# Patient Record
Sex: FEMALE | Race: BLACK OR AFRICAN AMERICAN | Employment: OTHER | URBAN - METROPOLITAN AREA
[De-identification: names, ages, dates, MRNs, and addresses within clinical notes are randomized per-mention and may not be internally consistent; named-entity substitution may affect disease eponyms.]

---

## 2018-05-08 ENCOUNTER — HOSPITAL ENCOUNTER (OUTPATIENT)
Dept: PREADMISSION TESTING | Age: 73
Discharge: HOME | End: 2018-05-12
Payer: MEDICARE

## 2018-05-08 VITALS
HEIGHT: 64 IN | WEIGHT: 192.68 LBS | SYSTOLIC BLOOD PRESSURE: 149 MMHG | RESPIRATION RATE: 16 BRPM | OXYGEN SATURATION: 99 % | BODY MASS INDEX: 32.9 KG/M2 | HEART RATE: 84 BPM | DIASTOLIC BLOOD PRESSURE: 67 MMHG

## 2018-05-08 LAB
ABSOLUTE EOS #: 0.3 K/UL (ref 0–0.4)
ABSOLUTE IMMATURE GRANULOCYTE: ABNORMAL K/UL (ref 0–0.3)
ABSOLUTE LYMPH #: 2.6 K/UL (ref 1–4.8)
ABSOLUTE MONO #: 0.8 K/UL (ref 0.2–0.8)
ANION GAP SERPL CALCULATED.3IONS-SCNC: 16 MMOL/L (ref 9–17)
BASOPHILS # BLD: 1 % (ref 0–2)
BASOPHILS ABSOLUTE: 0 K/UL (ref 0–0.2)
BUN BLDV-MCNC: 30 MG/DL (ref 8–23)
BUN/CREAT BLD: 18 (ref 9–20)
CALCIUM SERPL-MCNC: 9.5 MG/DL (ref 8.6–10.4)
CHLORIDE BLD-SCNC: 103 MMOL/L (ref 98–107)
CO2: 25 MMOL/L (ref 20–31)
CREAT SERPL-MCNC: 1.68 MG/DL (ref 0.5–0.9)
DIFFERENTIAL TYPE: ABNORMAL
EOSINOPHILS RELATIVE PERCENT: 5 % (ref 1–4)
GFR AFRICAN AMERICAN: 36 ML/MIN
GFR NON-AFRICAN AMERICAN: 30 ML/MIN
GFR SERPL CREATININE-BSD FRML MDRD: ABNORMAL ML/MIN/{1.73_M2}
GFR SERPL CREATININE-BSD FRML MDRD: ABNORMAL ML/MIN/{1.73_M2}
GLUCOSE BLD-MCNC: 241 MG/DL (ref 70–99)
HCT VFR BLD CALC: 35.5 % (ref 36–46)
HEMOGLOBIN: 11.6 G/DL (ref 12–16)
IMMATURE GRANULOCYTES: ABNORMAL %
LYMPHOCYTES # BLD: 37 % (ref 24–44)
MCH RBC QN AUTO: 28.5 PG (ref 26–34)
MCHC RBC AUTO-ENTMCNC: 32.8 G/DL (ref 31–37)
MCV RBC AUTO: 87 FL (ref 80–100)
MONOCYTES # BLD: 11 % (ref 1–7)
NRBC AUTOMATED: ABNORMAL PER 100 WBC
PDW BLD-RTO: 14.1 % (ref 11.5–14.5)
PLATELET # BLD: 225 K/UL (ref 130–400)
PLATELET ESTIMATE: ABNORMAL
PMV BLD AUTO: 10 FL (ref 6–12)
POTASSIUM SERPL-SCNC: 3.5 MMOL/L (ref 3.7–5.3)
RBC # BLD: 4.08 M/UL (ref 4–5.2)
RBC # BLD: ABNORMAL 10*6/UL
SEG NEUTROPHILS: 46 % (ref 36–66)
SEGMENTED NEUTROPHILS ABSOLUTE COUNT: 3.3 K/UL (ref 1.8–7.7)
SODIUM BLD-SCNC: 144 MMOL/L (ref 135–144)
WBC # BLD: 7 K/UL (ref 3.5–11)
WBC # BLD: ABNORMAL 10*3/UL

## 2018-05-08 PROCEDURE — 85025 COMPLETE CBC W/AUTO DIFF WBC: CPT

## 2018-05-08 PROCEDURE — 80048 BASIC METABOLIC PNL TOTAL CA: CPT

## 2018-05-08 PROCEDURE — 36415 COLL VENOUS BLD VENIPUNCTURE: CPT

## 2018-05-08 RX ORDER — SIMVASTATIN 10 MG
10 TABLET ORAL NIGHTLY
Status: ON HOLD | COMMUNITY
End: 2021-05-14 | Stop reason: HOSPADM

## 2018-05-08 RX ORDER — IBUPROFEN 800 MG/1
800 TABLET ORAL 3 TIMES DAILY PRN
Status: ON HOLD | COMMUNITY
End: 2021-05-14 | Stop reason: HOSPADM

## 2018-05-08 RX ORDER — CITALOPRAM 20 MG/1
40 TABLET ORAL DAILY
COMMUNITY

## 2018-05-08 RX ORDER — GABAPENTIN 300 MG/1
300 CAPSULE ORAL 3 TIMES DAILY
Status: ON HOLD | COMMUNITY
End: 2021-05-14 | Stop reason: SDUPTHER

## 2018-05-08 RX ORDER — ROSUVASTATIN CALCIUM 5 MG/1
5 TABLET, COATED ORAL DAILY
COMMUNITY

## 2018-05-08 RX ORDER — CHLORTHALIDONE 25 MG/1
25 TABLET ORAL DAILY
Status: ON HOLD | COMMUNITY
End: 2021-11-10 | Stop reason: ALTCHOICE

## 2018-05-08 RX ORDER — NEBIVOLOL 10 MG/1
20 TABLET ORAL DAILY
Status: ON HOLD | COMMUNITY
End: 2021-05-14 | Stop reason: HOSPADM

## 2018-05-08 RX ORDER — FENOFIBRATE 134 MG/1
134 CAPSULE ORAL NIGHTLY
Status: ON HOLD | COMMUNITY
End: 2021-05-14 | Stop reason: HOSPADM

## 2018-05-08 ASSESSMENT — PAIN DESCRIPTION - ORIENTATION: ORIENTATION: RIGHT

## 2018-05-08 ASSESSMENT — PAIN DESCRIPTION - DESCRIPTORS: DESCRIPTORS: SHOOTING

## 2018-05-08 ASSESSMENT — PAIN SCALES - GENERAL: PAINLEVEL_OUTOF10: 4

## 2018-05-08 ASSESSMENT — PAIN DESCRIPTION - PAIN TYPE: TYPE: ACUTE PAIN

## 2018-05-08 ASSESSMENT — PAIN DESCRIPTION - FREQUENCY: FREQUENCY: INTERMITTENT

## 2018-05-08 ASSESSMENT — PAIN DESCRIPTION - ONSET: ONSET: ON-GOING

## 2018-05-08 ASSESSMENT — PAIN DESCRIPTION - PROGRESSION: CLINICAL_PROGRESSION: GRADUALLY WORSENING

## 2018-05-13 ENCOUNTER — ANESTHESIA EVENT (OUTPATIENT)
Dept: OPERATING ROOM | Age: 73
End: 2018-05-13
Payer: MEDICARE

## 2018-05-14 ENCOUNTER — HOSPITAL ENCOUNTER (OUTPATIENT)
Age: 73
Setting detail: OUTPATIENT SURGERY
Discharge: HOME | End: 2018-05-14
Attending: SURGERY | Admitting: SURGERY
Payer: MEDICARE

## 2018-05-14 ENCOUNTER — ANESTHESIA (OUTPATIENT)
Dept: OPERATING ROOM | Age: 73
End: 2018-05-14
Payer: MEDICARE

## 2018-05-14 VITALS — DIASTOLIC BLOOD PRESSURE: 56 MMHG | SYSTOLIC BLOOD PRESSURE: 116 MMHG | TEMPERATURE: 97.3 F | OXYGEN SATURATION: 97 %

## 2018-05-14 VITALS
SYSTOLIC BLOOD PRESSURE: 138 MMHG | RESPIRATION RATE: 18 BRPM | HEART RATE: 75 BPM | DIASTOLIC BLOOD PRESSURE: 65 MMHG | OXYGEN SATURATION: 100 % | TEMPERATURE: 98.6 F

## 2018-05-14 DIAGNOSIS — R22.31 AXILLARY MASS, RIGHT: Primary | ICD-10-CM

## 2018-05-14 LAB
GLUCOSE BLD-MCNC: 198 MG/DL (ref 65–105)
GLUCOSE BLD-MCNC: 235 MG/DL (ref 65–105)

## 2018-05-14 PROCEDURE — 6360000002 HC RX W HCPCS: Performed by: NURSE ANESTHETIST, CERTIFIED REGISTERED

## 2018-05-14 PROCEDURE — 6360000002 HC RX W HCPCS: Performed by: SURGERY

## 2018-05-14 PROCEDURE — 3600000002 HC SURGERY LEVEL 2 BASE: Performed by: SURGERY

## 2018-05-14 PROCEDURE — 87641 MR-STAPH DNA AMP PROBE: CPT

## 2018-05-14 PROCEDURE — 2500000003 HC RX 250 WO HCPCS: Performed by: NURSE ANESTHETIST, CERTIFIED REGISTERED

## 2018-05-14 PROCEDURE — 7100000000 HC PACU RECOVERY - FIRST 15 MIN: Performed by: SURGERY

## 2018-05-14 PROCEDURE — 88307 TISSUE EXAM BY PATHOLOGIST: CPT

## 2018-05-14 PROCEDURE — 3600000012 HC SURGERY LEVEL 2 ADDTL 15MIN: Performed by: SURGERY

## 2018-05-14 PROCEDURE — 2500000003 HC RX 250 WO HCPCS

## 2018-05-14 PROCEDURE — 3700000001 HC ADD 15 MINUTES (ANESTHESIA): Performed by: SURGERY

## 2018-05-14 PROCEDURE — 7100000001 HC PACU RECOVERY - ADDTL 15 MIN: Performed by: SURGERY

## 2018-05-14 PROCEDURE — 2580000003 HC RX 258: Performed by: ANESTHESIOLOGY

## 2018-05-14 PROCEDURE — 6360000002 HC RX W HCPCS: Performed by: ANESTHESIOLOGY

## 2018-05-14 PROCEDURE — 7100000011 HC PHASE II RECOVERY - ADDTL 15 MIN: Performed by: SURGERY

## 2018-05-14 PROCEDURE — 82947 ASSAY GLUCOSE BLOOD QUANT: CPT

## 2018-05-14 PROCEDURE — 7100000010 HC PHASE II RECOVERY - FIRST 15 MIN: Performed by: SURGERY

## 2018-05-14 PROCEDURE — 3700000000 HC ANESTHESIA ATTENDED CARE: Performed by: SURGERY

## 2018-05-14 RX ORDER — LIDOCAINE HYDROCHLORIDE 20 MG/ML
INJECTION, SOLUTION EPIDURAL; INFILTRATION; INTRACAUDAL; PERINEURAL PRN
Status: DISCONTINUED | OUTPATIENT
Start: 2018-05-14 | End: 2018-05-14 | Stop reason: SDUPTHER

## 2018-05-14 RX ORDER — FENTANYL CITRATE 50 UG/ML
25 INJECTION, SOLUTION INTRAMUSCULAR; INTRAVENOUS EVERY 5 MIN PRN
Status: DISCONTINUED | OUTPATIENT
Start: 2018-05-14 | End: 2018-05-14 | Stop reason: HOSPADM

## 2018-05-14 RX ORDER — ONDANSETRON 4 MG/1
4 TABLET, FILM COATED ORAL EVERY 6 HOURS PRN
Qty: 30 TABLET | Refills: 0 | Status: ON HOLD | OUTPATIENT
Start: 2018-05-14 | End: 2022-02-04 | Stop reason: ALTCHOICE

## 2018-05-14 RX ORDER — PROPOFOL 10 MG/ML
INJECTION, EMULSION INTRAVENOUS PRN
Status: DISCONTINUED | OUTPATIENT
Start: 2018-05-14 | End: 2018-05-14 | Stop reason: SDUPTHER

## 2018-05-14 RX ORDER — HYDROCODONE BITARTRATE AND ACETAMINOPHEN 5; 325 MG/1; MG/1
TABLET ORAL
Qty: 24 TABLET | Refills: 0 | Status: SHIPPED | OUTPATIENT
Start: 2018-05-14 | End: 2018-05-18

## 2018-05-14 RX ORDER — ROCURONIUM BROMIDE 10 MG/ML
INJECTION, SOLUTION INTRAVENOUS PRN
Status: DISCONTINUED | OUTPATIENT
Start: 2018-05-14 | End: 2018-05-14 | Stop reason: SDUPTHER

## 2018-05-14 RX ORDER — GLYCOPYRROLATE 1 MG/5 ML
SYRINGE (ML) INTRAVENOUS PRN
Status: DISCONTINUED | OUTPATIENT
Start: 2018-05-14 | End: 2018-05-14 | Stop reason: SDUPTHER

## 2018-05-14 RX ORDER — NEOSTIGMINE METHYLSULFATE 5 MG/5 ML
SYRINGE (ML) INTRAVENOUS PRN
Status: DISCONTINUED | OUTPATIENT
Start: 2018-05-14 | End: 2018-05-14 | Stop reason: SDUPTHER

## 2018-05-14 RX ORDER — SODIUM CHLORIDE 9 MG/ML
INJECTION, SOLUTION INTRAVENOUS CONTINUOUS
Status: DISCONTINUED | OUTPATIENT
Start: 2018-05-15 | End: 2018-05-14

## 2018-05-14 RX ORDER — LIDOCAINE HYDROCHLORIDE 10 MG/ML
1 INJECTION, SOLUTION EPIDURAL; INFILTRATION; INTRACAUDAL; PERINEURAL
Status: DISCONTINUED | OUTPATIENT
Start: 2018-05-14 | End: 2018-05-14 | Stop reason: HOSPADM

## 2018-05-14 RX ORDER — ONDANSETRON 2 MG/ML
4 INJECTION INTRAMUSCULAR; INTRAVENOUS
Status: DISCONTINUED | OUTPATIENT
Start: 2018-05-14 | End: 2018-05-14 | Stop reason: HOSPADM

## 2018-05-14 RX ORDER — SODIUM CHLORIDE 0.9 % (FLUSH) 0.9 %
10 SYRINGE (ML) INJECTION PRN
Status: DISCONTINUED | OUTPATIENT
Start: 2018-05-14 | End: 2018-05-14 | Stop reason: HOSPADM

## 2018-05-14 RX ORDER — DOCUSATE SODIUM 100 MG/1
100 CAPSULE, LIQUID FILLED ORAL 2 TIMES DAILY PRN
Qty: 50 CAPSULE | Refills: 0 | Status: ON HOLD | OUTPATIENT
Start: 2018-05-14 | End: 2021-11-10

## 2018-05-14 RX ORDER — DEXAMETHASONE SODIUM PHOSPHATE 10 MG/ML
INJECTION INTRAMUSCULAR; INTRAVENOUS PRN
Status: DISCONTINUED | OUTPATIENT
Start: 2018-05-14 | End: 2018-05-14 | Stop reason: SDUPTHER

## 2018-05-14 RX ORDER — ONDANSETRON 2 MG/ML
INJECTION INTRAMUSCULAR; INTRAVENOUS PRN
Status: DISCONTINUED | OUTPATIENT
Start: 2018-05-14 | End: 2018-05-14 | Stop reason: SDUPTHER

## 2018-05-14 RX ORDER — SODIUM CHLORIDE, SODIUM LACTATE, POTASSIUM CHLORIDE, CALCIUM CHLORIDE 600; 310; 30; 20 MG/100ML; MG/100ML; MG/100ML; MG/100ML
INJECTION, SOLUTION INTRAVENOUS CONTINUOUS
Status: DISCONTINUED | OUTPATIENT
Start: 2018-05-15 | End: 2018-05-14 | Stop reason: HOSPADM

## 2018-05-14 RX ORDER — SODIUM CHLORIDE 0.9 % (FLUSH) 0.9 %
10 SYRINGE (ML) INJECTION EVERY 12 HOURS SCHEDULED
Status: DISCONTINUED | OUTPATIENT
Start: 2018-05-14 | End: 2018-05-14 | Stop reason: HOSPADM

## 2018-05-14 RX ORDER — HYDRALAZINE HYDROCHLORIDE 20 MG/ML
10 INJECTION INTRAMUSCULAR; INTRAVENOUS ONCE
Status: COMPLETED | OUTPATIENT
Start: 2018-05-14 | End: 2018-05-14

## 2018-05-14 RX ORDER — FENTANYL CITRATE 50 UG/ML
INJECTION, SOLUTION INTRAMUSCULAR; INTRAVENOUS PRN
Status: DISCONTINUED | OUTPATIENT
Start: 2018-05-14 | End: 2018-05-14 | Stop reason: SDUPTHER

## 2018-05-14 RX ADMIN — SODIUM CHLORIDE, POTASSIUM CHLORIDE, SODIUM LACTATE AND CALCIUM CHLORIDE: 600; 310; 30; 20 INJECTION, SOLUTION INTRAVENOUS at 13:45

## 2018-05-14 RX ADMIN — ONDANSETRON 4 MG: 2 INJECTION INTRAMUSCULAR; INTRAVENOUS at 16:23

## 2018-05-14 RX ADMIN — ROCURONIUM BROMIDE 50 MG: 10 INJECTION, SOLUTION INTRAVENOUS at 15:29

## 2018-05-14 RX ADMIN — FENTANYL CITRATE 100 MCG: 50 INJECTION, SOLUTION INTRAMUSCULAR; INTRAVENOUS at 15:27

## 2018-05-14 RX ADMIN — Medication 0.8 MG: at 16:34

## 2018-05-14 RX ADMIN — CEFAZOLIN SODIUM 2 G: 2 SOLUTION INTRAVENOUS at 15:41

## 2018-05-14 RX ADMIN — Medication 5 MG: at 16:34

## 2018-05-14 RX ADMIN — SODIUM CHLORIDE, POTASSIUM CHLORIDE, SODIUM LACTATE AND CALCIUM CHLORIDE: 600; 310; 30; 20 INJECTION, SOLUTION INTRAVENOUS at 16:35

## 2018-05-14 RX ADMIN — LIDOCAINE HYDROCHLORIDE 100 MG: 20 INJECTION, SOLUTION EPIDURAL; INFILTRATION; INTRACAUDAL; PERINEURAL at 15:27

## 2018-05-14 RX ADMIN — DEXAMETHASONE SODIUM PHOSPHATE 10 MG: 10 INJECTION INTRAMUSCULAR; INTRAVENOUS at 15:52

## 2018-05-14 RX ADMIN — HYDRALAZINE HYDROCHLORIDE 10 MG: 20 INJECTION INTRAMUSCULAR; INTRAVENOUS at 17:25

## 2018-05-14 RX ADMIN — PROPOFOL 150 MG: 10 INJECTION, EMULSION INTRAVENOUS at 15:27

## 2018-05-14 ASSESSMENT — PULMONARY FUNCTION TESTS
PIF_VALUE: 21
PIF_VALUE: 9
PIF_VALUE: 23
PIF_VALUE: 24
PIF_VALUE: 20
PIF_VALUE: 1
PIF_VALUE: 24
PIF_VALUE: 25
PIF_VALUE: 21
PIF_VALUE: 1
PIF_VALUE: 10
PIF_VALUE: 15
PIF_VALUE: 21
PIF_VALUE: 2
PIF_VALUE: 19
PIF_VALUE: 15
PIF_VALUE: 22
PIF_VALUE: 22
PIF_VALUE: 15
PIF_VALUE: 20
PIF_VALUE: 23
PIF_VALUE: 23
PIF_VALUE: 21
PIF_VALUE: 28
PIF_VALUE: 21
PIF_VALUE: 2
PIF_VALUE: 25
PIF_VALUE: 1
PIF_VALUE: 24
PIF_VALUE: 15
PIF_VALUE: 21
PIF_VALUE: 3
PIF_VALUE: 15
PIF_VALUE: 1
PIF_VALUE: 15
PIF_VALUE: 5
PIF_VALUE: 1
PIF_VALUE: 23
PIF_VALUE: 23
PIF_VALUE: 24
PIF_VALUE: 10
PIF_VALUE: 2
PIF_VALUE: 9
PIF_VALUE: 25
PIF_VALUE: 2
PIF_VALUE: 23
PIF_VALUE: 21
PIF_VALUE: 21
PIF_VALUE: 15
PIF_VALUE: 21
PIF_VALUE: 25
PIF_VALUE: 21
PIF_VALUE: 15
PIF_VALUE: 21
PIF_VALUE: 21
PIF_VALUE: 15
PIF_VALUE: 15
PIF_VALUE: 20
PIF_VALUE: 21
PIF_VALUE: 1
PIF_VALUE: 1
PIF_VALUE: 21
PIF_VALUE: 15
PIF_VALUE: 25
PIF_VALUE: 21
PIF_VALUE: 21
PIF_VALUE: 28
PIF_VALUE: 15
PIF_VALUE: 20
PIF_VALUE: 22
PIF_VALUE: 19
PIF_VALUE: 2
PIF_VALUE: 19
PIF_VALUE: 15
PIF_VALUE: 24
PIF_VALUE: 17
PIF_VALUE: 15
PIF_VALUE: 22
PIF_VALUE: 1
PIF_VALUE: 2
PIF_VALUE: 20
PIF_VALUE: 4
PIF_VALUE: 21
PIF_VALUE: 24
PIF_VALUE: 21
PIF_VALUE: 24

## 2018-05-14 ASSESSMENT — PAIN SCALES - GENERAL
PAINLEVEL_OUTOF10: 0

## 2018-05-14 ASSESSMENT — ENCOUNTER SYMPTOMS: SHORTNESS OF BREATH: 0

## 2018-05-15 LAB
MRSA, DNA, NASAL: NORMAL
SPECIMEN DESCRIPTION: NORMAL

## 2018-05-16 LAB — SURGICAL PATHOLOGY REPORT: NORMAL

## 2020-08-24 ENCOUNTER — HOSPITAL ENCOUNTER (OUTPATIENT)
Age: 75
Setting detail: SPECIMEN
Discharge: HOME | End: 2020-08-24
Payer: MEDICARE

## 2020-08-24 LAB
ANION GAP SERPL CALCULATED.3IONS-SCNC: 15 MMOL/L (ref 9–17)
BUN BLDV-MCNC: 30 MG/DL (ref 8–23)
BUN/CREAT BLD: ABNORMAL (ref 9–20)
CALCIUM SERPL-MCNC: 9.2 MG/DL (ref 8.6–10.4)
CHLORIDE BLD-SCNC: 106 MMOL/L (ref 98–107)
CO2: 21 MMOL/L (ref 20–31)
CREAT SERPL-MCNC: 3.06 MG/DL (ref 0.5–0.9)
GFR AFRICAN AMERICAN: 18 ML/MIN
GFR NON-AFRICAN AMERICAN: 15 ML/MIN
GFR SERPL CREATININE-BSD FRML MDRD: ABNORMAL ML/MIN/{1.73_M2}
GFR SERPL CREATININE-BSD FRML MDRD: ABNORMAL ML/MIN/{1.73_M2}
GLUCOSE BLD-MCNC: 144 MG/DL (ref 70–99)
POTASSIUM SERPL-SCNC: 3.6 MMOL/L (ref 3.7–5.3)
SODIUM BLD-SCNC: 142 MMOL/L (ref 135–144)

## 2021-05-12 ENCOUNTER — APPOINTMENT (OUTPATIENT)
Dept: GENERAL RADIOLOGY | Age: 76
DRG: 637 | End: 2021-05-12
Payer: MEDICARE

## 2021-05-12 ENCOUNTER — APPOINTMENT (OUTPATIENT)
Dept: CT IMAGING | Age: 76
DRG: 637 | End: 2021-05-12
Payer: MEDICARE

## 2021-05-12 ENCOUNTER — HOSPITAL ENCOUNTER (INPATIENT)
Age: 76
LOS: 3 days | Discharge: HOME HEALTH | DRG: 637 | End: 2021-05-15
Attending: EMERGENCY MEDICINE | Admitting: INTERNAL MEDICINE
Payer: MEDICARE

## 2021-05-12 DIAGNOSIS — G93.40 ENCEPHALOPATHY: Primary | ICD-10-CM

## 2021-05-12 PROBLEM — E11.9 DIABETES (HCC): Status: ACTIVE | Noted: 2021-05-12

## 2021-05-12 PROBLEM — Z99.2 PERITONEAL DIALYSIS STATUS (HCC): Status: ACTIVE | Noted: 2021-05-12

## 2021-05-12 PROBLEM — E16.2 HYPOGLYCEMIA: Status: ACTIVE | Noted: 2021-05-12

## 2021-05-12 PROBLEM — R41.82 AMS (ALTERED MENTAL STATUS): Status: ACTIVE | Noted: 2021-05-12

## 2021-05-12 LAB
ACETAMINOPHEN LEVEL: <5 UG/ML (ref 10–30)
ALBUMIN SERPL-MCNC: 3.5 G/DL (ref 3.5–5.2)
ALBUMIN/GLOBULIN RATIO: 0.9 (ref 1–2.5)
ALLEN TEST: ABNORMAL
ALLEN TEST: ABNORMAL
ALP BLD-CCNC: 71 U/L (ref 35–104)
ALT SERPL-CCNC: 12 U/L (ref 5–33)
AMMONIA: 22 UMOL/L (ref 11–51)
ANION GAP SERPL CALCULATED.3IONS-SCNC: 18 MMOL/L (ref 9–17)
ANION GAP: 10 MMOL/L (ref 7–16)
AST SERPL-CCNC: 12 U/L
BILIRUB SERPL-MCNC: 0.31 MG/DL (ref 0.3–1.2)
BILIRUBIN DIRECT: 0.13 MG/DL
BILIRUBIN, INDIRECT: 0.18 MG/DL (ref 0–1)
BUN BLDV-MCNC: 59 MG/DL (ref 8–23)
BUN/CREAT BLD: ABNORMAL (ref 9–20)
CALCIUM SERPL-MCNC: 8.7 MG/DL (ref 8.6–10.4)
CARBOXYHEMOGLOBIN: 4.8 % (ref 0–5)
CHLORIDE BLD-SCNC: 103 MMOL/L (ref 98–107)
CHP ED QC CHECK: NORMAL
CO2: 22 MMOL/L (ref 20–31)
CREAT SERPL-MCNC: 7.01 MG/DL (ref 0.5–0.9)
ETHANOL PERCENT: <0.01 %
ETHANOL: <10 MG/DL
FIO2: ABNORMAL
FIO2: ABNORMAL
GFR AFRICAN AMERICAN: 7 ML/MIN
GFR NON-AFRICAN AMERICAN: 5 ML/MIN
GFR NON-AFRICAN AMERICAN: 6 ML/MIN
GFR SERPL CREATININE-BSD FRML MDRD: 6 ML/MIN
GFR SERPL CREATININE-BSD FRML MDRD: ABNORMAL ML/MIN/{1.73_M2}
GLOBULIN: ABNORMAL G/DL (ref 1.5–3.8)
GLUCOSE BLD-MCNC: 123 MG/DL
GLUCOSE BLD-MCNC: 123 MG/DL (ref 65–105)
GLUCOSE BLD-MCNC: 147 MG/DL (ref 65–105)
GLUCOSE BLD-MCNC: 220 MG/DL (ref 65–105)
GLUCOSE BLD-MCNC: 51 MG/DL (ref 70–99)
GLUCOSE BLD-MCNC: 52 MG/DL (ref 74–100)
GLUCOSE BLD-MCNC: 99 MG/DL (ref 65–105)
HCO3 VENOUS: 20.8 MMOL/L (ref 24–30)
HCO3 VENOUS: 27.9 MMOL/L (ref 22–29)
HCT VFR BLD CALC: 39.8 % (ref 36.3–47.1)
HEMOGLOBIN: 12.4 G/DL (ref 11.9–15.1)
LACTIC ACID, WHOLE BLOOD: 2.1 MMOL/L (ref 0.7–2.1)
LIPASE: 44 U/L (ref 13–60)
MCH RBC QN AUTO: 29.7 PG (ref 25.2–33.5)
MCHC RBC AUTO-ENTMCNC: 31.2 G/DL (ref 28.4–34.8)
MCV RBC AUTO: 95.4 FL (ref 82.6–102.9)
METHEMOGLOBIN: ABNORMAL % (ref 0–1.5)
MODE: ABNORMAL
MODE: ABNORMAL
NEGATIVE BASE EXCESS, VEN: 2.1 MMOL/L (ref 0–2)
NEGATIVE BASE EXCESS, VEN: ABNORMAL (ref 0–2)
NOTIFICATION TIME: ABNORMAL
NOTIFICATION: ABNORMAL
NRBC AUTOMATED: 0.3 PER 100 WBC
O2 DEVICE/FLOW/%: ABNORMAL
O2 DEVICE/FLOW/%: ABNORMAL
O2 SAT, VEN: 71 % (ref 60–85)
O2 SAT, VEN: 97.2 % (ref 60–85)
OXYHEMOGLOBIN: ABNORMAL % (ref 95–98)
PATIENT TEMP: 37
PATIENT TEMP: ABNORMAL
PCO2, VEN, TEMP ADJ: ABNORMAL MMHG (ref 39–55)
PCO2, VEN: 31.3 (ref 39–55)
PCO2, VEN: 53.3 MM HG (ref 41–51)
PDW BLD-RTO: 14.9 % (ref 11.8–14.4)
PEEP/CPAP: ABNORMAL
PH VENOUS: 7.33 (ref 7.32–7.43)
PH VENOUS: 7.44 (ref 7.32–7.42)
PH, VEN, TEMP ADJ: ABNORMAL (ref 7.32–7.42)
PLATELET # BLD: 251 K/UL (ref 138–453)
PMV BLD AUTO: 12.3 FL (ref 8.1–13.5)
PO2, VEN, TEMP ADJ: ABNORMAL MMHG (ref 30–50)
PO2, VEN: 191 (ref 30–50)
PO2, VEN: 40.9 MM HG (ref 30–50)
POC BUN: 56 MG/DL (ref 8–26)
POC CHLORIDE: 108 MMOL/L (ref 98–107)
POC CREATININE: 7.64 MG/DL (ref 0.51–1.19)
POC HEMATOCRIT: 45 % (ref 36–46)
POC HEMOGLOBIN: 15.2 G/DL (ref 12–16)
POC IONIZED CALCIUM: 1.14 MMOL/L (ref 1.15–1.33)
POC LACTIC ACID: 1.36 MMOL/L (ref 0.56–1.39)
POC PCO2 TEMP: ABNORMAL MM HG
POC PH TEMP: ABNORMAL
POC PO2 TEMP: ABNORMAL MM HG
POC POTASSIUM: 2.8 MMOL/L (ref 3.5–4.5)
POC SODIUM: 145 MMOL/L (ref 138–146)
POC TCO2: 28 MMOL/L (ref 22–30)
POSITIVE BASE EXCESS, VEN: 1 (ref 0–3)
POSITIVE BASE EXCESS, VEN: ABNORMAL MMOL/L (ref 0–2)
POTASSIUM SERPL-SCNC: 3 MMOL/L (ref 3.7–5.3)
PSV: ABNORMAL
PT. POSITION: ABNORMAL
RBC # BLD: 4.17 M/UL (ref 3.95–5.11)
RESPIRATORY RATE: ABNORMAL
SALICYLATE LEVEL: <1 MG/DL (ref 3–10)
SAMPLE SITE: ABNORMAL
SAMPLE SITE: ABNORMAL
SET RATE: ABNORMAL
SODIUM BLD-SCNC: 143 MMOL/L (ref 135–144)
TEXT FOR RESPIRATORY: ABNORMAL
TOTAL CO2, VENOUS: ABNORMAL MMOL/L (ref 23–30)
TOTAL HB: ABNORMAL G/DL (ref 12–16)
TOTAL PROTEIN: 7.4 G/DL (ref 6.4–8.3)
TOTAL RATE: ABNORMAL
TOXIC TRICYCLIC SC,BLOOD: NEGATIVE
TROPONIN INTERP: ABNORMAL
TROPONIN INTERP: ABNORMAL
TROPONIN INTERP: NORMAL
TROPONIN T: ABNORMAL NG/ML
TROPONIN T: ABNORMAL NG/ML
TROPONIN T: NORMAL NG/ML
TROPONIN, HIGH SENSITIVITY: 13 NG/L (ref 0–14)
TROPONIN, HIGH SENSITIVITY: 69 NG/L (ref 0–14)
TROPONIN, HIGH SENSITIVITY: 75 NG/L (ref 0–14)
TSH SERPL DL<=0.05 MIU/L-ACNC: 1.21 MIU/L (ref 0.3–5)
VT: ABNORMAL
WBC # BLD: 11.7 K/UL (ref 3.5–11.3)

## 2021-05-12 PROCEDURE — 80179 DRUG ASSAY SALICYLATE: CPT

## 2021-05-12 PROCEDURE — 84443 ASSAY THYROID STIM HORMONE: CPT

## 2021-05-12 PROCEDURE — 80307 DRUG TEST PRSMV CHEM ANLYZR: CPT

## 2021-05-12 PROCEDURE — 83605 ASSAY OF LACTIC ACID: CPT

## 2021-05-12 PROCEDURE — 83690 ASSAY OF LIPASE: CPT

## 2021-05-12 PROCEDURE — 82565 ASSAY OF CREATININE: CPT

## 2021-05-12 PROCEDURE — 82140 ASSAY OF AMMONIA: CPT

## 2021-05-12 PROCEDURE — 80051 ELECTROLYTE PANEL: CPT

## 2021-05-12 PROCEDURE — 82803 BLOOD GASES ANY COMBINATION: CPT

## 2021-05-12 PROCEDURE — 2060000000 HC ICU INTERMEDIATE R&B

## 2021-05-12 PROCEDURE — 71045 X-RAY EXAM CHEST 1 VIEW: CPT

## 2021-05-12 PROCEDURE — 87040 BLOOD CULTURE FOR BACTERIA: CPT

## 2021-05-12 PROCEDURE — 85014 HEMATOCRIT: CPT

## 2021-05-12 PROCEDURE — 99285 EMERGENCY DEPT VISIT HI MDM: CPT

## 2021-05-12 PROCEDURE — 84520 ASSAY OF UREA NITROGEN: CPT

## 2021-05-12 PROCEDURE — 82330 ASSAY OF CALCIUM: CPT

## 2021-05-12 PROCEDURE — 80143 DRUG ASSAY ACETAMINOPHEN: CPT

## 2021-05-12 PROCEDURE — 82947 ASSAY GLUCOSE BLOOD QUANT: CPT

## 2021-05-12 PROCEDURE — 85027 COMPLETE CBC AUTOMATED: CPT

## 2021-05-12 PROCEDURE — 84484 ASSAY OF TROPONIN QUANT: CPT

## 2021-05-12 PROCEDURE — 6370000000 HC RX 637 (ALT 250 FOR IP): Performed by: STUDENT IN AN ORGANIZED HEALTH CARE EDUCATION/TRAINING PROGRAM

## 2021-05-12 PROCEDURE — G0480 DRUG TEST DEF 1-7 CLASSES: HCPCS

## 2021-05-12 PROCEDURE — 80048 BASIC METABOLIC PNL TOTAL CA: CPT

## 2021-05-12 PROCEDURE — 93005 ELECTROCARDIOGRAM TRACING: CPT | Performed by: EMERGENCY MEDICINE

## 2021-05-12 PROCEDURE — 6360000002 HC RX W HCPCS: Performed by: STUDENT IN AN ORGANIZED HEALTH CARE EDUCATION/TRAINING PROGRAM

## 2021-05-12 PROCEDURE — 80076 HEPATIC FUNCTION PANEL: CPT

## 2021-05-12 PROCEDURE — 81001 URINALYSIS AUTO W/SCOPE: CPT

## 2021-05-12 PROCEDURE — 82805 BLOOD GASES W/O2 SATURATION: CPT

## 2021-05-12 PROCEDURE — 70450 CT HEAD/BRAIN W/O DYE: CPT

## 2021-05-12 PROCEDURE — 36415 COLL VENOUS BLD VENIPUNCTURE: CPT

## 2021-05-12 RX ORDER — POLYETHYLENE GLYCOL 3350 17 G/17G
17 POWDER, FOR SOLUTION ORAL DAILY PRN
Status: DISCONTINUED | OUTPATIENT
Start: 2021-05-12 | End: 2021-05-15 | Stop reason: HOSPADM

## 2021-05-12 RX ORDER — DEXTROSE MONOHYDRATE 50 MG/ML
100 INJECTION, SOLUTION INTRAVENOUS PRN
Status: DISCONTINUED | OUTPATIENT
Start: 2021-05-12 | End: 2021-05-15 | Stop reason: HOSPADM

## 2021-05-12 RX ORDER — PROMETHAZINE HYDROCHLORIDE 12.5 MG/1
12.5 TABLET ORAL EVERY 6 HOURS PRN
Status: DISCONTINUED | OUTPATIENT
Start: 2021-05-12 | End: 2021-05-15 | Stop reason: HOSPADM

## 2021-05-12 RX ORDER — DEXTROSE MONOHYDRATE 50 MG/ML
100 INJECTION, SOLUTION INTRAVENOUS PRN
Status: DISCONTINUED | OUTPATIENT
Start: 2021-05-12 | End: 2021-05-12

## 2021-05-12 RX ORDER — DEXTROSE MONOHYDRATE 25 G/50ML
12.5 INJECTION, SOLUTION INTRAVENOUS PRN
Status: DISCONTINUED | OUTPATIENT
Start: 2021-05-12 | End: 2021-05-15 | Stop reason: HOSPADM

## 2021-05-12 RX ORDER — DEXTROSE MONOHYDRATE 25 G/50ML
12.5 INJECTION, SOLUTION INTRAVENOUS PRN
Status: DISCONTINUED | OUTPATIENT
Start: 2021-05-12 | End: 2021-05-12

## 2021-05-12 RX ORDER — SODIUM CHLORIDE, SODIUM LACTATE, CALCIUM CHLORIDE, MAGNESIUM CHLORIDE AND DEXTROSE 2.5; 538; 448; 18.3; 5.08 G/100ML; MG/100ML; MG/100ML; MG/100ML; MG/100ML
2000 INJECTION, SOLUTION INTRAPERITONEAL EVERY 6 HOURS
Status: DISCONTINUED | OUTPATIENT
Start: 2021-05-12 | End: 2021-05-13

## 2021-05-12 RX ORDER — SODIUM CHLORIDE 9 MG/ML
25 INJECTION, SOLUTION INTRAVENOUS PRN
Status: DISCONTINUED | OUTPATIENT
Start: 2021-05-12 | End: 2021-05-15 | Stop reason: HOSPADM

## 2021-05-12 RX ORDER — NICOTINE POLACRILEX 4 MG
15 LOZENGE BUCCAL PRN
Status: DISCONTINUED | OUTPATIENT
Start: 2021-05-12 | End: 2021-05-12

## 2021-05-12 RX ORDER — ACETAMINOPHEN 325 MG/1
650 TABLET ORAL EVERY 6 HOURS PRN
Status: DISCONTINUED | OUTPATIENT
Start: 2021-05-12 | End: 2021-05-15 | Stop reason: HOSPADM

## 2021-05-12 RX ORDER — DOCUSATE SODIUM 100 MG/1
100 CAPSULE, LIQUID FILLED ORAL 2 TIMES DAILY PRN
Status: DISCONTINUED | OUTPATIENT
Start: 2021-05-12 | End: 2021-05-15 | Stop reason: HOSPADM

## 2021-05-12 RX ORDER — DEXTROSE MONOHYDRATE 25 G/50ML
INJECTION, SOLUTION INTRAVENOUS
Status: DISPENSED
Start: 2021-05-12 | End: 2021-05-13

## 2021-05-12 RX ORDER — ACETAMINOPHEN 650 MG/1
650 SUPPOSITORY RECTAL EVERY 6 HOURS PRN
Status: DISCONTINUED | OUTPATIENT
Start: 2021-05-12 | End: 2021-05-15 | Stop reason: HOSPADM

## 2021-05-12 RX ORDER — GABAPENTIN 300 MG/1
300 CAPSULE ORAL 3 TIMES DAILY
Status: DISCONTINUED | OUTPATIENT
Start: 2021-05-12 | End: 2021-05-13

## 2021-05-12 RX ORDER — SODIUM CHLORIDE 0.9 % (FLUSH) 0.9 %
5-40 SYRINGE (ML) INJECTION EVERY 12 HOURS SCHEDULED
Status: DISCONTINUED | OUTPATIENT
Start: 2021-05-12 | End: 2021-05-15 | Stop reason: HOSPADM

## 2021-05-12 RX ORDER — CITALOPRAM 20 MG/1
40 TABLET ORAL DAILY
Status: DISCONTINUED | OUTPATIENT
Start: 2021-05-12 | End: 2021-05-15 | Stop reason: HOSPADM

## 2021-05-12 RX ORDER — SODIUM CHLORIDE 0.9 % (FLUSH) 0.9 %
5-40 SYRINGE (ML) INJECTION PRN
Status: DISCONTINUED | OUTPATIENT
Start: 2021-05-12 | End: 2021-05-15 | Stop reason: HOSPADM

## 2021-05-12 RX ORDER — CHLORTHALIDONE 25 MG/1
25 TABLET ORAL DAILY
Status: DISCONTINUED | OUTPATIENT
Start: 2021-05-12 | End: 2021-05-15 | Stop reason: HOSPADM

## 2021-05-12 RX ORDER — ASPIRIN 81 MG/1
81 TABLET, CHEWABLE ORAL DAILY
Status: DISCONTINUED | OUTPATIENT
Start: 2021-05-12 | End: 2021-05-15 | Stop reason: HOSPADM

## 2021-05-12 RX ORDER — ONDANSETRON 2 MG/ML
4 INJECTION INTRAMUSCULAR; INTRAVENOUS EVERY 6 HOURS PRN
Status: DISCONTINUED | OUTPATIENT
Start: 2021-05-12 | End: 2021-05-15 | Stop reason: HOSPADM

## 2021-05-12 RX ORDER — POTASSIUM CHLORIDE 7.45 MG/ML
20 INJECTION INTRAVENOUS ONCE
Status: COMPLETED | OUTPATIENT
Start: 2021-05-12 | End: 2021-05-13

## 2021-05-12 RX ORDER — ROSUVASTATIN CALCIUM 5 MG/1
5 TABLET, COATED ORAL DAILY
Status: DISCONTINUED | OUTPATIENT
Start: 2021-05-12 | End: 2021-05-15 | Stop reason: HOSPADM

## 2021-05-12 RX ORDER — NICOTINE POLACRILEX 4 MG
15 LOZENGE BUCCAL PRN
Status: DISCONTINUED | OUTPATIENT
Start: 2021-05-12 | End: 2021-05-15 | Stop reason: HOSPADM

## 2021-05-12 RX ADMIN — ENOXAPARIN SODIUM 30 MG: 30 INJECTION, SOLUTION INTRAVENOUS; SUBCUTANEOUS at 21:50

## 2021-05-12 RX ADMIN — GABAPENTIN 300 MG: 300 CAPSULE ORAL at 21:50

## 2021-05-12 RX ADMIN — ACETAMINOPHEN 650 MG: 325 TABLET ORAL at 21:50

## 2021-05-12 RX ADMIN — POTASSIUM CHLORIDE 20 MEQ: 7.46 INJECTION, SOLUTION INTRAVENOUS at 22:18

## 2021-05-12 ASSESSMENT — PAIN SCALES - GENERAL
PAINLEVEL_OUTOF10: 7
PAINLEVEL_OUTOF10: 5

## 2021-05-12 ASSESSMENT — PAIN DESCRIPTION - FREQUENCY: FREQUENCY: CONTINUOUS

## 2021-05-12 ASSESSMENT — PAIN DESCRIPTION - ORIENTATION: ORIENTATION: MID

## 2021-05-12 ASSESSMENT — PAIN DESCRIPTION - PROGRESSION: CLINICAL_PROGRESSION: NOT CHANGED

## 2021-05-12 NOTE — ED NOTES
Daughter at bedside. Dr. Yu Watts at bedside to talk to daughter.  Daughter requesting pt to be transferred to Festus Tatum RN  05/12/21 7103

## 2021-05-12 NOTE — ED PROVIDER NOTES
[facility]  ED  Emergency Department        Pt Name: Rosalind Johnson  MRN: [id number]  [place] 1945  Date of evaluation: 5/12/21    CHIEF COMPLAINT       Chief Complaint   Patient presents with    Hypoglycemia       HISTORY OF PRESENT ILLNESS  (Location/Symptom, Timing/Onset, Context/Setting, Quality, Duration, ModifyingFactors, Severity.)      Rosalind Johnson is a 67 y.o. female who presents with altered mental status, was found to have blood sugar of 49. Unable to get access and patient was brought into the emergency room her IV abscess was obtained, and patient was given an amp of dextrose. She does wake up and answer simple questions but still is somnolent and will fall asleep. Unable to get additional information. She does have a history of breast cancer has a recent port to her right chest that was placed. Typically follows with [address] [place] [address]. Spoke with patient's daughter Markel Peña who is present who reports that patient has been off of her insulin for about a week but was resumed 2 days ago after following with primary care provider and over the past 2 days has been getting slowly worse. She does her peritoneal dialysis intermittently. But has basically not cannot gotten out of bed for the last 2 days. And has had multiple falls due to generalized weakness. She does have a history of stroke. She has noticed some changes in her speech over the last day. She follows with Dr. Tierra Saini at [address] [place] [address] / SURGICAL / SOCIAL / FAMILY HISTORY      has a past medical history of Anxiety and depression, Arthritis, Cancer ([facility] [address] Diabetes mellitus ([facility] [address] History of blood transfusion, Hyperlipidemia, Hypertension, MRSA (methicillin resistant staph aureus) culture positive, PAT (obstructive sleep apnea), Prolonged emergence from general anesthesia, and SOB (shortness of breath).      has a past surgical history that includes Uvulopalatopharygoplasty; Mastectomy (Left); Hysterectomy; Breast reduction surgery; Appendectomy; back surgery; Mastectomy (Left, 05/14/2018); and pr exc skin benig <5mm trunk,arm,leg (Right, 5/14/2018). Social History     Socioeconomic History    Marital status:      Spouse name: Not on file    Number of children: Not on file    Years of education: Not on file    Highest education level: Not on file   Occupational History    Not on file   Social Needs    Financial resource strain: Not on file    Food insecurity     Worry: Not on file     Inability: Not on file    Transportation needs     Medical: Not on file     Non-medical: Not on file   Tobacco Use    Smoking status: Former Smoker    Smokeless tobacco: Never Used   Substance and Sexual Activity    Alcohol use: Yes     Comment: occasionally  \"couple a month\"    Drug use: No    Sexual activity: Not on file   Lifestyle    Physical activity     Days per week: Not on file     Minutes per session: Not on file    Stress: Not on file   Relationships    Social connections     Talks on phone: Not on file     Gets together: Not on file     Attends Taoist service: Not on file     Active member of club or organization: Not on file     Attends meetings of clubs or organizations: Not on file     Relationship status: Not on file    Intimate partner violence     Fear of current or ex partner: Not on file     Emotionally abused: Not on file     Physically abused: Not on file     Forced sexual activity: Not on file   Other Topics Concern    Not on file   Social History Narrative    Not on file       No family history on file. Allergies:  Patient has no known allergies. Home Medications:  Prior to Admission medications    Medication Sig Start Date End Date Taking?  Authorizing Provider   docusate sodium (COLACE) 100 MG capsule Take 1 capsule by mouth 2 times daily as needed for Constipation 5/14/18   Emelyn Alford, DO   ondansetron (ZOFRAN) 4 MG tablet Take 1 tablet by mouth every 6 hours as needed for Nausea or Vomiting 5/14/18   Garry Davenport,    nebivolol (BYSTOLIC) 10 MG tablet Take 20 mg by mouth daily    Historical Provider, MD   rosuvastatin (CRESTOR) 5 MG tablet Take 5 mg by mouth daily    Historical Provider, MD   chlorthalidone (HYGROTON) 25 MG tablet Take 25 mg by mouth daily    Historical Provider, MD   citalopram (CELEXA) 20 MG tablet Take 40 mg by mouth daily    Historical Provider, MD   simvastatin (ZOCOR) 10 MG tablet Take 10 mg by mouth nightly    Historical Provider, MD   gabapentin (NEURONTIN) 300 MG capsule Take 300 mg by mouth 3 times daily. Haroon  Historical Provider, MD   fenofibrate micronized (LOFIBRA) 134 MG capsule Take 134 mg by mouth nightly    Historical Provider, MD   SITagliptin-metFORMIN (JANUMET XR)  MG TB24 per extended release tablet Take 1 tablet by mouth daily    Historical Provider, MD   aspirin 81 MG tablet Take 81 mg by mouth daily    Historical Provider, MD   ibuprofen (ADVIL;MOTRIN) 800 MG tablet Take 800 mg by mouth 3 times daily as needed for Pain    Historical Provider, MD   insulin aspart (NOVOLOG) 100 UNIT/ML injection vial Inject 85 Units into the skin 3 times daily (before meals)    Historical Provider, MD       REVIEW OF SYSTEMS    (2-9 systems for level 4, 10 or more for level 5)      Review of Systems   Unable to perform ROS: Mental status change       PHYSICAL EXAM   (up to 7 for level 4, 8 or more for level 5)     INITIAL VITALS:   BP 96/65   Pulse 55   Temp 97.1 °F (36.2 °C) (Oral)   Resp 13   Wt 200 lb (90.7 kg)   SpO2 99%   BMI 34.87 kg/m²     Physical Exam  Vitals signs and nursing note reviewed. Constitutional:       Appearance: She is well-developed. Comments: Sleeping but will awake, and answer simple questions but then will fall back asleep   HENT:      Head: Normocephalic and atraumatic. Eyes:      General:         Right eye: No discharge. Left eye: No discharge. Conjunctiva/sclera: Conjunctivae normal.   Cardiovascular:      Rate and Rhythm: Normal rate and regular rhythm. Heart sounds: Normal heart sounds. No murmur. No friction rub. No gallop. Pulmonary:      Effort: Pulmonary effort is normal. No respiratory distress. Breath sounds: Normal breath sounds. No wheezing or rales. Chest:      Chest wall: No tenderness. Abdominal:      General: There is no distension. Palpations: Abdomen is soft. There is no mass. Tenderness: There is no abdominal tenderness. There is no guarding or rebound. Skin:     General: Skin is warm and dry. Findings: No rash. Neurological:      Comments: Patient does awake, and will answer simple questions and obey commands, no neuro deficit on exam.  Possible slurred speech which has been there for the last day         DIFFERENTIAL  DIAGNOSIS     Patient with concern for altered mental status, encephalopathy. She is a peritoneal dialysis. Daughter states that on Monday when they took her to her primary care doctor's office they discovered that she had not been taking her insulin regimen for about a week. And had missed some peritoneal dialysis. And since then they have been on her for taking her medication as well as doing her dialysis but patient has not been wanting to get out of bed and has been sleeping more often.   And they brought her to the ER today due to altered mental status does have a history of stroke    PLAN (LABS / IMAGING / EKG):  Orders Placed This Encounter   Procedures    XR CHEST PORTABLE    CT HEAD WO CONTRAST    CBC    BASIC METABOLIC PANEL    Troponin    AMMONIA    BLOOD GAS, VENOUS    HEPATIC FUNCTION PANEL    LIPASE    LACTIC ACID, WHOLE BLOOD    Urinalysis with microscopic    TOX SCR, BLD, ED    ELECTROLYTES PLUS    Hemoglobin and hematocrit, blood    CALCIUM, IONIC (POC)    Troponin    Inpatient consult to Nephrology    Inpatient consult to Internal Medicine    POCT glucose    Venous Blood Gas, POC    Creatinine W/GFR Point of Care    POCT urea (BUN)    Lactic Acid, POC    POCT Glucose    POC Glucose Fingerstick    EKG 12 Lead       MEDICATIONS ORDERED:  Orders Placed This Encounter   Medications    dextrose 50 % solution     [place] Bo: cabinet override       DIAGNOSTIC RESULTS / EMERGENCY DEPARTMENT COURSE / MDM     LABS:  Results for orders placed or performed during the hospital encounter of 05/12/21   CBC   Result Value Ref Range    WBC 11.7 (H) 3.5 - 11.3 k/uL    RBC 4.17 3.95 - 5.11 m/uL    Hemoglobin 12.4 11.9 - 15.1 g/dL    Hematocrit 39.8 36.3 - 47.1 %    MCV 95.4 82.6 - 102.9 fL    MCH 29.7 25.2 - 33.5 pg    MCHC 31.2 28.4 - 34.8 g/dL    RDW 14.9 (H) 11.8 - 14.4 %    Platelets 504 134 - 665 k/uL    MPV 12.3 8.1 - 13.5 fL    NRBC Automated 0.3 (H) 0.0 per 100 WBC   BASIC METABOLIC PANEL   Result Value Ref Range    Glucose 51 (L) 70 - 99 mg/dL    BUN 59 (H) 8 - 23 mg/dL    CREATININE 7.01 (HH) 0.50 - 0.90 mg/dL    Bun/Cre Ratio NOT REPORTED 9 - 20    Calcium 8.7 8.6 - 10.4 mg/dL    Sodium 143 135 - 144 mmol/L    Potassium 3.0 (L) 3.7 - 5.3 mmol/L    Chloride 103 98 - 107 mmol/L    CO2 22 20 - 31 mmol/L    Anion Gap 18 (H) 9 - 17 mmol/L    GFR Non-African American 6 (L) >60 mL/min    GFR  7 (L) >60 mL/min    GFR Comment          GFR Staging NOT REPORTED    Troponin   Result Value Ref Range    Troponin, High Sensitivity 75 (HH) 0 - 14 ng/L    Troponin T NOT REPORTED <0.03 ng/mL    Troponin Interp NOT REPORTED    AMMONIA   Result Value Ref Range    Ammonia 22 11 - 51 umol/L   BLOOD GAS, VENOUS   Result Value Ref Range    pH, Lito 7.436 (H) 7.320 - 7.420    pCO2, Lito 31.3 (L) 39 - 55    pO2, Lito 191.0 (H) 30 - 50    HCO3, Venous 20.8 (L) 24 - 30 mmol/L    Positive Base Excess, Lito NOT REPORTED 0.0 - 2.0 mmol/L    Negative Base Excess, Lito 2.1 (H) 0.0 - 2.0 mmol/L    O2 Sat, Lito 97.2 (H) 60.0 - 85.0 %    Total Hb NOT REPORTED 12.0 - 16.0 g/dl Oxyhemoglobin NOT REPORTED 95.0 - 98.0 %    Carboxyhemoglobin 4.8 0 - 5 %    Methemoglobin NOT REPORTED 0.0 - 1.5 %    Pt Temp 37.0     pH, Lito, Temp Adj NOT REPORTED 7.320 - 7.420    pCO2, Lito, Temp Adj NOT REPORTED 39 - 55 mmHg    pO2, Lito, Temp Adj NOT REPORTED 30 - 50 mmHg    O2 Device/Flow/% NOT REPORTED     Respiratory Rate NOT REPORTED     Frank Test NOT REPORTED     Sample Site NOT REPORTED     Pt.  Position NOT REPORTED     Mode NOT REPORTED     Set Rate NOT REPORTED     Total Rate NOT REPORTED     VT NOT REPORTED     FIO2 VENOUS     Peep/Cpap NOT REPORTED     PSV NOT REPORTED     Text for Respiratory NOT REPORTED     NOTIFICATION NOT REPORTED     NOTIFICATION TIME NOT REPORTED    HEPATIC FUNCTION PANEL   Result Value Ref Range    Albumin 3.5 3.5 - 5.2 g/dL    Alkaline Phosphatase 71 35 - 104 U/L    ALT 12 5 - 33 U/L    AST 12 <32 U/L    Total Bilirubin 0.31 0.3 - 1.2 mg/dL    Bilirubin, Direct 0.13 <0.31 mg/dL    Bilirubin, Indirect 0.18 0.00 - 1.00 mg/dL    Total Protein 7.4 6.4 - 8.3 g/dL    Globulin NOT REPORTED 1.5 - 3.8 g/dL    Albumin/Globulin Ratio 0.9 (L) 1.0 - 2.5   LIPASE   Result Value Ref Range    Lipase 44 13 - 60 U/L   LACTIC ACID, WHOLE BLOOD   Result Value Ref Range    Lactic Acid, Whole Blood 2.1 0.7 - 2.1 mmol/L   TOX SCR, BLD, ED   Result Value Ref Range    Acetaminophen Level <5 (L) 10 - 30 ug/mL    Ethanol <10 <10 mg/dL    Ethanol percent <4.983 <3.556 %    Salicylate Lvl <1 (L) 3 - 10 mg/dL    Toxic Tricyclic Sc,Blood NEGATIVE NEGATIVE   ELECTROLYTES PLUS   Result Value Ref Range    POC Sodium 145 138 - 146 mmol/L    POC Potassium 2.8 (LL) 3.5 - 4.5 mmol/L    POC Chloride 108 (H) 98 - 107 mmol/L    POC TCO2 28 22 - 30 mmol/L    Anion Gap 10 7 - 16 mmol/L   Hemoglobin and hematocrit, blood   Result Value Ref Range    POC Hemoglobin 15.2 12.0 - 16.0 g/dL    POC Hematocrit 45 36 - 46 %   CALCIUM, IONIC (POC)   Result Value Ref Range    POC Ionized Calcium 1.14 (L) 1.15 - 1.33 mmol/L POCT glucose   Result Value Ref Range    Glucose 123 mg/dL    QC OK? ok    Venous Blood Gas, POC   Result Value Ref Range    pH, Lito 7.328 7.320 - 7.430    pCO2, Lito 53.3 (H) 41.0 - 51.0 mm Hg    pO2, Lito 40.9 30 - 50 mm Hg    HCO3, Venous 27.9 22.0 - 29.0 mmol/L    Total CO2, Venous NOT REPORTED 23.0 - 30.0 mmol/L    Negative Base Excess, Lito NOT REPORTED 0.0 - 2.0    Positive Base Excess, Lito 1 0.0 - 3.0    O2 Sat, Lito 71 60.0 - 85.0 %    O2 Device/Flow/% NOT REPORTED     Frank Test NOT REPORTED     Sample Site NOT REPORTED     Mode NOT REPORTED     FIO2 NOT REPORTED     Pt Temp NOT REPORTED     POC pH Temp NOT REPORTED     POC pCO2 Temp NOT REPORTED mm Hg    POC pO2 Temp NOT REPORTED mm Hg   Creatinine W/GFR Point of Care   Result Value Ref Range    POC Creatinine 7.64 (HH) 0.51 - 1.19 mg/dL    GFR Comment 6 (L) >60 mL/min    GFR Non-African American 5 (L) >60 mL/min    GFR Comment         POCT urea (BUN)   Result Value Ref Range    POC BUN 56 (H) 8 - 26 mg/dL   Lactic Acid, POC   Result Value Ref Range    POC Lactic Acid 1.36 0.56 - 1.39 mmol/L   POCT Glucose   Result Value Ref Range    POC Glucose 52 (L) 74 - 100 mg/dL   POC Glucose Fingerstick   Result Value Ref Range    POC Glucose 123 (H) 65 - 105 mg/dL       IMPRESSION: AMS    RADIOLOGY:  CT HEAD WO CONTRAST   Final Result   No acute intracranial abnormality. Cerebral atrophy and small old lacunar infarct left basal ganglia. XR CHEST PORTABLE   Final Result   Hypoventilatory study with limited visualization left base. Cardiomegaly   with venous hypertension but no clear cut radiographic CHF. EKG:  EKG shows sinus bradycardia with a ventricular to 55, left axis deviation, Q waves in the septal leads as well as poor R wave progression, septal infarct of age undetermined. T wave inversion noted in lead I and aVL      EMERGENCY DEPARTMENT COURSE:  Patient with elevated creatinine no dialysis patient.   Possible encephalopathic CT imaging did not show any new pathology. But known history of stroke. Patient did have elevated troponin which is likely due to her creatinine, and will repeat. Patient will require admission. Her daughter Jazzmine Pike initially was wanting transfer to [facility] but on further discussion she is agreeable with keeping patient here for evaluation and treatment    PROCEDURES:      CONSULTS:  IP CONSULT TO NEPHROLOGY  IP CONSULT TO INTERNAL MEDICINE    CRITICAL CARE:      FINAL IMPRESSION      1. Encephalopathy          DISPOSITION / PLAN     DISPOSITION Decision To Admit 05/12/2021 01:59:50 PM      PATIENT REFERRED TO:  No follow-up provider specified.     DISCHARGE MEDICATIONS:  New Prescriptions    No medications on file       Rashida Bess  2:24 PM    Attending Emergency Physician  [address] ED    (Please note that portions of this note were completed with a voice recognition program.  Ashley Bazan made to edit the dictations but occasionally words are mis-transcribed.)              Jose Rafael Bay,   05/12/21 6075

## 2021-05-12 NOTE — ED PROVIDER NOTES
[facility] ED  Emergency Department  Emergency Medicine Resident Sign-out     Care of Cristian Sevilla was assumed from Dr. Jw Trotter and is being seen for Hypoglycemia  . The patient's initial evaluation and plan have been discussed with the prior provider who initially evaluated the patient.      EMERGENCY DEPARTMENT COURSE / MEDICAL DECISION MAKING:       MEDICATIONS GIVEN:  Orders Placed This Encounter   Medications    dextrose 50 % solution     [place] Bo: cabinet override       LABS / RADIOLOGY:     Labs Reviewed   CBC - Abnormal; Notable for the following components:       Result Value    WBC 11.7 (*)     RDW 14.9 (*)     NRBC Automated 0.3 (*)     All other components within normal limits   BASIC METABOLIC PANEL - Abnormal; Notable for the following components:    Glucose 51 (*)     BUN 59 (*)     CREATININE 7.01 (*)     Potassium 3.0 (*)     Anion Gap 18 (*)     GFR Non- 6 (*)     GFR  7 (*)     All other components within normal limits   TROPONIN - Abnormal; Notable for the following components:    Troponin, High Sensitivity 75 (*)     All other components within normal limits   BLOOD GAS, VENOUS - Abnormal; Notable for the following components:    pH, Lito 7.436 (*)     pCO2, Lito 31.3 (*)     pO2, Lito 191.0 (*)     HCO3, Venous 20.8 (*)     Negative Base Excess, Lito 2.1 (*)     O2 Sat, Lito 97.2 (*)     All other components within normal limits   HEPATIC FUNCTION PANEL - Abnormal; Notable for the following components:    Albumin/Globulin Ratio 0.9 (*)     All other components within normal limits   TOX SCR, BLD, ED - Abnormal; Notable for the following components:    Acetaminophen Level <5 (*)     Salicylate Lvl <1 (*)     All other components within normal limits   ELECTROLYTES PLUS - Abnormal; Notable for the following components:    POC Potassium 2.8 (*)     POC Chloride 108 (*)     All other components within normal limits   CALCIUM, IONIC (POC) - Abnormal; Notable for the following components:    POC Ionized Calcium 1.14 (*)     All other components within normal limits   VENOUS BLOOD GAS, POINT OF CARE - Abnormal; Notable for the following components:    pCO2, Lito 53.3 (*)     All other components within normal limits   CREATININE W/GFR POINT OF CARE - Abnormal; Notable for the following components:    POC Creatinine 7.64 (*)     GFR Comment 6 (*)     GFR Non- 5 (*)     All other components within normal limits   UREA N (POC) - Abnormal; Notable for the following components:    POC BUN 56 (*)     All other components within normal limits   POCT GLUCOSE - Abnormal; Notable for the following components:    POC Glucose 52 (*)     All other components within normal limits   POC GLUCOSE FINGERSTICK - Abnormal; Notable for the following components:    POC Glucose 123 (*)     All other components within normal limits   POCT GLUCOSE - Normal   AMMONIA   LIPASE   LACTIC ACID, WHOLE BLOOD   HGB/HCT   URINALYSIS WITH MICROSCOPIC   TROPONIN   LACTIC ACID,POINT OF CARE       Ct Head Wo Contrast    Result Date: 5/12/2021  EXAMINATION: CT OF THE HEAD WITHOUT CONTRAST  5/12/2021 1:23 pm TECHNIQUE: CT of the head was performed without the administration of intravenous contrast. Dose modulation, iterative reconstruction, and/or weight based adjustment of the mA/kV was utilized to reduce the radiation dose to as low as reasonably achievable. COMPARISON: None. HISTORY: ORDERING SYSTEM PROVIDED HISTORY: AMS, ?facial droop TECHNOLOGIST PROVIDED HISTORY: AMS, ?facial droop Decision Support Exception - unselect if not a suspected or confirmed emergency medical condition->Emergency Medical Condition (MA) Reason for Exam: ams, facial droop Acuity: Unknown Type of Exam: Unknown FINDINGS: BRAIN/VENTRICLES: Mild cerebral atrophy. No midline shift. Basal cisterns normally outlined. Small old lacunar infarct in periventricular left basal ganglia. No acute infarct. No acute intracranial hemorrhage. ORBITS: The visualized portion of the orbits demonstrate no acute abnormality. SINUSES: The visualized paranasal sinuses and mastoid air cells demonstrate no acute abnormality. SOFT TISSUES/SKULL:  No acute abnormality of the visualized skull or soft tissues. No acute intracranial abnormality. Cerebral atrophy and small old lacunar infarct left basal ganglia. Xr Chest Portable    Result Date: 5/12/2021  EXAMINATION: ONE XRAY VIEW OF THE CHEST 5/12/2021 12:55 pm COMPARISON: Two-view chest from 01/26/2013 HISTORY: ORDERING SYSTEM PROVIDED HISTORY: ams TECHNOLOGIST PROVIDED HISTORY: ams Acuity: Unknown Type of Exam: Unknown History of hypertension, diabetes, cancer and obstructive sleep apnea. FINDINGS: Right IJ chemo port with tip position cavoatrial junction. Overlying ECG monitor leads/gown snaps. Loop recorder overlying the left medial chest. Limited visualization left base. Hypoventilation accentuating enlarged but stable appearing cardiomediastinal shadow. Some degree of cephalization of blood flow. Probable basilar atelectasis; infiltrate at the left base not excluded but upper lung zones clear. No large pleural effusion, although small effusion at the left base again not excluded. Severe unchanged DJD spine. Hypoventilatory study with limited visualization left base. Cardiomegaly with venous hypertension but no clear cut radiographic CHF. RECENT VITALS:     Temp: 97.1 °F (36.2 °C),  Pulse: 55, Resp: 13, BP: 96/65, SpO2: 99 %    This patient is a 67 y.o. Female with altered mental status likely secondary to missed dialysis session and hypoglycemia. Patient was hypoglycemic in the 40s per EMS and received 1 amp of dextrose. Repeat blood glucose 123. Patient receives peritoneal dialysis daily and missed several sessions according to family. Discussed with nephrology who recommended 2.5% dextrose every 6 hours x4.   Did inform that internal medicine team.  CXR negative and CT head negative. Received a call from [facility] hospitalist who requested patient be admitted to their service. Discussed with hospitalist that [facility] does not admit to [facility] however hospitalist reported that they did. Did page the answering service and received no call back. Also unit clerk also page the answering service with no response. The patient was admitted to the internal medicine team.    Zeb Posadas / RECOMMENDATIONS:    1. Awaiting bed     FINAL IMPRESSION:     1. Encephalopathy        DISPOSITION:         DISPOSITION:  []  Discharge   []  Transfer -    [x]  Admission -     []  Against Medical Advice   []  Eloped   FOLLOW-UP: No follow-up provider specified.    DISCHARGE MEDICATIONS: New Prescriptions    No medications on file           Virginia Ibrahim MD  Emergency Medicine Resident  [address] [facility] MD Mita  Resident  05/12/21 1031 [facility] MD Abeba  Resident  05/12/21 0475

## 2021-05-12 NOTE — FLOWSHEET NOTE
SPIRITUAL CARE DEPARTMENT - [address]  PROGRESS NOTE    Shift date: 05/12/2021  Shift day: Wednesday   Shift # 2    Room # 25/25   Name: Joni Golden            Age: 67 y.o. Gender: female          Baptism: Unknown   Place of Ho[place]ism: Unknown    Referral: Rapid Response    Admit Date & Time: 5/12/2021 12:02 PM    PATIENT/EVENT DESCRIPTION:  Joni Golden is a 67 y.o. female paged out as a rapid response. Patient's \"blood sugar was low. \" Even though patient was in ED, she has been admitted so RRT needed to be called. SPIRITUAL ASSESSMENT/INTERVENTION:  Patient was approachable and seemed to be coping.  provided ministry of presence and active listening.  checked in with nursing staff and patient's family did not want to be updated until patient was in the main hospital.      SPIRITUAL CARE FOLLOW-UP PLAN:  Chaplains will remain available to offer spiritual and emotional support as needed.       Electronically signed by Vidya uRss on 5/12/2021 at 5:53 PM.  [address] [email or web address]  [phone]       05/12/21 8362   Encounter Summary   Services provided to: Patient   Referral/Consult From: Multi-disciplinary team   Support System Family members   Continue Visiting   (05/12/2021)   Complexity of Encounter Moderate   Length of Encounter 30 minutes   Spiritual Assessment Completed Yes   Crisis   Type Rapid response   Assessment Approachable;Coping   Intervention Active listening;Sustaining presence/ Ministry of presence   Outcome Coping

## 2021-05-12 NOTE — ED NOTES
Pt speaking in full sentences. Pt family at bedside. Pt other daughter Sigifredo Angel to be called when placed in room.  [id number] [facility], RN  05/12/21 6503

## 2021-05-12 NOTE — ED NOTES
Report taken from [employer] Pt resting on stretcher. NAD noted. RR even and nonlabored. Call light within reach. Will continue to monitor.        Trevon Bermudez RN  05/12/21 1954

## 2021-05-12 NOTE — Clinical Note
Patient Class: Inpatient [101]   REQUIRED: Diagnosis: AMS (altered mental status) [[id number]]   Estimated Length of Stay: Estimated stay of more than 2 midnights   Telemetry/Cardiac Monitoring Required?: Yes

## 2021-05-12 NOTE — ED NOTES
Pt presented to ED with complaints of AMS and hypoglycemia. EMS arrived and gave PO glucose for BS of 49. IV established and 1 amp of dextrose administered. Pt awoke with IV dextrose. Pt denies pain. Pt alert and oriented. Pt has hx of CVA. Pt arrived with port to right chest and peritoneal dialysis line to abd. No obvious deformities noted.      Nichole Guzman RN  05/12/21 8043

## 2021-05-12 NOTE — PROGRESS NOTES
Patient minimally responsive, slumped over. Slight grimace to aggressive sternal rub. Slight desaturation into the low 90s. Sonorous respirations. Patient has been boarding in the emergency department and has been admitted to the medicine team.  Point-of-care blood glucose taken, read \"low \". Rapid response was called. Patient given 2 A of dextrose. Repeat glucose 167. Medicine team came to bedside. Patient began to improve after dextrose. Silvina Benito Asp, DO  Emergency Medicine Resident Physician, PGY-3  05/12/21 5:43 PM

## 2021-05-12 NOTE — ED NOTES
Pt unresponsive. BS checked. Read low. Rapid response called and pt given two amps of dextrose. Pt alert to verbal stimuli following amp of dextrose. Admitting service at bedside.       Connie Zendejas RN  05/12/21 1648

## 2021-05-13 ENCOUNTER — APPOINTMENT (OUTPATIENT)
Dept: GENERAL RADIOLOGY | Age: 76
DRG: 637 | End: 2021-05-13
Payer: MEDICARE

## 2021-05-13 LAB
-: ABNORMAL
AMORPHOUS: ABNORMAL
AMPHETAMINE SCREEN URINE: NEGATIVE
ANION GAP SERPL CALCULATED.3IONS-SCNC: 14 MMOL/L (ref 9–17)
BACTERIA: ABNORMAL
BARBITURATE SCREEN URINE: NEGATIVE
BENZODIAZEPINE SCREEN, URINE: NEGATIVE
BILIRUBIN URINE: NEGATIVE
BUN BLDV-MCNC: 61 MG/DL (ref 8–23)
BUN/CREAT BLD: ABNORMAL (ref 9–20)
BUPRENORPHINE URINE: NORMAL
CALCIUM SERPL-MCNC: 8.3 MG/DL (ref 8.6–10.4)
CANNABINOID SCREEN URINE: NEGATIVE
CASTS UA: ABNORMAL /LPF (ref 0–8)
CHLORIDE BLD-SCNC: 103 MMOL/L (ref 98–107)
CO2: 24 MMOL/L (ref 20–31)
COCAINE METABOLITE, URINE: NEGATIVE
COLOR: YELLOW
COMMENT UA: ABNORMAL
CREAT SERPL-MCNC: 7.34 MG/DL (ref 0.5–0.9)
CRYSTALS, UA: ABNORMAL /HPF
EKG ATRIAL RATE: 55 BPM
EKG P AXIS: 41 DEGREES
EKG P-R INTERVAL: 188 MS
EKG Q-T INTERVAL: 528 MS
EKG QRS DURATION: 94 MS
EKG QTC CALCULATION (BAZETT): 505 MS
EKG R AXIS: -36 DEGREES
EKG T AXIS: 106 DEGREES
EKG VENTRICULAR RATE: 55 BPM
EPITHELIAL CELLS UA: ABNORMAL /HPF (ref 0–5)
GFR AFRICAN AMERICAN: 7 ML/MIN
GFR NON-AFRICAN AMERICAN: 5 ML/MIN
GFR SERPL CREATININE-BSD FRML MDRD: ABNORMAL ML/MIN/{1.73_M2}
GFR SERPL CREATININE-BSD FRML MDRD: ABNORMAL ML/MIN/{1.73_M2}
GLUCOSE BLD-MCNC: 142 MG/DL (ref 65–105)
GLUCOSE BLD-MCNC: 145 MG/DL (ref 65–105)
GLUCOSE BLD-MCNC: 174 MG/DL (ref 65–105)
GLUCOSE BLD-MCNC: 176 MG/DL (ref 65–105)
GLUCOSE BLD-MCNC: 201 MG/DL (ref 65–105)
GLUCOSE BLD-MCNC: 33 MG/DL (ref 65–105)
GLUCOSE BLD-MCNC: 66 MG/DL (ref 65–105)
GLUCOSE BLD-MCNC: 76 MG/DL (ref 70–99)
GLUCOSE BLD-MCNC: 83 MG/DL (ref 65–105)
GLUCOSE BLD-MCNC: <10 MG/DL (ref 65–105)
GLUCOSE URINE: NEGATIVE
HCT VFR BLD CALC: 36.1 % (ref 36.3–47.1)
HEMOGLOBIN: 11.2 G/DL (ref 11.9–15.1)
KETONES, URINE: NEGATIVE
LEUKOCYTE ESTERASE, URINE: ABNORMAL
MCH RBC QN AUTO: 29.6 PG (ref 25.2–33.5)
MCHC RBC AUTO-ENTMCNC: 31 G/DL (ref 28.4–34.8)
MCV RBC AUTO: 95.5 FL (ref 82.6–102.9)
MDMA URINE: NORMAL
METHADONE SCREEN, URINE: NEGATIVE
METHAMPHETAMINE, URINE: NORMAL
MUCUS: ABNORMAL
NITRITE, URINE: NEGATIVE
NRBC AUTOMATED: 0.5 PER 100 WBC
OPIATES, URINE: NEGATIVE
OTHER OBSERVATIONS UA: ABNORMAL
OXYCODONE SCREEN URINE: NEGATIVE
PDW BLD-RTO: 15 % (ref 11.8–14.4)
PH UA: 5 (ref 5–8)
PHENCYCLIDINE, URINE: NEGATIVE
PLATELET # BLD: 240 K/UL (ref 138–453)
PMV BLD AUTO: 12.3 FL (ref 8.1–13.5)
POTASSIUM SERPL-SCNC: 4 MMOL/L (ref 3.7–5.3)
PROPOXYPHENE, URINE: NORMAL
PROTEIN UA: ABNORMAL
RBC # BLD: 3.78 M/UL (ref 3.95–5.11)
RBC UA: ABNORMAL /HPF (ref 0–4)
RENAL EPITHELIAL, UA: ABNORMAL /HPF
SODIUM BLD-SCNC: 141 MMOL/L (ref 135–144)
SPECIFIC GRAVITY UA: 1.01 (ref 1–1.03)
TEST INFORMATION: NORMAL
TRICHOMONAS: ABNORMAL
TRICYCLIC ANTIDEPRESSANTS, UR: NORMAL
TURBIDITY: CLEAR
URINE HGB: NEGATIVE
UROBILINOGEN, URINE: NORMAL
WBC # BLD: 10.7 K/UL (ref 3.5–11.3)
WBC UA: ABNORMAL /HPF (ref 0–5)
YEAST: ABNORMAL

## 2021-05-13 PROCEDURE — 2580000003 HC RX 258: Performed by: STUDENT IN AN ORGANIZED HEALTH CARE EDUCATION/TRAINING PROGRAM

## 2021-05-13 PROCEDURE — 3E1M39Z IRRIGATION OF PERITONEAL CAVITY USING DIALYSATE, PERCUTANEOUS APPROACH: ICD-10-PCS | Performed by: INTERNAL MEDICINE

## 2021-05-13 PROCEDURE — 87086 URINE CULTURE/COLONY COUNT: CPT

## 2021-05-13 PROCEDURE — 6370000000 HC RX 637 (ALT 250 FOR IP): Performed by: STUDENT IN AN ORGANIZED HEALTH CARE EDUCATION/TRAINING PROGRAM

## 2021-05-13 PROCEDURE — 80307 DRUG TEST PRSMV CHEM ANLYZR: CPT

## 2021-05-13 PROCEDURE — 99223 1ST HOSP IP/OBS HIGH 75: CPT | Performed by: INTERNAL MEDICINE

## 2021-05-13 PROCEDURE — 87186 SC STD MICRODIL/AGAR DIL: CPT

## 2021-05-13 PROCEDURE — 2580000003 HC RX 258: Performed by: INTERNAL MEDICINE

## 2021-05-13 PROCEDURE — 85027 COMPLETE CBC AUTOMATED: CPT

## 2021-05-13 PROCEDURE — 74018 RADEX ABDOMEN 1 VIEW: CPT

## 2021-05-13 PROCEDURE — 80048 BASIC METABOLIC PNL TOTAL CA: CPT

## 2021-05-13 PROCEDURE — 99222 1ST HOSP IP/OBS MODERATE 55: CPT | Performed by: INTERNAL MEDICINE

## 2021-05-13 PROCEDURE — 87077 CULTURE AEROBIC IDENTIFY: CPT

## 2021-05-13 PROCEDURE — 81015 MICROSCOPIC EXAM OF URINE: CPT

## 2021-05-13 PROCEDURE — 82947 ASSAY GLUCOSE BLOOD QUANT: CPT

## 2021-05-13 PROCEDURE — 2060000000 HC ICU INTERMEDIATE R&B

## 2021-05-13 PROCEDURE — 76937 US GUIDE VASCULAR ACCESS: CPT

## 2021-05-13 PROCEDURE — 6360000002 HC RX W HCPCS: Performed by: STUDENT IN AN ORGANIZED HEALTH CARE EDUCATION/TRAINING PROGRAM

## 2021-05-13 RX ORDER — DEXTROSE MONOHYDRATE 50 MG/ML
100 INJECTION, SOLUTION INTRAVENOUS PRN
Status: DISCONTINUED | OUTPATIENT
Start: 2021-05-13 | End: 2021-05-13

## 2021-05-13 RX ORDER — DEXTROSE MONOHYDRATE 25 G/50ML
25 INJECTION, SOLUTION INTRAVENOUS ONCE
Status: DISCONTINUED | OUTPATIENT
Start: 2021-05-13 | End: 2021-05-15 | Stop reason: HOSPADM

## 2021-05-13 RX ORDER — DEXTROSE MONOHYDRATE 25 G/50ML
12.5 INJECTION, SOLUTION INTRAVENOUS PRN
Status: DISCONTINUED | OUTPATIENT
Start: 2021-05-13 | End: 2021-05-13

## 2021-05-13 RX ORDER — HEPARIN SODIUM 5000 [USP'U]/ML
5000 INJECTION, SOLUTION INTRAVENOUS; SUBCUTANEOUS EVERY 8 HOURS SCHEDULED
Status: DISCONTINUED | OUTPATIENT
Start: 2021-05-13 | End: 2021-05-15 | Stop reason: HOSPADM

## 2021-05-13 RX ORDER — SODIUM CHLORIDE, SODIUM LACTATE, CALCIUM CHLORIDE, MAGNESIUM CHLORIDE AND DEXTROSE 2.5; 538; 448; 18.3; 5.08 G/100ML; MG/100ML; MG/100ML; MG/100ML; MG/100ML
2000 INJECTION, SOLUTION INTRAPERITONEAL EVERY 4 HOURS
Status: COMPLETED | OUTPATIENT
Start: 2021-05-13 | End: 2021-05-14

## 2021-05-13 RX ORDER — SODIUM CHLORIDE, SODIUM LACTATE, CALCIUM CHLORIDE, MAGNESIUM CHLORIDE AND DEXTROSE 2.5; 538; 448; 18.3; 5.08 G/100ML; MG/100ML; MG/100ML; MG/100ML; MG/100ML
2000 INJECTION, SOLUTION INTRAPERITONEAL EVERY 6 HOURS
Status: DISCONTINUED | OUTPATIENT
Start: 2021-05-13 | End: 2021-05-13

## 2021-05-13 RX ORDER — GABAPENTIN 300 MG/1
300 CAPSULE ORAL DAILY
Status: DISCONTINUED | OUTPATIENT
Start: 2021-05-13 | End: 2021-05-15 | Stop reason: HOSPADM

## 2021-05-13 RX ORDER — NICOTINE POLACRILEX 4 MG
15 LOZENGE BUCCAL PRN
Status: DISCONTINUED | OUTPATIENT
Start: 2021-05-13 | End: 2021-05-15 | Stop reason: HOSPADM

## 2021-05-13 RX ADMIN — GABAPENTIN 300 MG: 300 CAPSULE ORAL at 10:39

## 2021-05-13 RX ADMIN — CITALOPRAM 40 MG: 20 TABLET, FILM COATED ORAL at 10:39

## 2021-05-13 RX ADMIN — SODIUM CHLORIDE, PRESERVATIVE FREE 10 ML: 5 INJECTION INTRAVENOUS at 21:38

## 2021-05-13 RX ADMIN — SODIUM CHLORIDE, PRESERVATIVE FREE 10 ML: 5 INJECTION INTRAVENOUS at 10:35

## 2021-05-13 RX ADMIN — HEPARIN SODIUM 5000 UNITS: 5000 INJECTION INTRAVENOUS; SUBCUTANEOUS at 05:53

## 2021-05-13 RX ADMIN — SODIUM CHLORIDE, SODIUM LACTATE, CALCIUM CHLORIDE, MAGNESIUM CHLORIDE AND DEXTROSE 2000 ML: 2.5; 538; 448; 18.3; 5.08 INJECTION, SOLUTION INTRAPERITONEAL at 21:22

## 2021-05-13 RX ADMIN — DEXTROSE 15 G: 15 GEL ORAL at 08:15

## 2021-05-13 RX ADMIN — INSULIN LISPRO 6 UNITS: 100 INJECTION, SOLUTION INTRAVENOUS; SUBCUTANEOUS at 18:28

## 2021-05-13 RX ADMIN — SODIUM CHLORIDE, SODIUM LACTATE, CALCIUM CHLORIDE, MAGNESIUM CHLORIDE AND DEXTROSE 2000 ML: 2.5; 538; 448; 18.3; 5.08 INJECTION, SOLUTION INTRAPERITONEAL at 10:35

## 2021-05-13 RX ADMIN — CEFTRIAXONE SODIUM 1000 MG: 1 INJECTION, POWDER, FOR SOLUTION INTRAMUSCULAR; INTRAVENOUS at 03:58

## 2021-05-13 RX ADMIN — HEPARIN SODIUM 5000 UNITS: 5000 INJECTION INTRAVENOUS; SUBCUTANEOUS at 18:25

## 2021-05-13 RX ADMIN — ROSUVASTATIN CALCIUM 5 MG: 5 TABLET, COATED ORAL at 15:38

## 2021-05-13 RX ADMIN — ASPIRIN 81 MG: 81 TABLET, CHEWABLE ORAL at 10:39

## 2021-05-13 RX ADMIN — CHLORTHALIDONE 25 MG: 25 TABLET ORAL at 15:39

## 2021-05-13 RX ADMIN — SODIUM CHLORIDE, SODIUM LACTATE, CALCIUM CHLORIDE, MAGNESIUM CHLORIDE AND DEXTROSE 2000 ML: 2.5; 538; 448; 18.3; 5.08 INJECTION, SOLUTION INTRAPERITONEAL at 01:24

## 2021-05-13 ASSESSMENT — PAIN DESCRIPTION - PROGRESSION: CLINICAL_PROGRESSION: NOT CHANGED

## 2021-05-13 NOTE — PROGRESS NOTES
[facility]  Internal Medicine Teaching Residency Program  Inpatient Daily Progress Note  ______________________________________________________________________________    Patient: Tyesha Sweet  YOB: 1945   [id number]    Acct: [de-identified]     Room: [address] [place] [address]  Admit date: 5/12/2021  Today's date: 05/13/21  Number of days in the hospital: 1    SUBJECTIVE   Admitting Diagnosis: <principal problem not specified>  CC: Hypoglycemia  Pt examined at bedside. Chart & results reviewed. No acute episodes overnight  Patient is heme stable and afebrile  No further episodes of hypoglycemia  Mental status improved  Started on ceftriaxone for possible UTI  Nephrology following for peritoneal dialysis   Diabetes education    ROS:  Unable to obtain due to mental status     BRIEF HISTORY     The patient is a pleasant 67 y.o. female with a past medical history of hypertension, hyperlipidemia, T1DM, PAT, and left sided breast cancer presented with altered mental status and was found to have a sugar of 49. She was given an amp of dextrose in the emergency department and improved. She was still lethargic but was able to somewhat answer questions and would fall asleep mid sentence. She has a history of breast cancer and has a port in place on the right side of her chest.  She typically follows up at [address] [facility]. Patient's daughter is with her and states that she has been off of her insulin for a bout a week but started in back up about 2 days ago after seeing her primary care provider. She also undergoes peritoneal dialysis intermittently but has been unable to get out of bed for the past 2 days. Additionally she has experienced falls in the past due to generalized weakness . She does has a history of stroke as well and has noticed some changes in speech over the past few days.       OBJECTIVE     Vital Signs:  BP (!) 145/75   Pulse 69   Temp 98.4 °F (36.9 °C) (Oral)   Resp 12   Ht 5' 3\" (1.6 m)   Wt 190 lb 14.4 oz (86.6 kg) Comment: with one blanket on bed  SpO2 100%   BMI 33.82 kg/m²     Temp (24hrs), Av.9 °F (36.6 °C), Min:97.1 °F (36.2 °C), Max:98.4 °F (36.9 °C)    In:    Out: 1700     Physical Exam:  Constitutional:       Appearance: She is well-developed.      Comments: Sleeping but will awake, and answer simple questions but then will fall back asleep   HENT:      Head: Normocephalic and atraumatic. Eyes:      General:         Right eye: No discharge.         Left eye: No discharge.      Conjunctiva/sclera: Conjunctivae normal.   Cardiovascular:      Rate and Rhythm: Normal rate and regular rhythm.      Heart sounds: Normal heart sounds. No murmur. No friction rub. No gallop.    Pulmonary:      Effort: Pulmonary effort is normal. No respiratory distress.      Breath sounds: Normal breath sounds. No wheezing or rales. Chest:      Chest wall: No tenderness. Abdominal:      General: There is no distension.      Palpations: Abdomen is soft. There is no mass.      Tenderness: There is no abdominal tenderness. There is no guarding or rebound. Skin:     General: Skin is warm and dry.      Findings: No rash.    Neurological:      Comments: Patient does awake, and will answer simple questions and obey commands, no neuro deficit on exam.  Possible slurred speech which has been there for the last day      Medications:  Scheduled Medications:    dextrose  25 g Intravenous Once    cefTRIAXone (ROCEPHIN) IV  1,000 mg Intravenous Q24H    insulin lispro  0-18 Units Subcutaneous TID     insulin lispro  0-9 Units Subcutaneous Nightly    heparin (porcine)  5,000 Units Subcutaneous 3 times per day    gabapentin  300 mg Oral Daily    aspirin  81 mg Oral Daily    chlorthalidone  25 mg Oral Daily    citalopram  40 mg Oral Daily    rosuvastatin  5 mg Oral Daily    sodium chloride flush  5-40 mL Intravenous 2 times per day    dianeal lo-william (ULTRABAG) 2.5%  2,000 mL Intraperitoneal Q6H     Continuous Infusions:    sodium chloride      dextrose       PRN Medicationsglucose, 15 g, PRN  glucagon (rDNA), 1 mg, PRN  docusate sodium, 100 mg, BID PRN  sodium chloride flush, 5-40 mL, PRN  sodium chloride, 25 mL, PRN  promethazine, 12.5 mg, Q6H PRN    Or  ondansetron, 4 mg, Q6H PRN  polyethylene glycol, 17 g, Daily PRN  acetaminophen, 650 mg, Q6H PRN    Or  acetaminophen, 650 mg, Q6H PRN  glucose, 15 g, PRN  dextrose, 12.5 g, PRN  glucagon (rDNA), 1 mg, PRN  dextrose, 100 mL/hr, PRN      Diagnostic Labs:  CBC:   Recent Labs     05/12/21  1217 05/13/21  0640   WBC 11.7* 10.7   RBC 4.17 3.78*   HGB 12.4 11.2*   HCT 39.8 36.1*   MCV 95.4 95.5   RDW 14.9* 15.0*    240     BMP:   Recent Labs     05/12/21  1209 05/12/21  1217 05/13/21  0640   NA  --  143 141   K  --  3.0* 4.0   CL  --  103 103   CO2  --  22 24   BUN  --  59* 61*   CREATININE 7.64* 7.01* 7.34*     LIVER PROFILE:   Recent Labs     05/12/21  1217   AST 12   ALT 12   BILIDIR 0.13   BILITOT 0.31   ALKPHOS 71      MICROBIOLOGY:   Lab Results   Component Value Date/Time    CULTURE NO GROWTH 10 HOURS 05/12/2021 12:14 PM     Imaging:    Ct Head Wo Contrast    Result Date: 5/12/2021  No acute intracranial abnormality. Cerebral atrophy and small old lacunar infarct left basal ganglia. Xr Chest Portable    Result Date: 5/12/2021  Hypoventilatory study with limited visualization left base. Cardiomegaly with venous hypertension but no clear cut radiographic CHF.      ASSESSMENT & PLAN     ASSESSMENT / PLAN:     Acute Metabolic Encephalopathy  -Improved  -May be secondary to hypoglycemia and/or urinary tract infection and/or missed peritoneal dialysis   -Will continue to monitor     Type 1 DM   -Hemoglobin A1c ordered and pending  -High Dose Sliding Scale  -Hypoglycemia protocol  -Glucose checks AC/HS  -Neurontin 300 mg   -Will continue to monitor      Urinary Tract Infection  -May be asymptomatic bacteriuria   -UA positive for bacteria  -Urine culture pending   -On Ceftriaxone 1 mg q24 hr  -Will adjust antibiotics and/or discontinue antibiotic pending results and clinical status      ESRD  -On peritoneal dialysis intermittently  -Nephrology consulted. Recommendations appreciated. -On Dianeal lo-william 2.5% 2,000 mL q6 per Nephrology  -Will continue to monitor      Type 2 MI Demand Ischemia   -Troponin trending down - 69 to 13  -No acute ST/T wave abnormalities  -Will continue to monitor      DVT ppx: Heparin      PT/OT/SW: On Board  Discharge Planning: Ongoing    Thomas Millard MD  Internal Medicine Resident, PGY-1  [address], [place]  5/13/2021, 10:15 AM   Attending Physician Statement  I have discussed the care of [facility], including pertinent history and exam findings,  with the resident. I have seen and examined the patient and the key elements of all parts of the encounter have been performed by me. I agree with the assessment, plan and orders as documented by the resident with additions . Treatment plan Discussed with nursing staff in detail , all questions answered . Electronically signed by Christal Mina MD on   5/13/21 at 5:08 PM EDT    Please note that this chart was generated using voice recognition Dragon dictation software. Although every effort was made to ensure the accuracy of this automated transcription, some errors in transcription may have occurred.

## 2021-05-13 NOTE — CARE COORDINATION
Case Management Initial Discharge Plan  [address] [facility] [address] [address],             Met with:patient to discuss discharge plans. Information verified: address, contacts, phone number, , insurance Yes    Emergency Contact/Next of Kin name & number: Nicolle Clark [phone]    PCP: Suma Carolina MD  Date of last visit: May 2021    Insurance Provider: Eleanor Presley and medicare    Discharge Planning    Living Arrangements:  Children   Support Systems:  Spouse/Significant Other, Friends/Neighbors    Home has 1 stories  7 stairs to climb to get into front door, 0 stairs to climb to reach second floor  Location of bedroom/bathroom in home main    Patient able to perform ADL's:Independent    Current Services (outpatient & in home) none   DME equipment: walker and cane  DME provider:     Receiving oral anticoagulation therapy? ASA    If indicated:   Physician managing anticoagulation treatment:   Where does patient obtain lab work for ATC treatment? Potential Assistance Needed:  N/A    Patient agreeable to home care: Yes  Freedom of choice provided:  yes    Prior SNF/Rehab Placement and Facility:   Agreeable to SNF/Rehab: No  [place] of choice provided: n/a     Evaluation: no    Expected Discharge date:  21    Patient expects to be discharged to:  HOME  Follow Up Appointment: Best Day/ Time:      Transportation provider: family  Transportation arrangements needed for discharge: Yes    Readmission Risk              Risk of Unplanned Readmission:        13             Does patient have a readmission risk score greater than 14?: No  If yes, follow-up appointment must be made within 7 days of discharge.      Goals of Care: safety       Discharge Plan: home with son,  [facility]          Electronically signed by Ramirez RN on 21 at 12:12 PM EDT

## 2021-05-13 NOTE — CONSULTS
NEPHROLOGY     REASON FOR CONSULT:     ESRD on peritoneal dialysis      HISTORY OF PRESENTING ILLNESS                 This is a 67 y.o. female who presented with altered mental sensorium and found to have blood sugar of 49. She was given 1 amp of dextrose in the emergency department and her mentation improved significantly. As per the notes it seems patient's daughter reported that she has been off her insulin for about a week and started taking back 2 days back after seeing her primary care provider. Because of lethargy and confusional state she was not able to dialysis at home. She was experiencing generalized weakness. No reported history of fever. Denies any abdominal pain nausea vomiting constipation. Today she is awake alert oriented and answers all my questions appropriately. Assessment revealed stable blood pressure. PD prescription was ordered yesterday by me which included 2.5% dextrose 2 L bag 4 cycles in 24 hours. She uses cycler at home. Does not know the exact prescription. But stated that she uses 1 green bag in 1 year low back. Amount unspecified. No recent history of PD peritonitis. No abdominal pain nausea vomiting. She is on peritoneal dialysis last 1 month. Follows up with ~clinic. Overnight had to cycles. Last cycle effluent  drain is suboptimal.  I have ordered x-ray of the abdomen to rule out constipation/malpositioning. No fibrin in the effluent.         PAST MEDICAL HISTORY         Diagnosis Date    Anxiety and depression     Arthritis     Cancer ([address]     left breast cancer    Diabetes mellitus ([address]     Type 1    History of blood transfusion     50 plus yrs ago with pregnancy    Hyperlipidemia     Hypertension     MRSA (methicillin resistant staph aureus) culture positive 2015    back    PAT (obstructive sleep apnea)     had UPPP  \"have not used machine in years\"    Prolonged emergence from general anesthesia     SOB (shortness of breath)     \"long distance\" PAST SURGICAL HISTORY          Procedure Laterality Date    APPENDECTOMY      BACK SURGERY      I and D lower back MRSA    BREAST REDUCTION SURGERY      HYSTERECTOMY      MASTECTOMY Left     lymph nodes removed    MASTECTOMY Left 05/14/2018    axillary mastectomy    MS EXC SKIN BENIG <5MM TRUNK,ARM,LEG Right 5/14/2018    RIGHT AXILLARY MASTECTOMY performed by Yohan Ponce DO at [facility]:                Medications Prior to Admission: docusate sodium (COLACE) 100 MG capsule, Take 1 capsule by mouth 2 times daily as needed for Constipation  ondansetron (ZOFRAN) 4 MG tablet, Take 1 tablet by mouth every 6 hours as needed for Nausea or Vomiting  nebivolol (BYSTOLIC) 10 MG tablet, Take 20 mg by mouth daily  rosuvastatin (CRESTOR) 5 MG tablet, Take 5 mg by mouth daily  chlorthalidone (HYGROTON) 25 MG tablet, Take 25 mg by mouth daily  citalopram (CELEXA) 20 MG tablet, Take 40 mg by mouth daily  simvastatin (ZOCOR) 10 MG tablet, Take 10 mg by mouth nightly  gabapentin (NEURONTIN) 300 MG capsule, Take 300 mg by mouth 3 times daily. .  fenofibrate micronized (LOFIBRA) 134 MG capsule, Take 134 mg by mouth nightly  SITagliptin-metFORMIN (JANUMET XR)  MG TB24 per extended release tablet, Take 1 tablet by mouth daily  aspirin 81 MG tablet, Take 81 mg by mouth daily  ibuprofen (ADVIL;MOTRIN) 800 MG tablet, Take 800 mg by mouth 3 times daily as needed for Pain  insulin aspart (NOVOLOG) 100 UNIT/ML injection vial, Inject 85 Units into the skin 3 times daily (before meals)  Scheduled Meds:    dextrose  25 g Intravenous Once    cefTRIAXone (ROCEPHIN) IV  1,000 mg Intravenous Q24H    insulin lispro  0-18 Units Subcutaneous TID WC    insulin lispro  0-9 Units Subcutaneous Nightly    heparin (porcine)  5,000 Units Subcutaneous 3 times per day    gabapentin  300 mg Oral Daily    dianeal lo-william (ULTRABAG) 2.5%  2,000 mL Intraperitoneal Q6H    FAMILY HISTORY     No family history on file. REVIEW OF SYSTEM     Constitutional: No asthenia/weight loss/anorexia    HEENT : No epistaxis/visual blurriness/rhinorrhoea/sorethroat/trauma  Cardiovascular:No chest pain/palpitation/SOB  Respiratory: No cough/fever/SOB/Wheezing    Gastrointestinal: No abdominal pain/nausea/vomiting/diarrhoea/constipation  Genitourinary: No dysuria/pyuria/hematuria/incomplete emptying of bladder  Musculoskeletal:  No gait disturbance/weakness or joint complaints  Integumentary: No rash or pruritis. Neurological: No headache/diplopia/change in muscle strength/numbness or tingling. No change in gait, balance, coordination, mood, affect, memory, mentation, behavior. Psychiatric: No anxiety/depression. Endocrine: No temperature intolerance. No excessive thirst, fluid intake, or urination. No tremor. Hematologic/Lymphatic: No abnormal bruising or bleeding, blood clots or swolle lymph nodes. Allergic/Immunologic: No nasal congestion or hives      PHYSICAL EXAM     Vitals:    05/13/21 0400 05/13/21 0600 05/13/21 0759 05/13/21 1205   BP: (!) 169/65  (!) 145/75 123/62   Pulse: 68  69 72   Resp: 13 12 21   Temp: 98.1 °F (36.7 °C)  98.4 °F (36.9 °C) 98 °F (36.7 °C)   TempSrc: Oral  Oral Oral   SpO2: 94%  100% 100%   Weight:  190 lb 14.4 oz (86.6 kg)     Height:         24HR INTAKE/OUTPUT:      Intake/Output Summary (Last 24 hours) at 5/13/2021 1310  Last data filed at 5/13/2021 8560  Gross per 24 hour   Intake 2000 ml   Output 1700 ml   Net 300 ml       General appearance:Awake, alert, in no acute distress  Skin: warm and dry, no rash or erythema  Eyes: conjunctivae normal and sclera anicteric  ENT: no thrush no pharyngeal congestion  orodental hygiene   Neck: No JVD, Lymphadenopathty or thyromegaly  Respiratory: vesicular breath sounds,no wheeze/crackles  Cardiovascular: S1 S2 normal,no gallop or organic murmur. No carotid bruit  Abdomen:Non tender/non distended. Bowel sounds present PD catheter in place  Extremities: No Cyanosis or Clubbing, present lower extremity edema  Neurological:Alert and oriented. No abnormalities of mood, affect, memory, mentation, or behavior are noted    INVESTIGATIONS      CBC:   Recent Labs     05/12/21  1217 05/13/21  0640   WBC 11.7* 10.7   RBC 4.17 3.78*   HGB 12.4 11.2*   HCT 39.8 36.1*   MCV 95.4 95.5   MCH 29.7 29.6   MCHC 31.2 31.0   RDW 14.9* 15.0*    240   MPV 12.3 12.3      BMP:   Recent Labs     05/12/21  1209 05/12/21  1217 05/12/21  1336 05/13/21  0640   NA  --  143  --  141   K  --  3.0*  --  4.0   CL  --  103  --  103   CO2  --  22  --  24   BUN  --  59*  --  61*   CREATININE 7.64* 7.01*  --  7.34*   GLUCOSE  --  51* 123 76   CALCIUM  --  8.7  --  8.3*        Phosphorus:  No results for input(s): PHOS in the last 72 hours. Magnesium: No results for input(s): MG in the last 72 hours. Albumin:   Recent Labs     05/12/21  1217   LABALBU 3.5       Radiology:  Reviewed as available. ASSESSMENT     #1 ESRD secondary ischemic nephrosclerosis on peritoneal dialysis last 1 month. Follows up with ~clinic. On APD. #2 admitted with altered mental sensorium secondary to hypoglycemia  #3 suspected UTI  #4 diabetes mellitus  #5 essential hypertension  #6 history of breast cancer status post surgery     PLAN:     X-ray abdomen to rule out constipation/malpositioning of PD catheter as effluent  drain suboptimal     GS consulted  Resume home meds  PD 2L. 2. 5% D and Q6    Thank you for the consultation. Please do not hesitate to call with questions. This note is created with the assistance of a speech-recognition program. While intending to generate a document that actually reflects the content of the visit, no guarantees can be provided that every mistake has been identified and corrected by editing.     Faustino Merino MD, MRCP [place], FACP   5/13/2021 1:10 PM    [facility]

## 2021-05-13 NOTE — H&P
[facility]     Department of Internal Medicine - Staff Internal Medicine Teaching Service          ADMISSION NOTE/HISTORY AND PHYSICAL EXAMINATION   Date: 5/13/2021  Patient Name: Jazmin Almendarez  Date of admission: 5/12/2021 12:02 PM  YOB: 1945  PCP: Teo Bland MD  History Obtained From:  patient, electronic medical record    CHIEF COMPLAINT     Chief complaint: Hypoglycemia     HISTORY OF PRESENTING ILLNESS     The patient is a pleasant 67 y.o. female with a past medical history of hypertension, hyperlipidemia, T1DM, PAT, and left sided breast cancer presented with altered mental status and was found to have a sugar of 49. She was given an amp of dextrose in the emergency department and improved. She was still lethargic but was able to somewhat answer questions and would fall asleep mid sentence. She has a history of breast cancer and has a port in place on the right side of her chest.  She typically follows up at [address] facilities. Patient's daughter is with her and states that she has been off of her insulin for a bout a week but started in back up about 2 days ago after seeing her primary care provider. She also undergoes peritoneal dialysis intermittently but has been unable to get out of bed for the past 2 days. Additionally she has experienced falls in the past due to generalized weakness . She does has a history of stroke as well and has noticed some changes in speech over the past few days.       Review of Systems:  Review of Systems  Unable to perform due to mental status change     PAST MEDICAL HISTORY     Past Medical History:   Diagnosis Date    Anxiety and depression     Arthritis     Cancer ([facility]     left breast cancer    Diabetes mellitus ([facility]     Type 1    History of blood transfusion     50 plus yrs ago with pregnancy    Hyperlipidemia     Hypertension     MRSA (methicillin resistant staph aureus) culture positive 2015    back  PAT (obstructive sleep apnea)     had UPPP  \"have not used machine in years\"    Prolonged emergence from general anesthesia     SOB (shortness of breath)     \"long distance\"     PAST SURGICAL HISTORY     Past Surgical History:   Procedure Laterality Date    APPENDECTOMY      BACK SURGERY      I and D lower back MRSA    BREAST REDUCTION SURGERY      HYSTERECTOMY      MASTECTOMY Left     lymph nodes removed    MASTECTOMY Left 05/14/2018    axillary mastectomy    [facility] EXC SKIN BENIG <5MM TRUNK,ARM,LEG Right 5/14/2018    RIGHT AXILLARY MASTECTOMY performed by Yohan Ponce DO at [address] [place] [address].     Patient has no known allergies. MEDICATIONS PRIOR TO ADMISSION     Prior to Admission medications    Medication Sig Start Date End Date Taking? Authorizing Provider   docusate sodium (COLACE) 100 MG capsule Take 1 capsule by mouth 2 times daily as needed for Constipation 5/14/18   Manuel House DO   ondansetron [facility]) 4 MG tablet Take 1 tablet by mouth every 6 hours as needed for Nausea or Vomiting 5/14/18   Manuel House DO   nebivolol (BYSTOLIC) 10 MG tablet Take 20 mg by mouth daily    Historical Provider, MD   rosuvastatin (CRESTOR) 5 MG tablet Take 5 mg by mouth daily    Historical Provider, MD   chlorthalidone (HYGROTON) 25 MG tablet Take 25 mg by mouth daily    Historical Provider, MD   citalopram (CELEXA) 20 MG tablet Take 40 mg by mouth daily    Historical Provider, MD   simvastatin (ZOCOR) 10 MG tablet Take 10 mg by mouth nightly    Historical Provider, MD   gabapentin (NEURONTIN) 300 MG capsule Take 300 mg by mouth 3 times daily. Adrianna Infante     Historical Provider, MD   fenofibrate micronized (LOFIBRA) 134 MG capsule Take 134 mg by mouth nightly    Historical Provider, MD   SITagliptin-metFORMIN (JANUMET XR)  MG TB24 per extended release tablet Take 1 tablet by mouth daily    Historical Provider, MD   aspirin 81 MG tablet Take 81 mg by mouth daily Historical Provider, MD   ibuprofen (ADVIL;MOTRIN) 800 MG tablet Take 800 mg by mouth 3 times daily as needed for Pain    Historical Provider, MD   insulin aspart (NOVOLOG) 100 UNIT/ML injection vial Inject 85 Units into the skin 3 times daily (before meals)    Historical Provider, MD     SOCIAL HISTORY     Tobacco: Never Used  Alcohol: Social Drinker   Illicits: Does Not Use     FAMILY HISTORY     No family history on file. PHYSICAL EXAM     Vitals: BP (!) 154/113   Pulse 66   Temp 97.9 °F (36.6 °C) (Oral)   Resp 16   Ht 5' 3\" (1.6 m)   Wt 194 lb 8 oz (88.2 kg)   SpO2 100%   BMI 34.45 kg/m²   Tmax: Temp (24hrs), Av.5 °F (36.4 °C), Min:97.1 °F (36.2 °C), Max:97.9 °F (36.6 °C)    Last Body weight:   Wt Readings from Last 3 Encounters:   21 194 lb 8 oz (88.2 kg)   18 192 lb 10.9 oz (87.4 kg)     Body Mass Index : Body mass index is 34.45 kg/m². PHYSICAL EXAMINATION:  Constitutional:       Appearance: She is well-developed. Comments: Sleeping but will awake, and answer simple questions but then will fall back asleep   HENT:      Head: Normocephalic and atraumatic. Eyes:      General:         Right eye: No discharge. Left eye: No discharge. Conjunctiva/sclera: Conjunctivae normal.   Cardiovascular:      Rate and Rhythm: Normal rate and regular rhythm. Heart sounds: Normal heart sounds. No murmur. No friction rub. No gallop. Pulmonary:      Effort: Pulmonary effort is normal. No respiratory distress. Breath sounds: Normal breath sounds. No wheezing or rales. Chest:      Chest wall: No tenderness. Abdominal:      General: There is no distension. Palpations: Abdomen is soft. There is no mass. Tenderness: There is no abdominal tenderness. There is no guarding or rebound. Skin:     General: Skin is warm and dry. Findings: No rash.    Neurological:      Comments: Patient does awake, and will answer simple questions and obey commands, no neuro 0.30 - 5.00 mIU/L   POC Glucose Fingerstick    Collection Time: 05/12/21  5:38 PM   Result Value Ref Range    POC Glucose 147 (H) 65 - 105 mg/dL   POC Glucose Fingerstick    Collection Time: 05/12/21  6:16 PM   Result Value Ref Range    POC Glucose 220 (H) 65 - 105 mg/dL   POC Glucose Fingerstick    Collection Time: 05/12/21  7:26 PM   Result Value Ref Range    POC Glucose 99 65 - 105 mg/dL   Troponin    Collection Time: 05/12/21  8:04 PM   Result Value Ref Range    Troponin, High Sensitivity 13 0 - 14 ng/L    Troponin T NOT REPORTED <0.03 ng/mL    Troponin Interp NOT REPORTED    POC Glucose Fingerstick    Collection Time: 05/13/21  1:22 AM   Result Value Ref Range    POC Glucose 66 65 - 105 mg/dL   Urinalysis Reflex to Culture    Collection Time: 05/13/21  2:04 AM    Specimen: Urine, clean catch   Result Value Ref Range    Color, UA YELLOW YELLOW    Turbidity UA CLEAR CLEAR    Glucose, Ur NEGATIVE NEGATIVE    Bilirubin Urine NEGATIVE NEGATIVE    Ketones, Urine NEGATIVE NEGATIVE    Specific Gravity, UA 1.012 1.005 - 1.030    Urine Hgb NEGATIVE NEGATIVE    pH, UA 5.0 5.0 - 8.0    Protein, UA 1+ (A) NEGATIVE    Urobilinogen, Urine Normal Normal    Nitrite, Urine NEGATIVE NEGATIVE    Leukocyte Esterase, Urine SMALL (A) NEGATIVE    Urinalysis Comments NOT REPORTED    DRUG SCREEN MULTI URINE    Collection Time: 05/13/21  2:04 AM   Result Value Ref Range    Amphetamine Screen, Ur NEGATIVE NEGATIVE    Barbiturate Screen, Ur NEGATIVE NEGATIVE    Benzodiazepine Screen, Urine NEGATIVE NEGATIVE    Cocaine Metabolite, Urine NEGATIVE NEGATIVE    Methadone Screen, Urine NEGATIVE NEGATIVE    Opiates, Urine NEGATIVE NEGATIVE    Phencyclidine, Urine NEGATIVE NEGATIVE    Propoxyphene, Urine NOT REPORTED NEGATIVE    Cannabinoid Scrn, Ur NEGATIVE NEGATIVE    Oxycodone Screen, Ur NEGATIVE NEGATIVE    Methamphetamine, Urine NOT REPORTED NEGATIVE    Tricyclic Antidepressants, Urine NOT REPORTED NEGATIVE    MDMA, Urine NOT REPORTED NEGATIVE    Buprenorphine Urine NOT REPORTED NEGATIVE    Test Information       Assay provides medical screening only. The absence of expected drug(s) and/or metabolite(s) may indicate diluted or adulterated urine, limitations of testing or timing of collection. URINALYSIS, MICRO    Collection Time: 05/13/21  2:04 AM   Result Value Ref Range    -          WBC, UA 10 TO 20 0 - 5 /HPF    RBC, UA 0 TO 2 0 - 4 /HPF    Casts UA  0 - 8 /LPF     0 TO 2 HYALINE Reference range defined for non-centrifuged specimen. Crystals, UA NOT REPORTED None /HPF    Epithelial Cells UA 0 TO 2 0 - 5 /HPF    Renal Epithelial, UA NOT REPORTED 0 /HPF    Bacteria, UA MANY (A) None    Mucus, UA NOT REPORTED None    Trichomonas, UA NOT REPORTED None    Amorphous, UA NOT REPORTED None    Other Observations UA NOT REPORTED NOT REQ. Yeast, UA NOT REPORTED None     Imaging:   Ct Head Wo Contrast    Result Date: 5/12/2021  No acute intracranial abnormality. Cerebral atrophy and small old lacunar infarct left basal ganglia. Xr Chest Portable    Result Date: 5/12/2021  Hypoventilatory study with limited visualization left base. Cardiomegaly with venous hypertension but no clear cut radiographic CHF. ASSESSMENT & PLAN     ASSESSMENT / PLAN:     IMPRESSION  This is a 67 y.o. female who presented with altered mental status and found to be hypoglycemia. Patient admitted to inpatient status for further evaluation.     Acute Metabolic Encephalopathy  -May be secondary to hypoglycemia and/or urinary tract infection and/or missed peritoneal dialysis   -Will continue to monitor     Type 1 DM   -Hemoglobin A1c ordered and pending  -High Dose Sliding Scale  -Hypoglycemia protocol  -Glucose checks AC/HS  -Neurontin 300 mg PO TID   -Will continue to monitor     Urinary Tract Infection  -May be asymptomatic bacteriuria   -UA positive for bacteria  -Urine culture pending   -On Ceftriaxone 1 mg q24 hr  -Will adjust antibiotics and/or discontinue

## 2021-05-13 NOTE — ED NOTES
Pt daughters came to ED and were very upset because pt has been in the ED since 1230. Daughter were verbally aggressive with writer, cussing out writer, stating she is not doing her job and \"does not give a shit about their mother. \" Security called to bedside.      Melissa Luque RN  05/12/21 2812

## 2021-05-13 NOTE — PROGRESS NOTES
IM Brief Senior Note:    66 yo F questionable Pmhx of ESRD on intermittent PD? (no Nephrology notes in chart), DM, HLD, HTN, PAT presents w/ AMS per family. AOx1-person but baseline apparently normal: AOX3. Supposedly missed PD session. Was hypogly en route, given dextrose w/ BG from 49 to 120. Mildly hypertensive, bradycardic high 40s. Other VSS. Became hypoglycemic again in ED, requiring re-adminstration of dextrose with repeat POC glucose now 99. K 3.0, trop 69-->13. Wbc 11.7, afeb. VBG-7.44/31.3/191. bicarb-21. LFTS grossly normal.    CT head noncon-no acute pathology. CXR-cardiomegaly. Nephrology consulted: rec'd dianeal 25% dextrose q6h x 4. Ordered. A/P: 75yo F h/o ESRD on PD?, DM, HTN, PAT, HLD presents w/ new AMS, supposedly missed PD session.  -Nephrology consulted: PD per recs.  Dianeal ordered  -will replete K gently given ESRD-20 meq IV  -hypoglycemia protocol  -continue home meds  -leukocytosis, new AMS: check bcx, UA to reflex cx, Utox, TSH  -last echo 2/2020: grossly normal; will recheck    Brook Chawla MD  PGY-2, Department of Internal Medicine  [facility], [place], [place]

## 2021-05-14 LAB
ANION GAP SERPL CALCULATED.3IONS-SCNC: 14 MMOL/L (ref 9–17)
BUN BLDV-MCNC: 63 MG/DL (ref 8–23)
BUN/CREAT BLD: ABNORMAL (ref 9–20)
CALCIUM SERPL-MCNC: 8.1 MG/DL (ref 8.6–10.4)
CHLORIDE BLD-SCNC: 104 MMOL/L (ref 98–107)
CO2: 22 MMOL/L (ref 20–31)
CREAT SERPL-MCNC: 7.18 MG/DL (ref 0.5–0.9)
CULTURE: ABNORMAL
CULTURE: ABNORMAL
ESTIMATED AVERAGE GLUCOSE: 163 MG/DL
ESTIMATED AVERAGE GLUCOSE: 163 MG/DL
GFR AFRICAN AMERICAN: 7 ML/MIN
GFR NON-AFRICAN AMERICAN: 6 ML/MIN
GFR SERPL CREATININE-BSD FRML MDRD: ABNORMAL ML/MIN/{1.73_M2}
GFR SERPL CREATININE-BSD FRML MDRD: ABNORMAL ML/MIN/{1.73_M2}
GLUCOSE BLD-MCNC: 142 MG/DL (ref 70–99)
GLUCOSE BLD-MCNC: 147 MG/DL (ref 65–105)
GLUCOSE BLD-MCNC: 185 MG/DL (ref 65–105)
GLUCOSE BLD-MCNC: 189 MG/DL (ref 65–105)
GLUCOSE BLD-MCNC: 220 MG/DL (ref 65–105)
HBA1C MFR BLD: 7.3 % (ref 4–6)
HBA1C MFR BLD: 7.3 % (ref 4–6)
HCT VFR BLD CALC: 35.2 % (ref 36.3–47.1)
HEMOGLOBIN: 10.8 G/DL (ref 11.9–15.1)
LV EF: 58 %
LVEF MODALITY: NORMAL
Lab: ABNORMAL
MCH RBC QN AUTO: 29.8 PG (ref 25.2–33.5)
MCHC RBC AUTO-ENTMCNC: 30.7 G/DL (ref 28.4–34.8)
MCV RBC AUTO: 97 FL (ref 82.6–102.9)
NRBC AUTOMATED: 0.5 PER 100 WBC
PDW BLD-RTO: 15.2 % (ref 11.8–14.4)
PLATELET # BLD: 227 K/UL (ref 138–453)
PMV BLD AUTO: 12.1 FL (ref 8.1–13.5)
POTASSIUM SERPL-SCNC: 4.1 MMOL/L (ref 3.7–5.3)
RBC # BLD: 3.63 M/UL (ref 3.95–5.11)
SODIUM BLD-SCNC: 140 MMOL/L (ref 135–144)
SPECIMEN DESCRIPTION: ABNORMAL
WBC # BLD: 8.6 K/UL (ref 3.5–11.3)

## 2021-05-14 PROCEDURE — 80048 BASIC METABOLIC PNL TOTAL CA: CPT

## 2021-05-14 PROCEDURE — 6370000000 HC RX 637 (ALT 250 FOR IP): Performed by: STUDENT IN AN ORGANIZED HEALTH CARE EDUCATION/TRAINING PROGRAM

## 2021-05-14 PROCEDURE — 2060000000 HC ICU INTERMEDIATE R&B

## 2021-05-14 PROCEDURE — 99232 SBSQ HOSP IP/OBS MODERATE 35: CPT | Performed by: INTERNAL MEDICINE

## 2021-05-14 PROCEDURE — G0108 DIAB MANAGE TRN  PER INDIV: HCPCS

## 2021-05-14 PROCEDURE — 82947 ASSAY GLUCOSE BLOOD QUANT: CPT

## 2021-05-14 PROCEDURE — 83036 HEMOGLOBIN GLYCOSYLATED A1C: CPT

## 2021-05-14 PROCEDURE — 2580000003 HC RX 258: Performed by: INTERNAL MEDICINE

## 2021-05-14 PROCEDURE — 94761 N-INVAS EAR/PLS OXIMETRY MLT: CPT

## 2021-05-14 PROCEDURE — 85027 COMPLETE CBC AUTOMATED: CPT

## 2021-05-14 PROCEDURE — 2580000003 HC RX 258: Performed by: STUDENT IN AN ORGANIZED HEALTH CARE EDUCATION/TRAINING PROGRAM

## 2021-05-14 PROCEDURE — 93306 TTE W/DOPPLER COMPLETE: CPT

## 2021-05-14 PROCEDURE — 6360000002 HC RX W HCPCS: Performed by: STUDENT IN AN ORGANIZED HEALTH CARE EDUCATION/TRAINING PROGRAM

## 2021-05-14 PROCEDURE — 36415 COLL VENOUS BLD VENIPUNCTURE: CPT

## 2021-05-14 PROCEDURE — 99238 HOSP IP/OBS DSCHRG MGMT 30/<: CPT | Performed by: INTERNAL MEDICINE

## 2021-05-14 RX ORDER — CEPHALEXIN 250 MG/1
250 CAPSULE ORAL DAILY
Qty: 3 CAPSULE | Refills: 0 | Status: SHIPPED | OUTPATIENT
Start: 2021-05-14 | End: 2021-05-17

## 2021-05-14 RX ORDER — GABAPENTIN 300 MG/1
300 CAPSULE ORAL DAILY
Qty: 30 CAPSULE | Refills: 0 | Status: ON HOLD | OUTPATIENT
Start: 2021-05-14 | End: 2021-11-10 | Stop reason: ALTCHOICE

## 2021-05-14 RX ORDER — SODIUM CHLORIDE, SODIUM LACTATE, CALCIUM CHLORIDE, MAGNESIUM CHLORIDE AND DEXTROSE 2.5; 538; 448; 18.3; 5.08 G/100ML; MG/100ML; MG/100ML; MG/100ML; MG/100ML
2000 INJECTION, SOLUTION INTRAPERITONEAL EVERY 6 HOURS
Status: DISCONTINUED | OUTPATIENT
Start: 2021-05-14 | End: 2021-05-15 | Stop reason: HOSPADM

## 2021-05-14 RX ADMIN — CITALOPRAM 40 MG: 20 TABLET, FILM COATED ORAL at 08:49

## 2021-05-14 RX ADMIN — CHLORTHALIDONE 25 MG: 25 TABLET ORAL at 08:49

## 2021-05-14 RX ADMIN — SODIUM CHLORIDE, PRESERVATIVE FREE 10 ML: 5 INJECTION INTRAVENOUS at 22:48

## 2021-05-14 RX ADMIN — INSULIN LISPRO 2 UNITS: 100 INJECTION, SOLUTION INTRAVENOUS; SUBCUTANEOUS at 21:23

## 2021-05-14 RX ADMIN — GABAPENTIN 300 MG: 300 CAPSULE ORAL at 08:49

## 2021-05-14 RX ADMIN — HEPARIN SODIUM 5000 UNITS: 5000 INJECTION INTRAVENOUS; SUBCUTANEOUS at 21:22

## 2021-05-14 RX ADMIN — ASPIRIN 81 MG: 81 TABLET, CHEWABLE ORAL at 08:49

## 2021-05-14 RX ADMIN — INSULIN LISPRO 6 UNITS: 100 INJECTION, SOLUTION INTRAVENOUS; SUBCUTANEOUS at 18:34

## 2021-05-14 RX ADMIN — SODIUM CHLORIDE, SODIUM LACTATE, CALCIUM CHLORIDE, MAGNESIUM CHLORIDE AND DEXTROSE 2000 ML: 2.5; 538; 448; 18.3; 5.08 INJECTION, SOLUTION INTRAPERITONEAL at 01:46

## 2021-05-14 RX ADMIN — SODIUM CHLORIDE, PRESERVATIVE FREE 10 ML: 5 INJECTION INTRAVENOUS at 08:50

## 2021-05-14 RX ADMIN — INSULIN LISPRO 3 UNITS: 100 INJECTION, SOLUTION INTRAVENOUS; SUBCUTANEOUS at 08:58

## 2021-05-14 RX ADMIN — SODIUM CHLORIDE, SODIUM LACTATE, CALCIUM CHLORIDE, MAGNESIUM CHLORIDE AND DEXTROSE 2000 ML: 2.5; 538; 448; 18.3; 5.08 INJECTION, SOLUTION INTRAPERITONEAL at 10:57

## 2021-05-14 RX ADMIN — ROSUVASTATIN CALCIUM 5 MG: 5 TABLET, COATED ORAL at 08:49

## 2021-05-14 RX ADMIN — ACETAMINOPHEN 650 MG: 325 TABLET ORAL at 21:24

## 2021-05-14 RX ADMIN — CEFTRIAXONE SODIUM 1000 MG: 1 INJECTION, POWDER, FOR SOLUTION INTRAMUSCULAR; INTRAVENOUS at 05:44

## 2021-05-14 RX ADMIN — HEPARIN SODIUM 5000 UNITS: 5000 INJECTION INTRAVENOUS; SUBCUTANEOUS at 05:45

## 2021-05-14 RX ADMIN — SODIUM CHLORIDE, SODIUM LACTATE, CALCIUM CHLORIDE, MAGNESIUM CHLORIDE AND DEXTROSE 2000 ML: 2.5; 538; 448; 18.3; 5.08 INJECTION, SOLUTION INTRAPERITONEAL at 05:45

## 2021-05-14 ASSESSMENT — PAIN SCALES - GENERAL
PAINLEVEL_OUTOF10: 4
PAINLEVEL_OUTOF10: 0

## 2021-05-14 NOTE — PROGRESS NOTES
CLINICAL PHARMACY NOTE: MEDS TO BEDS    Total # of Prescriptions Filled: 2   The following medications were delivered to the patient:  · Keflex  · gabapentin    Additional Documentation:

## 2021-05-14 NOTE — PLAN OF CARE
Problem: Pain:  Goal: Pain level will decrease  Description: Pain level will decrease  Outcome: Met This Shift  Goal: Control of acute pain  Description: Control of acute pain  Outcome: Met This Shift  Goal: Control of chronic pain  Description: Control of chronic pain  Outcome: Met This Shift     Problem: Skin Integrity:  Goal: Will show no infection signs and symptoms  Description: Will show no infection signs and symptoms  Outcome: Met This Shift  Goal: Absence of new skin breakdown  Description: Absence of new skin breakdown  Outcome: Met This Shift     Problem: Falls - Risk of:  Goal: Will remain free from falls  Description: Will remain free from falls  5/14/2021 1505 by Anaid Gan RN  Outcome: Met This Shift  5/14/2021 0631 by Jt Degroot RN  Outcome: Met This Shift  Goal: Absence of physical injury  Description: Absence of physical injury  5/14/2021 1505 by Anaid Gan RN  Outcome: Met This Shift  5/14/2021 0631 by Jt Degroot RN  Outcome: Met This Shift

## 2021-05-14 NOTE — DISCHARGE INSTR - COC
Continuity of Care Form    Patient Name: Rosalind Johnson   :  [id number]  MRN:  [id number]    [facility] [place] date:  2021  Discharge date:  5/15/21  Code Status Order: Full Code   Advance Directives:   [facility] Documentation       Date/Time Healthcare Directive Type of Healthcare Directive Copy in [address] Agent's Name Healthcare Agent's Phone Number    21 [phone]  Yes, patient has an advance directive for healthcare treatment  --  --  --  --  --            Admitting Physician:  Gabriella Gomes MD  PCP: Dell Perry MD    Discharging Nurse: Katalina Moctezuma Unit/Room#: 9722/4315-84  Discharging Unit Phone Number: ***    Emergency Contact:   Extended Emergency Contact Information  Primary Emergency Contact: Amanda Bagley of [address] [place] [address] Phone: [phone]  Relation: Child  [facility] Phone: [phone]  Relation: Child   needed? No    Past Surgical History:  Past Surgical History:   Procedure Laterality Date    APPENDECTOMY      BACK SURGERY      I and D lower back MRSA    BREAST REDUCTION SURGERY      HYSTERECTOMY      MASTECTOMY Left     lymph nodes removed    MASTECTOMY Left 2018    axillary mastectomy    [place] EXC SKIN BENIG <5MM TRUNK,ARM,LEG Right 2018    RIGHT AXILLARY MASTECTOMY performed by Angelica Pennington DO at [address] [place] [address]         Immunization History: There is no immunization history on file for this patient.     Active Problems:  Patient Active Problem List   Diagnosis Code    AMS (altered mental status) R41.82    Diabetes ([address] E11.9    Peritoneal dialysis status ([address] Z99.2    Hypoglycemia E16.2    Encephalopathy G93.40       Isolation/Infection:   Isolation            Contact          Patient Infection Status       Infection Onset Added Last Indicated Last Indicated By Review Planned Expiration Resolved Resolved By MRSA  05/14/18 05/14/18 Isaiah Montes RN        1 negative nasal swab - 5/2018 (needs 2nd nasal swab)  Back - 2015            Nurse Assessment:  Last Vital Signs: /70   Pulse 76   Temp 97.9 °F (36.6 °C) (Axillary)   Resp 13   Ht 5' 3\" (1.6 m)   Wt 190 lb 14.4 oz (86.6 kg) Comment: with one blanket on bed  SpO2 100%   BMI 33.82 kg/m²     Last documented pain score (0-10 scale): Pain Level: 7  Last Weight:   Wt Readings from Last 1 Encounters:   05/13/21 190 lb 14.4 oz (86.6 kg)     Mental Status:  oriented    IV Access:  - Dialysis Catheter  - site  right, insertion date: ***    Nursing Mobility/ADLs:  Walking   Assisted  Transfer  Assisted  Bathing  Assisted  Dressing  Assisted  Toileting  Assisted  Feeding  Independent  Med Admin  Assisted  Med Delivery   whole    Wound Care Documentation and Therapy:  Incision 05/14/18 Axilla Right (Active)   Number of days: 1095        Elimination:  Continence:   · Bowel: Yes  · Bladder: Yes  Urinary Catheter: None   Colostomy/Ileostomy/Ileal Conduit: No       Date of Last BM: 5/15/21      Intake/Output Summary (Last 24 hours) at 5/14/2021 0951  Last data filed at 5/14/2021 0619  Gross per 24 hour   Intake 7300 ml   Output 7200 ml   Net 100 ml     I/O last 3 completed shifts: In: 12 [P.O.:1300; Other:6000]  Out: 7200 [Other:7200]    Safety Concerns: At Risk for Falls    Impairments/Disabilities:      Hearing    Nutrition Therapy:  Current Nutrition Therapy:   - Oral Diet:  Low Sodium (2gm)    Routes of Feeding: Oral  Liquids: Thin Liquids  Daily Fluid Restriction: yes - amount 1500 ml  Last Modified Barium Swallow with Video (Video Swallowing Test): not done    Treatments at the Time of Hospital Discharge:   Respiratory Treatments:     Oxygen Therapy:  is not on home oxygen therapy.   Ventilator:    - No ventilator support    Rehab Therapies: Physical Therapy and Occupational Therapy  Weight Bearing Status/Restrictions: No weight bearing

## 2021-05-14 NOTE — PROGRESS NOTES
[facility]  Internal Medicine Teaching Residency Program  Inpatient Daily Progress Note  ______________________________________________________________________________    Patient: Milderd Erp  YOB: 1945   LWS:[id number]    Acct: [de-identified]     Room: [facility]9574-  Admit date: 5/12/2021  Today's date: 05/14/21  Number of days in the hospital: 2    SUBJECTIVE   Admitting Diagnosis: <principal problem not specified>  CC: Hypoglycemia   Pt examined at bedside. Chart & results reviewed. No acute episodes overnight  Patient is heme stable and afebrile  No further episodes of hypoglycemia  Updated Hemoglobin A1c pending  On Ceftriaxone for possible UTI  KUB showing proper placement of PD cath  General surgery evaluated and signed off  Nephrology following     ROS:  Constitutional:  negative for chills, fevers, sweats  Respiratory:  negative for cough, dyspnea on exertion, hemoptysis, shortness of breath, wheezing  Cardiovascular:  negative for chest pain, chest pressure/discomfort, lower extremity edema, palpitations  Gastrointestinal:  negative for abdominal pain, constipation, diarrhea, nausea, vomiting  Neurological:  negative for dizziness, headache / mental status improved     BRIEF HISTORY     The patient is a pleasant 75 y. o. female with a past medical history of hypertension, hyperlipidemia, T1DM, PAT, and left sided breast cancer presented with altered mental status and was found to have a sugar of 49.  She was given an amp of dextrose in the emergency department and improved.  She was still lethargic but was able to somewhat answer questions and would fall asleep mid sentence. Dave Degroot has a history of breast cancer and has a port in place on the right side of her chest.  She typically follows up at [facility] facilities.  Patient's daughter is with her and states that she has been off of her insulin for a bout a week but started in back up about 2 days ago after seeing her primary care provider. Freida Fleischer also undergoes peritoneal dialysis intermittently but has been unable to get out of bed for the past 2 days.  Additionally she has experienced falls in the past due to generalized weakness . Freida Fleischer does has a history of stroke as well and has noticed some changes in speech over the past few days.      OBJECTIVE     Vital Signs:  /70   Pulse 76   Temp 97.9 °F (36.6 °C) (Axillary)   Resp 13   Ht 5' 3\" (1.6 m)   Wt 190 lb 14.4 oz (86.6 kg) Comment: with one blanket on bed  SpO2 100%   BMI 33.82 kg/m²     Temp (24hrs), Av.3 °F (36.8 °C), Min:97.9 °F (36.6 °C), Max:98.9 °F (37.2 °C)    In: 5300   Out: 7200     Physical Exam:  Constitutional:       Appearance: She is well-developed.      Comments: Sleeping but will awake, and answer simple questions but then will fall back asleep   HENT:      Head: Normocephalic and atraumatic. Eyes:      General:         Right eye: No discharge.         Left eye: No discharge.      Conjunctiva/sclera: Conjunctivae normal.   Cardiovascular:      Rate and Rhythm: Normal rate and regular rhythm.      Heart sounds: Normal heart sounds. No murmur. No friction rub. No gallop.    Pulmonary:      Effort: Pulmonary effort is normal. No respiratory distress.      Breath sounds: Normal breath sounds. No wheezing or rales. Chest:      Chest wall: No tenderness. Abdominal:      General: There is no distension.      Palpations: Abdomen is soft. There is no mass.      Tenderness: There is no abdominal tenderness. There is no guarding or rebound. Skin:     General: Skin is warm and dry.      Findings: No rash.    Neurological:      Comments: Patient does awake, and will answer simple questions and obey commands, no neuro deficit on exam.  Possible slurred speech which has been there for the last day      Medications:  Scheduled Medications:    dextrose  25 g Intravenous Once    cefTRIAXone (ROCEPHIN) IV  1,000 mg Intravenous terminates in the right pelvis. Ct Head Wo Contrast    Result Date: 5/12/2021  No acute intracranial abnormality. Cerebral atrophy and small old lacunar infarct left basal ganglia. Xr Chest Portable    Result Date: 5/12/2021  Hypoventilatory study with limited visualization left base. Cardiomegaly with venous hypertension but no clear cut radiographic CHF. ASSESSMENT & PLAN     ASSESSMENT / PLAN:     Acute Metabolic Encephalopathy  -Resolved  -May be secondary to hypoglycemia and/or urinary tract infection and/or missed peritoneal dialysis      Type 2 DM   -Hemoglobin A1c ordered and pending  -High Dose Sliding Scale  -Hypoglycemia protocol  -Glucose checks AC/HS  -Neurontin 300 mg   -Will continue to monitor      Urinary Tract Infection  -May be asymptomatic bacteriuria   -UA positive for bacteria  -Urine culture positive for Non-lactose fermenting rods >100,000  -Culture and sensitivity pending   -On Ceftriaxone 1 mg q24 hr  -Will adjust antibiotics and/or discontinue antibiotic pending results and clinical status      ESRD  -On peritoneal dialysis intermittently  -Nephrology consulted. Recommendations appreciated. -On Dianeal lo-william 2.5% 2,000 mL q6 per Nephrology  -Will continue to monitor      Type 2 MI Demand Ischemia   -Troponin trending down - 69 to 13  -No acute ST/T wave abnormalities  -Will continue to monitor      DVT ppx: Heparin      PT/OT/SW: On Board  Discharge Matteo Carcamo MD  Internal Medicine Resident, PGY-1  [address], [place]  5/14/2021, 8:50 AM   Attending Physician Statement  I have discussed the care of Yumiko Hernandez, including pertinent history and exam findings,  with the resident. I have seen and examined the patient and the key elements of all parts of the encounter have been performed by me. I agree with the assessment, plan and orders as documented by the resident with additions .       Treatment plan Discussed with

## 2021-05-14 NOTE — PROGRESS NOTES
Inpatient Diabetes  Education     Type and Reason for Visit: Patient Education  Met with patient at bedside to discuss her use of insulin at home and hypoglycemia. She stated she had insulin pens at home, not sure of name, but when writer described cloudy insulin, she did state yes the pens she uses she rolls and then the insulin is milky white. She stated takes shot 2 - 3 times with her meals. Dose of 50 - 90 units with each injections. Writer did call PCP/  Dr Tayo Hewitt office and get home medication list faxed over and a copy is now in the folder at the nursing unit. Novolog 70/30 90 units in the morning, 65 units at noon, and 50 units in the evening. Patient stated recently having low spells at home, she stated drink milk or juice to bring low up. She does not have glucagon kit at home. She has a home BG meter and knows how to use this device. She has a son who lives with her also and daughters who often check in. She also has decreased kidney is uses perineal dialysis. Verbally reviewed following information with patient  Blood glucose targets, hypo and hyperglycemia, importance of home blood glucose monitoring, discussed glucagon emergency kit,  heathy eating  plate method for CHO control portions, be active as recommended by health care providers, take medications  insulin as directed/  discussed with patient to anticipate lower doses of insulin as need as in patient in last few days has also been much lower. Written educational materials provided  _x__ handouts - diabetes emergencies and Diabetes Id card    Out patient diabetes education  contact number provided - [phone] to patient and placed on the discharge summary.       Kenji Herron RN CDE

## 2021-05-14 NOTE — PROGRESS NOTES
Occupational Therapy    Occupational Therapy Not Seen Note    DATE: 2021  Name: Sajan Zurita  :   MRN: [id number]    Patient not available for Occupational Therapy due to: Other: Pt currently placed on peritoneal dialysis in room; therefore, she is unable to participate in OOB/EOB at this time.     Next Scheduled Treatment: Will check in PM or as time allows     Electronically signed by Gretchen Dance, OT on 2021 at 11:25 AM

## 2021-05-14 NOTE — PROGRESS NOTES
SUBJECTIVE    Patient remained stable. Her PD exchanges in progress. Patient is draining pretty well and no problems with dual or infusion of fluid. Her blood sugar remains under good control. Patient received dialysis under the care of [facility] clinics. OBJECTIVE      CURRENT TEMPERATURE:  Temp: 97.9 °F (36.6 °C)  MAXIMUM TEMPERATURE OVER 24HRS:  Temp (24hrs), Av.2 °F (36.8 °C), Min:97.9 °F (36.6 °C), Max:98.9 °F (37.2 °C)    CURRENT RESPIRATORY RATE:  Resp: 21  CURRENT PULSE:  Pulse: 76  CURRENT BLOOD PRESSURE:  BP: 125/73  24HR BLOOD PRESSURE RANGE:  Systolic (82XQB), TMS:675 , Min:125 , TXM:415   ; Diastolic (87QNS), IFJ:18, Min:58, Max:73    24HR INTAKE/OUTPUT:      Intake/Output Summary (Last 24 hours) at 2021 1324  Last data filed at 2021 0930  Gross per 24 hour   Intake 7300 ml   Output 9650 ml   Net -2350 ml     WEIGHT :  Patient Vitals for the past 96 hrs (Last 3 readings):   Weight   21 1100 181 lb 9.6 oz (82.4 kg)   21 0600 190 lb 14.4 oz (86.6 kg)   21 2300 194 lb 8 oz (88.2 kg)     PHYSICAL EXAM      GENERAL APPEARANCE: Awake and alert x3  SKIN: Warm to touch and no erythema  EYES: Conjunctiva was pink  ENT: No thrush  NECK: No JVD or carotid bruit.   PULMONARY: Bilateral air entry and clear to auscultation  CADRDIOVASCULAR: S1 and S2 audible no S3  ABDOMEN: Soft and nontender bowel sounds are positive no ascites  EXTREMITIES: No edema    CURRENT MEDICATIONS      dextrose 50 % IV solution, Once  cefTRIAXone (ROCEPHIN) 1000 mg IVPB in 50 mL D5W minibag, Q24H  insulin lispro (HUMALOG) injection vial 0-18 Units, TID WC  insulin lispro (HUMALOG) injection vial 0-9 Units, Nightly  glucose (GLUTOSE) 40 % oral gel 15 g, PRN  glucagon (rDNA) injection 1 mg, PRN  heparin (porcine) injection 5,000 Units, 3 times per day  gabapentin (NEURONTIN) capsule 300 mg, Daily  aspirin chewable tablet 81 mg, Daily  chlorthalidone (HYGROTON) tablet 25 mg, Daily  citalopram (CELEXA) tablet 40 mg, Daily  docusate sodium (COLACE) capsule 100 mg, BID PRN  rosuvastatin (CRESTOR) tablet 5 mg, Daily  sodium chloride flush 0.9 % injection 5-40 mL, 2 times per day  sodium chloride flush 0.9 % injection 5-40 mL, PRN  0.9 % sodium chloride infusion, PRN  promethazine (PHENERGAN) tablet 12.5 mg, Q6H PRN    Or  ondansetron (ZOFRAN) injection 4 mg, Q6H PRN  polyethylene glycol (GLYCOLAX) packet 17 g, Daily PRN  acetaminophen (TYLENOL) tablet 650 mg, Q6H PRN    Or  acetaminophen (TYLENOL) suppository 650 mg, Q6H PRN  glucose (GLUTOSE) 40 % oral gel 15 g, PRN  dextrose 50 % IV solution, PRN  glucagon (rDNA) injection 1 mg, PRN  dextrose 5 % solution, PRN          LABS      CBC:   Recent Labs     05/12/21  1217 05/13/21  0640 05/14/21  0433   WBC 11.7* 10.7 8.6   RBC 4.17 3.78* 3.63*   HGB 12.4 11.2* 10.8*   HCT 39.8 36.1* 35.2*   MCV 95.4 95.5 97.0   MCH 29.7 29.6 29.8   MCHC 31.2 31.0 30.7   RDW 14.9* 15.0* 15.2*    240 227   MPV 12.3 12.3 12.1      BMP:   Recent Labs     05/12/21  1217 05/12/21  1336 05/13/21  0640 05/14/21  0433     --  141 140   K 3.0*  --  4.0 4.1     --  103 104   CO2 22  --  24 22   BUN 59*  --  61* 63*   CREATININE 7.01*  --  7.34* 7.18*   GLUCOSE 51* 123 76 142*   CALCIUM 8.7  --  8.3* 8.1*        RADIOLOGY      Reviewed as available. ASSESSMENT    1. End-stage renal disease due to ischemic nephrosclerosis. Patient is on peritoneal dialysis under the care of [facility]. During hospitalization COPD was continued and patient tolerated well. 2.  Altered mental status: Improved  3. Longstanding type 2 diabetes patient use insulin at home. 4.  Essential hypertension and blood pressure is under good control  5. History of breast cancer status post surgery  6. Suspected UTI urine culture shows E. coli and treated with ceftriaxone  PLAN      1. Okay to discharge from renal standpoint. 2.  Adjustment in insulin dosing as per primary service  3.   Resume CAPD at home as per outpatient prescription  4. Follow-up with Marcio Reddy for dialysis related issues  Please do not hesitate to call with questions.   This note is created with the assistance of a speech-recognition program. While intending to generate a document that actually reflects the content of the visit, no guarantees can be provided that every mistake has been identified and corrected by editing  Electronically signed by Yue Hughes MD on 5/14/2021 at 1:29 PM      Electronically signed by Yue Hughes MD on 5/14/2021 at 1:24 PM

## 2021-05-14 NOTE — PROGRESS NOTES
PD Catheter draining well. See morning progress note for more details. No plans or indication for revision at this point. Would have patient follow up with surgeon that placed PD cath as outpatient if issues draining develop again and this can be addressed as outpatient. General Surgery to sign off at this time.       Electronically signed by Lenora Moe DO on 5/14/2021 at 8:25 AM

## 2021-05-14 NOTE — PROGRESS NOTES
General Surgery Progress Note    Date: 5/14/2021; 5:56 AM    Cc: tired    Subjective:     Patient doing well this morning. No acute issues overnight. RN reports PD cath draining well and she is FPC completed her session without issues. Has continued to drain well. No abdominal pain. Review of Systems - General ROS: negative for - chills or fever  Respiratory ROS: no cough, shortness of breath, or wheezing  Cardiovascular ROS: no chest pain or dyspnea on exertion  Gastrointestinal ROS: no abdominal pain, change in bowel habits, or black or bloody stools  Musculoskeletal ROS: negative for - muscle pain or muscular weakness    Objective:    Vitals:    05/13/21 1938   BP: (!) 126/58   Pulse: 81   Resp: 14   Temp: 98.9 °F (37.2 °C)   SpO2: 100%        Gen: alert, tired appearing   HEENT: moist mucous membranes, no scleral icterus  CV: +S1/S2  Lung: No respiratory distress, no audible wheezing  Abdomen: softly distended, obese, PD Cath site clean and intact, PD Cath patent and draining well, non-tender abdomen  Extremities: no cyanosis or clubbing    I/O last 3 completed shifts: In: 5300 [P.O.:1300;  Other:4000]  Out: 4300 [Other:4300]    CBC with Differential:    Lab Results   Component Value Date    WBC 8.6 05/14/2021    RBC 3.63 05/14/2021    HGB 10.8 05/14/2021    HCT 35.2 05/14/2021     05/14/2021    MCV 97.0 05/14/2021    MCH 29.8 05/14/2021    MCHC 30.7 05/14/2021    RDW 15.2 05/14/2021    LYMPHOPCT 37 05/08/2018    MONOPCT 11 05/08/2018    BASOPCT 1 05/08/2018    MONOSABS 0.80 05/08/2018    LYMPHSABS 2.60 05/08/2018    EOSABS 0.30 05/08/2018    BASOSABS 0.00 05/08/2018    DIFFTYPE NOT REPORTED 05/08/2018     BMP:    Lab Results   Component Value Date     05/14/2021    K 4.1 05/14/2021     05/14/2021    CO2 22 05/14/2021    BUN 63 05/14/2021    LABALBU 3.5 05/12/2021    CREATININE 7.18 05/14/2021    CALCIUM 8.1 05/14/2021    GFRAA 7 05/14/2021    LABGLOM 6 05/14/2021    GLUCOSE 142 05/14/2021       Assessment/Plan:     ESRD on peritoneal dialysis- c/s for transient PD Cath malfunction when cath temporarily had issues with drainage. Likely stuck on omentum/bowel at that time and is draining very well again at this time. · Continue with regular PD Cath as scheduled per Nephrology  · No plans for any revision to PD Cath this admission.   · If she continues to have issues with drainage in the future can be addressed as an outpatient.      ---------------------------------------------------------------  Radha Melendez DO, PGY-5  [place], [place].  5/14/2021; 5:56 AM

## 2021-05-14 NOTE — CARE COORDINATION
Called Corry Galindo daughter on face sheet, (as Merlin Brazil number is not listed)she informs me that it is unsafe for the patient to return to home. She states that she is not getting her medications she is not getting her dialysis and she is left alone. She lives with her son She states that the family doctor was planning on sending to [facility]. [facility] spoke to Corry Galindo, she informs me that she does not have a referral  [facility]. Called other daughter Palmira Sosa [phone] informed her that I will keep her until this can be sorted out. Will need HC that is able open patient up this weekend. Referral sent to [facility] referral sent to [facility] as it may not be safe to send patient home. CM will need to follow up on issue in AM      1806 Zbigniew Olmedo from [facility] call and informs me that they are unable to accept d/t staffing  Referrals sent to [facility] and [address] [place] [address] situation to patient and son Chino Whittaker who is at bedside. She is ok with staying tonight.

## 2021-05-14 NOTE — CARE COORDINATION
Received a call from Daughter сергей who informs me that her mother was supposed to be going to [facility]. I explained that this was the first I was hearing of this and that when I spoke to the patient she did not mention this. She states that she  was going home with her son. Spoke to patient regarding situation, she informs me that she wants to go home. I asked her about going to a facility, she tells me that she hears the family talking about it but it was never discussed with her Asked if she is willing to have home care, she started to cry an told me she doesn't care anymore. I asked what was wrong she states she just want to go home.

## 2021-05-15 VITALS
HEIGHT: 63 IN | TEMPERATURE: 97.1 F | HEART RATE: 66 BPM | WEIGHT: 181.6 LBS | BODY MASS INDEX: 32.18 KG/M2 | SYSTOLIC BLOOD PRESSURE: 155 MMHG | DIASTOLIC BLOOD PRESSURE: 89 MMHG | RESPIRATION RATE: 13 BRPM | OXYGEN SATURATION: 98 %

## 2021-05-15 PROBLEM — G93.41 METABOLIC ENCEPHALOPATHY: Status: ACTIVE | Noted: 2021-05-15

## 2021-05-15 LAB
ANION GAP SERPL CALCULATED.3IONS-SCNC: 13 MMOL/L (ref 9–17)
BUN BLDV-MCNC: 64 MG/DL (ref 8–23)
BUN/CREAT BLD: ABNORMAL (ref 9–20)
CALCIUM SERPL-MCNC: 8.2 MG/DL (ref 8.6–10.4)
CHLORIDE BLD-SCNC: 103 MMOL/L (ref 98–107)
CO2: 27 MMOL/L (ref 20–31)
CREAT SERPL-MCNC: 6.79 MG/DL (ref 0.5–0.9)
GFR AFRICAN AMERICAN: 7 ML/MIN
GFR NON-AFRICAN AMERICAN: 6 ML/MIN
GFR SERPL CREATININE-BSD FRML MDRD: ABNORMAL ML/MIN/{1.73_M2}
GFR SERPL CREATININE-BSD FRML MDRD: ABNORMAL ML/MIN/{1.73_M2}
GLUCOSE BLD-MCNC: 113 MG/DL (ref 65–105)
GLUCOSE BLD-MCNC: 141 MG/DL (ref 70–99)
GLUCOSE BLD-MCNC: 249 MG/DL (ref 65–105)
HCT VFR BLD CALC: 35.8 % (ref 36.3–47.1)
HEMOGLOBIN: 10.9 G/DL (ref 11.9–15.1)
MCH RBC QN AUTO: 29.2 PG (ref 25.2–33.5)
MCHC RBC AUTO-ENTMCNC: 30.4 G/DL (ref 28.4–34.8)
MCV RBC AUTO: 96 FL (ref 82.6–102.9)
NRBC AUTOMATED: 0 PER 100 WBC
PDW BLD-RTO: 15.2 % (ref 11.8–14.4)
PHOSPHORUS: 6.2 MG/DL (ref 2.6–4.5)
PLATELET # BLD: 233 K/UL (ref 138–453)
PMV BLD AUTO: 11.6 FL (ref 8.1–13.5)
POTASSIUM SERPL-SCNC: 4.4 MMOL/L (ref 3.7–5.3)
RBC # BLD: 3.73 M/UL (ref 3.95–5.11)
SODIUM BLD-SCNC: 143 MMOL/L (ref 135–144)
WBC # BLD: 8.8 K/UL (ref 3.5–11.3)

## 2021-05-15 PROCEDURE — 6370000000 HC RX 637 (ALT 250 FOR IP): Performed by: STUDENT IN AN ORGANIZED HEALTH CARE EDUCATION/TRAINING PROGRAM

## 2021-05-15 PROCEDURE — 2580000003 HC RX 258: Performed by: STUDENT IN AN ORGANIZED HEALTH CARE EDUCATION/TRAINING PROGRAM

## 2021-05-15 PROCEDURE — 99232 SBSQ HOSP IP/OBS MODERATE 35: CPT | Performed by: INTERNAL MEDICINE

## 2021-05-15 PROCEDURE — 97530 THERAPEUTIC ACTIVITIES: CPT

## 2021-05-15 PROCEDURE — 82947 ASSAY GLUCOSE BLOOD QUANT: CPT

## 2021-05-15 PROCEDURE — 6360000002 HC RX W HCPCS: Performed by: STUDENT IN AN ORGANIZED HEALTH CARE EDUCATION/TRAINING PROGRAM

## 2021-05-15 PROCEDURE — 84100 ASSAY OF PHOSPHORUS: CPT

## 2021-05-15 PROCEDURE — 97116 GAIT TRAINING THERAPY: CPT

## 2021-05-15 PROCEDURE — 80048 BASIC METABOLIC PNL TOTAL CA: CPT

## 2021-05-15 PROCEDURE — 85027 COMPLETE CBC AUTOMATED: CPT

## 2021-05-15 PROCEDURE — 97162 PT EVAL MOD COMPLEX 30 MIN: CPT

## 2021-05-15 PROCEDURE — 2580000003 HC RX 258: Performed by: INTERNAL MEDICINE

## 2021-05-15 RX ORDER — HEPARIN SODIUM (PORCINE) LOCK FLUSH IV SOLN 100 UNIT/ML 100 UNIT/ML
500 SOLUTION INTRAVENOUS PRN
Status: DISCONTINUED | OUTPATIENT
Start: 2021-05-15 | End: 2021-05-15 | Stop reason: HOSPADM

## 2021-05-15 RX ADMIN — CITALOPRAM 40 MG: 20 TABLET, FILM COATED ORAL at 08:29

## 2021-05-15 RX ADMIN — HEPARIN 500 UNITS: 100 SYRINGE at 16:05

## 2021-05-15 RX ADMIN — INSULIN LISPRO 2 UNITS: 100 INJECTION, SOLUTION INTRAVENOUS; SUBCUTANEOUS at 11:33

## 2021-05-15 RX ADMIN — GABAPENTIN 300 MG: 300 CAPSULE ORAL at 08:29

## 2021-05-15 RX ADMIN — ROSUVASTATIN CALCIUM 5 MG: 5 TABLET, COATED ORAL at 08:29

## 2021-05-15 RX ADMIN — SODIUM CHLORIDE, SODIUM LACTATE, CALCIUM CHLORIDE, MAGNESIUM CHLORIDE AND DEXTROSE 2000 ML: 2.5; 538; 448; 18.3; 5.08 INJECTION, SOLUTION INTRAPERITONEAL at 00:05

## 2021-05-15 RX ADMIN — HEPARIN SODIUM 5000 UNITS: 5000 INJECTION INTRAVENOUS; SUBCUTANEOUS at 05:09

## 2021-05-15 RX ADMIN — HEPARIN SODIUM 5000 UNITS: 5000 INJECTION INTRAVENOUS; SUBCUTANEOUS at 13:24

## 2021-05-15 RX ADMIN — CHLORTHALIDONE 25 MG: 25 TABLET ORAL at 08:29

## 2021-05-15 RX ADMIN — CEFTRIAXONE SODIUM 1000 MG: 1 INJECTION, POWDER, FOR SOLUTION INTRAMUSCULAR; INTRAVENOUS at 02:30

## 2021-05-15 RX ADMIN — ASPIRIN 81 MG: 81 TABLET, CHEWABLE ORAL at 08:29

## 2021-05-15 RX ADMIN — SODIUM CHLORIDE, PRESERVATIVE FREE 10 ML: 5 INJECTION INTRAVENOUS at 08:32

## 2021-05-15 RX ADMIN — SODIUM CHLORIDE, SODIUM LACTATE, CALCIUM CHLORIDE, MAGNESIUM CHLORIDE AND DEXTROSE 2000 ML: 2.5; 538; 448; 18.3; 5.08 INJECTION, SOLUTION INTRAPERITONEAL at 07:22

## 2021-05-15 NOTE — PROGRESS NOTES
SUBJECTIVE    Patient remained stable. Her PD exchanges in progress. Patient is draining pretty well and no problems with dual or infusion of fluid. Her blood sugar remains under good control. Patient received dialysis under the care of [facility] clinic. No acute events overnight  800 mL of urine out of the last 24 hours. Overall negative liters since admission peritoneal dialysis continues  Labs reviewed. OBJECTIVE      CURRENT TEMPERATURE:  Temp: 98.3 °F (36.8 °C)  MAXIMUM TEMPERATURE OVER 24HRS:  Temp (24hrs), Av.4 °F (36.9 °C), Min:97.8 °F (36.6 °C), Max:99.1 °F (37.3 °C)    CURRENT RESPIRATORY RATE:  Resp: 13  CURRENT PULSE:  Pulse: 66  CURRENT BLOOD PRESSURE:  BP: (!) 155/89  24HR BLOOD PRESSURE RANGE:  Systolic (34EEZ), IUR:350 , Min:125 , XBU:477   ; Diastolic (20LSN), YTO:80, Min:60, Max:89    24HR INTAKE/OUTPUT:      Intake/Output Summary (Last 24 hours) at 5/15/2021 1008  Last data filed at 5/15/2021 0500  Gross per 24 hour   Intake 4000 ml   Output 3200 ml   Net 800 ml     WEIGHT :  Patient Vitals for the past 96 hrs (Last 3 readings):   Weight   21 1100 181 lb 9.6 oz (82.4 kg)   21 0600 190 lb 14.4 oz (86.6 kg)   21 2300 194 lb 8 oz (88.2 kg)     PHYSICAL EXAM      GENERAL APPEARANCE: Awake and alert x3  SKIN: Warm to touch and no erythema  EYES: Conjunctiva was pink  ENT: No thrush  NECK: No JVD or carotid bruit.   PULMONARY: Bilateral air entry and clear to auscultation  CADRDIOVASCULAR: S1 and S2 audible no S3  ABDOMEN: Soft and nontender bowel sounds are positive no ascites  EXTREMITIES: No edema    CURRENT MEDICATIONS      insulin lispro (HUMALOG) injection vial 0-6 Units, TID WC  insulin lispro (HUMALOG) injection vial 0-3 Units, Nightly  dianeal lo-william 2.5% 2,000 mL, Q6H  dextrose 50 % IV solution, Once  cefTRIAXone (ROCEPHIN) 1000 mg IVPB in 50 mL D5W minibag, Q24H  glucose (GLUTOSE) 40 % oral gel 15 g, PRN  glucagon (rDNA) injection 1 mg, PRN  heparin (porcine) injection 5,000 Units, 3 times per day  gabapentin (NEURONTIN) capsule 300 mg, Daily  aspirin chewable tablet 81 mg, Daily  chlorthalidone (HYGROTON) tablet 25 mg, Daily  citalopram (CELEXA) tablet 40 mg, Daily  docusate sodium (COLACE) capsule 100 mg, BID PRN  rosuvastatin (CRESTOR) tablet 5 mg, Daily  sodium chloride flush 0.9 % injection 5-40 mL, 2 times per day  sodium chloride flush 0.9 % injection 5-40 mL, PRN  0.9 % sodium chloride infusion, PRN  promethazine (PHENERGAN) tablet 12.5 mg, Q6H PRN   Or  ondansetron (ZOFRAN) injection 4 mg, Q6H PRN  polyethylene glycol (GLYCOLAX) packet 17 g, Daily PRN  acetaminophen (TYLENOL) tablet 650 mg, Q6H PRN   Or  acetaminophen (TYLENOL) suppository 650 mg, Q6H PRN  glucose (GLUTOSE) 40 % oral gel 15 g, PRN  dextrose 50 % IV solution, PRN  glucagon (rDNA) injection 1 mg, PRN  dextrose 5 % solution, PRN          LABS      CBC:   Recent Labs     05/13/21  0640 05/14/21  0433 05/15/21  0459   WBC 10.7 8.6 8.8   RBC 3.78* 3.63* 3.73*   HGB 11.2* 10.8* 10.9*   HCT 36.1* 35.2* 35.8*   MCV 95.5 97.0 96.0   MCH 29.6 29.8 29.2   MCHC 31.0 30.7 30.4   RDW 15.0* 15.2* 15.2*    227 233   MPV 12.3 12.1 11.6      BMP:   Recent Labs     05/13/21  0640 05/14/21  0433 05/15/21  0459    140 143   K 4.0 4.1 4.4    104 103   CO2 24 22 27   BUN 61* 63* 64*   CREATININE 7.34* 7.18* 6.79*   GLUCOSE 76 142* 141*   CALCIUM 8.3* 8.1* 8.2*        RADIOLOGY      Reviewed as available. ASSESSMENT    1. End-stage renal disease due to ischemic nephrosclerosis. Patient is on peritoneal dialysis under the care of [facility]. During hospitalization COPD was continued and patient tolerated well. 2.  Altered mental status: Improved  3. Longstanding type 2 diabetes patient use insulin at home. 4.  Essential hypertension and blood pressure is under good control  5. History of breast cancer status post surgery  6.   Suspected UTI urine culture shows E. coli and treated with ceftriaxone  PLAN    1. Okay to discharge from renal standpoint. 2.  Adjustment in insulin dosing as per primary service  3. Resume CAPD at home as per outpatient prescription  4. Follow-up with Angelina Coombs for dialysis related issues    Please do not hesitate to call with questions. This note is created with the assistance of a speech-recognition program. While intending to generate a document that actually reflects the content of the visit, no guarantees can be provided that every mistake has been identified and corrected by editing      Electronically signed by Meche Seals CNP, APRN - CNP on 5/15/2021 at 10:08 AM     Attending Physician Statement  I have discussed the care of Jaden Cleary, including pertinent history and exam findings,  with the CNP. I have reviewed the key elements of all parts of the encounter with the CNP. I agree with the assessment, plan and orders as documented by the resident.     Marian Fam MD, MRCP Germain Sandhoff), FACP   5/15/2021 12:35 PM    Nephrology [address] [place] [address]

## 2021-05-15 NOTE — PROGRESS NOTES
Writer clarified with Nephrology that patient does not need to be discharge with indwelling dialysate fluid as patient handles peritoneal dialysis as outpatient and only indwells and drains over night. Plans to drain and leave empty pending discharge today.

## 2021-05-15 NOTE — FLOWSHEET NOTE
05/15/21 1330   Peritoneal Dialysis (CAPD manual)   Peritoneal Input Status Completed   Effluent Appearance Clear   Peritoneal Dialysis Volume In (ml) 2000 ml   Dwell Time (Hours:Minutes) 6   Effluent Volume Out (mL) 2500 ml   Balance This Exchange (mL) 500 ml   Pt drained from previous 2000cc indwelling.  No fluid infused to dwell as pt plan for discharge today

## 2021-05-15 NOTE — PROGRESS NOTES
due to generalized weakness . Taran Zimmerman does has a history of stroke as well and has noticed some changes in speech over the past few days.      OBJECTIVE     Vital Signs:  BP (!) 155/89   Pulse 66   Temp 98.3 °F (36.8 °C) (Oral)   Resp 13   Ht 5' 3\" (1.6 m)   Wt 181 lb 9.6 oz (82.4 kg) Comment: drained  SpO2 98%   BMI 32.17 kg/m²     Temp (24hrs), Av.5 °F (36.9 °C), Min:97.8 °F (36.6 °C), Max:99.1 °F (37.3 °C)    In:    Out: 800 [Urine:800]    Physical Exam:  Physical Exam  Vitals and nursing note reviewed. Constitutional:       General: She is not in acute distress. Appearance: Normal appearance. She is well-developed. She is obese. She is not diaphoretic. HENT:      Head: Normocephalic and atraumatic. Nose: Nose normal.   Eyes:      General:         Right eye: No discharge. Left eye: No discharge. Extraocular Movements: Extraocular movements intact. Conjunctiva/sclera: Conjunctivae normal. Pupils: Pupils are equal, round, and reactive to light. Neck:      Trachea: No tracheal deviation. Cardiovascular:      Rate and Rhythm: Normal rate and regular rhythm. Pulses: Normal pulses. Heart sounds: Normal heart sounds. Pulmonary:      Effort: Pulmonary effort is normal. Breath sounds: Normal breath sounds. No wheezing. Abdominal:      General: There is no distension. Palpations: Abdomen is soft. Tenderness: There is no abdominal tenderness. PD catheter in place   Musculoskeletal:         General: Normal range of motion. Brito cervical back: Normal range of motion and neck supple. Right lower leg: Edema present. Left lower leg: Edema present. Skin:     General: Skin is warm and dry. Coloration: Skin is not pale. Findings: No rash. Neurological:      General: No focal deficit present. Mental Status: She is alert and oriented to person, place, and time. Motor: No abnormal muscle tone.  Deep Tendon Reflexes: Reflexes normal.   Psychiatric:         Mood and Affect: Mood normal. Behavior: Behavior normal.       Medications:  Scheduled Medications:    insulin lispro  0-6 Units Subcutaneous TID     insulin lispro  0-3 Units Subcutaneous Nightly    dianeal lo-william 2.5%  2,000 mL Intraperitoneal Q6H    dextrose  25 g Intravenous Once    cefTRIAXone (ROCEPHIN) IV  1,000 mg Intravenous Q24H    heparin (porcine)  5,000 Units Subcutaneous 3 times per day    gabapentin  300 mg Oral Daily    aspirin  81 mg Oral Daily    chlorthalidone  25 mg Oral Daily    citalopram  40 mg Oral Daily    rosuvastatin  5 mg Oral Daily    sodium chloride flush  5-40 mL Intravenous 2 times per day     Continuous Infusions:    sodium chloride      dextrose       PRN Medicationsheparin flush, 500 Units, PRN  glucose, 15 g, PRN  glucagon (rDNA), 1 mg, PRN  docusate sodium, 100 mg, BID PRN  sodium chloride flush, 5-40 mL, PRN  sodium chloride, 25 mL, PRN  promethazine, 12.5 mg, Q6H PRN   Or  ondansetron, 4 mg, Q6H PRN  polyethylene glycol, 17 g, Daily PRN  acetaminophen, 650 mg, Q6H PRN   Or  acetaminophen, 650 mg, Q6H PRN  glucose, 15 g, PRN  dextrose, 12.5 g, PRN  glucagon (rDNA), 1 mg, PRN  dextrose, 100 mL/hr, PRN        Diagnostic Labs:  CBC:   Recent Labs     05/13/21  0640 05/14/21  0433 05/15/21  0459   WBC 10.7 8.6 8.8   RBC 3.78* 3.63* 3.73*   HGB 11.2* 10.8* 10.9*   HCT 36.1* 35.2* 35.8*   MCV 95.5 97.0 96.0   RDW 15.0* 15.2* 15.2*    227 233     BMP:   Recent Labs     05/13/21  0640 05/14/21 0433 05/15/21  0459    140 143   K 4.0 4.1 4.4    104 103   CO2 24 22 27   PHOS  --   --  6.2*   BUN 61* 63* 64*   CREATININE 7.34* 7.18* 6.79*     BNP: No results for input(s): BNP in the last 72 hours. PT/INR: No results for input(s): PROTIME, INR in the last 72 hours. APTT: No results for input(s): APTT in the last 72 hours. CARDIAC ENZYMES: No results for input(s): CKMB, CKMBINDEX, TROPONINI in the last 72 hours.     Invalid input(s): CKTOTAL;3  FASTING LIPID PANEL:No results found for: CHOL, HDL, TRIG  LIVER PROFILE: No results for input(s): AST, ALT, ALB, BILIDIR, BILITOT, ALKPHOS in the last 72 hours. MICROBIOLOGY:   Lab Results   Component Value Date/Time    CULTURE ESCHERICHIA COLI >952562 CFU/ML (A) 05/13/2021 02:16 AM    CULTURE (A) 05/13/2021 02:16 AM     NON LACTOSE FERMENTING GRAM NEGATIVE RODS 10 to 50,000 CFU/ML Susceptibility testing not performed on low colony count organisms. Imaging:    XR ABDOMEN (KUB) (SINGLE AP VIEW)    Result Date: 5/13/2021  Peritoneal dialysis catheter terminates in the right pelvis. CT HEAD WO CONTRAST    Result Date: 5/12/2021  No acute intracranial abnormality. Cerebral atrophy and small old lacunar infarct left basal ganglia. XR CHEST PORTABLE    Result Date: 5/12/2021  Hypoventilatory study with limited visualization left base. Cardiomegaly with venous hypertension but no clear cut radiographic CHF. ASSESSMENT & PLAN     ASSESSMENT / PLAN:     Principal Problem:    Metabolic encephalopathy  Active Problems:    AMS (altered mental status)    Diabetes ([address]    Peritoneal dialysis status ([address]    Hypoglycemia    Encephalopathy  Resolved Problems:    * No resolved hospital problems. *    1. Metabolic encephalopathy resolved  2. Hypoglycemia resolved. Holding all insulin and antihyperglycemic products  3. UTI. On Rocephin inpatient. Discharged on Keflex for total of 5 days  4. Dyslipidemia. Continue Crestor 5  5. ESRD on PD  6. Type II MI      DVT ppx : Heparin  GI ppx: None    PT/OT: Ongoing  Discharge Planning / Cathlene Crumb: Discharged. Waiting on home care       Jorge Lopez MD  PGY-3, Internal medicine resident  [place], [place]  5/15/2021 12:33 PM   Attending Physician Statement  I have discussed the care of Aspen Pak, including pertinent history and exam findings,  with the resident. I have seen and examined the patient and the key elements of all parts of the encounter have been performed by me.   I agree with the assessment, plan and orders as documented by the resident with additions . Treatment plan Discussed with nursing staff in detail , all questions answered . Electronically signed by Ginette Wang MD on   5/15/21 at 12:52 PM EDT    Please note that this chart was generated using voice recognition Dragon dictation software. Although every effort was made to ensure the accuracy of this automated transcription, some errors in transcription may have occurred.

## 2021-05-15 NOTE — PROGRESS NOTES
Physical Therapy    Facility/Department: [facility] NEURO  Initial Assessment    NAME: Rika Paul  : [id number]  MRN: [id number]    Date of Service: 5/15/2021  From H and P: The patient is a pleasant 67 y.o. female with a past medical history of hypertension, hyperlipidemia, T1DM, PAT, and left sided breast cancer presented with altered mental status and was found to have a sugar of 49. She was given an amp of dextrose in the emergency department and improved. She was still lethargic but was able to somewhat answer questions and would fall asleep mid sentence. She has a history of breast cancer and has a port in place on the right side of her chest.  She typically follows up at [address] [facility]. Patient's daughter is with her and states that she has been off of her insulin for a bout a week but started in back up about 2 days ago after seeing her primary care provider. She also undergoes peritoneal dialysis intermittently but has been unable to get out of bed for the past 2 days. Additionally she has experienced falls in the past due to generalized weakness . She does has a history of stroke as well and has noticed some changes in speech over the past few days. Discharge Recommendations:  Patient would benefit from continued therapy after discharge   PT Equipment Recommendations  Equipment Needed: No  Other: Has 2 walkers and cane. Assessment   Body structures, Functions, Activity limitations: Decreased functional mobility ; Decreased endurance;Decreased coordination;Decreased strength;Decreased balance;Decreased sensation  Assessment: Patient able to ambulate 80' complaining of muscle fatigue, but able to get back to chair/room without buckling or losses of balance. Patient is oriented x4. No confusion detected during eval.  Patient will need further PT to regain functional independence.   Prognosis: Good  Decision Making: Medium Complexity  Clinical Presentation: evolving  PT Education: Cognition  Overall Cognitive Status: WFL    Objective     Observation/Palpation  Observation: Peritoneal dialysis catheter in abdomen. She is incontinent of urine. Strength RLE  Strength RLE: Exception  R Hip Flexion: 4-/5  R Hip Extension: 4-/5  R Ankle Dorsiflexion: 4-/5  R Ankle Plantar flexion: 4/5  Strength LLE  Strength LLE: Exception  L Hip Flexion: 4/5  L Hip Extension: 4/5  L Ankle Dorsiflexion: 4+/5  L Ankle Plantar Flexion: 4+/5     Sensation  Overall Sensation Status: Impaired  Light Touch: Partial deficits in the LLE;Partial deficits in the RLE  Bed mobility  Supine to Sit: Contact guard assistance  Sit to Supine: Contact guard assistance  Transfers  Sit to Stand: Contact guard assistance  Stand to sit: Contact guard assistance  Ambulation  Ambulation?: Yes  Ambulation 1  Surface: level tile  Device: Rolling Walker  Assistance: Contact guard assistance  Gait Deviations: Slow Alisia;Decreased step height  Distance: 90'  Comments: Right LE is weaker LE. No losses of balance during gait. Complains of muscle fatigue during gait. Balance  Standing - Static: Fair  Standing - Dynamic: Fair;-  Comments: With heels together, toes apart, she was able to stand without UE support with eyes open, 10 seconds, increased sway but independently controlled. Due to neuropathy, static standing with eyes closed was not pursued. Patient was educated in higher risk for falling while walking in dim lighting because of her neuropathy/somatosensory loss. Plan   Plan  Times per week: 5-6x/wk  Current Treatment Recommendations: Strengthening, Gait Training, Balance Training, Functional Mobility Training, Endurance Training, Transfer Training, Safety Education & Training, Patient/Caregiver Education & Training, Stair training, Neuromuscular Re-education  Safety Devices  Type of devices:  All fall risk precautions in place, Left in chair, Call light within reach, Gait belt, Nurse notified

## 2021-05-15 NOTE — PLAN OF CARE
Problem: IP BALANCE  Goal: BALANCE EDUCATION  Description: Educate patients on maintaining dynamic/static standing/sitting balance, with/without upper extremity support.   Outcome: Ongoing     Problem: IP MOBILITY  Goal: LTG - patient will ambulate household distance  Outcome: Ongoing

## 2021-05-16 NOTE — DISCHARGE SUMMARY
taking these medications    Details   cephALEXin (KEFLEX) 250 MG capsule Take 1 capsule by mouth daily for 3 days, Disp-3 capsule, R-0Normal         CONTINUE these medications which have CHANGED    Details   gabapentin (NEURONTIN) 300 MG capsule Take 1 capsule by mouth daily for 30 days. , Disp-30 capsule, R-0Normal         CONTINUE these medications which have NOT CHANGED    Details   docusate sodium (COLACE) 100 MG capsule Take 1 capsule by mouth 2 times daily as needed for Constipation, Disp-50 capsule, R-0Print      ondansetron (ZOFRAN) 4 MG tablet Take 1 tablet by mouth every 6 hours as needed for Nausea or Vomiting, Disp-30 tablet, R-0Print      rosuvastatin (CRESTOR) 5 MG tablet Take 5 mg by mouth dailyHistorical Med      chlorthalidone (HYGROTON) 25 MG tablet Take 25 mg by mouth dailyHistorical Med      citalopram (CELEXA) 20 MG tablet Take 40 mg by mouth dailyHistorical Med      aspirin 81 MG tablet Take 81 mg by mouth dailyHistorical Med         STOP taking these medications       nebivolol (BYSTOLIC) 10 MG tablet Comments:   Reason for Stopping:         simvastatin (ZOCOR) 10 MG tablet Comments:   Reason for Stopping:         fenofibrate micronized (LOFIBRA) 134 MG capsule Comments:   Reason for Stopping:         SITagliptin-metFORMIN (JANUMET XR)  MG TB24 per extended release tablet Comments:   Reason for Stopping:         ibuprofen (ADVIL;MOTRIN) 800 MG tablet Comments:   Reason for Stopping:         insulin aspart (NOVOLOG) 100 UNIT/ML injection vial Comments:   Reason for Stopping:             Activity: activity as tolerated    Diet: diabetic diet    Follow-up:    Sharon Stuart MD  [address] [phone]    Schedule an appointment as soon as possible for a visit  For Hospital F/U    [address] [place] [id number] [phone]  Call  As needed, Follow up education on diabetes self management    Patient Instructions: Please take all medications as prescribed and take antibiotic course to completion. Please continue to perform peritoneal dialysis as scheduled and follow up with your Primary Care Provider as soon as possible. Note that over 30 minutes was spent in preparing discharge papers, discussing discharge with patient, medication review, etc.    Aaron Larios MD  Internal Medicine Resident, PGY-1  [facility];  [place], [place]  5/16/2021, 4:54 PM

## 2021-05-18 LAB
CULTURE: NORMAL
Lab: NORMAL
SPECIMEN DESCRIPTION: NORMAL

## 2021-11-08 ENCOUNTER — HOSPITAL ENCOUNTER (OUTPATIENT)
Age: 76
Setting detail: OBSERVATION
Discharge: HOME HEALTH | End: 2021-11-10
Attending: EMERGENCY MEDICINE | Admitting: FAMILY MEDICINE
Payer: MEDICARE

## 2021-11-08 ENCOUNTER — APPOINTMENT (OUTPATIENT)
Dept: GENERAL RADIOLOGY | Age: 76
End: 2021-11-08
Payer: MEDICARE

## 2021-11-08 DIAGNOSIS — R77.8 ELEVATED TROPONIN: Primary | ICD-10-CM

## 2021-11-08 PROBLEM — R79.89 ELEVATED TROPONIN: Status: ACTIVE | Noted: 2021-11-08

## 2021-11-08 LAB
ABO/RH: NORMAL
ABSOLUTE EOS #: 0.23 K/UL (ref 0–0.44)
ABSOLUTE IMMATURE GRANULOCYTE: 0.11 K/UL (ref 0–0.3)
ABSOLUTE LYMPH #: 1.51 K/UL (ref 1.1–3.7)
ABSOLUTE MONO #: 0.77 K/UL (ref 0.1–1.2)
ALBUMIN SERPL-MCNC: 3.7 G/DL (ref 3.5–5.2)
ALBUMIN/GLOBULIN RATIO: ABNORMAL (ref 1–2.5)
ALP BLD-CCNC: 113 U/L (ref 35–104)
ALT SERPL-CCNC: 11 U/L (ref 5–33)
ANION GAP SERPL CALCULATED.3IONS-SCNC: 14 MMOL/L (ref 9–17)
ANTIBODY SCREEN: NEGATIVE
ARM BAND NUMBER: NORMAL
AST SERPL-CCNC: 15 U/L
BASOPHILS # BLD: 0 % (ref 0–2)
BASOPHILS ABSOLUTE: 0.03 K/UL (ref 0–0.2)
BILIRUB SERPL-MCNC: 0.53 MG/DL (ref 0.3–1.2)
BNP INTERPRETATION: ABNORMAL
BUN BLDV-MCNC: 26 MG/DL (ref 8–23)
BUN/CREAT BLD: 7 (ref 9–20)
CALCIUM SERPL-MCNC: 8.3 MG/DL (ref 8.6–10.4)
CHLORIDE BLD-SCNC: 101 MMOL/L (ref 98–107)
CHP ED QC CHECK: NORMAL
CO2: 23 MMOL/L (ref 20–31)
CREAT SERPL-MCNC: 3.77 MG/DL (ref 0.5–0.9)
DIFFERENTIAL TYPE: ABNORMAL
EOSINOPHILS RELATIVE PERCENT: 2 % (ref 1–4)
EXPIRATION DATE: NORMAL
GFR AFRICAN AMERICAN: 14 ML/MIN
GFR NON-AFRICAN AMERICAN: 12 ML/MIN
GFR SERPL CREATININE-BSD FRML MDRD: ABNORMAL ML/MIN/{1.73_M2}
GFR SERPL CREATININE-BSD FRML MDRD: ABNORMAL ML/MIN/{1.73_M2}
GLUCOSE BLD-MCNC: 155 MG/DL
GLUCOSE BLD-MCNC: 155 MG/DL (ref 70–99)
HCT VFR BLD CALC: 32.2 % (ref 36.3–47.1)
HEMOGLOBIN: 10.2 G/DL (ref 11.9–15.1)
IMMATURE GRANULOCYTES: 1 %
INR BLD: 1.1
LYMPHOCYTES # BLD: 16 % (ref 24–43)
MAGNESIUM: 1.9 MG/DL (ref 1.6–2.6)
MCH RBC QN AUTO: 29.8 PG (ref 25.2–33.5)
MCHC RBC AUTO-ENTMCNC: 31.7 G/DL (ref 28.4–34.8)
MCV RBC AUTO: 94.2 FL (ref 82.6–102.9)
MONOCYTES # BLD: 8 % (ref 3–12)
NRBC AUTOMATED: 0 PER 100 WBC
PARTIAL THROMBOPLASTIN TIME: 40.1 SEC (ref 23.9–33.8)
PDW BLD-RTO: 15.1 % (ref 11.8–14.4)
PLATELET # BLD: 171 K/UL (ref 138–453)
PLATELET ESTIMATE: ABNORMAL
PMV BLD AUTO: 11.3 FL (ref 8.1–13.5)
POTASSIUM SERPL-SCNC: 3.1 MMOL/L (ref 3.7–5.3)
PRO-BNP: 3989 PG/ML
PROTHROMBIN TIME: 14.2 SEC (ref 11.5–14.2)
RBC # BLD: 3.42 M/UL (ref 3.95–5.11)
RBC # BLD: ABNORMAL 10*6/UL
SEG NEUTROPHILS: 73 % (ref 36–65)
SEGMENTED NEUTROPHILS ABSOLUTE COUNT: 7.05 K/UL (ref 1.5–8.1)
SODIUM BLD-SCNC: 138 MMOL/L (ref 135–144)
TOTAL PROTEIN: 6.8 G/DL (ref 6.4–8.3)
TROPONIN INTERP: ABNORMAL
TROPONIN T: ABNORMAL NG/ML
TROPONIN, HIGH SENSITIVITY: 101 NG/L (ref 0–14)
TROPONIN, HIGH SENSITIVITY: 115 NG/L (ref 0–14)
TROPONIN, HIGH SENSITIVITY: 84 NG/L (ref 0–14)
WBC # BLD: 9.7 K/UL (ref 3.5–11.3)
WBC # BLD: ABNORMAL 10*3/UL

## 2021-11-08 PROCEDURE — 83735 ASSAY OF MAGNESIUM: CPT

## 2021-11-08 PROCEDURE — 85610 PROTHROMBIN TIME: CPT

## 2021-11-08 PROCEDURE — 99283 EMERGENCY DEPT VISIT LOW MDM: CPT

## 2021-11-08 PROCEDURE — 86900 BLOOD TYPING SEROLOGIC ABO: CPT

## 2021-11-08 PROCEDURE — 85520 HEPARIN ASSAY: CPT

## 2021-11-08 PROCEDURE — 86901 BLOOD TYPING SEROLOGIC RH(D): CPT

## 2021-11-08 PROCEDURE — 84484 ASSAY OF TROPONIN QUANT: CPT

## 2021-11-08 PROCEDURE — 85025 COMPLETE CBC W/AUTO DIFF WBC: CPT

## 2021-11-08 PROCEDURE — 96374 THER/PROPH/DIAG INJ IV PUSH: CPT

## 2021-11-08 PROCEDURE — 71045 X-RAY EXAM CHEST 1 VIEW: CPT

## 2021-11-08 PROCEDURE — G0378 HOSPITAL OBSERVATION PER HR: HCPCS

## 2021-11-08 PROCEDURE — 36415 COLL VENOUS BLD VENIPUNCTURE: CPT

## 2021-11-08 PROCEDURE — 83880 ASSAY OF NATRIURETIC PEPTIDE: CPT

## 2021-11-08 PROCEDURE — 80053 COMPREHEN METABOLIC PANEL: CPT

## 2021-11-08 PROCEDURE — 85730 THROMBOPLASTIN TIME PARTIAL: CPT

## 2021-11-08 PROCEDURE — 6360000002 HC RX W HCPCS: Performed by: EMERGENCY MEDICINE

## 2021-11-08 PROCEDURE — 86850 RBC ANTIBODY SCREEN: CPT

## 2021-11-08 PROCEDURE — 93005 ELECTROCARDIOGRAM TRACING: CPT | Performed by: EMERGENCY MEDICINE

## 2021-11-08 RX ORDER — SODIUM CHLORIDE 0.9 % (FLUSH) 0.9 %
10 SYRINGE (ML) INJECTION EVERY 12 HOURS SCHEDULED
Status: CANCELLED | OUTPATIENT
Start: 2021-11-08

## 2021-11-08 RX ORDER — HEPARIN SODIUM 1000 [USP'U]/ML
4000 INJECTION, SOLUTION INTRAVENOUS; SUBCUTANEOUS ONCE
Status: COMPLETED | OUTPATIENT
Start: 2021-11-08 | End: 2021-11-08

## 2021-11-08 RX ORDER — HEPARIN SODIUM 10000 [USP'U]/100ML
5-30 INJECTION, SOLUTION INTRAVENOUS CONTINUOUS
Status: DISCONTINUED | OUTPATIENT
Start: 2021-11-08 | End: 2021-11-09

## 2021-11-08 RX ORDER — SODIUM CHLORIDE 9 MG/ML
25 INJECTION, SOLUTION INTRAVENOUS PRN
Status: CANCELLED | OUTPATIENT
Start: 2021-11-08

## 2021-11-08 RX ORDER — ONDANSETRON 4 MG/1
4 TABLET, ORALLY DISINTEGRATING ORAL EVERY 8 HOURS PRN
Status: CANCELLED | OUTPATIENT
Start: 2021-11-08

## 2021-11-08 RX ORDER — ONDANSETRON 2 MG/ML
4 INJECTION INTRAMUSCULAR; INTRAVENOUS EVERY 6 HOURS PRN
Status: CANCELLED | OUTPATIENT
Start: 2021-11-08

## 2021-11-08 RX ORDER — ACETAMINOPHEN 325 MG/1
650 TABLET ORAL EVERY 4 HOURS PRN
Status: CANCELLED | OUTPATIENT
Start: 2021-11-08

## 2021-11-08 RX ORDER — HEPARIN SODIUM 1000 [USP'U]/ML
4000 INJECTION, SOLUTION INTRAVENOUS; SUBCUTANEOUS PRN
Status: DISCONTINUED | OUTPATIENT
Start: 2021-11-08 | End: 2021-11-09 | Stop reason: ALTCHOICE

## 2021-11-08 RX ORDER — HEPARIN SODIUM 5000 [USP'U]/ML
5000 INJECTION, SOLUTION INTRAVENOUS; SUBCUTANEOUS EVERY 8 HOURS SCHEDULED
Status: CANCELLED | OUTPATIENT
Start: 2021-11-08

## 2021-11-08 RX ORDER — SODIUM CHLORIDE 0.9 % (FLUSH) 0.9 %
10 SYRINGE (ML) INJECTION PRN
Status: CANCELLED | OUTPATIENT
Start: 2021-11-08

## 2021-11-08 RX ORDER — HEPARIN SODIUM 1000 [USP'U]/ML
2000 INJECTION, SOLUTION INTRAVENOUS; SUBCUTANEOUS PRN
Status: DISCONTINUED | OUTPATIENT
Start: 2021-11-08 | End: 2021-11-09 | Stop reason: ALTCHOICE

## 2021-11-08 RX ADMIN — HEPARIN SODIUM 984 UNITS/HR: 10000 INJECTION, SOLUTION INTRAVENOUS at 18:26

## 2021-11-08 RX ADMIN — HEPARIN SODIUM 4000 UNITS: 1000 INJECTION, SOLUTION INTRAVENOUS; SUBCUTANEOUS at 18:25

## 2021-11-08 ASSESSMENT — ENCOUNTER SYMPTOMS
FACIAL SWELLING: 0
ABDOMINAL PAIN: 0
DIARRHEA: 0
NAUSEA: 0
VOMITING: 0
VOICE CHANGE: 0
COUGH: 0
WHEEZING: 0
SHORTNESS OF BREATH: 0

## 2021-11-08 NOTE — H&P
History & Physical  [facility]    Adult Hospitalist      Name: Crista Agudelo  MRN: [id number]     Acct: [de-identified]  Room: [facility]    Admit Date: 11/8/2021  1:24 PM  PCP: Sunita Sheppard MD    Primary Problem  Active Problems:    Elevated troponin  Resolved Problems:    * No resolved hospital problems. *        Assesment:     · Hemodialysis AV fistula malfunction  · Acute blood loss anemia  · Elevated troponin  · End-stage renal disease with hemodialysis  · Diabetes mellitus type 2  · Essential hypertension  · Mixed hyperlipidemia  · Obstructive sleep apnea  · Osteoarthritis multiple joints  · Major depressive disorder  · Anxiety disorder  · Left breast cancer        Plan:     · Admit to progressive  · Monitor vitals closely  · Keep SPO2 above 90%  · I's and O's  · IV Hep-Lock  · Check H&H, serial  · Twelve-lead EKG  · Serial cardiac enzymes  · Consult cardiology  · Heparin infusion  · Anti-Xa  · Consult nephrology/schedule dialysis  · Resume essential home meds  · CBC, BMP  · Discussed with RN  · DVT and GI prophylaxis. Chief Complaint:     Chief Complaint   Patient presents with    Other     loss of blood through dialysis cath. History of Present Illness:      Crista Agudelo is a 67 y.o.  female who presents with Other (loss of blood through dialysis cath.)    Patient admitted through the emergency room where she presented after receiving a hemodialysis treatment at an outpatient center. Patient says reports disconnected from the dialysis machine early in the session and says there was moderate amount of blood loss. Patient complains of lethargy since her dialysis session. Patient says she has been getting dialysis since January and is on a 3 times a week regimen    Patient says there was a significant amount of blood loss. She says she felt dizzy after that. She continued to have dyspnea at rest and on exertion.   Says she felt feverish earlier to but covered herself and felt better. Patient was found to have elevated cardiac enzymes after which cardiology were consulted. Cardiology have recommended heparin infusion which is being maintained. Patient says she underwent chemotherapy for breast cancer almost 22 years ago. She has had diabetes and is concerned about neuropathy in both her lower extremities. She says she has tried several medication which have not been very effective for her. Patient denies any chest pain, dyspnea or cough. Denies any orthopnea, nausea or vomiting. Denies any diarrhea or constipation. Denies any headache, blurred vision, photophobia, neck pain or back pain    I have personally reviewed the past medical history, past surgical history, medications, social history, and family history, and summarized in the note. Review of Systems:     All 10 point system is reviewed and negative otherwise mentioned in HPI.       Past Medical History:     Past Medical History:   Diagnosis Date    Anxiety and depression     Arthritis     [facility] [address]     left breast cancer    Diabetes mellitus [facility] [address]     History of blood transfusion     50 plus yrs ago with pregnancy    Hyperlipidemia     Hypertension     MRSA (methicillin resistant staph aureus) culture positive 2015    back    PAT (obstructive sleep apnea)     had UPPP  \"have not used machine in years\"    Prolonged emergence from general anesthesia     SOB (shortness of breath)     \"long distance\"        Past Surgical History:     Past Surgical History:   Procedure Laterality Date    APPENDECTOMY      BACK SURGERY      I and D lower back MRSA    BREAST REDUCTION SURGERY      HYSTERECTOMY      MASTECTOMY Left     lymph nodes removed    MASTECTOMY Left 05/14/2018    axillary mastectomy    ME EXC SKIN BENIG <5MM TRUNK,ARM,LEG Right 5/14/2018    RIGHT AXILLARY MASTECTOMY performed by Cesar Ascencio DO at [address] [facility]          Medications Prior to Admission:       Prior murmur  Abdomen - soft, nontender, nondistended, bowel sounds present all four quadrants, no masses, hepatomegaly or splenomegaly  Neurological - normal speech, no focal findings or movement disorder noted, cranial nerves II through XII grossly intact  Extremities - peripheral pulses palpable, no pedal edema or calf pain with palpation. Bilateral lower extremity paresthesias  Skin - no gross lesions, rashes, or induration noted        Data:     Labs:    Hematology:  Recent Labs     11/08/21  1347   WBC 9.7   RBC 3.42*   HGB 10.2*   HCT 32.2*   MCV 94.2   MCH 29.8   MCHC 31.7   RDW 15.1*      MPV 11.3   INR 1.1     Chemistry:  Recent Labs     11/08/21  1347 11/08/21  1431 11/08/21  1515     --   --    K 3.1*  --   --      --   --    CO2 23  --   --    GLUCOSE 155* 155  --    BUN 26*  --   --    CREATININE 3.77*  --   --    MG 1.9  --   --    ANIONGAP 14  --   --    LABGLOM 12*  --   --    GFRAA 14*  --   --    CALCIUM 8.3*  --   --    PROBNP 3,989*  --   --    TROPHS 84*  --  101*     Recent Labs     11/08/21  1347   PROT 6.8   LABALBU 3.7   AST 15   ALT 11   ALKPHOS 113*   BILITOT 0.53       Lab Results   Component Value Date    INR 1.1 11/08/2021    PROTIME 14.2 11/08/2021       Lab Results   Component Value Date/Time    SPECIAL NOT REPORTED 05/13/2021 02:16 AM     Lab Results   Component Value Date/Time    CULTURE ESCHERICHIA COLI >051514 CFU/ML (A) 05/13/2021 02:16 AM    CULTURE (A) 05/13/2021 02:16 AM     NON LACTOSE FERMENTING GRAM NEGATIVE RODS 10 to 50,000 CFU/ML Susceptibility testing not performed on low colony count organisms.        Lab Results   Component Value Date    FIO2 VENOUS 05/12/2021       Radiology:    XR CHEST PORTABLE    Result Date: 11/8/2021  New right-sided central venous catheter terminates in the SVC Otherwise, no significant change demonstrating a left basilar opacity which could represent atelectasis or infection         All radiological studies reviewed                Code Status:  Prior    Electronically signed by Anjel Turner MD on 11/8/2021 at 6:28 PM     Copy sent to Dr. Diego Keenan MD    This note was created with the assistance of a speech-recognition program.  Although the intention is to generate a document that actually reflects the content of the visit, no guarantees can be provided that every mistake has been identified and corrected by editing. Note was updated later by me after  physical examination and  completion of the assessment.

## 2021-11-08 NOTE — ED PROVIDER NOTES
Psychiatric/Behavioral: Negative for agitation. All other systems reviewed and are negative. Except as noted above the remainder of the review of systems was reviewed and negative. PAST MEDICAL HISTORY     Past Medical History:   Diagnosis Date    Anxiety and depression     Arthritis     [facility] [address]     left breast cancer    Diabetes mellitus ([facility] [address]     History of blood transfusion     50 plus yrs ago with pregnancy    Hyperlipidemia     Hypertension     MRSA (methicillin resistant staph aureus) culture positive 2015    back    PAT (obstructive sleep apnea)     had UPPP  \"have not used machine in years\"    Prolonged emergence from general anesthesia     SOB (shortness of breath)     \"long distance\"         SURGICALHISTORY       Past Surgical History:   Procedure Laterality Date    APPENDECTOMY      BACK SURGERY      I and D lower back MRSA    BREAST REDUCTION SURGERY      HYSTERECTOMY      MASTECTOMY Left     lymph nodes removed    MASTECTOMY Left 05/14/2018    axillary mastectomy    MN EXC SKIN BENIG <5MM TRUNK,ARM,LEG Right 5/14/2018    RIGHT AXILLARY MASTECTOMY performed by Jeanette Johnson DO at [address] [place] [address],[facility]       Previous Medications    ASPIRIN 81 MG TABLET    Take 81 mg by mouth daily    CHLORTHALIDONE (HYGROTON) 25 MG TABLET    Take 25 mg by mouth daily    CITALOPRAM (CELEXA) 20 MG TABLET    Take 40 mg by mouth daily    DOCUSATE SODIUM (COLACE) 100 MG CAPSULE    Take 1 capsule by mouth 2 times daily as needed for Constipation    GABAPENTIN (NEURONTIN) 300 MG CAPSULE    Take 1 capsule by mouth daily for 30 days. ONDANSETRON (ZOFRAN) 4 MG TABLET    Take 1 tablet by mouth every 6 hours as needed for Nausea or Vomiting    ROSUVASTATIN (CRESTOR) 5 MG TABLET    Take 5 mg by mouth daily         ALLERGIES   Patient has no known allergies. FAMILY HISTORY     History reviewed. No pertinent family history.        SOCIAL HISTORY Social History     Socioeconomic History    Marital status:      Spouse name: None    Number of children: None    Years of education: None    Highest education level: None   Occupational History    None   Tobacco Use    Smoking status: Former Smoker    Smokeless tobacco: Never Used   Vaping Use    Vaping Use: Never used   Substance and Sexual Activity    Alcohol use: Yes     Comment: occasionally  \"couple a month\"    Drug use: No    Sexual activity: None   Other Topics Concern    None   Social History Narrative    None     Social Determinants of Health     Financial Resource Strain:     Difficulty of Paying Living Expenses: Not on file   Food Insecurity:     Worried About Running Out of Food in the Last Year: Not on file    Caleb of Food in the Last Year: Not on file   Transportation Needs:     Lack of Transportation (Medical): Not on file    Lack of Transportation (Non-Medical):  Not on file   Physical Activity:     Days of Exercise per Week: Not on file    Minutes of Exercise per Session: Not on file   Stress:     Feeling of Stress : Not on file   Social Connections:     Frequency of Communication with Friends and Family: Not on file    Frequency of Social Gatherings with Friends and Family: Not on file    Attends Faith Services: Not on file    Active Member of [address] [place] [address] or Organizations: Not on file    Attends Club or Organization Meetings: Not on file    Marital Status: Not on file   Intimate Partner Violence:     Fear of Current or Ex-Partner: Not on file    Emotionally Abused: Not on file    Physically Abused: Not on file    Sexually Abused: Not on file   Housing Stability:     Unable to Pay for Housing in the Last Year: Not on file    Number of [place] in the Last Year: Not on file    Unstable Housing in the Last Year: Not on file       SCREENINGS             PHYSICAL EXAM    (up to 7 for level 4, 8 or more for level 5)     ED Triage Vitals [11/08/21 1325] BP Temp Temp src Pulse Resp SpO2 Height Weight   (!) 149/73 -- -- 85 18 -- -- --       Physical Exam  Constitutional:       General: She is not in acute distress. Appearance: She is well-developed. HENT:      Head: Normocephalic and atraumatic. Right Ear: External ear normal.      Left Ear: External ear normal.      Nose: Nose normal.      Mouth/Throat:      Mouth: Mucous membranes are moist.      Pharynx: Oropharynx is clear. Eyes:      Pupils: Pupils are equal, round, and reactive to light. Comments: Conjunctive is pale   Neck:      Vascular: No JVD. Cardiovascular:      Rate and Rhythm: Normal rate and regular rhythm. Heart sounds: No murmur heard. Comments: Port and dialysis catheter in right chest without any external bleeding. Did arrive with syringes attached to dialysis ports  Pulmonary:      Effort: Pulmonary effort is normal. No respiratory distress. Breath sounds: Normal breath sounds. No stridor. No wheezing or rhonchi. Abdominal:      General: There is no distension. Palpations: Abdomen is soft. Tenderness: There is no abdominal tenderness. Musculoskeletal:         General: Normal range of motion. Cervical back: Normal range of motion and neck supple. Skin:     General: Skin is warm and dry. Capillary Refill: Capillary refill takes less than 2 seconds. Neurological:      Mental Status: She is alert and oriented to person, place, and time. Cranial Nerves: No cranial nerve deficit. Motor: No weakness.       Comments: Cranial nerves intact, 5 out of 5 strength to hand grasp, bicep and tricep flexion, leg raise and dorsi and plantar flexion   Psychiatric:         Mood and Affect: Mood normal.         DIAGNOSTIC RESULTS     EKG: All EKG's are interpreted by the Emergency Department Physician who either signs orCo-signs this chart in the absence of a cardiologist.      RADIOLOGY:   Non-plainfilm images such as CT, Ultrasound and MRI are read by the radiologist. Plain radiographic images are visualized and preliminarily interpreted by the emergency physician with the below findings:      Interpretationper the Radiologist below, if available at the time of this note:    XR CHEST PORTABLE   Final Result   New right-sided central venous catheter terminates in the SVC      Otherwise, no significant change demonstrating a left basilar opacity which   could represent atelectasis or infection               ED BEDSIDE ULTRASOUND:   Performed by ED Physician - none    LABS:  Labs Reviewed   CBC WITH AUTO DIFFERENTIAL - Abnormal; Notable for the following components:       Result Value    RBC 3.42 (*)     Hemoglobin 10.2 (*)     Hematocrit 32.2 (*)     RDW 15.1 (*)     Seg Neutrophils 73 (*)     Lymphocytes 16 (*)     Immature Granulocytes 1 (*)     All other components within normal limits   COMPREHENSIVE METABOLIC PANEL W/ REFLEX TO MG FOR LOW K - Abnormal; Notable for the following components:    Glucose 155 (*)     BUN 26 (*)     CREATININE 3.77 (*)     Bun/Cre Ratio 7 (*)     Calcium 8.3 (*)     Potassium 3.1 (*)     Alkaline Phosphatase 113 (*)     GFR Non- 12 (*)     GFR  14 (*)     All other components within normal limits   TROPONIN - Abnormal; Notable for the following components:    Troponin, High Sensitivity 84 (*)     All other components within normal limits   BRAIN NATRIURETIC PEPTIDE - Abnormal; Notable for the following components:    Pro-BNP 3,989 (*)     All other components within normal limits   APTT - Abnormal; Notable for the following components:    PTT 40.1 (*)     All other components within normal limits   TROPONIN - Abnormal; Notable for the following components:    Troponin, High Sensitivity 101 (*)     All other components within normal limits   POCT GLUCOSE - Normal   PROTIME-INR   MAGNESIUM   HEPARIN LEVEL/ANTI-XA   TROPONIN   HEPARIN LEVEL/ANTI-XA   TYPE AND SCREEN       All other labs were within normal range or not returned as of this dictation. EMERGENCY DEPARTMENT COURSE and DIFFERENTIAL DIAGNOSIS/MDM:   Vitals:    Vitals:    11/08/21 1612 11/08/21 1627 11/08/21 1642 11/08/21 1656   BP: (!) 162/77 120/77 (!) 170/94 (!) 156/97   Pulse: 82 88 88 88   Resp: 19 10 (!) 0 12   Temp:       TempSrc:       SpO2:       Weight:             MDM  Number of Diagnoses or Management Options  Elevated troponin  Diagnosis management comments: Patient with blood loss from dialysis, unclear how much. Also lightheaded and drowsy now. She did not get a full dialysis session. EKG from EMS showed normal T waves, vital signs were stable. We will get basic labs, coags, type and screen, chest x-ray, and reassess. May need dialysis. 2:46 PM EST  EKG shows normal sinus rhythm rate of 84, , QTC of 503. Left axis deviation, no ST elevations or depressions. Q waves in 3 and aVF. This nonspecific EKG but no signs of ischemia    5:34 PM EST  Troponin 84 then repeat is 101, BNP is 4000 likely fluid overloaded. Discussed this with nephrology and they will dialyze tonight. Consulted cardiology and they recommend heparin, observation, echo tomorrow as this is higher than they would suspect for a missed dialysis session. Admitted this patient to only medicine, patient is still pain-free and blood pressures controlled. Procedures    FINAL IMPRESSION      1. Elevated troponin        DISPOSITION/PLAN   DISPOSITION Admitted 11/08/2021 05:33:40 PM      PATIENT REFERRED TO:  No follow-up provider specified.     DISCHARGE MEDICATIONS:  New Prescriptions    No medications on file              Summation      Patient Course:      ED Medications administered this visit:    Medications   heparin (porcine) injection 4,000 Units (has no administration in time range)   heparin (porcine) injection 4,000 Units (has no administration in time range)   heparin (porcine) injection 2,000 Units (has no administration in time range)   heparin 25,000 units in dextrose 5% 250 mL (premix) infusion (has no administration in time range)       New Prescriptions from this visit:    New Prescriptions    No medications on file       Follow-up:  No follow-up provider specified. Final Impression:   1.  Elevated troponin               (Please note that portions of this note were completed with a voice recognition program.  Efforts were made to edit the dictations but occasionally words are mis-transcribed.)            Amalia Queen MD  11/08/21 [id number]

## 2021-11-08 NOTE — PROGRESS NOTES
Spoke to Dr. Eliecer Quinn who is on call for Dr. Foster Villafana in regards to dialysis orders for the patient. Per Dr. Eliecer Quinn, the patient will not have dialysis tonight. Reassess in the morning for hemodialysis. ER staff updated.

## 2021-11-09 LAB
ANION GAP SERPL CALCULATED.3IONS-SCNC: 12 MMOL/L (ref 9–17)
ANTI-XA UNFRAC HEPARIN: 0.57 IU/L (ref 0.3–0.7)
ANTI-XA UNFRAC HEPARIN: 0.61 IU/L (ref 0.3–0.7)
ANTI-XA UNFRAC HEPARIN: 0.65 IU/L (ref 0.3–0.7)
ANTI-XA UNFRAC HEPARIN: <0.1 IU/L (ref 0.3–0.7)
BUN BLDV-MCNC: 31 MG/DL (ref 8–23)
BUN/CREAT BLD: 7 (ref 9–20)
CALCIUM SERPL-MCNC: 8.3 MG/DL (ref 8.6–10.4)
CHLORIDE BLD-SCNC: 104 MMOL/L (ref 98–107)
CO2: 24 MMOL/L (ref 20–31)
CREAT SERPL-MCNC: 4.56 MG/DL (ref 0.5–0.9)
EKG ATRIAL RATE: 84 BPM
EKG P AXIS: -7 DEGREES
EKG P-R INTERVAL: 174 MS
EKG Q-T INTERVAL: 426 MS
EKG QRS DURATION: 84 MS
EKG QTC CALCULATION (BAZETT): 503 MS
EKG R AXIS: -38 DEGREES
EKG T AXIS: 169 DEGREES
EKG VENTRICULAR RATE: 84 BPM
GFR AFRICAN AMERICAN: 11 ML/MIN
GFR NON-AFRICAN AMERICAN: 9 ML/MIN
GFR SERPL CREATININE-BSD FRML MDRD: ABNORMAL ML/MIN/{1.73_M2}
GFR SERPL CREATININE-BSD FRML MDRD: ABNORMAL ML/MIN/{1.73_M2}
GLUCOSE BLD-MCNC: 108 MG/DL (ref 70–99)
GLUCOSE BLD-MCNC: 125 MG/DL (ref 65–105)
GLUCOSE BLD-MCNC: 140 MG/DL (ref 65–105)
GLUCOSE BLD-MCNC: 160 MG/DL (ref 65–105)
GLUCOSE BLD-MCNC: 194 MG/DL (ref 65–105)
GLUCOSE BLD-MCNC: 199 MG/DL (ref 65–105)
GLUCOSE BLD-MCNC: 99 MG/DL (ref 65–105)
POTASSIUM SERPL-SCNC: 3.3 MMOL/L (ref 3.7–5.3)
SODIUM BLD-SCNC: 140 MMOL/L (ref 135–144)
TROPONIN INTERP: ABNORMAL
TROPONIN INTERP: ABNORMAL
TROPONIN T: ABNORMAL NG/ML
TROPONIN T: ABNORMAL NG/ML
TROPONIN, HIGH SENSITIVITY: 109 NG/L (ref 0–14)
TROPONIN, HIGH SENSITIVITY: 110 NG/L (ref 0–14)

## 2021-11-09 PROCEDURE — G0378 HOSPITAL OBSERVATION PER HR: HCPCS

## 2021-11-09 PROCEDURE — 96372 THER/PROPH/DIAG INJ SC/IM: CPT

## 2021-11-09 PROCEDURE — 93308 TTE F-UP OR LMTD: CPT

## 2021-11-09 PROCEDURE — 36415 COLL VENOUS BLD VENIPUNCTURE: CPT

## 2021-11-09 PROCEDURE — 90935 HEMODIALYSIS ONE EVALUATION: CPT

## 2021-11-09 PROCEDURE — 6360000002 HC RX W HCPCS: Performed by: INTERNAL MEDICINE

## 2021-11-09 PROCEDURE — 82947 ASSAY GLUCOSE BLOOD QUANT: CPT

## 2021-11-09 PROCEDURE — 80048 BASIC METABOLIC PNL TOTAL CA: CPT

## 2021-11-09 PROCEDURE — 85520 HEPARIN ASSAY: CPT

## 2021-11-09 PROCEDURE — 2500000003 HC RX 250 WO HCPCS: Performed by: INTERNAL MEDICINE

## 2021-11-09 PROCEDURE — 6370000000 HC RX 637 (ALT 250 FOR IP): Performed by: FAMILY MEDICINE

## 2021-11-09 PROCEDURE — 84484 ASSAY OF TROPONIN QUANT: CPT

## 2021-11-09 RX ORDER — ENALAPRIL MALEATE 10 MG/1
10 TABLET ORAL DAILY
Status: DISCONTINUED | OUTPATIENT
Start: 2021-11-09 | End: 2021-11-10 | Stop reason: HOSPADM

## 2021-11-09 RX ORDER — CLOPIDOGREL BISULFATE 75 MG/1
75 TABLET ORAL DAILY
Status: DISCONTINUED | OUTPATIENT
Start: 2021-11-09 | End: 2021-11-10 | Stop reason: HOSPADM

## 2021-11-09 RX ORDER — HYDRALAZINE HYDROCHLORIDE 50 MG/1
100 TABLET, FILM COATED ORAL EVERY 8 HOURS SCHEDULED
Status: DISCONTINUED | OUTPATIENT
Start: 2021-11-09 | End: 2021-11-10 | Stop reason: HOSPADM

## 2021-11-09 RX ORDER — CLONIDINE 0.2 MG/24H
1 PATCH, EXTENDED RELEASE TRANSDERMAL WEEKLY
Status: DISCONTINUED | OUTPATIENT
Start: 2021-11-09 | End: 2021-11-10 | Stop reason: HOSPADM

## 2021-11-09 RX ORDER — DEXTROSE MONOHYDRATE 25 G/50ML
12.5 INJECTION, SOLUTION INTRAVENOUS PRN
Status: DISCONTINUED | OUTPATIENT
Start: 2021-11-09 | End: 2021-11-10 | Stop reason: HOSPADM

## 2021-11-09 RX ORDER — ONDANSETRON 2 MG/ML
4 INJECTION INTRAMUSCULAR; INTRAVENOUS EVERY 6 HOURS PRN
Status: DISCONTINUED | OUTPATIENT
Start: 2021-11-09 | End: 2021-11-10 | Stop reason: HOSPADM

## 2021-11-09 RX ORDER — ATORVASTATIN CALCIUM 40 MG/1
40 TABLET, FILM COATED ORAL NIGHTLY
Status: DISCONTINUED | OUTPATIENT
Start: 2021-11-09 | End: 2021-11-10 | Stop reason: HOSPADM

## 2021-11-09 RX ORDER — FUROSEMIDE 80 MG
80 TABLET ORAL 2 TIMES DAILY
Status: DISCONTINUED | OUTPATIENT
Start: 2021-11-09 | End: 2021-11-10 | Stop reason: HOSPADM

## 2021-11-09 RX ORDER — NICOTINE POLACRILEX 4 MG
15 LOZENGE BUCCAL PRN
Status: DISCONTINUED | OUTPATIENT
Start: 2021-11-09 | End: 2021-11-10 | Stop reason: HOSPADM

## 2021-11-09 RX ORDER — AMLODIPINE BESYLATE 10 MG/1
10 TABLET ORAL DAILY
Status: DISCONTINUED | OUTPATIENT
Start: 2021-11-09 | End: 2021-11-10 | Stop reason: HOSPADM

## 2021-11-09 RX ORDER — HEPARIN SODIUM 5000 [USP'U]/ML
5000 INJECTION, SOLUTION INTRAVENOUS; SUBCUTANEOUS EVERY 8 HOURS SCHEDULED
Status: DISCONTINUED | OUTPATIENT
Start: 2021-11-09 | End: 2021-11-10 | Stop reason: HOSPADM

## 2021-11-09 RX ORDER — PANTOPRAZOLE SODIUM 40 MG/1
40 TABLET, DELAYED RELEASE ORAL
Status: DISCONTINUED | OUTPATIENT
Start: 2021-11-09 | End: 2021-11-10 | Stop reason: HOSPADM

## 2021-11-09 RX ORDER — DEXTROSE MONOHYDRATE 50 MG/ML
100 INJECTION, SOLUTION INTRAVENOUS PRN
Status: DISCONTINUED | OUTPATIENT
Start: 2021-11-09 | End: 2021-11-10 | Stop reason: HOSPADM

## 2021-11-09 RX ORDER — METOPROLOL TARTRATE 100 MG/1
100 TABLET ORAL DAILY
Status: DISCONTINUED | OUTPATIENT
Start: 2021-11-09 | End: 2021-11-10 | Stop reason: HOSPADM

## 2021-11-09 RX ORDER — SODIUM CITRATE 4 % (5 ML)
1.6 SYRINGE (ML) MISCELLANEOUS PRN
Status: DISCONTINUED | OUTPATIENT
Start: 2021-11-09 | End: 2021-11-10 | Stop reason: HOSPADM

## 2021-11-09 RX ORDER — GABAPENTIN 300 MG/1
300 CAPSULE ORAL DAILY
Status: DISCONTINUED | OUTPATIENT
Start: 2021-11-09 | End: 2021-11-10 | Stop reason: HOSPADM

## 2021-11-09 RX ORDER — ASPIRIN 81 MG/1
81 TABLET, CHEWABLE ORAL DAILY
Status: DISCONTINUED | OUTPATIENT
Start: 2021-11-09 | End: 2021-11-10 | Stop reason: HOSPADM

## 2021-11-09 RX ORDER — SEVELAMER CARBONATE 800 MG/1
800 TABLET, FILM COATED ORAL
Status: DISCONTINUED | OUTPATIENT
Start: 2021-11-09 | End: 2021-11-10 | Stop reason: HOSPADM

## 2021-11-09 RX ORDER — SPIRONOLACTONE 25 MG/1
25 TABLET ORAL DAILY
Status: DISCONTINUED | OUTPATIENT
Start: 2021-11-09 | End: 2021-11-10 | Stop reason: HOSPADM

## 2021-11-09 RX ORDER — NIFEDIPINE 30 MG/1
60 TABLET, EXTENDED RELEASE ORAL DAILY
Status: DISCONTINUED | OUTPATIENT
Start: 2021-11-09 | End: 2021-11-10 | Stop reason: HOSPADM

## 2021-11-09 RX ADMIN — HEPARIN SODIUM 5000 UNITS: 5000 INJECTION INTRAVENOUS; SUBCUTANEOUS at 21:42

## 2021-11-09 RX ADMIN — SEVELAMER CARBONATE 800 MG: 800 TABLET, FILM COATED ORAL at 15:13

## 2021-11-09 RX ADMIN — INSULIN LISPRO 2 UNITS: 100 INJECTION, SOLUTION INTRAVENOUS; SUBCUTANEOUS at 08:06

## 2021-11-09 RX ADMIN — ATORVASTATIN CALCIUM 40 MG: 40 TABLET, FILM COATED ORAL at 21:42

## 2021-11-09 RX ADMIN — HEPARIN SODIUM 5000 UNITS: 5000 INJECTION INTRAVENOUS; SUBCUTANEOUS at 15:13

## 2021-11-09 RX ADMIN — CLOPIDOGREL BISULFATE 75 MG: 75 TABLET ORAL at 08:07

## 2021-11-09 RX ADMIN — HYDRALAZINE HYDROCHLORIDE 100 MG: 50 TABLET, FILM COATED ORAL at 21:42

## 2021-11-09 RX ADMIN — GABAPENTIN 300 MG: 300 CAPSULE ORAL at 09:44

## 2021-11-09 RX ADMIN — NIFEDIPINE 60 MG: 30 TABLET, FILM COATED, EXTENDED RELEASE ORAL at 16:26

## 2021-11-09 RX ADMIN — PANTOPRAZOLE SODIUM 40 MG: 40 TABLET, DELAYED RELEASE ORAL at 05:54

## 2021-11-09 RX ADMIN — ASPIRIN 81 MG: 81 TABLET, CHEWABLE ORAL at 08:07

## 2021-11-09 RX ADMIN — INSULIN LISPRO 2 UNITS: 100 INJECTION, SOLUTION INTRAVENOUS; SUBCUTANEOUS at 16:33

## 2021-11-09 RX ADMIN — Medication 1.6 ML: at 14:12

## 2021-11-09 RX ADMIN — SPIRONOLACTONE 25 MG: 25 TABLET ORAL at 16:26

## 2021-11-09 RX ADMIN — METOPROLOL TARTRATE 100 MG: 100 TABLET, FILM COATED ORAL at 16:26

## 2021-11-09 RX ADMIN — INSULIN LISPRO 1 UNITS: 100 INJECTION, SOLUTION INTRAVENOUS; SUBCUTANEOUS at 21:41

## 2021-11-09 RX ADMIN — FUROSEMIDE 80 MG: 80 TABLET ORAL at 16:26

## 2021-11-09 RX ADMIN — ENALAPRIL MALEATE 10 MG: 10 TABLET ORAL at 16:26

## 2021-11-09 RX ADMIN — HYDRALAZINE HYDROCHLORIDE 100 MG: 50 TABLET, FILM COATED ORAL at 05:55

## 2021-11-09 NOTE — CARE COORDINATION
Case Management Initial Discharge Plan  [address] [facility] [address] [address],             Met with:patient to discuss discharge plans. Information verified: address, contacts, phone number, , insurance Yes  PCP: Sallie Liriano MD  Date of last visit: within the last 2 weeks. Insurance Provider: [address] [place] [address]    Discharge Planning    Living Arrangements: Children      Support Systems: Family       Lives in 2nd Floor duplex  7 stairs to climb to get into front door, 0 stairs to climb to reach second floor  Location of bedroom/bathroom in home Main Floor    Patient able to perform ADL's:Independent with walker and receives HHA services 2x/week. Current Services (outpatient & in home) HHA 2x/wk through [facility] and RN & PT through [facility]  DME equipment: walker, cane, shower chair, glucometer  DME provider: N/A    Pharmacy: [facility] Needed:       Patient agreeable to home care: Yes  Freedom of choice provided:  yes    Prior SNF/Rehab Placement and Facility: Yes, [facility] to SNF/Rehab: No  [place] of choice provided: n/a   Evaluation: n/a    Expected Discharge date:     Patient expects to be discharged to: Follow Up Appointment: Best Day/ Time:      Transportation provider: Family  Transportation arrangements needed for discharge: No    Readmission Risk              Risk of Unplanned Readmission:  0             Does patient have a readmission risk score greater than 14?: No  If yes, follow-up appointment must be made within 7 days of discharge. Goal of Care:       Discharge Plan:   Met with patient at bedside to discuss d/c plans. Patient is admitted with elevated troponin and malfunction/bleeding of AV fistula during HD. Heparin gtt was discontinued by cardiology  Pending Echo result - if low risk, no further inpatient cardiac work up and can f/u outpatient.    Patient is current at [facility] for HD M/W/F at 11am.  Currently patient is receiving HD through [facility]/port in her right chest.   Lives with son. Independent with walker and does not drive. Currently receives HHA 2x/wk through [facility] and RN/PT [facility] Writer contacted Alyssa Tate with [facility] to inform of admission and they will accept at [place]. Will need to inform Alyssa Tate of d/c and fax RED at [place] to [phone]. Contact [facility] at [phone] when patient discharges. Denies any DME needs at [place]. Consults: Nephro & Cardio    Continue to follow.                      Electronically signed by Jessica Vargas RN on 11/9/21 at 5:45 PM EST

## 2021-11-09 NOTE — PLAN OF CARE
Problem: Falls - Risk of:  Goal: Will remain free from falls  Description: Will remain free from falls  11/9/2021 1547 by Jamari Bryant RN  Outcome: Ongoing    O  Problem: Skin Integrity:  Goal: Will show no infection signs and symptoms  Description: Will show no infection signs and symptoms  11/9/2021 1547 by Jamari Bryant RN  Outcome: Ongoing  Pt in bed today as having dialysis at bedside. Pt states previously she had some dizziness when getting up. Will monitor. Encouraged pt to move self in bed and relieve pressure at pressure points/backside. Noted pt to turn self well in bed. Continue to change brief as needed and assess skin integrity t/o.

## 2021-11-09 NOTE — CONSULTS
NEPHROLOGY     REASON FOR CONSULT:   ESKD for fluid and electrolytes management    HISTORY OF PRESENTING ILLNESS                 This is a 67 y.o. female who presents with bleeding from the TC, the port got disconnected during HD, she was bleeding, so HD stopped after one hour and she was transferred to our hospital.  The patient's hemoglobin is stable, she did not require blood transfusion, her BP is stable, she is having SOB, no chest pain, her troponins were elevated so she was started on HWBP. The patient is known to have ESKD, she was on PD, switched to HD via TC after having peritonitis, her HD schedule is MW.   She received only one hour of her treatment yesterday        PAST MEDICAL HISTORY         Diagnosis Date    Anxiety and depression     Arthritis     Cancer ([address]     left breast cancer    Diabetes mellitus ([facility] [address]     History of blood transfusion     50 plus yrs ago with pregnancy    Hyperlipidemia     Hypertension     MRSA (methicillin resistant staph aureus) culture positive 2015    back    PAT (obstructive sleep apnea)     had UPPP  \"have not used machine in years\"    Prolonged emergence from general anesthesia     SOB (shortness of breath)     \"long distance\"       PAST SURGICAL HISTORY          Procedure Laterality Date    APPENDECTOMY      BACK SURGERY      I and D lower back MRSA    BREAST REDUCTION SURGERY      HYSTERECTOMY      MASTECTOMY Left     lymph nodes removed    MASTECTOMY Left 05/14/2018    axillary mastectomy    HI EXC SKIN BENIG <5MM TRUNK,ARM,LEG Right 5/14/2018    RIGHT AXILLARY MASTECTOMY performed by Jeanette Johnson DO at [address] [place] [address]:                Medications Prior to Admission: docusate sodium (COLACE) 100 MG capsule, Take 1 capsule by mouth 2 times daily as needed for Constipation  ondansetron (ZOFRAN) 4 MG tablet, Take 1 tablet by mouth every 6 hours as needed for Nausea or Vomiting  rosuvastatin (CRESTOR) 5 MG tablet, Take 5 mg by mouth daily  chlorthalidone (HYGROTON) 25 MG tablet, Take 25 mg by mouth daily  citalopram (CELEXA) 20 MG tablet, Take 40 mg by mouth daily  aspirin 81 MG tablet, Take 81 mg by mouth daily  gabapentin (NEURONTIN) 300 MG capsule, Take 1 capsule by mouth daily for 30 days. Scheduled Meds:    amLODIPine  10 mg Oral Daily    hydrALAZINE  100 mg Oral 3 times per day    aspirin  81 mg Oral Daily    metoprolol tartrate  100 mg Oral Daily    atorvastatin  40 mg Oral Nightly    NIFEdipine  60 mg Oral Daily    cloNIDine  1 patch TransDERmal Weekly    clopidogrel  75 mg Oral Daily    enalapril  10 mg Oral Daily    pantoprazole  40 mg Oral QAM AC    furosemide  80 mg Oral BID    sevelamer  800 mg Oral TID WC    gabapentin  300 mg Oral Daily    spironolactone  25 mg Oral Daily    insulin lispro  0-12 Units SubCUTAneous TID WC    insulin lispro  0-6 Units SubCUTAneous Nightly     Continuous Infusions:    dextrose      heparin (PORCINE) Infusion 984 Units/hr (11/08/21 1826)     PRN Meds:  glucose, dextrose, glucagon (rDNA), dextrose, heparin (porcine), heparin (porcine)    ALLERGY   Patient has no known allergies.        SOCIAL HISTORY     Social History     Socioeconomic History    Marital status:      Spouse name: Not on file    Number of children: Not on file    Years of education: Not on file    Highest education level: Not on file   Occupational History    Not on file   Tobacco Use    Smoking status: Former Smoker    Smokeless tobacco: Never Used   Vaping Use    Vaping Use: Never used   Substance and Sexual Activity    Alcohol use: Yes     Comment: occasionally  \"couple a month\"    Drug use: No    Sexual activity: Not on file   Other Topics Concern    Not on file   Social History Narrative    Not on file     Social Determinants of Health     Financial Resource Strain:     Difficulty of Paying Living Expenses: Not on file   Food Insecurity:     Worried About [facility] in the Last Year: Not on file    [place] of Food in the Last Year: Not on file   Transportation Needs:     Lack of Transportation (Medical): Not on file    Lack of Transportation (Non-Medical): Not on file   Physical Activity:     Days of Exercise per Week: Not on file    Minutes of Exercise per Session: Not on file   Stress:     Feeling of Stress : Not on file   Social Connections:     Frequency of Communication with Friends and Family: Not on file    Frequency of Social Gatherings with Friends and Family: Not on file    Attends Jehovah's witness Services: Not on file    Active Member of [address] [place] [address] or Organizations: Not on file    Attends Club or Organization Meetings: Not on file    Marital Status: Not on file   Intimate Partner Violence:     Fear of Current or Ex-Partner: Not on file    Emotionally Abused: Not on file    Physically Abused: Not on file    Sexually Abused: Not on file   Housing Stability:     Unable to Pay for Housing in the Last Year: Not on file    Number of [place] in the Last Year: Not on file    Unstable Housing in the Last Year: Not on file       FAMILY HISTORY   History reviewed. No pertinent family history. REVIEW OF SYSTEM     Constitutional: No asthenia/weight loss/anorexia    HEENT : No epistaxis/visual blurriness/rhinorrhoea/sorethroat/trauma  Cardiovascular:No chest pain/palpitation/positive SOB  Respiratory: No cough/fever/Wheezing    Gastrointestinal: No abdominal pain/nausea/vomiting/diarrhea/constipation  Genitourinary: No dysuria/pyuria/hematuria/incomplete emptying of bladder  Musculoskeletal:  No gait disturbance/weakness or joint complaints  Integumentary: No rash or pruritis. Neurological: No headache/diplopia/change in muscle strength/numbness or tingling. No change in gait, balance, coordination, mood, affect, memory, mentation, behavior. Psychiatric: No anxiety/depression. Endocrine: No temperature intolerance.  No excessive thirst, fluid intake, or urination. No tremor. Hematologic/Lymphatic: No abnormal bruising or bleeding, blood clots or swolle lymph nodes. Allergic/Immunologic: No nasal congestion or hives      PHYSICAL EXAM     Vitals:    11/09/21 0019 11/09/21 0400 11/09/21 0545 11/09/21 0731   BP: (!) 161/76 (!) 142/71 (!) 162/84 135/62   Pulse: 89 92 93 95   Resp: 18 18  19   Temp: 98.6 °F (37 °C) 98.8 °F (37.1 °C)  98.4 °F (36.9 °C)   TempSrc: Oral Oral  Oral   SpO2: 95% 97%  96%   Weight:           General appearance:Awake, alert, in no acute distress  Skin: warm and dry, no rash or erythema  Eyes: conjunctivae normal and sclera anicteric  ENT: no thrush no pharyngeal congestion  orodental hygiene   Neck: No JVD, Lymphadenopathty or thyromegaly  Respiratory: vesicular breath sounds,no wheeze/crackles  Cardiovascular: S1 S2 normal,no gallop or organic murmur. No carotid bruit  Abdomen:Non tender/non distended. Bowel sounds present  Extremities: No Cyanosis or Clubbing,Lower extremity edema  Neurological:Alert and oriented. No abnormalities of mood, affect, memory, mentation, or behavior are noted    INVESTIGATIONS      CBC:   Recent Labs     11/08/21  1347   WBC 9.7   RBC 3.42*   HGB 10.2*   HCT 32.2*   MCV 94.2   MCH 29.8   MCHC 31.7   RDW 15.1*      MPV 11.3      BMP:   Recent Labs     11/08/21  1347 11/08/21  1431 11/09/21  0540     --  140   K 3.1*  --  3.3*     --  104   CO2 23  --  24   BUN 26*  --  31*   CREATININE 3.77*  --  4.56*   GLUCOSE 155* 155 108*   CALCIUM 8.3*  --  8.3*        Phosphorus:  No results for input(s): PHOS in the last 72 hours. Magnesium:   Recent Labs     11/08/21  1347   MG 1.9     Albumin:   Recent Labs     11/08/21  1347   LABALBU 3.7       Radiology:  Reviewed as available. ASSESSMENT   1. End-stage renal disease due to ischemic nephrosclerosis on HD, MWF via TC  2.  The   Patient was on peritoneal dialysis, discontinued and PD catheter removed secondary to peritonitis   3. Hypokalemia  4. Acute blood loss anemia   5.  elevated troponin   6. Essential hypertension   7. History of breast cancer status post surgery  8. DM2    PLAN:   1. HD today, orders given  2. Next HD tomorrow per schedule  3. Ok to discharge     Thank you for the consultation. Please do not hesitate to call with questions.   Marylen Robinsons, MD  TCNEP

## 2021-11-09 NOTE — DISCHARGE INSTR - COC
Continuity of Care Form    Patient Name: Liat Haley   :    MRN:  [id number]    Admit date:  2021  Discharge date:  11/10/2021    Code Status Order: Prior   Advance Directives:      Admitting Physician:  Alexander Sanches MD  PCP: Pepper Sellers MD    Discharging Nurse: [facility] Unit/Room#: 1010/1010-02  Discharging Unit Phone Number: [phone]    Emergency Contact:   Extended Emergency Contact Information  Primary Emergency Contact: [address] Phone: [phone]  Relation: Child  Secondary Emergency Contact: Noble Aguilar  [place] Phone: [phone]  Relation: Child   needed? No    Past Surgical History:  Past Surgical History:   Procedure Laterality Date    APPENDECTOMY      BACK SURGERY      I and D lower back MRSA    BREAST REDUCTION SURGERY      HYSTERECTOMY      MASTECTOMY Left     lymph nodes removed    MASTECTOMY Left 2018    axillary mastectomy    AL EXC SKIN BENIG <5MM TRUNK,ARM,LEG Right 2018    RIGHT AXILLARY MASTECTOMY performed by Vignesh Diaz DO at [address] [place] [address] Drive         Immunization History: There is no immunization history on file for this patient.     Active Problems:  Patient Active Problem List   Diagnosis Code    AMS (altered mental status) R41.82    Diabetes ([facility] [address] E11.9    Peritoneal dialysis status ([facility] [address] Z99.2    Hypoglycemia E16.2    Encephalopathy M51.90    Metabolic encephalopathy L12.89    Elevated troponin R77.8       Isolation/Infection:   Isolation            Contact          Patient Infection Status       Infection Onset Added Last Indicated Last Indicated By Review Planned Expiration Resolved Resolved By    MRSA  18 Naga Messer RN        1 negative nasal swab - 2018 (needs 2nd nasal swab)  Back -             Nurse Assessment:  Last Vital Signs: BP (!) 149/68   Pulse 92   Temp 98.8 °F (37.1 °C) (Oral)   Resp 16   Ht 5' 3\" (1.6 m)   Wt 181 lb (82.1 kg)   SpO2 98%   BMI 32.06 kg/m²     Last documented pain score (0-10 scale):    Last Weight:   Wt Readings from Last 1 Encounters:   11/09/21 181 lb (82.1 kg)     Mental Status:  oriented and alert    IV Access:  Right side subclavian Dialysis catheter  Right sided chest port. Nursing Mobility/ADLs:  Walking   Assisted  Transfer  Assisted  Bathing  Assisted  Dressing  Assisted  Toileting  Assisted  Feeding  Independent  Med Admin  Independent  Med Delivery   whole    Wound Care Documentation and Therapy:  Wound 11/08/21 Buttocks Right (Active)   Number of days: 0        Elimination:  Continence: Bowel: Yes  Bladder: Yes  Urinary Catheter: None   Colostomy/Ileostomy/Ileal Conduit: No       Date of Last BM: 11/10/2021    Intake/Output Summary (Last 24 hours) at 11/9/2021 1758  Last data filed at 11/9/2021 1546  Gross per 24 hour   Intake 643 ml   Output --   Net 643 ml     No intake/output data recorded. Safety Concerns: At Risk for Falls    Impairments/Disabilities:      None    Nutrition Therapy:  Current Nutrition Therapy:   - Oral Diet:  Renal, Low Fat, and Low Sodium (2gm)    Routes of Feeding: Oral  Liquids: No Restrictions  Daily Fluid Restriction: yes - amount 2000 ml  Last Modified Barium Swallow with Video (Video Swallowing Test): not done    Treatments at the Time of Hospital Discharge:   Respiratory Treatments: none  Oxygen Therapy:  is not on home oxygen therapy.   Ventilator:    - No ventilator support    Rehab Therapies: Physical Therapy  Weight Bearing Status/Restrictions: No weight bearing restirctions  Other Medical Equipment (for information only, NOT a DME order):  cane, walker, and shower chair  Other Treatments:   1) SN for Assessment  2) SN for Medication Teaching & Compliance    Patient's personal belongings (please select all that are sent with patient):  Javier    RN SIGNATURE:  Electronically signed by Hao Guerin RN on 11/10/21 at 2:16 PM EST    CASE MANAGEMENT/SOCIAL WORK SECTION    Inpatient Status Date: Obs    Readmission Risk Assessment Score:  Readmission Risk              Risk of Unplanned Readmission:  0           [facility]charg[facility] Facility/ Agency   [facility]  Address:   Phone: [phone]    Discharging to Facility/ Agency   Name: [facility]  Phone: [phone]    Dialysis Facility (if applicable)   Name: [facility]  Address:  Dialysis Schedule: M/W/F at [facility]  Phone: [phone]  Fax: [phone]      / signature: Electronically signed by Michelle Charles RN on 11/9/21 at 6:00 PM EST    PHYSICIAN SECTION    Prognosis: Fair    Condition at Discharge: Stable    Rehab Potential (if transferring to Rehab): Fair    Recommended Labs or Other Treatments After Discharge: Pt may need PRBC w next HD    Physician Certification: I certify the above information and transfer of Giuliano Quiroz  is necessary for the continuing treatment of the diagnosis listed and that she requires Home Care for greater 30 days.      Update Admission H&P: No change in H&P    PHYSICIAN SIGNATURE:  Electronically signed by Joe Sheppard MD on 11/10/21 at 2:28 PM EST

## 2021-11-09 NOTE — PROGRESS NOTES
HEMODIALYSIS PRE-TREATMENT NOTE    Patient Identifiers prior to treatment: Name,     Isolation Required:  Yes                      Isolation Type: Contact/MRSA       (please document if patient is being managed as a PUI/COVID-19 patient)        Hepatitis status:                           Date Drawn                             Result  Hepatitis B Surface Antigen 10/25/21     neg                     Hepatitis B Surface Antibody 10/25/21 neg        Hepatitis B Core Antibody n/a n/a          How was Hepatitis Status verified: Epic     Was a copy of the labs you documented provided to facility for the patient's chart: Epic    Hemodialysis orders verified: Yes    Access Within normal limits ( I.e. s/s of infection,...): WNL     Pre-Assessment completed: Yes    Pre-dialysis report received from: PATRICIA Sanchez                      Time: 11:30

## 2021-11-09 NOTE — CONSULTS
[facility]  In Patient Cardiology Consult             Date:   11/9/2021  Patient name: Mook Thorpe  Date of admission:  11/8/2021  1:24 PM  MRN:   [id number]  YOB: 1945    Reason for Admission: Bleeding from hemodialysis site. CHIEF COMPLAINT: Bleeding from hemodialysis site. History Obtained From: The patient and chart    HISTORY OF PRESENT ILLNESS:    This is a 76-year-old female who presented due to bleeding after her port was disconnected from hemodialysis. Apparently there was bleeding from her tunneled catheter. The port was disconnected during hemodialysis and she was bleeding. Hemodialysis was stopped after an hour. She was transferred to our hospital.  We are consulted due to elevated troponins. She denies complaints of chest pain, orthopnea, PND. Has stable shortness of breath.     Past Medical History:    Past Medical History:   Diagnosis Date    Anxiety and depression     Arthritis     Cancer ([address]     left breast cancer    Diabetes mellitus ([address]     History of blood transfusion     50 plus yrs ago with pregnancy    Hyperlipidemia     Hypertension     MRSA (methicillin resistant staph aureus) culture positive 2015    back    PAT (obstructive sleep apnea)     had UPPP  \"have not used machine in years\"    Prolonged emergence from general anesthesia     SOB (shortness of breath)     \"long distance\"         Past Surgical History:    Past Surgical History:   Procedure Laterality Date    APPENDECTOMY      BACK SURGERY      I and D lower back MRSA    BREAST REDUCTION SURGERY      HYSTERECTOMY      MASTECTOMY Left     lymph nodes removed    MASTECTOMY Left 05/14/2018    axillary mastectomy    DE EXC SKIN BENIG <5MM TRUNK,ARM,LEG Right 5/14/2018    RIGHT AXILLARY MASTECTOMY performed by Clyde Rosales DO at [address] Medications:    Outpatient Medications Marked as Taking for the 11/8/21 encounter [facility] Encounter)   Medication Sig Dispense Refill    docusate sodium (COLACE) 100 MG capsule Take 1 capsule by mouth 2 times daily as needed for Constipation 50 capsule 0    ondansetron (ZOFRAN) 4 MG tablet Take 1 tablet by mouth every 6 hours as needed for Nausea or Vomiting 30 tablet 0    rosuvastatin (CRESTOR) 5 MG tablet Take 5 mg by mouth daily      chlorthalidone (HYGROTON) 25 MG tablet Take 25 mg by mouth daily      citalopram (CELEXA) 20 MG tablet Take 40 mg by mouth daily      aspirin 81 MG tablet Take 81 mg by mouth daily          Allergies:  Patient has no known allergies. Social History:    Social History     Socioeconomic History    Marital status:      Spouse name: None    Number of children: None    Years of education: None    Highest education level: None   Occupational History    None   Tobacco Use    Smoking status: Former Smoker    Smokeless tobacco: Never Used   Vaping Use    Vaping Use: Never used   Substance and Sexual Activity    Alcohol use: Yes     Comment: occasionally  \"couple a month\"    Drug use: No    Sexual activity: None   Other Topics Concern    None   Social History Narrative    None     Social Determinants of Health     Financial Resource Strain:     Difficulty of Paying Living Expenses: Not on file   Food Insecurity:     Worried About Running Out of Food in the Last Year: Not on file    Caleb of Food in the Last Year: Not on file   Transportation Needs:     Lack of Transportation (Medical): Not on file    Lack of Transportation (Non-Medical):  Not on file   Physical Activity:     Days of Exercise per Week: Not on file    Minutes of Exercise per Session: Not on file   Stress:     Feeling of Stress : Not on file   Social Connections:     Frequency of Communication with Friends and Family: Not on file    Frequency of Social Gatherings with Friends and Family: Not on file    Attends Hindu Services: Not on file   [employer] of Clubs or Organizations: Not on file    Attends Club or Organization Meetings: Not on file    Marital Status: Not on file   Intimate Partner Violence:     Fear of Current or Ex-Partner: Not on file    Emotionally Abused: Not on file    Physically Abused: Not on file    Sexually Abused: Not on file   Housing Stability:     Unable to Pay for Housing in the Last Year: Not on file    Number of Radha in the Last Year: Not on file    Unstable Housing in the Last Year: Not on file        Family History:   History reviewed. No pertinent family history. REVIEW OF SYSTEMS:    · Constitutional: there has been no unanticipated weight loss. There's been No change in energy level, No change in activity level. · Eyes: No visual changes or diplopia. No scleral icterus. · ENT: No Headaches, hearing loss or vertigo. No mouth sores or sore throat. · Cardiovascular: As HPI  · Respiratory: As HPI  · Gastrointestinal: No abdominal pain, appetite loss, blood in stools. No change in bowel or bladder habits. · Genitourinary: No dysuria, trouble voiding, or hematuria. · Musculoskeletal:  No gait disturbance, No weakness or joint complaints. · Integumentary: No rash or pruritis. · Neurological: No headache, diplopia, change in muscle strength, numbness or tingling. No change in gait, balance, coordination, mood, affect, memory, mentation, behavior. · Psychiatric: No anxiety, or depression. · Endocrine: No temperature intolerance. No excessive thirst, fluid intake, or urination. No tremor. · Hematologic/Lymphatic: No abnormal bruising or bleeding, blood clots or swollen lymph nodes. · Allergic/Immunologic: No nasal congestion or hives.     PHYSICAL EXAM:    Physical Examination:    /62   Pulse 95   Temp 98.4 °F (36.9 °C) (Oral)   Resp 19   Wt 181 lb (82.1 kg)   SpO2 96%   BMI 32.06 kg/m²    Constitutional and General Appearance: alert, cooperative, no distress and appears stated age  [de-identified]: PERRL, no cervical lymphadenopathy. No masses palpable. Normal oral mucosa  Respiratory:  · Normal excursion and expansion without use of accessory muscles  · Resp Auscultation: Good respiratory effort. No for increased work of breathing. On auscultation: Clear  Cardiovascular:  · Heart tones are crisp and normal. regular S1 and S2. Murmurs: none  · Jugular venous pulsation Normal  Abdomen:  · No masses or tenderness  · Bowel sounds present  Extremities:  ·  No Cyanosis or Clubbing  ·  Lower extremity edema: non  ·  Skin: Warm and dry  Neurological:  · Alert and oriented. · Moves all extremities well  · No abnormalities of mood, affect, memory, mentation, or behavior are noted    DATA:    Diagnostics:      EKG:   Sinus, LVH,   Some LVH with Repol in I/AVL, stable from ECG 5/21    Echo:  Results for orders placed or performed during the hospital encounter of 05/12/21   ECHO Complete 2D W Doppler W Color  Global left ventricular systolic function is normal.  Estimated LVEF 55-60%. No segmental wall motion abnormalities. Moderate concentric left ventricular hypertrophy. Grade I (mild) left ventricular diastolic dysfunction. No significant valvular regurgitation or stenosis seen. Estimated right ventricular systolic pressure is 08QOMP. Labs:     CBC:   Recent Labs     11/08/21  1347   WBC 9.7   HGB 10.2*   HCT 32.2*        BMP:   Recent Labs     11/08/21  1347 11/08/21  1347 11/08/21  1431 11/09/21  0540     --   --  140   K 3.1*  --   --  3.3*   CO2 23  --   --  24   BUN 26*  --   --  31*   CREATININE 3.77*  --   --  4.56*   LABGLOM 12*  --   --  9*   GLUCOSE 155*   < > 155 108*    < > = values in this interval not displayed. BNP: No results for input(s): BNP in the last 72 hours.   PT/INR:   Recent Labs     11/08/21  1347   PROTIME 14.2   INR 1.1     APTT:  Recent Labs     11/08/21  1347   APTT 40.1*     CARDIAC ENZYMES:  Recent Labs     11/08/21  1515 11/08/21  2000 11/09/21  0540   TROPHS 101* 115* 110*     FASTING LIPID PANEL:No results found for: HDL, LDLDIRECT, LDLCALC, TRIG  LIVER PROFILE:  Recent Labs     11/08/21  1347   AST 15   ALT 11   LABALBU 3.7         IMPRESSION:    Patient Active Problem List   Diagnosis    AMS (altered mental status)    Diabetes ([facility]    Peritoneal dialysis status ([facility]    Hypoglycemia    Encephalopathy    Metabolic encephalopathy    Elevated troponin     - Elevated troponin in the setting of ESRD on HD- no trend to suggest ongoing injury / ischemia  - Bleeding from HD catheter  - LVH  - Preserved LVEF Echo 5/21    RECOMMENDATIONS:  - repeat trop  - stop heparin drip  - check limited echo  - if limited echo low risk, no further inpatient cardiac workup is planned, can follow up in 2 weeks for consideration for stress testing for further risk stratification. Discussed with patient and nursing. Thank you for allowing me to participate in the care of this patient, please do not hesitate to call if you have any questions. Venancio Brewer DO, [facility], [address], [address] [facility]  [email or web address]  [phone]

## 2021-11-09 NOTE — PROGRESS NOTES
Treatment time: 120 minutes    Net UF: 0 mL    Pre weight: 82.1 kg  Post weight: n/a  EDW: n/a    Access used: CVC right chest  Access function: good    Medications or blood products given: none    Regular outpatient schedule: MWF [facility]    Summary of response to treatment: Patient tolerated treatment well, new dressing applied over catheter site, report given to PATRICIA Sanchez. Copy of dialysis treatment record placed in chart, to be scanned into EMR.

## 2021-11-09 NOTE — PROGRESS NOTES
Comprehensive Nutrition Assessment    Type and Reason for Visit:  Positive Nutrition Screen (Pressure ulcer or non-healing wound)    Nutrition Recommendations/Plan:   1. Continue ADULT DIET; Regular; Low Fat/Low Chol/High Fiber/PATTI; Low Sodium (2 gm); Low Potassium (Less than 3000 mg/day); 2000 ml  2. Start Nepro 1x/day  3. Monitor p.o intakes and labs    Nutrition Assessment:  Patient admission is related to elevated troponin, AV fistula malfunction and blood loss anemia. Patient was receiving hemodialysis at time of visit. Right buttocks pressure ulcer is present. Patient reports eating 26-50% at meals. Will start Nepro 2x/day. Monitor p.o intakes and labs. Malnutrition Assessment:  Malnutrition Status: At risk for malnutrition (Comment)        Estimated Daily Nutrient Needs:  Energy (kcal):  2382-9539 kcal (16-18 kcal/kg); Weight Used for Energy Requirements:  Current     Protein (g):  63-73 gm (1.2-1.4 gm/kg); Weight Used for Protein Requirements:  Ideal              Nutrition Related Findings:  No edema. Hemodialysis. Pressure ulcer to right buttocks      Wounds:  Pressure Injury (right buttocks)       Current Nutrition Therapies:    ADULT DIET; Regular; Low Fat/Low Chol/High Fiber/PATTI; Low Sodium (2 gm); Low Potassium (Less than 3000 mg/day); 2000 ml  ADULT ORAL NUTRITION SUPPLEMENT; Breakfast; Renal Oral Supplement    Anthropometric Measures:  · Height: 5' 3\" (160 cm)  · Current Body Weight: 181 lb (82.1 kg)   · Ideal Body Weight: 115 lbs; % Ideal Body Weight 157.4 %   · BMI: 32.1  · BMI Categories: Obese Class 1 (BMI 30.0-34. 9)       Nutrition Diagnosis:   · Increased nutrient needs related to increase demand for energy/nutrients as evidenced by dialysis, wounds      Nutrition Interventions:   Food and/or Nutrient Delivery:  Continue Current Diet, Start Oral Nutrition Supplement  Nutrition Education/Counseling:  Education not indicated   Coordination of Nutrition Care:  Continue to monitor while inpatient    Goals:  PO intakes are greater than 75% at meals       Nutrition Monitoring and Evaluation:   Food/Nutrient Intake Outcomes:  Supplement Intake, Food and Nutrient Intake  Physical Signs/Symptoms Outcomes:  Biochemical Data, Fluid Status or Edema, Skin, Weight     Discharge Planning:    Continue current diet, Continue Oral Nutrition Supplement           Chuy BELLO, RDN, LDN  Lead Clinical Dietitian  RD Office Phone [phone]

## 2021-11-10 VITALS
OXYGEN SATURATION: 98 % | TEMPERATURE: 98.4 F | SYSTOLIC BLOOD PRESSURE: 120 MMHG | DIASTOLIC BLOOD PRESSURE: 68 MMHG | RESPIRATION RATE: 18 BRPM | HEART RATE: 76 BPM | BODY MASS INDEX: 31.41 KG/M2 | HEIGHT: 63 IN | WEIGHT: 177.25 LBS

## 2021-11-10 LAB
ANION GAP SERPL CALCULATED.3IONS-SCNC: 14 MMOL/L (ref 9–17)
ANTI-XA UNFRAC HEPARIN: 0.1 IU/L (ref 0.3–0.7)
ANTI-XA UNFRAC HEPARIN: <0.1 IU/L (ref 0.3–0.7)
BUN BLDV-MCNC: 21 MG/DL (ref 8–23)
BUN/CREAT BLD: 5 (ref 9–20)
CALCIUM SERPL-MCNC: 8.2 MG/DL (ref 8.6–10.4)
CHLORIDE BLD-SCNC: 104 MMOL/L (ref 98–107)
CO2: 22 MMOL/L (ref 20–31)
CREAT SERPL-MCNC: 4.43 MG/DL (ref 0.5–0.9)
GFR AFRICAN AMERICAN: 12 ML/MIN
GFR NON-AFRICAN AMERICAN: 10 ML/MIN
GFR SERPL CREATININE-BSD FRML MDRD: ABNORMAL ML/MIN/{1.73_M2}
GFR SERPL CREATININE-BSD FRML MDRD: ABNORMAL ML/MIN/{1.73_M2}
GLUCOSE BLD-MCNC: 108 MG/DL (ref 65–105)
GLUCOSE BLD-MCNC: 159 MG/DL (ref 65–105)
GLUCOSE BLD-MCNC: 164 MG/DL (ref 70–99)
HCT VFR BLD CALC: 27.7 % (ref 36.3–47.1)
HEMOGLOBIN: 8.7 G/DL (ref 11.9–15.1)
MCH RBC QN AUTO: 30.1 PG (ref 25.2–33.5)
MCHC RBC AUTO-ENTMCNC: 31.4 G/DL (ref 28.4–34.8)
MCV RBC AUTO: 95.8 FL (ref 82.6–102.9)
NRBC AUTOMATED: 0 PER 100 WBC
PDW BLD-RTO: 15.9 % (ref 11.8–14.4)
PLATELET # BLD: 162 K/UL (ref 138–453)
PMV BLD AUTO: 11.8 FL (ref 8.1–13.5)
POTASSIUM SERPL-SCNC: 3.8 MMOL/L (ref 3.7–5.3)
RBC # BLD: 2.89 M/UL (ref 3.95–5.11)
SODIUM BLD-SCNC: 140 MMOL/L (ref 135–144)
WBC # BLD: 10 K/UL (ref 3.5–11.3)

## 2021-11-10 PROCEDURE — 90935 HEMODIALYSIS ONE EVALUATION: CPT

## 2021-11-10 PROCEDURE — G0378 HOSPITAL OBSERVATION PER HR: HCPCS

## 2021-11-10 PROCEDURE — 85520 HEPARIN ASSAY: CPT

## 2021-11-10 PROCEDURE — 6360000002 HC RX W HCPCS: Performed by: FAMILY MEDICINE

## 2021-11-10 PROCEDURE — 6370000000 HC RX 637 (ALT 250 FOR IP): Performed by: FAMILY MEDICINE

## 2021-11-10 PROCEDURE — 85027 COMPLETE CBC AUTOMATED: CPT

## 2021-11-10 PROCEDURE — 6360000002 HC RX W HCPCS: Performed by: INTERNAL MEDICINE

## 2021-11-10 PROCEDURE — 82947 ASSAY GLUCOSE BLOOD QUANT: CPT

## 2021-11-10 PROCEDURE — 36415 COLL VENOUS BLD VENIPUNCTURE: CPT

## 2021-11-10 PROCEDURE — 2500000003 HC RX 250 WO HCPCS: Performed by: INTERNAL MEDICINE

## 2021-11-10 PROCEDURE — 96372 THER/PROPH/DIAG INJ SC/IM: CPT

## 2021-11-10 PROCEDURE — 80048 BASIC METABOLIC PNL TOTAL CA: CPT

## 2021-11-10 RX ORDER — CLONIDINE 0.1 MG/24H
1 PATCH, EXTENDED RELEASE TRANSDERMAL WEEKLY
COMMUNITY

## 2021-11-10 RX ORDER — INSULIN ASPART 100 [IU]/ML
INJECTION, SOLUTION INTRAVENOUS; SUBCUTANEOUS
COMMUNITY
End: 2022-01-13

## 2021-11-10 RX ORDER — SEVELAMER CARBONATE 800 MG/1
800 TABLET, FILM COATED ORAL
Qty: 90 TABLET | Refills: 3 | Status: SHIPPED | OUTPATIENT
Start: 2021-11-10

## 2021-11-10 RX ORDER — CLOPIDOGREL BISULFATE 75 MG/1
75 TABLET ORAL DAILY
COMMUNITY

## 2021-11-10 RX ORDER — AMLODIPINE BESYLATE 10 MG/1
10 TABLET ORAL DAILY
COMMUNITY
End: 2022-01-13

## 2021-11-10 RX ORDER — NIFEDIPINE 60 MG/1
60 TABLET, EXTENDED RELEASE ORAL DAILY
COMMUNITY

## 2021-11-10 RX ORDER — SPIRONOLACTONE 50 MG/1
50 TABLET, FILM COATED ORAL DAILY
COMMUNITY
End: 2022-01-13

## 2021-11-10 RX ORDER — PANTOPRAZOLE SODIUM 40 MG/1
40 TABLET, DELAYED RELEASE ORAL DAILY
COMMUNITY

## 2021-11-10 RX ORDER — HEPARIN SODIUM (PORCINE) LOCK FLUSH IV SOLN 100 UNIT/ML 100 UNIT/ML
500 SOLUTION INTRAVENOUS PRN
Status: DISCONTINUED | OUTPATIENT
Start: 2021-11-10 | End: 2021-11-10 | Stop reason: HOSPADM

## 2021-11-10 RX ORDER — ENALAPRIL MALEATE 5 MG/1
5 TABLET ORAL DAILY
COMMUNITY

## 2021-11-10 RX ORDER — METOPROLOL TARTRATE 100 MG/1
100 TABLET ORAL DAILY
COMMUNITY
End: 2022-04-20

## 2021-11-10 RX ORDER — HYDRALAZINE HYDROCHLORIDE 100 MG/1
100 TABLET, FILM COATED ORAL 3 TIMES DAILY
Status: ON HOLD | COMMUNITY
End: 2022-02-03 | Stop reason: HOSPADM

## 2021-11-10 RX ADMIN — FUROSEMIDE 80 MG: 80 TABLET ORAL at 16:25

## 2021-11-10 RX ADMIN — HYDRALAZINE HYDROCHLORIDE 100 MG: 50 TABLET, FILM COATED ORAL at 14:56

## 2021-11-10 RX ADMIN — HEPARIN 500 UNITS: 100 SYRINGE at 16:26

## 2021-11-10 RX ADMIN — SEVELAMER CARBONATE 800 MG: 800 TABLET, FILM COATED ORAL at 16:25

## 2021-11-10 RX ADMIN — SEVELAMER CARBONATE 800 MG: 800 TABLET, FILM COATED ORAL at 12:40

## 2021-11-10 RX ADMIN — PANTOPRAZOLE SODIUM 40 MG: 40 TABLET, DELAYED RELEASE ORAL at 06:08

## 2021-11-10 RX ADMIN — GABAPENTIN 300 MG: 300 CAPSULE ORAL at 08:28

## 2021-11-10 RX ADMIN — HEPARIN SODIUM 5000 UNITS: 5000 INJECTION INTRAVENOUS; SUBCUTANEOUS at 06:08

## 2021-11-10 RX ADMIN — Medication 1.6 ML: at 11:58

## 2021-11-10 RX ADMIN — Medication 1.6 ML: at 11:59

## 2021-11-10 RX ADMIN — HYDRALAZINE HYDROCHLORIDE 100 MG: 50 TABLET, FILM COATED ORAL at 06:08

## 2021-11-10 RX ADMIN — CLOPIDOGREL BISULFATE 75 MG: 75 TABLET ORAL at 08:28

## 2021-11-10 RX ADMIN — SEVELAMER CARBONATE 800 MG: 800 TABLET, FILM COATED ORAL at 08:28

## 2021-11-10 RX ADMIN — HEPARIN SODIUM 5000 UNITS: 5000 INJECTION INTRAVENOUS; SUBCUTANEOUS at 14:56

## 2021-11-10 RX ADMIN — ASPIRIN 81 MG: 81 TABLET, CHEWABLE ORAL at 08:28

## 2021-11-10 RX ADMIN — INSULIN LISPRO 2 UNITS: 100 INJECTION, SOLUTION INTRAVENOUS; SUBCUTANEOUS at 08:29

## 2021-11-10 NOTE — PROGRESS NOTES
HEMODIALYSIS POST TREATMENT NOTE    Treatment time ordered: 3 Hours    Actual treatment time: 3 Hours    UltraFiltration Goal: 1000 ml as tolerated  UltraFiltration Removed: 1000 ml      Pre Treatment weight: 81.4 kg  Post Treatment weight: 80.4 kg  Estimated Dry Weight: 78.5 kg per [facility] treatment records    Access used:     Central Venous Catheter:          Tunneled or Non-tunneled: Tunneled           Site: Right chest wall          Access Flow: Good with lines reversed      Internal Access:       AV Fistula or AV Graft:          Site:        Access Flow:        Sign and symptoms of infection:        If YES:     Medications or blood products given: none    Chronic outpatient schedule: [facility]    Chronic outpatient unit: [facility],  [address] [place]    Summary of response to treatment: Treatment ran uneventful. Patient tolerated well. No interventions for BP support required. Vitals remained stable. Patient awake and denies any needs post treatment. Explain if orders NOT met, was physician notified:      Winfield Sprinkles faxed to patient unit/ placed in patient chart: Yes    Post assessment completed: Yes    Report given to: Farheen Lan RN      * Intra-treatment documented Safety Checks include the followin) Access and face visible at all times. 2) All connections and blood lines are secure with no kinks. 3) NVL alarm engaged. 4) Hemosafe device applied (if applicable). 5) No collapse of Arterial or Venous blood chambers. 6) All blood lines / pump segments in the air detectors.

## 2021-11-10 NOTE — PROGRESS NOTES
[facility].,    Adult Hospitalist      Name: Crista Agudelo  MRN: [id number]     Acct: [de-identified]  Room: [facility]0/1010-02    Admit Date: 11/8/2021  1:24 PM  PCP: Sunita Sheppard MD    Primary Problem  Active Problems:    Elevated troponin  Resolved Problems:    * No resolved hospital problems. *        Assesment:     · Hemodialysis AV fistula malfunction  · Acute blood loss anemia  · Elevated troponin  · End-stage renal disease with hemodialysis  · Diabetes mellitus type 2  · Essential hypertension  · Mixed hyperlipidemia  · Obstructive sleep apnea  · Osteoarthritis multiple joints  · Major depressive disorder  · Anxiety disorder  · Left breast cancer  · Left ventricular hypertrophy  · CHFpEF        Plan:     · Admit to progressive  · Monitor vitals closely  · Keep SPO2 above 90%  · I's and O's  · IV Hep-Lock  · Check H&H, serial  · Twelve-lead EKG  · Serial cardiac enzymes  · Consult cardiology  · Heparin infusion DC  · Anti-Xa DC  · Consult nephrology/schedule dialysis  · Resume essential home meds  · CBC, BMP  · Discussed with RN  · Hemodialysis in a.m. · Plan discharge tomorrow  · DVT and GI prophylaxis. Chief Complaint:     Chief Complaint   Patient presents with    Other     loss of blood through dialysis cath. History of Present Illness:        Patient seen and examined at bedside  Complains of nausea  Will be given as needed IV meds  Advised up in chair  Ambulate  PT/OT  If continues to improve plan discharge in a.m. Denies chest pain, dyspnea, cough  Denies vomiting, diarrhea, abdominal pain  Denies joint swelling, headache, photophobia, rash      Initial HPI  Crista Agudelo is a 67 y.o.  female who presents with Other (loss of blood through dialysis cath.)    Patient admitted through the emergency room where she presented after receiving a hemodialysis treatment at an outpatient center.   Patient says reports disconnected from the dialysis machine early in the session and says there was moderate amount of blood loss. Patient complains of lethargy since her dialysis session. Patient says she has been getting dialysis since January and is on a 3 times a week regimen    Patient says there was a significant amount of blood loss. She says she felt dizzy after that. She continued to have dyspnea at rest and on exertion. Says she felt feverish earlier to but covered herself and felt better. Patient was found to have elevated cardiac enzymes after which cardiology were consulted. Cardiology have recommended heparin infusion which is being maintained. Patient says she underwent chemotherapy for breast cancer almost 22 years ago. She has had diabetes and is concerned about neuropathy in both her lower extremities. She says she has tried several medication which have not been very effective for her. Patient denies any chest pain, dyspnea or cough. Denies any orthopnea, nausea or vomiting. Denies any diarrhea or constipation. Denies any headache, blurred vision, photophobia, neck pain or back pain    I have personally reviewed the past medical history, past surgical history, medications, social history, and family history, and summarized in the note. Review of Systems:     All 10 point system is reviewed and negative otherwise mentioned in HPI.       Past Medical History:     Past Medical History:   Diagnosis Date    Anxiety and depression     Arthritis     [facility] [address]     left breast cancer    Diabetes mellitus ([facility]     History of blood transfusion     50 plus yrs ago with pregnancy    Hyperlipidemia     Hypertension     MRSA (methicillin resistant staph aureus) culture positive 2015    back    PAT (obstructive sleep apnea)     had UPPP  \"have not used machine in years\"    Prolonged emergence from general anesthesia     SOB (shortness of breath)     \"long distance\"        Past Surgical History:     Past Surgical History:   Procedure Laterality Date    APPENDECTOMY      BACK SURGERY      I and D lower back MRSA    BREAST REDUCTION SURGERY      HYSTERECTOMY      MASTECTOMY Left     lymph nodes removed    MASTECTOMY Left 2018    axillary mastectomy    KS EXC SKIN BENIG <5MM TRUNK,ARM,LEG Right 2018    RIGHT AXILLARY MASTECTOMY performed by Marita Roy DO at [address] [place] [address]          Medications Prior to Admission:       Prior to Admission medications    Medication Sig Start Date End Date Taking? Authorizing Provider   docusate sodium (COLACE) 100 MG capsule Take 1 capsule by mouth 2 times daily as needed for Constipation 18  Yes Luiz Robles DO   ondansetron (ZOFRAN) 4 MG tablet Take 1 tablet by mouth every 6 hours as needed for Nausea or Vomiting 18  Yes Luiz Robles,    rosuvastatin (CRESTOR) 5 MG tablet Take 5 mg by mouth daily   Yes Historical Provider, MD   chlorthalidone (HYGROTON) 25 MG tablet Take 25 mg by mouth daily   Yes Historical Provider, MD   citalopram (CELEXA) 20 MG tablet Take 40 mg by mouth daily   Yes Historical Provider, MD   aspirin 81 MG tablet Take 81 mg by mouth daily   Yes Historical Provider, MD   gabapentin (NEURONTIN) 300 MG capsule Take 1 capsule by mouth daily for 30 days. 21  Margareth Pimentel MD        Allergies:       Patient has no known allergies. Social History:     Tobacco:    reports that she has quit smoking. She has never used smokeless tobacco.  Alcohol:      reports current alcohol use. Drug Use:  reports no history of drug use. Family History:     History reviewed. No pertinent family history.       Physical Exam:     Vitals:  /62   Pulse 76   Temp 98.2 °F (36.8 °C) (Oral)   Resp 16   Ht 5' 3\" (1.6 m)   Wt 181 lb (82.1 kg)   SpO2 97%   BMI 32.06 kg/m²   Temp (24hrs), Av.5 °F (36.9 °C), Min:98.2 °F (36.8 °C), Max:98.8 °F (37.1 °C)          General appearance - alert, well appearing, and in no acute distress  Mental status - oriented to person, place, and time with normal affect  Head - normocephalic and atraumatic  Eyes - pupils equal and reactive, extraocular eye movements intact, conjunctiva clear  Ears - hearing appears to be intact  Nose - no drainage noted  Mouth - mucous membranes moist  Neck - supple, no carotid bruits, thyroid not palpable  Chest - clear to auscultation, normal effort. Right Andrea catheter  Heart - normal rate, regular rhythm, no murmur  Abdomen - soft, nontender, nondistended, bowel sounds present all four quadrants, no masses, hepatomegaly or splenomegaly  Neurological - normal speech, no focal findings or movement disorder noted, cranial nerves II through XII grossly intact  Extremities - peripheral pulses palpable, no pedal edema or calf pain with palpation. Bilateral lower extremity paresthesias  Skin - no gross lesions, rashes, or induration noted        Data:     Labs:    Hematology:  Recent Labs     11/08/21  1347   WBC 9.7   RBC 3.42*   HGB 10.2*   HCT 32.2*   MCV 94.2   MCH 29.8   MCHC 31.7   RDW 15.1*      MPV 11.3   INR 1.1     Chemistry:  Recent Labs     11/08/21  1347 11/08/21  1431 11/08/21  1515 11/08/21  2000 11/09/21  0540 11/09/21  1152     --   --   --  140  --    K 3.1*  --   --   --  3.3*  --      --   --   --  104  --    CO2 23  --   --   --  24  --    GLUCOSE 155* 155  --   --  108*  --    BUN 26*  --   --   --  31*  --    CREATININE 3.77*  --   --   --  4.56*  --    MG 1.9  --   --   --   --   --    ANIONGAP 14  --   --   --  12  --    LABGLOM 12*  --   --   --  9*  --    GFRAA 14*  --   --   --  11*  --    CALCIUM 8.3*  --   --   --  8.3*  --    PROBNP 3,989*  --   --   --   --   --    TROPHS 84*  --    < > 115* 110* 109*    < > = values in this interval not displayed.      Recent Labs     11/08/21  1347 11/09/21  0326 11/09/21  0724 11/09/21  1240 11/09/21  1623   PROT 6.8  --   --   --   --    LABALBU 3.7  --   --   --   --    AST 15  --   --   --   --    ALT 11  --   -- --   --    ALKPHOS 113*  --   --   --   --    BILITOT 0.53  --   --   --   --    POCGLU  --  99 140* 125* 199*       Lab Results   Component Value Date    INR 1.1 11/08/2021    PROTIME 14.2 11/08/2021       Lab Results   Component Value Date/Time    SPECIAL NOT REPORTED 05/13/2021 02:16 AM     Lab Results   Component Value Date/Time    CULTURE ESCHERICHIA COLI >[id number] CFU/ML (A) 05/13/2021 02:16 AM    CULTURE (A) 05/13/2021 02:16 AM     NON LACTOSE FERMENTING GRAM NEGATIVE RODS 10 to 50,000 CFU/ML Susceptibility testing not performed on low colony count organisms. Lab Results   Component Value Date    FIO2 VENOUS 05/12/2021       Radiology:    XR CHEST PORTABLE    Result Date: 11/8/2021  New right-sided central venous catheter terminates in the SVC Otherwise, no significant change demonstrating a left basilar opacity which could represent atelectasis or infection         All radiological studies reviewed                Code Status:  Prior    Electronically signed by Shweta Gandhi MD on 11/9/2021 at 8:47 PM     Copy sent to Dr. Asia Cheema MD    This note was created with the assistance of a speech-recognition program.  Although the intention is to generate a document that actually reflects the content of the visit, no guarantees can be provided that every mistake has been identified and corrected by editing. Note was updated later by me after  physical examination and  completion of the assessment.

## 2021-11-10 NOTE — PROGRESS NOTES
at an outpatient center. Patient says reports disconnected from the dialysis machine early in the session and says there was moderate amount of blood loss. Patient complains of lethargy since her dialysis session. Patient says she has been getting dialysis since January and is on a 3 times a week regimen    Patient says there was a significant amount of blood loss. She says she felt dizzy after that. She continued to have dyspnea at rest and on exertion. Says she felt feverish earlier to but covered herself and felt better. Patient was found to have elevated cardiac enzymes after which cardiology were consulted. Cardiology have recommended heparin infusion which is being maintained. Patient says she underwent chemotherapy for breast cancer almost 22 years ago. She has had diabetes and is concerned about neuropathy in both her lower extremities. She says she has tried several medication which have not been very effective for her. Patient denies any chest pain, dyspnea or cough. Denies any orthopnea, nausea or vomiting. Denies any diarrhea or constipation. Denies any headache, blurred vision, photophobia, neck pain or back pain    I have personally reviewed the past medical history, past surgical history, medications, social history, and family history, and summarized in the note. Review of Systems:     All 10 point system is reviewed and negative otherwise mentioned in HPI.       Past Medical History:     Past Medical History:   Diagnosis Date    Anxiety and depression     Arthritis     Cancer ([address]     left breast cancer    Diabetes mellitus ([facility] [address]     History of blood transfusion     50 plus yrs ago with pregnancy    Hyperlipidemia     Hypertension     MRSA (methicillin resistant staph aureus) culture positive 2015    back    PAT (obstructive sleep apnea)     had UPPP  \"have not used machine in years\"    Prolonged emergence from general anesthesia     SOB (shortness of breath) °C)          General appearance - alert, well appearing, and in no acute distress  Mental status - oriented to person, place, and time with normal affect  Head - normocephalic and atraumatic  Eyes - pupils equal and reactive, extraocular eye movements intact, conjunctiva clear  Ears - hearing appears to be intact  Nose - no drainage noted  Mouth - mucous membranes moist  Neck - supple, no carotid bruits, thyroid not palpable  Chest - clear to auscultation, normal effort. Right Andrea catheter  Heart - normal rate, regular rhythm, no murmur  Abdomen - soft, nontender, nondistended, bowel sounds present all four quadrants, no masses, hepatomegaly or splenomegaly  Neurological - normal speech, no focal findings or movement disorder noted, cranial nerves II through XII grossly intact  Extremities - peripheral pulses palpable, no pedal edema or calf pain with palpation.   Bilateral lower extremity paresthesias  Skin - no gross lesions, rashes, or induration noted        Data:     Labs:    Hematology:  Recent Labs     11/08/21  1347 11/10/21  0554   WBC 9.7 10.0   RBC 3.42* 2.89*   HGB 10.2* 8.7*   HCT 32.2* 27.7*   MCV 94.2 95.8   MCH 29.8 30.1   MCHC 31.7 31.4   RDW 15.1* 15.9*    162   MPV 11.3 11.8   INR 1.1  --      Chemistry:  Recent Labs     11/08/21  1347 11/08/21  1347 11/08/21  1431 11/08/21  1515 11/08/21 2000 11/09/21  0540 11/09/21  1152 11/10/21  0554     --   --   --   --  140  --  140   K 3.1*  --   --   --   --  3.3*  --  3.8     --   --   --   --  104  --  104   CO2 23  --   --   --   --  24  --  22   GLUCOSE 155*   < > 155  --   --  108*  --  164*   BUN 26*  --   --   --   --  31*  --  21   CREATININE 3.77*  --   --   --   --  4.56*  --  4.43*   MG 1.9  --   --   --   --   --   --   --    ANIONGAP 14  --   --   --   --  12  --  14   LABGLOM 12*  --   --   --   --  9*  --  10*   GFRAA 14*  --   --   --   --  11*  --  12*   CALCIUM 8.3*  --   --   --   --  8.3*  --  8.2*   PROBNP 3,989*  --   --   --   --   --   --   --    TROPHS 84*  --   --    < > 115* 110* 109*  --     < > = values in this interval not displayed. Recent Labs     11/08/21  1347 11/09/21  0326 11/09/21  0724 11/09/21  1240 11/09/21  1623 11/09/21  2036 11/09/21  2134 11/10/21  0721   PROT 6.8  --   --   --   --   --   --   --    LABALBU 3.7  --   --   --   --   --   --   --    AST 15  --   --   --   --   --   --   --    ALT 11  --   --   --   --   --   --   --    ALKPHOS 113*  --   --   --   --   --   --   --    BILITOT 0.53  --   --   --   --   --   --   --    POCGLU  --    < > 140* 125* 199* 194* 160* 159*    < > = values in this interval not displayed. Lab Results   Component Value Date    INR 1.1 11/08/2021    PROTIME 14.2 11/08/2021       Lab Results   Component Value Date/Time    SPECIAL NOT REPORTED 05/13/2021 02:16 AM     Lab Results   Component Value Date/Time    CULTURE ESCHERICHIA COLI >311729 CFU/ML (A) 05/13/2021 02:16 AM    CULTURE (A) 05/13/2021 02:16 AM     NON LACTOSE FERMENTING GRAM NEGATIVE RODS 10 to 50,000 CFU/ML Susceptibility testing not performed on low colony count organisms. Lab Results   Component Value Date    FIO2 VENOUS 05/12/2021       Radiology:    XR CHEST PORTABLE    Result Date: 11/8/2021  New right-sided central venous catheter terminates in the SVC Otherwise, no significant change demonstrating a left basilar opacity which could represent atelectasis or infection         All radiological studies reviewed                Code Status:  Prior    Electronically signed by Kam Izquierdo MD on 11/10/2021 at 8:53 AM     Copy sent to Dr. Angela Leonard MD    This note was created with the assistance of a speech-recognition program.  Although the intention is to generate a document that actually reflects the content of the visit, no guarantees can be provided that every mistake has been identified and corrected by editing.      Note was updated later by me after  physical examination and  completion of the assessment.

## 2021-11-10 NOTE — PROGRESS NOTES
Reason for Follow up: End-stage renal disease  Blood loss anemia  Elevated troponin  History of breast cancer s/p surgery  2 diabetes  Hypokalemia  Hypertension    HISTORY:    Patient was seen and evaluated during dialysis. She is feeling pretty good denies any headache nausea vomiting chest pain palpitation abdominal pain constipation diarrhea dysuria hematuria is tolerating dialysis well blood pressure 97 systolic dizziness reported    Review Of Systems:   Constitutional: No fever, chills, lethargy, weakness or wt loss. Cardiac:  No chest pain, dyspnea, orthopnea or PND. Pulmonary:  No cough, phlegm or wheezing. Abdomen:  No abdominal pain, nausea, vomiting or diarrhea. :   No hematuria, pyuria, dysuria or flank pain. Extremities:  No swelling or joint pains. Review Of Systems:   Constitutional: No fever, chills, lethargy, weakness or wt loss. HEENT:  No headache, nasal discharge or sore throat. Cardiac:  No chest pain, dyspnea, orthopnea or PND. Pulmonary:  No cough, phlegm or wheezing. Abdomen:  No abdominal pain, nausea, vomiting or diarrhea. Neuro:  No gross focal weakness, numbness, abnormal movements or seizure like activity. Skin:   No rashes or itching. :   No hematuria, pyuria, dysuria or flank pain. Extremities:  No swelling or joint pains. Endocrine: No polyuria, polydypsia, or thyroid problems. Hematology:    No bleeding disorders, bruising or anemia. All other ROS is negative. Unable to obtain because he is intubated and sedated.      Scheduled Meds:   amLODIPine  10 mg Oral Daily    hydrALAZINE  100 mg Oral 3 times per day    aspirin  81 mg Oral Daily    metoprolol tartrate  100 mg Oral Daily    atorvastatin  40 mg Oral Nightly    NIFEdipine  60 mg Oral Daily    cloNIDine  1 patch TransDERmal Weekly    clopidogrel  75 mg Oral Daily    enalapril  10 mg Oral Daily    pantoprazole  40 mg Oral QAM AC    furosemide  80 mg Oral BID    sevelamer  800 mg Oral TID WC    gabapentin  300 mg Oral Daily    spironolactone  25 mg Oral Daily    insulin lispro  0-12 Units SubCUTAneous TID     insulin lispro  0-6 Units SubCUTAneous Nightly    heparin (porcine)  5,000 Units SubCUTAneous 3 times per day   Continuous Infusions:   dextrose       PRN Meds:glucose, dextrose, glucagon (rDNA), dextrose, sodium citrate, sodium citrate, ondansetron    No Known Allergies    Physical Exam:  Blood pressure (!) 125/59, pulse 78, temperature 98.8 °F (37.1 °C), temperature source Oral, resp. rate 18, height 5' 3\" (1.6 m), weight 179 lb (81.2 kg), SpO2 97 %. In: 643 [I.V.:643]  Out: -   In: 643   Out: -   CONSTITUTIONAL:  awake, alert, cooperative, no apparent distress, and appears stated age  General:  Awake, alert, not in distress. Appears to be stated age. HEENT: Atraumatic, normocephalic. Anicteric sclera. Pink and moist oral mucosa. No carotid bruit. No JVD. Chest: Bilateral air entry, clear to auscultation, no wheezing, rhonchi or rales. Cardiovascular: RRR, S1S2, no murmur, rub or gallop. No lower extremity edema. Abdomen: Soft, non tender to palpation. Active bowel sounds x 4 quadrants. Musculoskeletal: Active ROM x 4 extremities. No cyanosis or clubbing. Integumentary: Pink, warm and dry. Free from rash or lesions. Skin turgor normal.  CNS: Oriented to person, place and time. Speech clear. Face symmetrical. No tremor.      Data:  CBC:   Lab Results   Component Value Date    WBC 10.0 11/10/2021    HGB 8.7 (L) 11/10/2021    HCT 27.7 (L) 11/10/2021    MCV 95.8 11/10/2021     11/10/2021     BMP:    Lab Results   Component Value Date     11/10/2021    K 3.8 11/10/2021     11/10/2021    CO2 22 11/10/2021    BUN 21 11/10/2021    CREATININE 4.43 (H) 11/10/2021    GLUCOSE 164 (H) 11/10/2021     CMP:   Lab Results   Component Value Date     11/10/2021    K 3.8 11/10/2021     11/10/2021    CO2 22 11/10/2021    BUN 21 11/10/2021    CREATININE 4.43 (H) 11/10/2021 GLUCOSE 164 (H) 11/10/2021    CALCIUM 8.2 (L) 11/10/2021    PROT 6.8 11/08/2021    LABALBU 3.7 11/08/2021    BILITOT 0.53 11/08/2021    ALKPHOS 113 (H) 11/08/2021    AST 15 11/08/2021    ALT 11 11/08/2021      Hepatic:   Lab Results   Component Value Date    AST 15 11/08/2021    ALT 11 11/08/2021    BILITOT 0.53 11/08/2021    ALKPHOS 113 (H) 11/08/2021     BNP: No results found for: BNP  Lipids: No results found for: CHOL, HDL  INR:   Lab Results   Component Value Date    INR 1.1 11/08/2021     PTH: No results found for: PTH  Phosphorus:    Lab Results   Component Value Date    PHOS 6.2 (H) 05/15/2021     Ionized Calcium: No results found for: IONCA  Magnesium:   Lab Results   Component Value Date    MG 1.9 11/08/2021     Albumin:   Lab Results   Component Value Date    LABALBU 3.7 11/08/2021     Last 3 CK, CKMB, Troponin: @LABRCNT(CKTOTAL:3,CKMB:3,TROPONINI:3)       URINE:)No results found for: Aldo Kocher    Radiology:   Reviewed. Assessment: End-stage renal disease  Hyperphosphatemia  Anemia of chronic disease  Hypertension  Type 2 diabetes         Plan: Hemodialysis orders reviewed  Patient can be discharged from renal standpoint  Avoid hypotension, nephrotoxic drugs, Lovenox, Fleets enema and IV contrast exposure. Follow up labs ordered for AM.     Please do not hesitate to call with questions. We will follow with you.       Electronically signed by Renzo John MD  on 11/10/2021 at 9:47 AM

## 2021-11-10 NOTE — PROGRESS NOTES
Transitions of Care Pharmacy Service   Medication Review    The patient's list of home medications was reviewed/updated earlier today in preparation for discharge per RN/MD request.    Source(s) of information: patient, PCP office (Dr Shivam Phillips), Surescripts refill report      Please feel free to call me with any questions about this encounter. Thank you.     Deana Albarran [facility]   Transitions of Care Pharmacy Service  Phone:  [phone]  Fax: [phone]      Electronically signed by Deana Albarran [facility] on 11/10/2021 at 6:04 PM           Medications Prior to Admission:   clopidogrel (PLAVIX) 75 MG tablet, Take 75 mg by mouth daily  enalapril (VASOTEC) 5 MG tablet, Take 5 mg by mouth daily  insulin aspart (NOVOLOG FLEXPEN) 100 UNIT/ML injection pen, Inject into the skin 3 times daily (before meals) Per sliding scale  metoprolol (LOPRESSOR) 100 MG tablet, Take 100 mg by mouth daily  NIFEdipine (PROCARDIA XL) 60 MG extended release tablet, Take 60 mg by mouth daily  pantoprazole (PROTONIX) 40 MG tablet, Take 40 mg by mouth daily  spironolactone (ALDACTONE) 50 MG tablet, Take 50 mg by mouth daily  amLODIPine (NORVASC) 10 MG tablet, Take 10 mg by mouth daily  cloNIDine (CATAPRES) 0.1 MG/24HR PTWK, Place 1 patch onto the skin once a week Indications: Sundays   hydrALAZINE (APRESOLINE) 100 MG tablet, Take 100 mg by mouth 3 times daily  ondansetron (ZOFRAN) 4 MG tablet, Take 1 tablet by mouth every 6 hours as needed for Nausea or Vomiting  rosuvastatin (CRESTOR) 5 MG tablet, Take 5 mg by mouth daily  citalopram (CELEXA) 20 MG tablet, Take 40 mg by mouth daily Takes 2 tabs (=40mg) daily

## 2021-11-10 NOTE — PLAN OF CARE
Problem: Falls - Risk of:  Goal: Will remain free from falls  Description: Will remain free from falls  11/10/2021 0826 by Yuriy Barnes RN  Outcome: Ongoing  Pt fall risk, fall band present, falling star, safety alarm activated and in use as needed. Hourly rounding performed. Pt encouraged to use call light. See Severo Jaquez fall risk assessment.

## 2021-11-10 NOTE — PLAN OF CARE
Problem: Skin Integrity:  Goal: Will show no infection signs and symptoms  Description: Will show no infection signs and symptoms  11/10/2021 0022 by Roberth Ramires RN  Outcome: Ongoing     Problem: Skin Integrity:  Goal: Absence of new skin breakdown  Description: Absence of new skin breakdown  Outcome: Ongoing     Problem: Falls - Risk of:  Goal: Will remain free from falls  Description: Will remain free from falls  11/10/2021 0022 by Roberth Ramires RN  Outcome: Ongoing     Problem: Falls - Risk of:  Goal: Absence of physical injury  Description: Absence of physical injury  Outcome: Ongoing     Problem: Pain:  Goal: Pain level will decrease  Description: Pain level will decrease  Outcome: Ongoing     Problem: Pain:  Goal: Control of acute pain  Description: Control of acute pain  Outcome: Ongoing     Problem: Pain:  Goal: Control of chronic pain  Description: Control of chronic pain  Outcome: Ongoing     Problem: Nutrition  Goal: Optimal nutrition therapy  Outcome: Ongoing

## 2021-11-10 NOTE — DISCHARGE INSTR - DIET
Good nutrition is important when healing from an illness, injury, or surgery. Follow any nutrition recommendations given to you during your hospital stay. If you were given an oral nutrition supplement while in the hospital, continue to take this supplement at home. You can take it with meals, in-between meals, and/or before bedtime. These supplements can be purchased at most local grocery stores, pharmacies, and chain [employer]stores. If you have any questions about your diet or nutrition, call the hospital and ask for the dietitian.       Renal diet, fluid restriction 2000 ml

## 2021-11-10 NOTE — PROGRESS NOTES
HEMODIALYSIS PRE-TREATMENT NOTE    Patient Identifiers prior to treatment: Name, , MRN    Isolation Required:  Yes                      Isolation Type: Contact, MRSA       (please document if patient is being managed as a PUI/COVID-19 patient)        Hepatitis status:                           Date Drawn                             Result  Hepatitis B Surface Antigen 10/25/21     Negative                     Hepatitis B Surface Antibody 10/25/21 Negative        Hepatitis B Core Antibody 10/25/21 Negative          How was Hepatitis Status verified: Outpatient records per [facility] treatment documents     Was a copy of the labs you documented provided to facility for the patient's chart: Yes    Hemodialysis orders verified: Yes    Access Within normal limits ( I.e. s/s of infection,...): Tunneled CVC to right chest wall WNL     Pre-Assessment completed: yes    Pre-dialysis report received from: Yogi Rosales RN                      Time: 830

## 2021-11-10 NOTE — PROGRESS NOTES
[facility]   Progress Note                   Date:   11/10/2021  Patient name: Daniel Monroe  Date of admission:  11/8/2021  1:24 PM  MRN:   [id number]  YOB: 1945  PCP: Candice Paulino MD    Reason for Admission:  Bleeding from dialysis site     Subjective:       Clinical Changes / Abnormalities: Patient is seen in dialysis denies any chest pain or pressures. Patient hemoglobin dropped from 10.7-8.7  Denies any shortness of breath      Medications:   Scheduled Meds:   amLODIPine  10 mg Oral Daily    hydrALAZINE  100 mg Oral 3 times per day    aspirin  81 mg Oral Daily    metoprolol tartrate  100 mg Oral Daily    atorvastatin  40 mg Oral Nightly    NIFEdipine  60 mg Oral Daily    cloNIDine  1 patch TransDERmal Weekly    clopidogrel  75 mg Oral Daily    enalapril  10 mg Oral Daily    pantoprazole  40 mg Oral QAM AC    furosemide  80 mg Oral BID    sevelamer  800 mg Oral TID WC    gabapentin  300 mg Oral Daily    spironolactone  25 mg Oral Daily    insulin lispro  0-12 Units SubCUTAneous TID WC    insulin lispro  0-6 Units SubCUTAneous Nightly    heparin (porcine)  5,000 Units SubCUTAneous 3 times per day     Continuous Infusions:   dextrose       CBC:   Recent Labs     11/08/21  1347 11/10/21  0554   WBC 9.7 10.0   HGB 10.2* 8.7*    162     BMP:    Recent Labs     11/08/21  1347 11/08/21  1347 11/08/21  1431 11/09/21  0540 11/10/21  0554     --   --  140 140   K 3.1*  --   --  3.3* 3.8     --   --  104 104   CO2 23  --   --  24 22   BUN 26*  --   --  31* 21   CREATININE 3.77*  --   --  4.56* 4.43*   GLUCOSE 155*   < > 155 108* 164*    < > = values in this interval not displayed. Hepatic:   Recent Labs     11/08/21  1347   AST 15   ALT 11   BILITOT 0.53   ALKPHOS 113*     Troponin: No results for input(s): TROPONINI in the last 72 hours. BNP: No results for input(s): BNP in the last 72 hours.   Lipids: No results for input(s): CHOL, HDL in the last 72 hours. Invalid input(s): LDLCALCU  INR:   Recent Labs     21  1347   INR 1.1       Objective:   Vitals: BP (!) 125/59   Pulse 78   Temp 98.8 °F (37.1 °C) (Oral)   Resp 18   Ht 5' 3\" (1.6 m)   Wt 179 lb (81.2 kg)   SpO2 97%   BMI 31.71 kg/m²   I/O last 3 completed shifts: In: 643 [I.V.:643]  Out: -   No intake/output data recorded.   Scheduled Meds:   amLODIPine  10 mg Oral Daily    hydrALAZINE  100 mg Oral 3 times per day    aspirin  81 mg Oral Daily    metoprolol tartrate  100 mg Oral Daily    atorvastatin  40 mg Oral Nightly    NIFEdipine  60 mg Oral Daily    cloNIDine  1 patch TransDERmal Weekly    clopidogrel  75 mg Oral Daily    enalapril  10 mg Oral Daily    pantoprazole  40 mg Oral QAM AC    furosemide  80 mg Oral BID    sevelamer  800 mg Oral TID WC    gabapentin  300 mg Oral Daily    spironolactone  25 mg Oral Daily    insulin lispro  0-12 Units SubCUTAneous TID WC    insulin lispro  0-6 Units SubCUTAneous Nightly    heparin (porcine)  5,000 Units SubCUTAneous 3 times per day     Continuous Infusions:   dextrose       PRN Meds:.glucose, dextrose, glucagon (rDNA), dextrose, sodium citrate, sodium citrate, ondansetron    CONSTITUTIONAL: AOx4, no apparent distress, appears stated age   HEAD: normocephalic, atraumatic   EYES: PERRLA, EOMI   ENT: moist mucous membranes, uvula midline   NECK: symmetric, no midline tenderness to palpation   LUNGS: clear to auscultation bilaterally   CARDIOVASCULAR: regular rate and rhythm, no murmurs, rubs or gallops   ABDOMEN: Soft, non-tender, non-distended with normal active bowel sounds   SKIN: no rash       EK21   Normal sinus rhythm  Left axis deviation  Minimal voltage criteria for LVH, may be normal variant  Anteroseptal infarct , age undetermined  T wave abnormality, consider lateral ischemia  Abnormal ECG    ECHO:21      Limited echo  Left ventricle cavity is small and underfilled  Mild to moderate left ventricular hypertrophy. Global left ventricular systolic function is normal with an estimated  ejection fraction of 60 % . No obvious wall motion abnormality seen. Normal right ventricular size and function. Trivial pericardial effusion posterior to left ventricle               Assessment / Acute Cardiac Problems:       Patient Active Problem List:     AMS (altered mental status)     Diabetes ([facility] [address]     Peritoneal dialysis status ([facility] [address]     Hypoglycemia     Encephalopathy     Metabolic encephalopathy     Elevated troponin      Plan of Treatment:   Patient came after bleeding from dialysis site  H&H are still going on the lower side. As patient is getting dialysis can be replaced with blood transfusions  Tropes are going also lower trend.   No chest pain or pressures  Echocardiogram normal ejection fraction of 60%  Given the patient age and risk factor high probability of underlying CAD  Patient can be benefited for ischemia work-up as an outpatient's  Please call if needed    Leon Delacruz MD, Jody Carrizales [facility]  [phone]

## 2021-11-11 LAB — GLUCOSE BLD-MCNC: 125 MG/DL (ref 65–105)

## 2021-11-29 NOTE — DISCHARGE SUMMARY
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 She says she has tried several medication which have not been very effective for her.       Patient denies any chest pain, dyspnea or cough. Denies any orthopnea, nausea or vomiting. Denies any diarrhea or constipation. Denies any headache, blurred vision, photophobia, neck pain or back pain    Serial H&H were conducted. Cardiac enzymes were checked. Cardiology were consulted. Heparin infusion was discontinued per cardiology. Nephrology were consulted who scheduled a dialysis treatment next day. Patient tolerated the procedure well. She was initially dizzy but slowly improved. There were questions about accuracy of medication for the patient. Pharmacy were consulted who verified them and an updated list was provided for the patient.     I have personally reviewed the past medical history, past surgical history, medications, social history, and family history, and summarized in the note. The plan was discussed in detail with patient who agreed with the plan and verbalized understanding . The patient was seen and examined on day of discharge and this discharge summary is in conjunction with any daily progress note from day of discharge. Hospital Data:    Labs:    Hematology:No results for input(s): WBC, RBC, HGB, HCT, MCV, MCH, MCHC, RDW, PLT, MPV, SEDRATE, CRP, INR, DDIMER, GM1YATIB, LABABSO in the last 72 hours. Invalid input(s): PT  Chemistry:No results for input(s): NA, K, CL, CO2, GLUCOSE, BUN, CREATININE, MG, ANIONGAP, LABGLOM, GFRAA, CALCIUM, CAION, PHOS, PSA, PROBNP, TROPHS, CKTOTAL, CKMB, CKMBINDEX, MYOGLOBIN, DIGOXIN, LACTACIDWB in the last 72 hours. No results for input(s): PROT, LABALBU, LABA1C, S7KTAGW, M4NTTVC, FT4, TSH, AST, ALT, LDH, GGT, ALKPHOS, LABGGT, BILITOT, BILIDIR, AMMONIA, AMYLASE, LIPASE, LACTATE, CHOL, HDL, LDLCHOLESTEROL, CHOLHDLRATIO, TRIG, VLDL, DMV52XZ, PHENYTOIN, PHENYF, URICACID, POCGLU in the last 72 hours.   Lab Results   Component Value Date    INR 1.1 11/08/2021    PROTIME 14.2 11/08/2021     Lab Results   Component Value Date/Time    SPECIAL NOT REPORTED 05/13/2021 02:16 AM     Lab Results   Component Value Date/Time    CULTURE ESCHERICHIA COLI >259749 CFU/ML (A) 05/13/2021 02:16 AM    CULTURE (A) 05/13/2021 02:16 AM     NON LACTOSE FERMENTING GRAM NEGATIVE RODS 10 to 50,000 CFU/ML Susceptibility testing not performed on low colony count organisms. Lab Results   Component Value Date    FIO2 VENOUS 05/12/2021       Radiology:    No results found. All radiological studies reviewed      Reviews of Symptoms:    A 10 point system is reviewed and  negative except described in hospital course    Physical Exam:    Vitals:  /68   Pulse 76   Temp 98.4 °F (36.9 °C) (Oral)   Resp 18   Ht 5' 3\" (1.6 m)   Wt 177 lb 4 oz (80.4 kg)   SpO2 98%   BMI 31.40 kg/m²   No data recorded.       General appearance - alert, well appearing, and in no acute distress  Mental status - oriented to person, place, and time with normal affect  Head - normocephalic and atraumatic  Eyes - pupils equal and reactive, extraocular eye movements intact, conjunctiva clear  Ears - hearing appears to be intact  Nose - no drainage noted  Mouth - mucous membranes moist  Neck - supple, no carotid bruits, thyroid not palpable  Chest - clear to auscultation, normal effort  Heart - normal rate, regular rhythm, no murmur  Abdomen - soft, nontender, nondistended, bowel sounds present all four quadrants, no masses, hepatomegaly or splenomegaly  Neurological - normal speech, no focal findings or movement disorder noted, cranial nerves II through XII grossly intact  Extremities - peripheral pulses palpable, no pedal edema or calf pain with palpation  Skin - no gross lesions, rashes, or induration noted      Consults:  IP CONSULT TO NEPHROLOGY  IP CONSULT TO CARDIOLOGY  IP CONSULT TO CARDIOLOGY  IP CONSULT TO INTERNAL MEDICINE    Disposition: Home    Discharged Condition: Stable    Follow Up: Jayda Lynne, DO  [address]  [address] [facility]    Call in 2 weeks  possible stress test     Jody Claros 53 [address] [facility], [facility] [address]  P: [phone]  F: [address], [place], [facility]  P: [facility] [address] [facility] [address], [facility] [address]  [phone]    In 1 week              Diet: No diet orders on file    Discharge Medications:   @DISCHARGEMEDSLIST(<NOROUTINE> error)@    Code Status:  Prior    Time Spent on discharge is  35 mins in patient examination, evaluation, counseling as well as medication reconciliation, prescriptions for required medications, discharge plan and follow up. Electronically signed by Bam Acevedo MD on 11/28/2021 at 9:20 PM     Thank you Dr. Feliz Velasquez MD for the opportunity to be involved in this patient's care. This note was created with the assistance of a speech-recognition program.  Although the intention is to generate a document that actually reflects the content of the visit, no guarantees can be provided that every mistake has been identified and corrected by editing. Note was updated later by me after  physical examination and  completion of the assessment.

## 2021-12-08 PROBLEM — R77.8 ELEVATED TROPONIN: Status: RESOLVED | Noted: 2021-11-08 | Resolved: 2021-12-08

## 2021-12-08 PROBLEM — R79.89 ELEVATED TROPONIN: Status: RESOLVED | Noted: 2021-11-08 | Resolved: 2021-12-08

## 2022-01-13 ENCOUNTER — HOSPITAL ENCOUNTER (OUTPATIENT)
Dept: PREADMISSION TESTING | Age: 77
Discharge: HOME | End: 2022-01-17
Payer: MEDICARE

## 2022-01-13 VITALS
WEIGHT: 174.16 LBS | OXYGEN SATURATION: 99 % | TEMPERATURE: 98.2 F | DIASTOLIC BLOOD PRESSURE: 83 MMHG | SYSTOLIC BLOOD PRESSURE: 169 MMHG | RESPIRATION RATE: 16 BRPM | HEART RATE: 80 BPM | HEIGHT: 63 IN | BODY MASS INDEX: 30.86 KG/M2

## 2022-01-13 LAB
ANION GAP SERPL CALCULATED.3IONS-SCNC: 16 MMOL/L (ref 9–17)
BUN BLDV-MCNC: 48 MG/DL (ref 8–23)
BUN/CREAT BLD: 9 (ref 9–20)
CALCIUM SERPL-MCNC: 9 MG/DL (ref 8.6–10.4)
CHLORIDE BLD-SCNC: 104 MMOL/L (ref 98–107)
CO2: 21 MMOL/L (ref 20–31)
CREAT SERPL-MCNC: 5.13 MG/DL (ref 0.5–0.9)
GFR AFRICAN AMERICAN: 10 ML/MIN
GFR NON-AFRICAN AMERICAN: 8 ML/MIN
GFR SERPL CREATININE-BSD FRML MDRD: ABNORMAL ML/MIN/{1.73_M2}
GFR SERPL CREATININE-BSD FRML MDRD: ABNORMAL ML/MIN/{1.73_M2}
GLUCOSE BLD-MCNC: 138 MG/DL (ref 70–99)
HCT VFR BLD CALC: 33.7 % (ref 36.3–47.1)
HEMOGLOBIN: 10.3 G/DL (ref 11.9–15.1)
MCH RBC QN AUTO: 28.6 PG (ref 25.2–33.5)
MCHC RBC AUTO-ENTMCNC: 30.6 G/DL (ref 28.4–34.8)
MCV RBC AUTO: 93.6 FL (ref 82.6–102.9)
NRBC AUTOMATED: 0 PER 100 WBC
PDW BLD-RTO: 14.1 % (ref 11.8–14.4)
PLATELET # BLD: 245 K/UL (ref 138–453)
PMV BLD AUTO: 11.9 FL (ref 8.1–13.5)
POTASSIUM SERPL-SCNC: 3.9 MMOL/L (ref 3.7–5.3)
RBC # BLD: 3.6 M/UL (ref 3.95–5.11)
SODIUM BLD-SCNC: 141 MMOL/L (ref 135–144)
WBC # BLD: 7.5 K/UL (ref 3.5–11.3)

## 2022-01-13 PROCEDURE — 80048 BASIC METABOLIC PNL TOTAL CA: CPT

## 2022-01-13 PROCEDURE — 83036 HEMOGLOBIN GLYCOSYLATED A1C: CPT

## 2022-01-13 PROCEDURE — 85027 COMPLETE CBC AUTOMATED: CPT

## 2022-01-13 PROCEDURE — 36415 COLL VENOUS BLD VENIPUNCTURE: CPT

## 2022-01-13 RX ORDER — CALCITRIOL 0.25 UG/1
0.25 CAPSULE, LIQUID FILLED ORAL DAILY
Status: ON HOLD | COMMUNITY
End: 2022-02-04 | Stop reason: ALTCHOICE

## 2022-01-13 NOTE — PRE-PROCEDURE INSTRUCTIONS
[address] [place] [address] Tuesday, Jan 18,2022 at 12:30 PM    Once you enter the hospital lobby, take the elevators to the second floor. Check-In is at the surgery registration desk. Continue to take your home medications as you normally do up to and including the night before surgery with the exception of any blood thinning medications. Please stop any blood thinning medications as directed by your surgeon or prescribing physician. Failure to stop certain medications may interfere with your scheduled surgery. These may include:  Aspirin, Warfarin (Coumadin), Clopidogrel (Plavix), Ibuprofen (Motrin, Advil), Naproxen (Aleve), Meloxicam (Mobic), Celecoxib (Celebrex), Eliquis, Pradaxa, Xarelto, Effient, Fish Oil, Herbal supplements. Check with , Doctor who ordered Clopidogrel(plavix), when it should be stopped before surgery. Please take the following medication(s) the day of surgery with a small sip of water:  Enalapril,hydralizine,metoprolol,nifedipine,pantoprazole    PREPARING FOR YOUR SURGERY:     Before surgery, you can play an important role in your own health. Because skin is not sterile, we need to be sure that your skin is as free of germs as possible before surgery by carefully washing before surgery. Preparing or prepping skin before surgery can reduce the risk of a surgical site infection.   Do not shave the area of your body where your surgery will be performed unless you received specific permission from your physician. You will need to shower at home the night before surgery and the morning of surgery with a special soap called chlorhexidine gluconate (CHG*). *Not to be used by people allergic to Chlorhexidine Gluconate (CHG). Following these instructions will help you be sure that your skin is clean before surgery. Instructions on cleaning your skin before surgery:      The night before your surgery:      You will need to shower with warm water (not hot) and the CHG soap.  Use a clean wash cloth and a clean towel. Have clean clothes available to put on after the shower.   First wash your hair with regular shampoo. Rinse your hair and body thoroughly to remove the shampoo.  Wash your face and genital area (private parts) with your regular soap or water only. Thoroughly rinse your body with warm water from the neck down.  Turn water off to prevent rinsing the soap off too soon.  With a clean wet washcloth and half of the CHG soap in the bottle, lather your entire body from the neck down. Do not use CHG soap near your eyes or ears to avoid injury to those areas.  Wash thoroughly, paying special attention to the area where your surgery will be performed.  Wash your body gently for five (5) minutes. Avoid scrubbing your skin too hard.  Turn the water back on and rinse your body thoroughly.  Pat yourself dry with a clean, soft towel. Do not apply lotion, cream or powder.  Dress with clean freshly washed clothes. The morning of surgery:     Repeat shower following steps above - using remaining half of CHG soap in bottle. Patient Instructions:    [place] If you are having any type of anesthesia you are to have nothing to eat or drink after midnight the night before your surgery. This includes gum, hard candy, mints, water or smoking or chewing tobacco.  The only exception to this is a small sip of water to take with any morning dose of heart, blood pressure, or seizure medications. No alcoholic beverages for 24 hours prior to surgery.  Brush your teeth but do not swallow water.  Bring your eyeglasses and case with you. No contacts are to be worn the day of surgery. You also may bring your hearing aids. Most surgical procedures involving anesthesia will require that you remove your dentures prior to surgery. · Do not wear any jewelry or body piercings day of surgery.   Also, NO lotion, perfume or deodorant to be used the day of surgery. No nail polish on the operative extremity (arm/leg surgeries)     Please wear loose, comfortable clothing. If you are potentially going to have a cast or brace bring clothing that will fit over them.  In case of illness - If you have cold or flu like symptoms (high fever, runny nose, sore throat, cough, etc.) rash, nausea, vomiting, loose stools, and/or recent contact with someone who has a contagious disease (chicken pox, measles, etc.) Please call your doctor before coming to the hospital.         Day of Surgery/Procedure:    As a patient at [facility] - INPATIENT you can expect quality medical and nursing care that is centered on your individual needs. Our goal is to make your surgical experience as comfortable as possible    . Transportation After Your Surgery/Procedure: You will need a friend or family member to drive you home after your procedure. Your  must be 25years of age or older and able to sign off on your discharge instructions. A taxi cab or any other form of public transportation is not acceptable. Your friend or family member must stay at the hospital throughout your procedure. Someone must remain with you for the first 24 hours after your surgery if you receive anesthesia or medication. If you do not have someone to stay with you, your procedure may be cancelled.       If you have any other questions regarding your procedure or the day of surgery, please call [phone]      _________________________  ____________________________  Signature (Patient)              Signature (Provider) & date

## 2022-01-14 LAB
ESTIMATED AVERAGE GLUCOSE: 126 MG/DL
HBA1C MFR BLD: 6 % (ref 4–6)

## 2022-01-28 ENCOUNTER — HOSPITAL ENCOUNTER (INPATIENT)
Age: 77
LOS: 7 days | Discharge: NURSING, REHAB OR LONG-TERM CARE | DRG: 871 | End: 2022-02-04
Attending: EMERGENCY MEDICINE | Admitting: FAMILY MEDICINE
Payer: MEDICARE

## 2022-01-28 ENCOUNTER — APPOINTMENT (OUTPATIENT)
Dept: GENERAL RADIOLOGY | Age: 77
DRG: 871 | End: 2022-01-28
Payer: MEDICARE

## 2022-01-28 DIAGNOSIS — Z99.2 ADMISSION FOR DIALYSIS (HCC): Primary | ICD-10-CM

## 2022-01-28 DIAGNOSIS — U07.1 COVID: ICD-10-CM

## 2022-01-28 DIAGNOSIS — A41.9 SEPSIS WITHOUT ACUTE ORGAN DYSFUNCTION, DUE TO UNSPECIFIED ORGANISM (HCC): ICD-10-CM

## 2022-01-28 PROBLEM — N18.6 END STAGE RENAL FAILURE ON DIALYSIS (HCC): Status: ACTIVE | Noted: 2022-01-28

## 2022-01-28 LAB
-: ABNORMAL
-: NORMAL
ABSOLUTE EOS #: 0.05 K/UL (ref 0–0.44)
ABSOLUTE IMMATURE GRANULOCYTE: 0.03 K/UL (ref 0–0.3)
ABSOLUTE LYMPH #: 1.01 K/UL (ref 1.1–3.7)
ABSOLUTE MONO #: 0.79 K/UL (ref 0.1–1.2)
ALBUMIN SERPL-MCNC: 4.2 G/DL (ref 3.5–5.2)
ALBUMIN/GLOBULIN RATIO: 1.1 (ref 1–2.5)
ALLEN TEST: ABNORMAL
ALLEN TEST: ABNORMAL
ALP BLD-CCNC: 136 U/L (ref 35–104)
ALT SERPL-CCNC: 14 U/L (ref 5–33)
AMORPHOUS: ABNORMAL
ANION GAP SERPL CALCULATED.3IONS-SCNC: 19 MMOL/L (ref 9–17)
ANION GAP: 11 MMOL/L (ref 7–16)
AST SERPL-CCNC: 22 U/L
BACTERIA: ABNORMAL
BASOPHILS # BLD: 0 % (ref 0–2)
BASOPHILS ABSOLUTE: <0.03 K/UL (ref 0–0.2)
BILIRUB SERPL-MCNC: 0.49 MG/DL (ref 0.3–1.2)
BILIRUBIN URINE: NEGATIVE
BUN BLDV-MCNC: 55 MG/DL (ref 8–23)
BUN/CREAT BLD: ABNORMAL (ref 9–20)
CALCIUM SERPL-MCNC: 8.8 MG/DL (ref 8.6–10.4)
CARBOXYHEMOGLOBIN: 3.2 % (ref 0–5)
CASTS UA: ABNORMAL /LPF (ref 0–2)
CHLORIDE BLD-SCNC: 105 MMOL/L (ref 98–107)
CO2: 16 MMOL/L (ref 20–31)
COLOR: YELLOW
CREAT SERPL-MCNC: 5.14 MG/DL (ref 0.5–0.9)
CRYSTALS, UA: ABNORMAL /HPF
DIFFERENTIAL TYPE: ABNORMAL
EOSINOPHILS RELATIVE PERCENT: 1 % (ref 1–4)
EPITHELIAL CELLS UA: ABNORMAL /HPF (ref 0–5)
FIO2: ABNORMAL
FIO2: ABNORMAL
GFR AFRICAN AMERICAN: 10 ML/MIN
GFR NON-AFRICAN AMERICAN: 6 ML/MIN
GFR NON-AFRICAN AMERICAN: 8 ML/MIN
GFR SERPL CREATININE-BSD FRML MDRD: 8 ML/MIN
GFR SERPL CREATININE-BSD FRML MDRD: ABNORMAL ML/MIN/{1.73_M2}
GLUCOSE BLD-MCNC: 105 MG/DL (ref 70–99)
GLUCOSE BLD-MCNC: 109 MG/DL (ref 74–100)
GLUCOSE URINE: NEGATIVE
HCO3 VENOUS: 17.9 MMOL/L (ref 24–30)
HCO3 VENOUS: 20.2 MMOL/L (ref 22–29)
HCT VFR BLD CALC: 37.5 % (ref 36.3–47.1)
HEMOGLOBIN: 11.5 G/DL (ref 11.9–15.1)
IMMATURE GRANULOCYTES: 0 %
INR BLD: 1
KETONES, URINE: NEGATIVE
LACTIC ACID, SEPSIS WHOLE BLOOD: 0.9 MMOL/L (ref 0.5–1.9)
LACTIC ACID, SEPSIS WHOLE BLOOD: 1.5 MMOL/L (ref 0.5–1.9)
LACTIC ACID, SEPSIS: NORMAL MMOL/L (ref 0.5–1.9)
LACTIC ACID, SEPSIS: NORMAL MMOL/L (ref 0.5–1.9)
LACTIC ACID, WHOLE BLOOD: 1.5 MMOL/L (ref 0.7–2.1)
LACTIC ACID: NORMAL MMOL/L
LEUKOCYTE ESTERASE, URINE: ABNORMAL
LIPASE: 59 U/L (ref 13–60)
LYMPHOCYTES # BLD: 11 % (ref 24–43)
MCH RBC QN AUTO: 29.1 PG (ref 25.2–33.5)
MCHC RBC AUTO-ENTMCNC: 30.7 G/DL (ref 28.4–34.8)
MCV RBC AUTO: 94.9 FL (ref 82.6–102.9)
METHEMOGLOBIN: ABNORMAL % (ref 0–1.5)
MODE: ABNORMAL
MODE: ABNORMAL
MONOCYTES # BLD: 8 % (ref 3–12)
MUCUS: ABNORMAL
NEGATIVE BASE EXCESS, VEN: 5 (ref 0–2)
NEGATIVE BASE EXCESS, VEN: 7.7 MMOL/L (ref 0–2)
NITRITE, URINE: NEGATIVE
NOTIFICATION TIME: ABNORMAL
NOTIFICATION: ABNORMAL
NRBC AUTOMATED: 0 PER 100 WBC
O2 DEVICE/FLOW/%: ABNORMAL
O2 DEVICE/FLOW/%: ABNORMAL
O2 SAT, VEN: 34 % (ref 60–85)
O2 SAT, VEN: 82.1 % (ref 60–85)
OTHER OBSERVATIONS UA: ABNORMAL
OXYHEMOGLOBIN: ABNORMAL % (ref 95–98)
PARTIAL THROMBOPLASTIN TIME: 23.7 SEC (ref 20.5–30.5)
PATIENT TEMP: 37
PATIENT TEMP: ABNORMAL
PCO2, VEN, TEMP ADJ: ABNORMAL MMHG (ref 39–55)
PCO2, VEN: 37.9 MM HG (ref 41–51)
PCO2, VEN: 39.1 (ref 39–55)
PDW BLD-RTO: 13.3 % (ref 11.8–14.4)
PEEP/CPAP: ABNORMAL
PH UA: 6.5 (ref 5–8)
PH VENOUS: 7.28 (ref 7.32–7.42)
PH VENOUS: 7.33 (ref 7.32–7.43)
PH, VEN, TEMP ADJ: ABNORMAL (ref 7.32–7.42)
PLATELET # BLD: ABNORMAL K/UL (ref 138–453)
PLATELET ESTIMATE: ABNORMAL
PLATELET, FLUORESCENCE: NORMAL K/UL (ref 138–453)
PLATELET, IMMATURE FRACTION: NORMAL % (ref 1.1–10.3)
PMV BLD AUTO: ABNORMAL FL (ref 8.1–13.5)
PO2, VEN, TEMP ADJ: ABNORMAL MMHG (ref 30–50)
PO2, VEN: 21.8 MM HG (ref 30–50)
PO2, VEN: 52.4 (ref 30–50)
POC BUN: 52 MG/DL (ref 8–26)
POC CHLORIDE: 114 MMOL/L (ref 98–107)
POC CREATININE: 6.33 MG/DL (ref 0.51–1.19)
POC HEMATOCRIT: 40 % (ref 36–46)
POC HEMOGLOBIN: 13.6 G/DL (ref 12–16)
POC IONIZED CALCIUM: 1.11 MMOL/L (ref 1.15–1.33)
POC LACTIC ACID: 1.24 MMOL/L (ref 0.56–1.39)
POC PCO2 TEMP: ABNORMAL MM HG
POC PH TEMP: ABNORMAL
POC PO2 TEMP: ABNORMAL MM HG
POC POTASSIUM: 3.7 MMOL/L (ref 3.5–4.5)
POC SODIUM: 145 MMOL/L (ref 138–146)
POC TCO2: 21 MMOL/L (ref 22–30)
POSITIVE BASE EXCESS, VEN: ABNORMAL (ref 0–3)
POSITIVE BASE EXCESS, VEN: ABNORMAL MMOL/L (ref 0–2)
POTASSIUM SERPL-SCNC: 3.6 MMOL/L (ref 3.7–5.3)
PROCALCITONIN: 0.56 NG/ML
PROTEIN UA: ABNORMAL
PROTHROMBIN TIME: 10.5 SEC (ref 9.1–12.3)
PSV: ABNORMAL
PT. POSITION: ABNORMAL
RBC # BLD: 3.95 M/UL (ref 3.95–5.11)
RBC # BLD: ABNORMAL 10*6/UL
RBC UA: ABNORMAL /HPF (ref 0–2)
REASON FOR REJECTION: NORMAL
RENAL EPITHELIAL, UA: ABNORMAL /HPF
RESPIRATORY RATE: ABNORMAL
SAMPLE SITE: ABNORMAL
SAMPLE SITE: ABNORMAL
SARS-COV-2, RAPID: DETECTED
SEG NEUTROPHILS: 80 % (ref 36–65)
SEGMENTED NEUTROPHILS ABSOLUTE COUNT: 7.53 K/UL (ref 1.5–8.1)
SET RATE: ABNORMAL
SODIUM BLD-SCNC: 140 MMOL/L (ref 135–144)
SPECIFIC GRAVITY UA: 1.02 (ref 1–1.03)
SPECIMEN DESCRIPTION: ABNORMAL
TEXT FOR RESPIRATORY: ABNORMAL
TOTAL CO2, VENOUS: ABNORMAL MMOL/L (ref 23–30)
TOTAL HB: ABNORMAL G/DL (ref 12–16)
TOTAL PROTEIN: 8.1 G/DL (ref 6.4–8.3)
TOTAL RATE: ABNORMAL
TRICHOMONAS: ABNORMAL
TURBIDITY: CLEAR
URINE HGB: ABNORMAL
UROBILINOGEN, URINE: NORMAL
VT: ABNORMAL
WBC # BLD: 9.4 K/UL (ref 3.5–11.3)
WBC # BLD: ABNORMAL 10*3/UL
WBC UA: ABNORMAL /HPF (ref 0–5)
YEAST: ABNORMAL
ZZ NTE CLEAN UP: ORDERED TEST: NORMAL
ZZ NTE WITH NAME CLEAN UP: SPECIMEN SOURCE: NORMAL

## 2022-01-28 PROCEDURE — 85730 THROMBOPLASTIN TIME PARTIAL: CPT

## 2022-01-28 PROCEDURE — 85055 RETICULATED PLATELET ASSAY: CPT

## 2022-01-28 PROCEDURE — 82805 BLOOD GASES W/O2 SATURATION: CPT

## 2022-01-28 PROCEDURE — 82330 ASSAY OF CALCIUM: CPT

## 2022-01-28 PROCEDURE — 93005 ELECTROCARDIOGRAM TRACING: CPT | Performed by: STUDENT IN AN ORGANIZED HEALTH CARE EDUCATION/TRAINING PROGRAM

## 2022-01-28 PROCEDURE — 81001 URINALYSIS AUTO W/SCOPE: CPT

## 2022-01-28 PROCEDURE — 82803 BLOOD GASES ANY COMBINATION: CPT

## 2022-01-28 PROCEDURE — 83690 ASSAY OF LIPASE: CPT

## 2022-01-28 PROCEDURE — 82565 ASSAY OF CREATININE: CPT

## 2022-01-28 PROCEDURE — 80053 COMPREHEN METABOLIC PANEL: CPT

## 2022-01-28 PROCEDURE — 87635 SARS-COV-2 COVID-19 AMP PRB: CPT

## 2022-01-28 PROCEDURE — 99285 EMERGENCY DEPT VISIT HI MDM: CPT

## 2022-01-28 PROCEDURE — 83605 ASSAY OF LACTIC ACID: CPT

## 2022-01-28 PROCEDURE — 6370000000 HC RX 637 (ALT 250 FOR IP): Performed by: STUDENT IN AN ORGANIZED HEALTH CARE EDUCATION/TRAINING PROGRAM

## 2022-01-28 PROCEDURE — 87040 BLOOD CULTURE FOR BACTERIA: CPT

## 2022-01-28 PROCEDURE — 71045 X-RAY EXAM CHEST 1 VIEW: CPT

## 2022-01-28 PROCEDURE — 36415 COLL VENOUS BLD VENIPUNCTURE: CPT

## 2022-01-28 PROCEDURE — 84145 PROCALCITONIN (PCT): CPT

## 2022-01-28 PROCEDURE — 87086 URINE CULTURE/COLONY COUNT: CPT

## 2022-01-28 PROCEDURE — 82947 ASSAY GLUCOSE BLOOD QUANT: CPT

## 2022-01-28 PROCEDURE — 80051 ELECTROLYTE PANEL: CPT

## 2022-01-28 PROCEDURE — 1200000000 HC SEMI PRIVATE

## 2022-01-28 PROCEDURE — 84520 ASSAY OF UREA NITROGEN: CPT

## 2022-01-28 PROCEDURE — 6360000002 HC RX W HCPCS: Performed by: STUDENT IN AN ORGANIZED HEALTH CARE EDUCATION/TRAINING PROGRAM

## 2022-01-28 PROCEDURE — 85025 COMPLETE CBC W/AUTO DIFF WBC: CPT

## 2022-01-28 PROCEDURE — 96374 THER/PROPH/DIAG INJ IV PUSH: CPT

## 2022-01-28 PROCEDURE — 85014 HEMATOCRIT: CPT

## 2022-01-28 PROCEDURE — 2580000003 HC RX 258: Performed by: STUDENT IN AN ORGANIZED HEALTH CARE EDUCATION/TRAINING PROGRAM

## 2022-01-28 PROCEDURE — XW033H5 INTRODUCTION OF TOCILIZUMAB INTO PERIPHERAL VEIN, PERCUTANEOUS APPROACH, NEW TECHNOLOGY GROUP 5: ICD-10-PCS | Performed by: FAMILY MEDICINE

## 2022-01-28 PROCEDURE — 85610 PROTHROMBIN TIME: CPT

## 2022-01-28 RX ORDER — METOPROLOL TARTRATE 50 MG/1
100 TABLET, FILM COATED ORAL ONCE
Status: COMPLETED | OUTPATIENT
Start: 2022-01-28 | End: 2022-01-28

## 2022-01-28 RX ORDER — NITROGLYCERIN 20 MG/100ML
5-200 INJECTION INTRAVENOUS CONTINUOUS
Status: DISCONTINUED | OUTPATIENT
Start: 2022-01-28 | End: 2022-01-28

## 2022-01-28 RX ORDER — HEPARIN SODIUM (PORCINE) LOCK FLUSH IV SOLN 100 UNIT/ML 100 UNIT/ML
300 SOLUTION INTRAVENOUS ONCE
Status: DISCONTINUED | OUTPATIENT
Start: 2022-01-28 | End: 2022-01-28

## 2022-01-28 RX ORDER — NIFEDIPINE 30 MG/1
60 TABLET, EXTENDED RELEASE ORAL DAILY
Status: DISCONTINUED | OUTPATIENT
Start: 2022-01-29 | End: 2022-02-04 | Stop reason: HOSPADM

## 2022-01-28 RX ORDER — HYDRALAZINE HYDROCHLORIDE 50 MG/1
100 TABLET, FILM COATED ORAL ONCE
Status: COMPLETED | OUTPATIENT
Start: 2022-01-28 | End: 2022-01-28

## 2022-01-28 RX ORDER — ENALAPRIL MALEATE 5 MG/1
5 TABLET ORAL DAILY
Status: DISCONTINUED | OUTPATIENT
Start: 2022-01-29 | End: 2022-02-04 | Stop reason: HOSPADM

## 2022-01-28 RX ORDER — ACETAMINOPHEN 500 MG
1000 TABLET ORAL ONCE
Status: COMPLETED | OUTPATIENT
Start: 2022-01-28 | End: 2022-01-28

## 2022-01-28 RX ADMIN — HYDRALAZINE HYDROCHLORIDE 100 MG: 50 TABLET, FILM COATED ORAL at 22:59

## 2022-01-28 RX ADMIN — METOPROLOL TARTRATE 100 MG: 50 TABLET, FILM COATED ORAL at 23:00

## 2022-01-28 RX ADMIN — CEFEPIME HYDROCHLORIDE 1000 MG: 1 INJECTION, POWDER, FOR SOLUTION INTRAMUSCULAR; INTRAVENOUS at 23:46

## 2022-01-28 RX ADMIN — ACETAMINOPHEN 1000 MG: 500 TABLET ORAL at 21:30

## 2022-01-28 ASSESSMENT — ENCOUNTER SYMPTOMS
VOMITING: 1
NAUSEA: 1
SHORTNESS OF BREATH: 1
PHOTOPHOBIA: 0
DIARRHEA: 1
BACK PAIN: 0
ABDOMINAL PAIN: 0
SORE THROAT: 0

## 2022-01-28 ASSESSMENT — PAIN SCALES - GENERAL: PAINLEVEL_OUTOF10: 0

## 2022-01-29 ENCOUNTER — APPOINTMENT (OUTPATIENT)
Dept: CT IMAGING | Age: 77
DRG: 871 | End: 2022-01-29
Payer: MEDICARE

## 2022-01-29 ENCOUNTER — APPOINTMENT (OUTPATIENT)
Dept: GENERAL RADIOLOGY | Age: 77
DRG: 871 | End: 2022-01-29
Payer: MEDICARE

## 2022-01-29 PROBLEM — I16.0 HYPERTENSIVE URGENCY: Status: ACTIVE | Noted: 2022-01-29

## 2022-01-29 PROBLEM — A41.9 SEPSIS (HCC): Status: ACTIVE | Noted: 2022-01-29

## 2022-01-29 PROBLEM — J12.82 PNEUMONIA DUE TO COVID-19 VIRUS: Status: ACTIVE | Noted: 2022-01-29

## 2022-01-29 PROBLEM — J96.01 ACUTE RESPIRATORY FAILURE WITH HYPOXIA (HCC): Status: ACTIVE | Noted: 2022-01-29

## 2022-01-29 PROBLEM — U07.1 PNEUMONIA DUE TO COVID-19 VIRUS: Status: ACTIVE | Noted: 2022-01-29

## 2022-01-29 PROBLEM — E66.01 MORBID OBESITY (HCC): Status: ACTIVE | Noted: 2022-01-29

## 2022-01-29 LAB
ABSOLUTE EOS #: <0.03 K/UL (ref 0–0.44)
ABSOLUTE IMMATURE GRANULOCYTE: 0.06 K/UL (ref 0–0.3)
ABSOLUTE LYMPH #: 1.48 K/UL (ref 1.1–3.7)
ABSOLUTE MONO #: 0.96 K/UL (ref 0.1–1.2)
ALBUMIN SERPL-MCNC: 3.5 G/DL (ref 3.5–5.2)
ALBUMIN/GLOBULIN RATIO: 0.9 (ref 1–2.5)
ALP BLD-CCNC: 114 U/L (ref 35–104)
ALT SERPL-CCNC: 14 U/L (ref 5–33)
ANION GAP SERPL CALCULATED.3IONS-SCNC: 18 MMOL/L (ref 9–17)
AST SERPL-CCNC: 19 U/L
BASOPHILS # BLD: 0 % (ref 0–2)
BASOPHILS ABSOLUTE: <0.03 K/UL (ref 0–0.2)
BILIRUB SERPL-MCNC: 0.39 MG/DL (ref 0.3–1.2)
BUN BLDV-MCNC: 56 MG/DL (ref 8–23)
BUN/CREAT BLD: ABNORMAL (ref 9–20)
C-REACTIVE PROTEIN: 105.4 MG/L (ref 0–5)
CALCIUM SERPL-MCNC: 8.5 MG/DL (ref 8.6–10.4)
CHLORIDE BLD-SCNC: 105 MMOL/L (ref 98–107)
CO2: 13 MMOL/L (ref 20–31)
CREAT SERPL-MCNC: 5.13 MG/DL (ref 0.5–0.9)
CULTURE: NO GROWTH
D-DIMER QUANTITATIVE: 5.54 MG/L FEU
D-DIMER QUANTITATIVE: 5.94 MG/L FEU
DIFFERENTIAL TYPE: ABNORMAL
EKG ATRIAL RATE: 86 BPM
EKG P AXIS: -6 DEGREES
EKG P-R INTERVAL: 174 MS
EKG Q-T INTERVAL: 392 MS
EKG QRS DURATION: 82 MS
EKG QTC CALCULATION (BAZETT): 469 MS
EKG R AXIS: -36 DEGREES
EKG T AXIS: 142 DEGREES
EKG VENTRICULAR RATE: 86 BPM
EOSINOPHILS RELATIVE PERCENT: 0 % (ref 1–4)
FERRITIN: 412 UG/L (ref 13–150)
FIBRINOGEN: 413 MG/DL (ref 140–420)
GFR AFRICAN AMERICAN: 10 ML/MIN
GFR NON-AFRICAN AMERICAN: 8 ML/MIN
GFR SERPL CREATININE-BSD FRML MDRD: ABNORMAL ML/MIN/{1.73_M2}
GFR SERPL CREATININE-BSD FRML MDRD: ABNORMAL ML/MIN/{1.73_M2}
GLUCOSE BLD-MCNC: 163 MG/DL (ref 65–105)
GLUCOSE BLD-MCNC: 178 MG/DL (ref 70–99)
HCT VFR BLD CALC: 35.4 % (ref 36.3–47.1)
HEMOGLOBIN: 11.3 G/DL (ref 11.9–15.1)
IMMATURE GRANULOCYTES: 1 %
LACTATE DEHYDROGENASE: 244 U/L (ref 135–214)
LYMPHOCYTES # BLD: 15 % (ref 24–43)
Lab: NORMAL
MCH RBC QN AUTO: 29.2 PG (ref 25.2–33.5)
MCHC RBC AUTO-ENTMCNC: 31.9 G/DL (ref 28.4–34.8)
MCV RBC AUTO: 91.5 FL (ref 82.6–102.9)
MONOCYTES # BLD: 10 % (ref 3–12)
NRBC AUTOMATED: 0 PER 100 WBC
PDW BLD-RTO: 13.6 % (ref 11.8–14.4)
PLATELET # BLD: 177 K/UL (ref 138–453)
PLATELET ESTIMATE: ABNORMAL
PMV BLD AUTO: 12.2 FL (ref 8.1–13.5)
POTASSIUM SERPL-SCNC: 4.1 MMOL/L (ref 3.7–5.3)
PROCALCITONIN: 0.88 NG/ML
RBC # BLD: 3.87 M/UL (ref 3.95–5.11)
RBC # BLD: ABNORMAL 10*6/UL
SEG NEUTROPHILS: 74 % (ref 36–65)
SEGMENTED NEUTROPHILS ABSOLUTE COUNT: 7.42 K/UL (ref 1.5–8.1)
SODIUM BLD-SCNC: 136 MMOL/L (ref 135–144)
SPECIMEN DESCRIPTION: NORMAL
TOTAL PROTEIN: 7.3 G/DL (ref 6.4–8.3)
VITAMIN D 25-HYDROXY: 6.8 NG/ML (ref 30–100)
WBC # BLD: 9.9 K/UL (ref 3.5–11.3)
WBC # BLD: ABNORMAL 10*3/UL

## 2022-01-29 PROCEDURE — 90935 HEMODIALYSIS ONE EVALUATION: CPT

## 2022-01-29 PROCEDURE — 82306 VITAMIN D 25 HYDROXY: CPT

## 2022-01-29 PROCEDURE — 6360000002 HC RX W HCPCS: Performed by: INTERNAL MEDICINE

## 2022-01-29 PROCEDURE — 82947 ASSAY GLUCOSE BLOOD QUANT: CPT

## 2022-01-29 PROCEDURE — 85379 FIBRIN DEGRADATION QUANT: CPT

## 2022-01-29 PROCEDURE — 90935 HEMODIALYSIS ONE EVALUATION: CPT | Performed by: INTERNAL MEDICINE

## 2022-01-29 PROCEDURE — 99222 1ST HOSP IP/OBS MODERATE 55: CPT | Performed by: INTERNAL MEDICINE

## 2022-01-29 PROCEDURE — 6360000002 HC RX W HCPCS: Performed by: STUDENT IN AN ORGANIZED HEALTH CARE EDUCATION/TRAINING PROGRAM

## 2022-01-29 PROCEDURE — 6360000002 HC RX W HCPCS

## 2022-01-29 PROCEDURE — 83615 LACTATE (LD) (LDH) ENZYME: CPT

## 2022-01-29 PROCEDURE — 36415 COLL VENOUS BLD VENIPUNCTURE: CPT

## 2022-01-29 PROCEDURE — 2580000003 HC RX 258: Performed by: INTERNAL MEDICINE

## 2022-01-29 PROCEDURE — 6370000000 HC RX 637 (ALT 250 FOR IP): Performed by: STUDENT IN AN ORGANIZED HEALTH CARE EDUCATION/TRAINING PROGRAM

## 2022-01-29 PROCEDURE — 80053 COMPREHEN METABOLIC PANEL: CPT

## 2022-01-29 PROCEDURE — 2060000000 HC ICU INTERMEDIATE R&B

## 2022-01-29 PROCEDURE — 71250 CT THORAX DX C-: CPT

## 2022-01-29 PROCEDURE — 1200000000 HC SEMI PRIVATE

## 2022-01-29 PROCEDURE — 5A1D70Z PERFORMANCE OF URINARY FILTRATION, INTERMITTENT, LESS THAN 6 HOURS PER DAY: ICD-10-PCS | Performed by: INTERNAL MEDICINE

## 2022-01-29 PROCEDURE — 2700000000 HC OXYGEN THERAPY PER DAY

## 2022-01-29 PROCEDURE — 2580000003 HC RX 258: Performed by: STUDENT IN AN ORGANIZED HEALTH CARE EDUCATION/TRAINING PROGRAM

## 2022-01-29 PROCEDURE — 6360000002 HC RX W HCPCS: Performed by: NURSE PRACTITIONER

## 2022-01-29 PROCEDURE — 84145 PROCALCITONIN (PCT): CPT

## 2022-01-29 PROCEDURE — 85384 FIBRINOGEN ACTIVITY: CPT

## 2022-01-29 PROCEDURE — 02HV33Z INSERTION OF INFUSION DEVICE INTO SUPERIOR VENA CAVA, PERCUTANEOUS APPROACH: ICD-10-PCS | Performed by: INTERNAL MEDICINE

## 2022-01-29 PROCEDURE — 82728 ASSAY OF FERRITIN: CPT

## 2022-01-29 PROCEDURE — 6370000000 HC RX 637 (ALT 250 FOR IP): Performed by: NURSE PRACTITIONER

## 2022-01-29 PROCEDURE — 71045 X-RAY EXAM CHEST 1 VIEW: CPT

## 2022-01-29 PROCEDURE — 86140 C-REACTIVE PROTEIN: CPT

## 2022-01-29 PROCEDURE — 2580000003 HC RX 258: Performed by: NURSE PRACTITIONER

## 2022-01-29 PROCEDURE — 94761 N-INVAS EAR/PLS OXIMETRY MLT: CPT

## 2022-01-29 PROCEDURE — 93010 ELECTROCARDIOGRAM REPORT: CPT | Performed by: INTERNAL MEDICINE

## 2022-01-29 PROCEDURE — 85025 COMPLETE CBC W/AUTO DIFF WBC: CPT

## 2022-01-29 RX ORDER — FENTANYL CITRATE 50 UG/ML
INJECTION, SOLUTION INTRAMUSCULAR; INTRAVENOUS
Status: COMPLETED
Start: 2022-01-29 | End: 2022-01-29

## 2022-01-29 RX ORDER — HEPARIN SODIUM 5000 [USP'U]/ML
5000 INJECTION, SOLUTION INTRAVENOUS; SUBCUTANEOUS EVERY 8 HOURS SCHEDULED
Status: DISCONTINUED | OUTPATIENT
Start: 2022-01-29 | End: 2022-02-04 | Stop reason: HOSPADM

## 2022-01-29 RX ORDER — ONDANSETRON 2 MG/ML
4 INJECTION INTRAMUSCULAR; INTRAVENOUS EVERY 6 HOURS PRN
Status: DISCONTINUED | OUTPATIENT
Start: 2022-01-29 | End: 2022-02-04 | Stop reason: HOSPADM

## 2022-01-29 RX ORDER — ACETAMINOPHEN 650 MG/1
650 SUPPOSITORY RECTAL EVERY 6 HOURS PRN
Status: DISCONTINUED | OUTPATIENT
Start: 2022-01-29 | End: 2022-02-04 | Stop reason: HOSPADM

## 2022-01-29 RX ORDER — ACETAMINOPHEN 325 MG/1
650 TABLET ORAL EVERY 6 HOURS PRN
Status: DISCONTINUED | OUTPATIENT
Start: 2022-01-29 | End: 2022-02-04 | Stop reason: HOSPADM

## 2022-01-29 RX ORDER — SODIUM CHLORIDE 0.9 % (FLUSH) 0.9 %
5-40 SYRINGE (ML) INJECTION PRN
Status: DISCONTINUED | OUTPATIENT
Start: 2022-01-29 | End: 2022-02-04 | Stop reason: HOSPADM

## 2022-01-29 RX ORDER — ONDANSETRON 4 MG/1
4 TABLET, ORALLY DISINTEGRATING ORAL EVERY 8 HOURS PRN
Status: DISCONTINUED | OUTPATIENT
Start: 2022-01-29 | End: 2022-02-04 | Stop reason: HOSPADM

## 2022-01-29 RX ORDER — DEXAMETHASONE SODIUM PHOSPHATE 10 MG/ML
6 INJECTION INTRAMUSCULAR; INTRAVENOUS EVERY 24 HOURS
Status: DISCONTINUED | OUTPATIENT
Start: 2022-01-29 | End: 2022-02-04 | Stop reason: HOSPADM

## 2022-01-29 RX ORDER — SODIUM CHLORIDE 9 MG/ML
25 INJECTION, SOLUTION INTRAVENOUS PRN
Status: DISCONTINUED | OUTPATIENT
Start: 2022-01-29 | End: 2022-02-04 | Stop reason: HOSPADM

## 2022-01-29 RX ORDER — SODIUM CHLORIDE 0.9 % (FLUSH) 0.9 %
5-40 SYRINGE (ML) INJECTION EVERY 12 HOURS SCHEDULED
Status: DISCONTINUED | OUTPATIENT
Start: 2022-01-29 | End: 2022-02-04 | Stop reason: HOSPADM

## 2022-01-29 RX ORDER — HYDRALAZINE HYDROCHLORIDE 20 MG/ML
10 INJECTION INTRAMUSCULAR; INTRAVENOUS EVERY 6 HOURS PRN
Status: DISCONTINUED | OUTPATIENT
Start: 2022-01-29 | End: 2022-02-04 | Stop reason: HOSPADM

## 2022-01-29 RX ADMIN — SODIUM CHLORIDE, PRESERVATIVE FREE 10 ML: 5 INJECTION INTRAVENOUS at 09:52

## 2022-01-29 RX ADMIN — FENTANYL CITRATE 25 MCG: 50 INJECTION, SOLUTION INTRAMUSCULAR; INTRAVENOUS at 21:26

## 2022-01-29 RX ADMIN — HYDRALAZINE HYDROCHLORIDE 10 MG: 20 INJECTION INTRAMUSCULAR; INTRAVENOUS at 04:41

## 2022-01-29 RX ADMIN — ALTEPLASE 2 MG: 2.2 INJECTION, POWDER, LYOPHILIZED, FOR SOLUTION INTRAVENOUS at 18:45

## 2022-01-29 RX ADMIN — WATER 2.2 ML: 1 INJECTION INTRAMUSCULAR; INTRAVENOUS; SUBCUTANEOUS at 18:45

## 2022-01-29 RX ADMIN — TOCILIZUMAB 600 MG: 20 INJECTION, SOLUTION, CONCENTRATE INTRAVENOUS at 21:26

## 2022-01-29 RX ADMIN — NIFEDIPINE 60 MG: 30 TABLET, FILM COATED, EXTENDED RELEASE ORAL at 09:52

## 2022-01-29 RX ADMIN — SODIUM CHLORIDE, PRESERVATIVE FREE 10 ML: 5 INJECTION INTRAVENOUS at 20:34

## 2022-01-29 RX ADMIN — ACETAMINOPHEN 650 MG: 325 TABLET ORAL at 09:51

## 2022-01-29 RX ADMIN — ACETAMINOPHEN 650 MG: 325 TABLET ORAL at 20:33

## 2022-01-29 RX ADMIN — VANCOMYCIN HYDROCHLORIDE 2000 MG: 1 INJECTION, POWDER, LYOPHILIZED, FOR SOLUTION INTRAVENOUS at 04:40

## 2022-01-29 RX ADMIN — CEFEPIME HYDROCHLORIDE 1000 MG: 1 INJECTION, POWDER, FOR SOLUTION INTRAMUSCULAR; INTRAVENOUS at 20:33

## 2022-01-29 RX ADMIN — DEXAMETHASONE SODIUM PHOSPHATE 6 MG: 10 INJECTION INTRAMUSCULAR; INTRAVENOUS at 20:34

## 2022-01-29 RX ADMIN — ONDANSETRON 4 MG: 2 INJECTION INTRAMUSCULAR; INTRAVENOUS at 16:09

## 2022-01-29 RX ADMIN — ENALAPRIL MALEATE 5 MG: 5 TABLET ORAL at 09:52

## 2022-01-29 RX ADMIN — HEPARIN SODIUM 5000 UNITS: 5000 INJECTION INTRAVENOUS; SUBCUTANEOUS at 05:57

## 2022-01-29 RX ADMIN — HEPARIN SODIUM 5000 UNITS: 5000 INJECTION INTRAVENOUS; SUBCUTANEOUS at 13:53

## 2022-01-29 RX ADMIN — ONDANSETRON 4 MG: 2 INJECTION INTRAMUSCULAR; INTRAVENOUS at 09:47

## 2022-01-29 ASSESSMENT — PAIN SCALES - GENERAL
PAINLEVEL_OUTOF10: 0
PAINLEVEL_OUTOF10: 3
PAINLEVEL_OUTOF10: 0
PAINLEVEL_OUTOF10: 0

## 2022-01-29 ASSESSMENT — ENCOUNTER SYMPTOMS
SHORTNESS OF BREATH: 1
ABDOMINAL DISTENTION: 0
EYE DISCHARGE: 0
COLOR CHANGE: 0
APNEA: 0
COUGH: 0

## 2022-01-29 NOTE — ED TRIAGE NOTES
Pt is a+o x4 msps intact c/c general illness since yesterday. Pt states she has not been able to get dialysis due to port in chest not working. Pt states it worked Monday and was able to have full dialysis but port has not worked since and she has missed 2 treatments. Pt denies any n/v/d. Skin hot and dry at this time. Pt denies any new pain complaints. Pt states normally if she starts feels not well like now she is able to lay down and feels better after a nap but states she has slept all day today and still does not feel well.  Pt states she has not been able to take any meds today buut did take all home medications yesterday

## 2022-01-29 NOTE — PROGRESS NOTES
Patient was seen and examined on HD at bedside. Orders were confirmed with the HD nurse. Patient went for 10 minutes but dialysis catheter was having very high arterial pressures. Will stop dialysis and Activase the catheter still does not work, then will require vascular surgery consult for catheter exchange. Mark Headley MD  [facility]     This note is created with the assistance of a speech-recognition program. While intending to generate a document that actually reflects the content of the visit, no guarantees can be provided that every mistake has been identified and corrected by editing.

## 2022-01-29 NOTE — ED PROVIDER NOTES
[facility] ED  Emergency Department Encounter  EmergencyMedicine Resident     Pt Name:Sheri Vazquez  MRN: [id number]  Caydengfbeto 1945  Date of evaluation: 1/28/22  PCP:  Frances Funez MD    This patient was evaluated in the Emergency Department for symptoms described in the history of present illness. The patient was evaluated in the context of the global COVID-19 pandemic, which necessitated consideration that the patient might be at risk for infection with the SARS-CoV-2 virus that causes COVID-19. Institutional protocols and algorithms that pertain to the evaluation of patients at risk for COVID-19 are in a state of rapid change based on information released by regulatory bodies including the CDC and federal and state organizations. These policies and algorithms were followed during the patient's care in the ED. CHIEF COMPLAINT       Chief Complaint   Patient presents with    Hypertension     htn no dialysis x2 treatments, port bad per pt       HISTORY OF PRESENT ILLNESS  (Location/Symptom, Timing/Onset, Context/Setting, Quality, Duration, Modifying Factors, Severity.)      Solis Monge is a 67 y.o. female who presents with low blood pressure and generalized ill feeling lightheadedness presyncope. Patient states she has been feeling ill for the last 1 to 2 days she missed her last 2 dialysis sessions because her port was malfunctioning per the patient's report. Patient has a indwelling tunneled dialysis catheter right chest wall.     PAST MEDICAL / SURGICAL / SOCIAL / FAMILY HISTORY      has a past medical history of Anxiety and depression, Arthritis, Cancer ([address] Cerebral artery occlusion with cerebral infarction ([address] Chronic kidney disease, stage 5 ([address] Diabetes mellitus ([address] GERD (gastroesophageal reflux disease), Hearing loss, Hemodialysis patient ([address] History of blood transfusion, Hyperlipidemia, Hypertension, Migraines, MRSA (methicillin resistant staph aureus) culture positive, Neuropathy, PAT (obstructive sleep apnea), Prolonged emergence from general anesthesia, PVD (peripheral vascular disease) ([facility] [address] SOB (shortness of breath), and Tinnitus. has a past surgical history that includes Uvulopalatopharygoplasty; Mastectomy (Left); Hysterectomy; Appendectomy; back surgery; Mastectomy (Left, 2018); pr exc skin benig <5mm trunk,arm,leg (Right, 2018); Breast reduction surgery (Left, ); Colonoscopy; and Insertable Cardiac Monitor. Social History     Socioeconomic History    Marital status:      Spouse name: Not on file    Number of children: Not on file    Years of education: Not on file    Highest education level: Not on file   Occupational History    Not on file   Tobacco Use    Smoking status: Former Smoker     Quit date:      Years since quittin.0    Smokeless tobacco: Never Used   Vaping Use    Vaping Use: Never used   Substance and Sexual Activity    Alcohol use: Yes     Comment: occasionally  \"couple a month\"    Drug use: No    Sexual activity: Not on file   Other Topics Concern    Not on file   Social History Narrative    Not on file     Social Determinants of Health     Financial Resource Strain:     Difficulty of Paying Living Expenses: Not on file   Food Insecurity:     Worried About [address] in the Last Year: Not on file    [address] in the Last Year: Not on file   Transportation Needs:     Lack of Transportation (Medical): Not on file    Lack of Transportation (Non-Medical):  Not on file   Physical Activity:     Days of Exercise per Week: Not on file    Minutes of Exercise per Session: Not on file   Stress:     Feeling of Stress : Not on file   Social Connections:     Frequency of Communication with Friends and Family: Not on file    Frequency of Social Gatherings with Friends and Family: Not on file    Attends Catholic Services: Not on file   [employer] or Organizations: Not on file    Attends Club or Organization Meetings: Not on file    Marital Status: Not on file   Intimate Partner Violence:     Fear of Current or Ex-Partner: Not on file    Emotionally Abused: Not on file    Physically Abused: Not on file    Sexually Abused: Not on file   Housing Stability:     Unable to Pay for Housing in the Last Year: Not on file    Number of Radha in the Last Year: Not on file    Unstable Housing in the Last Year: Not on file       No family history on file. Allergies:  Patient has no known allergies. Home Medications:  Prior to Admission medications    Medication Sig Start Date End Date Taking?  Authorizing Provider   calcitRIOL (ROCALTROL) 0.25 MCG capsule Take 0.25 mcg by mouth daily   Yes Historical Provider, MD   sevelamer (RENVELA) 800 MG tablet Take 1 tablet by mouth 3 times daily (with meals) 11/10/21  Yes Hector Martinez MD   clopidogrel (PLAVIX) 75 MG tablet Take 75 mg by mouth daily   Yes Historical Provider, MD   enalapril (VASOTEC) 5 MG tablet Take 5 mg by mouth daily   Yes Historical Provider, MD   metoprolol (LOPRESSOR) 100 MG tablet Take 100 mg by mouth daily   Yes Historical Provider, MD   NIFEdipine (PROCARDIA XL) 60 MG extended release tablet Take 60 mg by mouth daily   Yes Historical Provider, MD   pantoprazole (PROTONIX) 40 MG tablet Take 40 mg by mouth daily   Yes Historical Provider, MD   cloNIDine (CATAPRES) 0.1 MG/24HR PTWK Place 1 patch onto the skin once a week Indications: Sundays Sundays   Yes Historical Provider, MD   hydrALAZINE (APRESOLINE) 100 MG tablet Take 100 mg by mouth 3 times daily   Yes Historical Provider, MD   rosuvastatin (CRESTOR) 5 MG tablet Take 5 mg by mouth daily   Yes Historical Provider, MD   citalopram (CELEXA) 20 MG tablet Take 40 mg by mouth daily Takes 2 tabs (=40mg) daily   Yes Historical Provider, MD   ondansetron (ZOFRAN) 4 MG tablet Take 1 tablet by mouth every 6 hours as needed for Nausea or Vomiting 5/14/18   Denita Comas, DO       REVIEW OF SYSTEMS    (2-9 systems for level 4, 10 or more for level 5)      Review of Systems   Constitutional: Negative for fever. HENT: Negative for congestion and sore throat. Eyes: Negative for photophobia. Respiratory: Positive for shortness of breath. Cardiovascular: Negative for chest pain. Gastrointestinal: Positive for diarrhea, nausea and vomiting. Negative for abdominal pain. Genitourinary: Negative for flank pain. Musculoskeletal: Negative for back pain, neck pain and neck stiffness. Skin: Negative for pallor. Allergic/Immunologic: Negative for environmental allergies and food allergies. Neurological: Positive for light-headedness. Hematological: Negative for adenopathy. Psychiatric/Behavioral: Negative for agitation and confusion. PHYSICAL EXAM   (up to 7 for level 4, 8 or more for level 5)      INITIAL VITALS:   BP (!) 188/92   Pulse 79   Temp 99.7 °F (37.6 °C) (Oral)   Resp 20   Ht 5' 3\" (1.6 m)   Wt 170 lb (77.1 kg)   SpO2 97%   BMI 30.11 kg/m²     Physical Exam  Vitals and nursing note reviewed. Constitutional:       Appearance: She is obese. She is not toxic-appearing. HENT:      Head: Normocephalic and atraumatic. Right Ear: External ear normal.      Left Ear: External ear normal.      Nose: Nose normal.      Mouth/Throat:      Pharynx: Oropharynx is clear. Eyes:      Conjunctiva/sclera: Conjunctivae normal.   Cardiovascular:      Rate and Rhythm: Normal rate. Pulses: Normal pulses. Pulmonary:      Breath sounds: Rales present. Abdominal:      Palpations: Abdomen is soft. Tenderness: There is no abdominal tenderness. There is no guarding or rebound. Musculoskeletal:         General: Normal range of motion. Cervical back: Normal range of motion. Skin:     General: Skin is warm. Capillary Refill: Capillary refill takes less than 2 seconds.    Neurological:      Mental Status: She is alert and oriented to person, place, and time.    Psychiatric:         Mood and Affect: Mood normal.         DIFFERENTIAL  DIAGNOSIS     PLAN (Abhishek Welch / Jevon Manual / EKG):  Orders Placed This Encounter   Procedures    Culture, Blood 2    Culture, Blood 1    Culture, Urine    COVID-19, Rapid    XR CHEST PORTABLE    CBC WITH AUTO DIFFERENTIAL    COMPREHENSIVE METABOLIC PANEL    Lactic Acid, Plasma    Procalcitonin    Lactate, Sepsis    Lipase    Urinalysis with microscopic    Blood Gas, Venous    ELECTROLYTES PLUS    Hemoglobin and hematocrit, blood    CALCIUM, IONIC (POC)    Immature Platelet Fraction    Protime-INR    APTT    PREVIOUS SPECIMEN    SPECIMEN REJECTION    Insert indwelling urinary catheter    Discontinue indwelling urinary catheter when documented indications no longer apply per hospital policy    Inpatient consult to Nephrology    Inpatient consult to Hospitalist    Venous Blood Gas, POC    Creatinine W/GFR Point of Care    POCT urea (BUN)    Lactic Acid, POC    POCT Glucose    EKG 12 lead       MEDICATIONS ORDERED:  Orders Placed This Encounter   Medications    acetaminophen (TYLENOL) tablet 1,000 mg    DISCONTD: nitroGLYCERIN 50 mg in dextrose 5% 250 mL infusion    enalapril (VASOTEC) tablet 5 mg    NIFEdipine (PROCARDIA XL) extended release tablet 60 mg    metoprolol tartrate (LOPRESSOR) tablet 100 mg    hydrALAZINE (APRESOLINE) tablet 100 mg    DISCONTD: heparin flush 100 UNIT/ML injection 300 Units    cefepime (MAXIPIME) 1000 mg IVPB minibag     Order Specific Question:   Antimicrobial Indications     Answer:   Sepsis of Unknown Etiology    vancomycin (VANCOCIN) 2,000 mg in dextrose 5 % 500 mL IVPB     Order Specific Question:   Antimicrobial Indications     Answer:   Sepsis of Unknown Etiology       DDX: pulmonary edema, pneumonia, uti, electrolyte abnormality    DIAGNOSTIC RESULTS / EMERGENCY DEPARTMENT COURSE / MDM   LAB RESULTS:  Results for orders placed or performed during the hospital encounter of 01/28/22   Culture, Blood 2    Specimen: Blood   Result Value Ref Range    Specimen Description . BLOOD     Special Requests RT CHEST MED PORT 12ML     Culture NO GROWTH <24 HRS    Culture, Blood 1    Specimen: Blood   Result Value Ref Range    Specimen Description . BLOOD     Special Requests LAC 20ML     Culture NO GROWTH <24 HRS    COVID-19, Rapid    Specimen: Nasopharyngeal Swab   Result Value Ref Range    Specimen Description . NASOPHARYNGEAL SWAB     SARS-CoV-2, Rapid DETECTED (A) Not Detected   CBC WITH AUTO DIFFERENTIAL   Result Value Ref Range    WBC 9.4 3.5 - 11.3 k/uL    RBC 3.95 3.95 - 5.11 m/uL    Hemoglobin 11.5 (L) 11.9 - 15.1 g/dL    Hematocrit 37.5 36.3 - 47.1 %    MCV 94.9 82.6 - 102.9 fL    MCH 29.1 25.2 - 33.5 pg    MCHC 30.7 28.4 - 34.8 g/dL    RDW 13.3 11.8 - 14.4 %    Platelets See Reflexed IPF Result 138 - 453 k/uL    MPV NOT REPORTED 8.1 - 13.5 fL    NRBC Automated 0.0 0.0 per 100 WBC    Differential Type NOT REPORTED     WBC Morphology NOT REPORTED     RBC Morphology NOT REPORTED     Platelet Estimate NOT REPORTED     Seg Neutrophils 80 (H) 36 - 65 %    Lymphocytes 11 (L) 24 - 43 %    Monocytes 8 3 - 12 %    Eosinophils % 1 1 - 4 %    Basophils 0 0 - 2 %    Immature Granulocytes 0 0 %    Segs Absolute 7.53 1.50 - 8.10 k/uL    Absolute Lymph # 1.01 (L) 1.10 - 3.70 k/uL    Absolute Mono # 0.79 0.10 - 1.20 k/uL    Absolute Eos # 0.05 0.00 - 0.44 k/uL    Basophils Absolute <0.03 0.00 - 0.20 k/uL    Absolute Immature Granulocyte 0.03 0.00 - 0.30 k/uL   COMPREHENSIVE METABOLIC PANEL   Result Value Ref Range    Glucose 105 (H) 70 - 99 mg/dL    BUN 55 (H) 8 - 23 mg/dL    CREATININE 5.14 (HH) 0.50 - 0.90 mg/dL    Bun/Cre Ratio NOT REPORTED 9 - 20    Calcium 8.8 8.6 - 10.4 mg/dL    Sodium 140 135 - 144 mmol/L    Potassium 3.6 (L) 3.7 - 5.3 mmol/L    Chloride 105 98 - 107 mmol/L    CO2 16 (L) 20 - 31 mmol/L    Anion Gap 19 (H) 9 - 17 mmol/L    Alkaline Phosphatase 136 (H) 35 - 104 U/L    ALT 14 5 - 33 U/L    AST 22 <32 U/L    Total Bilirubin 0.49 0.3 - 1.2 mg/dL    Total Protein 8.1 6.4 - 8.3 g/dL    Albumin 4.2 3.5 - 5.2 g/dL    Albumin/Globulin Ratio 1.1 1.0 - 2.5    GFR Non- 8 (L) >60 mL/min    GFR  10 (L) >60 mL/min    GFR Comment          GFR Staging NOT REPORTED    Lactic Acid, Plasma   Result Value Ref Range    Lactic Acid NOT REPORTED mmol/L    Lactic Acid, Whole Blood 1.5 0.7 - 2.1 mmol/L   Procalcitonin   Result Value Ref Range    Procalcitonin 0.56 (H) <0.09 ng/mL   Lactate, Sepsis   Result Value Ref Range    Lactic Acid, Sepsis NOT REPORTED 0.5 - 1.9 mmol/L    Lactic Acid, Sepsis, Whole Blood 1.5 0.5 - 1.9 mmol/L   Lactate, Sepsis   Result Value Ref Range    Lactic Acid, Sepsis NOT REPORTED 0.5 - 1.9 mmol/L    Lactic Acid, Sepsis, Whole Blood 0.9 0.5 - 1.9 mmol/L   Lipase   Result Value Ref Range    Lipase 59 13 - 60 U/L   Urinalysis with microscopic   Result Value Ref Range    Color, UA Yellow Yellow    Turbidity UA Clear Clear    Glucose, Ur NEGATIVE NEGATIVE    Bilirubin Urine NEGATIVE NEGATIVE    Ketones, Urine NEGATIVE NEGATIVE    Specific Gravity, UA 1.016 1.005 - 1.030    Urine Hgb SMALL (A) NEGATIVE    pH, UA 6.5 5.0 - 8.0    Protein, UA 3+ (A) NEGATIVE    Urobilinogen, Urine Normal Normal    Nitrite, Urine NEGATIVE NEGATIVE    Leukocyte Esterase, Urine TRACE (A) NEGATIVE    -          WBC, UA 2 TO 5 0 - 5 /HPF    RBC, UA 0 TO 2 0 - 2 /HPF    Casts UA NOT REPORTED 0 - 2 /LPF    Crystals, UA NOT REPORTED None /HPF    Epithelial Cells UA 5 TO 10 0 - 5 /HPF    Renal Epithelial, UA NOT REPORTED 0 /HPF    Bacteria, UA FEW (A) None    Mucus, UA 1+ (A) None    Trichomonas, UA NOT REPORTED None    Amorphous, UA NOT REPORTED None    Other Observations UA NOT REPORTED NOT REQ.     Yeast, UA NOT REPORTED None   Blood Gas, Venous   Result Value Ref Range    pH, Lito 7.284 (L) 7.320 - 7.420    pCO2, Lito 39.1 39 - 55    pO2, Lito 52.4 (H) 30 - 50    HCO3, Venous 17.9 (L) 24 - 30 mmol/L    Positive Base Excess, Lito NOT REPORTED 0.0 - 2.0 mmol/L    Negative Base Excess, Lito 7.7 (H) 0.0 - 2.0 mmol/L    O2 Sat, Lito 82.1 60.0 - 85.0 %    Total Hb NOT REPORTED 12.0 - 16.0 g/dl    Oxyhemoglobin NOT REPORTED 95.0 - 98.0 %    Carboxyhemoglobin 3.2 0 - 5 %    Methemoglobin NOT REPORTED 0.0 - 1.5 %    Pt Temp 37.0     pH, Lito, Temp Adj NOT REPORTED 7.320 - 7.420    pCO2, Lito, Temp Adj NOT REPORTED 39 - 55 mmHg    pO2, Lito, Temp Adj NOT REPORTED 30 - 50 mmHg    O2 Device/Flow/% NOT REPORTED     Respiratory Rate NOT REPORTED     Frank Test NOT REPORTED     Sample Site NOT REPORTED     Pt. Position NOT REPORTED     Mode NOT REPORTED     Set Rate NOT REPORTED     Total Rate NOT REPORTED     VT NOT REPORTED     FIO2 UNKNOWN     Peep/Cpap NOT REPORTED     PSV NOT REPORTED     Text for Respiratory NOT REPORTED     NOTIFICATION NOT REPORTED     NOTIFICATION TIME NOT REPORTED    ELECTROLYTES PLUS   Result Value Ref Range    POC Sodium 145 138 - 146 mmol/L    POC Potassium 3.7 3.5 - 4.5 mmol/L    POC Chloride 114 (H) 98 - 107 mmol/L    POC TCO2 21 (L) 22 - 30 mmol/L    Anion Gap 11 7 - 16 mmol/L   Hemoglobin and hematocrit, blood   Result Value Ref Range    POC Hemoglobin 13.6 12.0 - 16.0 g/dL    POC Hematocrit 40 36 - 46 %   CALCIUM, IONIC (POC)   Result Value Ref Range    POC Ionized Calcium 1.11 (L) 1.15 - 1.33 mmol/L   Immature Platelet Fraction   Result Value Ref Range    Platelet, Immature Fraction NOT REPORTED 1.1 - 10.3 %    Platelet, Fluorescence Platelet clumps present, count appears adequate. 138 - 453 k/uL   Protime-INR   Result Value Ref Range    Protime 10.5 9.1 - 12.3 sec    INR 1.0    APTT   Result Value Ref Range    PTT 23.7 20.5 - 30.5 sec   SPECIMEN REJECTION   Result Value Ref Range    Specimen Source . BLOOD     Ordered Test PT,PTT     Reason for Rejection Unable to perform testing: Specimen clotted.      - NOT REPORTED    Venous Blood Gas, POC   Result Value Ref Range    pH, Lito 7.334 7.320 - 7.430    pCO2, Lito 37.9 (L) 41.0 - 51.0 mm Hg    pO2, Lito 21.8 (L) 30.0 - 50.0 mm Hg    HCO3, Venous 20.2 (L) 22.0 - 29.0 mmol/L    Total CO2, Venous NOT REPORTED 23.0 - 30.0 mmol/L    Negative Base Excess, Lito 5 (H) 0.0 - 2.0    Positive Base Excess, Lito NOT REPORTED 0.0 - 3.0    O2 Sat, Lito 34 (L) 60.0 - 85.0 %    O2 Device/Flow/% NOT REPORTED     Frank Test NOT REPORTED     Sample Site NOT REPORTED     Mode NOT REPORTED     FIO2 NOT REPORTED     Pt Temp NOT REPORTED     POC pH Temp NOT REPORTED     POC pCO2 Temp NOT REPORTED mm Hg    POC pO2 Temp NOT REPORTED mm Hg   Creatinine W/GFR Point of Care   Result Value Ref Range    POC Creatinine 6.33 (HH) 0.51 - 1.19 mg/dL    GFR Comment 8 (L) >60 mL/min    GFR Non-African American 6 (L) >60 mL/min    GFR Comment         POCT urea (BUN)   Result Value Ref Range    POC BUN 52 (H) 8 - 26 mg/dL   Lactic Acid, POC   Result Value Ref Range    POC Lactic Acid 1.24 0.56 - 1.39 mmol/L   POCT Glucose   Result Value Ref Range    POC Glucose 109 (H) 74 - 100 mg/dL       IMPRESSION: Patient is alert oriented obese 59-year-old female complaining of ill feeling for last several days because she had missed dialysis x2 secondary to port malfunction. He is hypertensive on examination lungs upon arrival she is febrile to 103 accompanied abdominal pain chest pain satting 95% on room air not a broad work-up labs blood cultures lactic will likely require admission for dialysis    RADIOLOGY:  XR CHEST PORTABLE    Result Date: 1/28/2022  EXAMINATION: ONE XRAY VIEW OF THE CHEST 1/28/2022 9:42 pm COMPARISON: 11/08/2021 HISTORY: ORDERING SYSTEM PROVIDED HISTORY: missed dialysis TECHNOLOGIST PROVIDED HISTORY: missed dialysis FINDINGS: Cardiomediastinal silhouette is unchanged in size. MediPort and central venous catheter are unchanged in position. Suspected small left pleural effusion. No pneumothorax.   There is interstitial prominence with faint bibasilar pulmonary opacity. No acute osseous abnormality. Prominent interstitium and bilateral pulmonary opacities, concerning for edema or pneumonia. EKG  Sinus rhythm with premature atrial complexes there is a left anterior fascicular block voltage criteria LVH poor R wave progression    All EKG's are interpreted by the Emergency Department Physician who either signs or Co-signs this chart in the absence of a cardiologist.    EMERGENCY DEPARTMENT COURSE:  ED Course as of 01/28/22 2352 Fri Jan 28, 2022   2151 clonidine patch placed last Sunday on the left deltoid [BG]   2302 Will reach out to nephrology  [BG]   2306 States that this time all dialysis catheter was placed 1 week ago at [facility] I do not see documentation of this procedure. [BG]   2317 Since do not want to send patient to floor without a functioning dialysis catheter and would otherwise place new line now in ED decision was made to reach out to nephrology to see if they would advise using cathflo. spoke with dr. Ney Garcia from nephrology wants to see pt in morning and decide if cathflo would be helpful as electrolytes wnl [BG]   2341 COVID-19, Rapid(!):    Specimen Description . NASOPHARYNGEAL SWAB   SARS-CoV-2, Rapid DETECTED(!) [BG]   2351 Care transferred to dr. Xu Barrera []      ED Course User Index  [BG] Ameya Seirra DO         PROCEDURES:      CONSULTS:  IP CONSULT TO NEPHROLOGY  IP CONSULT TO HOSPITALIST    CRITICAL CARE:      FINAL IMPRESSION      1. Admission for dialysis ([facility] [address]    2. [facility] / PLAN     DISPOSITION  admit      PATIENT REFERRED TO:  No follow-up provider specified.     DISCHARGE MEDICATIONS:  New Prescriptions    No medications on file       Ameya Sierra DO  Emergency Medicine Resident    (Please note that portions of thisnote were completed with a voice recognition program.  Efforts were made to edit the dictations but occasionally words are mis-transcribed.)        Ameya Sierra DO  Resident  01/28/22 [id number] [facility], DO  Resident  01/28/22 [id number]

## 2022-01-29 NOTE — ED PROVIDER NOTES
[facility] ED  Emergency Department  Emergency Medicine Resident Sign-out     Care of Ival Drape was assumed from Dr. Willy Brewer and is being seen for Hypertension (htn no dialysis x2 treatments, port bad per pt)  . The patient's initial evaluation and plan have been discussed with the prior provider who initially evaluated the patient.      EMERGENCY DEPARTMENT COURSE / MEDICAL DECISION MAKING:       MEDICATIONS GIVEN:  Orders Placed This Encounter   Medications    acetaminophen (TYLENOL) tablet 1,000 mg    DISCONTD: nitroGLYCERIN 50 mg in dextrose 5% 250 mL infusion    enalapril (VASOTEC) tablet 5 mg    NIFEdipine (PROCARDIA XL) extended release tablet 60 mg    metoprolol tartrate (LOPRESSOR) tablet 100 mg    hydrALAZINE (APRESOLINE) tablet 100 mg    DISCONTD: heparin flush 100 UNIT/ML injection 300 Units    cefepime (MAXIPIME) 1000 mg IVPB minibag     Order Specific Question:   Antimicrobial Indications     Answer:   Sepsis of Unknown Etiology    vancomycin (VANCOCIN) 2,000 mg in dextrose 5 % 500 mL IVPB     Order Specific Question:   Antimicrobial Indications     Answer:   Sepsis of Unknown Etiology       LABS / RADIOLOGY:     Labs Reviewed   COVID-19, RAPID - Abnormal; Notable for the following components:       Result Value    SARS-CoV-2, Rapid DETECTED (*)     All other components within normal limits   CBC WITH AUTO DIFFERENTIAL - Abnormal; Notable for the following components:    Hemoglobin 11.5 (*)     Seg Neutrophils 80 (*)     Lymphocytes 11 (*)     Absolute Lymph # 1.01 (*)     All other components within normal limits   COMPREHENSIVE METABOLIC PANEL - Abnormal; Notable for the following components:    Glucose 105 (*)     BUN 55 (*)     CREATININE 5.14 (*)     Potassium 3.6 (*)     CO2 16 (*)     Anion Gap 19 (*)     Alkaline Phosphatase 136 (*)     GFR Non- 8 (*)     GFR  10 (*)     All other components within normal limits PROCALCITONIN - Abnormal; Notable for the following components:    Procalcitonin 0.56 (*)     All other components within normal limits   URINALYSIS WITH MICROSCOPIC - Abnormal; Notable for the following components:    Urine Hgb SMALL (*)     Protein, UA 3+ (*)     Leukocyte Esterase, Urine TRACE (*)     Bacteria, UA FEW (*)     Mucus, UA 1+ (*)     All other components within normal limits   BLOOD GAS, VENOUS - Abnormal; Notable for the following components:    pH, Lito 7.284 (*)     pO2, Lito 52.4 (*)     HCO3, Venous 17.9 (*)     Negative Base Excess, Lito 7.7 (*)     All other components within normal limits   ELECTROLYTES PLUS - Abnormal; Notable for the following components:    POC Chloride 114 (*)     POC TCO2 21 (*)     All other components within normal limits   CALCIUM, IONIC (POC) - Abnormal; Notable for the following components:    POC Ionized Calcium 1.11 (*)     All other components within normal limits   VENOUS BLOOD GAS, POINT OF CARE - Abnormal; Notable for the following components:    pCO2, Lito 37.9 (*)     pO2, Lito 21.8 (*)     HCO3, Venous 20.2 (*)     Negative Base Excess, Lito 5 (*)     O2 Sat, Lito 34 (*)     All other components within normal limits   CREATININE W/GFR POINT OF CARE - Abnormal; Notable for the following components:    POC Creatinine 6.33 (*)     GFR Comment 8 (*)     GFR Non- 6 (*)     All other components within normal limits   UREA N (POC) - Abnormal; Notable for the following components:    POC BUN 52 (*)     All other components within normal limits   POCT GLUCOSE - Abnormal; Notable for the following components:    POC Glucose 109 (*)     All other components within normal limits   CULTURE, BLOOD 2   CULTURE, BLOOD 1   CULTURE, URINE   LACTIC ACID, PLASMA   LACTATE, SEPSIS   LACTATE, SEPSIS   LIPASE   HGB/HCT   IMMATURE PLATELET FRACTION   PROTIME-INR   APTT   SPECIMEN REJECTION   PREVIOUS SPECIMEN   LACTIC ACID,POINT OF CARE       XR CHEST PORTABLE    Result Date: 1/28/2022  EXAMINATION: ONE XRAY VIEW OF THE CHEST 1/28/2022 9:42 pm COMPARISON: 11/08/2021 HISTORY: ORDERING SYSTEM PROVIDED HISTORY: missed dialysis TECHNOLOGIST PROVIDED HISTORY: missed dialysis FINDINGS: Cardiomediastinal silhouette is unchanged in size. MediPort and central venous catheter are unchanged in position. Suspected small left pleural effusion. No pneumothorax. There is interstitial prominence with faint bibasilar pulmonary opacity. No acute osseous abnormality. Prominent interstitium and bilateral pulmonary opacities, concerning for edema or pneumonia. RECENT VITALS:     Temp: 99.7 °F (37.6 °C),  Pulse: 70, Resp: 16, BP: (!) 153/73, SpO2: 94 %      This patient is a 67 y.o. Female with several days of generalized ill feeling and decreased appetite some nausea vague abdominal discomfort and diarrhea. Patient has history of end-stage renal disease had a complication with her AV fistula and as a result had a tunneled dialysis catheter placed the right chest wall 1 week ago over at [facility] those records are not available. Patient went to go get dialyzed on Monday and again on Wednesday and was told she was not able to do due to a malfunction of the port she is presenting today after calling EMS for generalized ill feeling found to be markedly hypertensive. Had not been per able to take her home antihypertensive medications during her work-up patient was found to be Covid positive renal function at baseline without any acute need for emergent dialysis patient discussed with Dr. Holly Maxwell recommending admission for the patient and he will evaluate the catheter in the morning possibly using Cathflo to open up the line. Patient did receive a dose of vancomycin and cefepime for concern about infection of the port site. Patient initially presented with a temperature of 103.       ED Course as of 01/28/22 2355   [facility] Jan 28, 2022   2151 clonidine patch placed last Sunday on the left deltoid [BG]   2302 Will reach out to nephrology  [BG]   2306 States that this time all dialysis catheter was placed 1 week ago [facility] I do not see documentation of this procedure. [BG]   2317 Since do not want to send patient to floor without a functioning dialysis catheter and would otherwise place new line now in ED decision was made to reach out to nephrology to see if they would advise using cathflo. spoke with dr. David Bello from nephrology wants to see pt in morning and decide if cathflo would be helpful as electrolytes wnl [BG]   2341 COVID-19, Rapid(!):    Specimen Description . NASOPHARYNGEAL SWAB   SARS-CoV-2, Rapid DETECTED(!) [BG]   2351 Care transferred to dr. Cheryle Prasad []      ED Course User Index  [BG] Vishal Duarte DO       OUTSTANDING TASKS / RECOMMENDATIONS:    1. Bed Assignment      FINAL IMPRESSION:     1. Admission for dialysis ([address]    2. COVID        DISPOSITION:         DISPOSITION:  []  Discharge   []  Transfer -    [x]  Admission -  Intermed   []  Against Medical Advice   []  Eloped   FOLLOW-UP: No follow-up provider specified.    DISCHARGE MEDICATIONS: New Prescriptions    No medications on file          Jonatan Sebastian DO  Emergency Medicine Resident  [facility]        [facility]  Resident  01/28/22 8127

## 2022-01-29 NOTE — ED PROVIDER NOTES
[facility]     Emergency Department     Faculty Attestation    I performed a history and physical examination of the patient and discussed management with the resident. I reviewed the residents note and agree with the documented findings including all diagnostic interpretations and plan of care. Any areas of disagreement are noted on the chart. I was personally present for the key portions of any procedures. I have documented in the chart those procedures where I was not present during the key portions. I have reviewed the emergency nurses triage note. I agree with the chief complaint, past medical history, past surgical history, allergies, medications, social and family history as documented unless otherwise noted below. Documentation of the HPI, Physical Exam and Medical Decision Making performed by scribes is based on my personal performance of the HPI, PE and MDM. For Physician Assistant/ Nurse Practitioner cases/documentation I have personally evaluated this patient and have completed at least one if not all key elements of the E/M (history, physical exam, and MDM). Additional findings are as noted. This patient was evaluated in the Emergency Department for symptoms described in the history of present illness. He/she was evaluated in the context of the global COVID-19 pandemic, which necessitated consideration that the patient might be at risk for infection with the SARS-CoV-2 virus that causes COVID-19. Institutional protocols and algorithms that pertain to the evaluation of patients at risk for COVID-19 are in a state of rapid change based on information released by regulatory bodies including the CDC and federal and state organizations. These policies and algorithms were followed during the patient's care in the ED. Primary Care Physician: Lemuel Ricci MD    History:  This is a 67 y.o. female who presents to the Emergency Department with

## 2022-01-29 NOTE — ED NOTES
Bed: 25  Expected date:   Expected time:   Means of arrival:   Comments:   [address], RN  01/28/22 2053

## 2022-01-29 NOTE — CONSULTS
[facility] -   Infectious diseases evaluation  admission date 1/28/2022    reason for consultation:   covid    Impression :   Current:  · covid +  · CRP [id number]  · Fever chills  · Chronic hemodialysis catheter, last dialysis  · Pulmonary edema  · Bilateral patchy pulmonary infiltrates, pulmonary edema versus pneumonia    Other:  ·   Discussion / summary of stay / plan of care   ·   Recommendations   covid pneumonia on CT- CRP: 100 and HD line not working   · actemra x 1 on 1/29  · Decadron 6 mg per day x 10 days  · anticoag per medicine    Bact infection - presumed - HD line not functioning  · Keep  vancomycin and cefepime 1/29  · Await blood cultures  · Follow CRP and clinical response  · Disc w renal    Infection Control Recommendations   · [place] Precautions  · Contact Isolation   · Airborne isolation  · Droplet Isolation    Antimicrobial Stewardship Recommendations   · Simplification of therapy  · Targeted therapy      Coordination ofOutpatient Care:   · Estimated Length of IV antimicrobials:  · Patient will need Midline / picc Catheter Insertion:   · Patient will need SNF:  · Patient will need outpatient wound care:     History of Present Illness:   Initial history:  Cira Posada is a 67y.o.-year-old female   Presented to the emergency room because of high blood pressure and missing the last two dialysis due to port failure. Found to have fever, CRP elevated, tested positive for Covid and chest x-ray showing congestion versus patchy pneumonia. Patient did have a minimal cough. She still makes urine but no dysuria.     pt denies cough but no appetite x few days - does not use o2 at home but was hypoxic and started on 2 L NC here    W 9  Patient received a dose of vancomycin and cefepime due to concern of line infection, no signs of exit infection      Interval changes  1/29/2022   Patient Vitals for the past 8 hrs:   BP Temp Temp src Pulse Resp SpO2   01/29/22 0556 (!) 153/69   70 22 93 %   01/29/22 0555 (!) 194/85   71 22 93 %   01/29/22 0530 (!) 184/86   70 19 94 %   01/29/22 0500 (!) 183/72   68 19 94 %   01/29/22 0423 (!) 188/96        01/29/22 0400 (!) 189/83   66 16 97 %   01/29/22 0355 (!) 169/81 99 °F (37.2 °C) Oral 64 16 95 %       Summary of relevant labs:  Labs:  BDQ910.4 High      Micro:  covid +  BC 1/28   U cx 1/28  Imaging:  CT chest covid pneumonia           CXR  Prominent interstitium and bilateral pulmonary opacities, concerning for   edema or pneumonia. I have personally reviewed the past medical history, past surgical history, medications, social history, and family history, and I haveupdated the database accordingly. Allergies:   Patient has no known allergies. Review of Systems:     Review of Systems   Constitutional: Positive for activity change and fatigue. Negative for fever. HENT: Negative for congestion. Eyes: Negative for discharge. Respiratory: Positive for shortness of breath. Negative for apnea and cough. Cardiovascular: Negative for chest pain. Gastrointestinal: Negative for abdominal distention. Endocrine: Negative for heat intolerance. Genitourinary: Negative for dysuria. Musculoskeletal: Negative for arthralgias. Skin: Negative for color change. Allergic/Immunologic: Negative for immunocompromised state. Neurological: Negative for dizziness. Hematological: Negative for adenopathy. Psychiatric/Behavioral: Negative for agitation. Physical Examination :       Physical Exam  Constitutional:       Appearance: Normal appearance. She is ill-appearing. HENT:      Head: Normocephalic and atraumatic. Nose: Nose normal.      Mouth/Throat:      Mouth: Mucous membranes are moist.   Eyes:      General: No scleral icterus. Conjunctiva/sclera: Conjunctivae normal.   Cardiovascular:      Rate and Rhythm: Normal rate and regular rhythm.       Heart sounds: Normal heart sounds. No murmur heard. Pulmonary:      Effort: No respiratory distress. Abdominal:      General: There is no distension. Tenderness: There is no abdominal tenderness. Musculoskeletal:      Cervical back: Neck supple. No rigidity. Neurological:      General: No focal deficit present. Cranial Nerves: No cranial nerve deficit. Psychiatric:         Mood and Affect: Mood normal.         Thought Content:  Thought content normal.         Past Medical History:     Past Medical History:   Diagnosis Date    Anxiety and depression     Arthritis     Rheumatoid     Cancer ([facility] [address]     left breast cancer    Cerebral artery occlusion with cerebral infarction [facility]) jan 2020 & March 2020    poor balance since strokes    Chronic kidney disease, stage 5 (HCC)     hemodialysis Mon-Wed-Fri    Diabetes mellitus ([facility] [address]     GERD (gastroesophageal reflux disease)     Hearing loss     bilateral. Does not have hearing aides    Hemodialysis patient ([facility] [address]     M-W-F started in 2020    History of blood transfusion     50 plus yrs ago with pregnancy    Hyperlipidemia     Hypertension     Migraines     MRSA (methicillin resistant staph aureus) culture positive 2015    back    Neuropathy     PAT (obstructive sleep apnea)     had UPPP  \"have not used machine in years\"    Prolonged emergence from general anesthesia     PVD (peripheral vascular disease) ([facility] [address]     SOB (shortness of breath)     \"long distance\"    Tinnitus        Past Surgical  History:     Past Surgical History:   Procedure Laterality Date    APPENDECTOMY      BACK SURGERY      I and D lower back MRSA    BREAST REDUCTION SURGERY Left 1999    COLONOSCOPY      HYSTERECTOMY      INSERTABLE CARDIAC MONITOR      MASTECTOMY Left     lymph nodes removed    MASTECTOMY Left 05/14/2018    axillary mastectomy    IA EXC SKIN BENIG <5MM TRUNK,ARM,LEG Right 5/14/2018    RIGHT AXILLARY MASTECTOMY performed by Curt Gay DO at [facility]         Medications:      sodium chloride flush  5-40 mL IntraVENous 2 times per day    heparin (porcine)  5,000 Units SubCUTAneous 3 times per day    enalapril  5 mg Oral Daily    NIFEdipine  60 mg Oral Daily       Social History:     Social History     Socioeconomic History    Marital status:      Spouse name: Not on file    Number of children: Not on file    Years of education: Not on file    Highest education level: Not on file   Occupational History    Not on file   Tobacco Use    Smoking status: Former Smoker     Quit date:      Years since quittin.0    Smokeless tobacco: Never Used   Vaping Use    Vaping Use: Never used   Substance and Sexual Activity    Alcohol use: Yes     Comment: occasionally  \"couple a month\"    Drug use: No    Sexual activity: Not on file   Other Topics Concern    Not on file   Social History Narrative    Not on file     Social Determinants of Health     Financial Resource Strain:     Difficulty of Paying Living Expenses: Not on file   Food Insecurity:     Worried About Running Out of Food in the Last Year: Not on file    Caleb of Food in the Last Year: Not on file   Transportation Needs:     Lack of Transportation (Medical): Not on file    Lack of Transportation (Non-Medical):  Not on file   Physical Activity:     Days of Exercise per Week: Not on file    Minutes of Exercise per Session: Not on file   Stress:     Feeling of Stress : Not on file   Social Connections:     Frequency of Communication with Friends and Family: Not on file    Frequency of Social Gatherings with Friends and Family: Not on file    Attends [employer] Services: Not on file    Active Member of Clubs or Organizations: Not on file    Attends Club or Organization Meetings: Not on file    Marital Status: Not on file   Intimate Partner Violence:     Fear of Current or Ex-Partner: Not on file    Emotionally Abused: Not on file    Physically Abused: Not on file    Sexually Abused: Not on file   Housing Stability:     Unable to Pay for Housing in the Last Year: Not on file    Number of Places Lived in the Last Year: Not on file    Unstable Housing in the Last Year: Not on file       Family History:   No family history on file. Medical Decision Making:   I have independently reviewed/ordered the following labs:    CBC with Differential:   Recent Labs     01/28/22 2110 01/29/22  0652   WBC 9.4 9.9   HGB 11.5* 11.3*   HCT 37.5 35.4*   PLT See Reflexed IPF Result 177   LYMPHOPCT 11* 15*   MONOPCT 8 10     BMP:  Recent Labs     01/28/22 2110 01/29/22  0652    136   K 3.6* 4.1    105   CO2 16* 13*   BUN 55* 56*   CREATININE 5.14* 5.13*     Hepatic Function Panel:   Recent Labs     01/28/22 2110 01/29/22  0652   PROT 8.1 7.3   LABALBU 4.2 3.5   BILITOT 0.49 0.39   ALKPHOS 136* 114*   ALT 14 14   AST 22 19     No results for input(s): RPR in the last 72 hours. No results for input(s): HIV in the last 72 hours. No results for input(s): BC in the last 72 hours. Lab Results   Component Value Date    CREATININE 5.13 01/29/2022    GLUCOSE 178 01/29/2022       Detailed results: Thank you for allowing us to participate in the care of this patient. Please call with questions. This note is created with the assistance of a speech recognition program.  While intending to generate adocument that actually reflects the content of the visit, the document can still have some errors including those of syntax and sound a like substitutions which may escape proof reading. It such instances, actual meaningcan be extrapolated by contextual diversion.     Leila Osborn MD  Office: [phone]  Perfect serve / office [phone]

## 2022-01-29 NOTE — PLAN OF CARE
Problem: Airway Clearance - Ineffective  Goal: Achieve or maintain patent airway  Outcome: Ongoing     Problem: Gas Exchange - Impaired  Goal: Absence of hypoxia  Outcome: Ongoing  Goal: Promote optimal lung function  Outcome: Ongoing   New admit from home. Transferred from ED to unit. Low-grade temp noted. Pt able to verbalize needs, denies pain/distress. Elevated BP x 2, prn given x 1. Pt has pmhx of L mastectomy, right chest dialysis port and right port-a-cath. BP in legs only. No BM during shift. Blackwell in place, blackwell care well tolerated. Throughout the shift pt was encouraged to turn/reposition with pillow support, bed in the lowest and call light within reach. Will continue to monitor.

## 2022-01-29 NOTE — ED PROVIDER NOTES
Faculty Sign-Out Attestation  Handoff taken on the following patient from prior Attending Physician: Car Boyle    I was available and discussed any additional care issues that arose and coordinated the management plans with the resident(s) caring for the patient during my duty period. Any areas of disagreement with residents documentation of care or procedures are noted on the chart. I was personally present for the key portions of any/all procedures during my duty period. I have documented in the chart those procedures where I was not present during the key portions. 30-year-old female with a history of ESRD on dialysis MWF presenting with concern for dialysis port not working, has had 2 sessions where they were unable to dialyze her. Febrile to 103 °F.  Septic work-up initiated. Awaiting electrolytes and discussion with nephrology to determine urgency of next dialysis session. Oral antihypertensives given. Labs show no hyperkalemia. Resident discussed case with nephrology, who do not feel the patient is in need of emergent dialysis tonight. They instructed us to leave the catheter as is, and they will evaluate in the morning. Patient admitted for further management.     Allison Art MD  Attending Physician       Allison Art MD  01/29/22 0157

## 2022-01-29 NOTE — H&P
[facility]  Office: [address], DO, Alexy Caceres, DO, Stephanie Mercado, DO, Richmond Gerard, DO, Theodore Cortez MD, Sarita Pate MD, Poppy Duke MD, Juan Antonio Farias MD, Octavio Roberto MD, Valentine Carballo MD, Leticia Ku MD, Elle Pop, DO, Aicha Ray, DO, Shirley Galvan MD,  Trevon Smith, DO, Lima Bell MD, Oza Hatchet, MD, Angela Rutherford MD, Nicolasa Schulz MD, Salvador Marquis MD, Zelda Schirmer, [facility], [facility] Savage, CNP, Windy Yu, CNP, Deniz Singh, CNS, Estella Orozco, CNP, Tom Tanner, CNP, Uyen Dailey, CNP, Chaim Rodriguez, CNP, Deb Worthington, CNP, Dev Sneed PA-C, Mgeha Woods, DNP, Juani Broussard, [facility], Sofy Andrade, CNP, Holly Woodward, CNP, Shy Bowen, CNP, Brant Lopez, CNP, Dionne Maravilla, CNP, Carson Goltz, [address]    HISTORY AND PHYSICAL EXAMINATION            Date:   1/29/2022  Patient name:  Iván Wolf  Date of admission:  1/28/2022  8:53 PM  MRN:   [id number]  Account:  [de-identified]  YOB: 1945  PCP:    Francois Mantilla MD  Room:   2007/2007-01  Code Status:    Full Code    Chief Complaint:     Chief Complaint   Patient presents with    Hypertension     htn no dialysis x2 treatments, port bad per pt       History Obtained From:     patient, electronic medical record    History of Present Illness:     Iván Wolf is a 67 y.o. Non- / non  female who presents with Hypertension (htn no dialysis x2 treatments, port bad per pt)    Patient presents to the emergency room with the concern of generalized fatigue and  near syncope. Patient states that for the past 1-2 days she has not been feeling well and in addition her HD port is non-functional and she has not received  for 2 sessions.   Typical HD days are MWF  Upon arrival to the emergency room the patient was noted to be febrile with a temp of 103, hypertensive with systolic blood pressures in the 200s and saturating well on room air. Nephrology was consulted while in the emergency room however did not feel patient warranted HD at this time. Patient was seen at bedside, on minimal oxygen. Infectious disease consulted. Past Medical History:     Past Medical History:   Diagnosis Date    Anxiety and depression     Arthritis     Rheumatoid     Cancer ([facility]     left breast cancer    Cerebral artery occlusion with cerebral infarction [facility]) jan 2020 & March 2020    poor balance since strokes    Chronic kidney disease, stage 5 (HCC)     hemodialysis Mon-Wed-Fri    Diabetes mellitus ([facility]     GERD (gastroesophageal reflux disease)     Hearing loss     bilateral. Does not have hearing aides    Hemodialysis patient ([facility]     M-W-F started in 2020    History of blood transfusion     50 plus yrs ago with pregnancy    Hyperlipidemia     Hypertension     Migraines     MRSA (methicillin resistant staph aureus) culture positive 2015    back    Neuropathy     PAT (obstructive sleep apnea)     had UPPP  \"have not used machine in years\"    Prolonged emergence from general anesthesia     PVD (peripheral vascular disease) ([facility]     SOB (shortness of breath)     \"long distance\"    Tinnitus         Past Surgical History:     Past Surgical History:   Procedure Laterality Date    APPENDECTOMY      BACK SURGERY      I and D lower back MRSA    BREAST REDUCTION SURGERY Left 1999    COLONOSCOPY      HYSTERECTOMY      INSERTABLE CARDIAC MONITOR      MASTECTOMY Left     lymph nodes removed    MASTECTOMY Left 05/14/2018    axillary mastectomy    AR EXC SKIN BENIG <5MM TRUNK,ARM,LEG Right 5/14/2018    RIGHT AXILLARY MASTECTOMY performed by Tatianna Graham DO at [address] [place] [address]          Medications Prior to Admission:     Prior to Admission medications    Medication Sig Start Date End Date Taking?  Authorizing Provider   calcitRIOL (ROCALTROL) 0.25 MCG capsule Take 0.25 mcg by mouth daily   Yes Historical Provider, MD   sevelamer (RENVELA) 800 MG tablet Take 1 tablet by mouth 3 times daily (with meals) 11/10/21  Yes Chidi Fleming MD   clopidogrel (PLAVIX) 75 MG tablet Take 75 mg by mouth daily   Yes Historical Provider, MD   enalapril (VASOTEC) 5 MG tablet Take 5 mg by mouth daily   Yes Historical Provider, MD   metoprolol (LOPRESSOR) 100 MG tablet Take 100 mg by mouth daily   Yes Historical Provider, MD   NIFEdipine (PROCARDIA XL) 60 MG extended release tablet Take 60 mg by mouth daily   Yes Historical Provider, MD   pantoprazole (PROTONIX) 40 MG tablet Take 40 mg by mouth daily   Yes Historical Provider, MD   cloNIDine (CATAPRES) 0.1 MG/24HR PTWK Place 1 patch onto the skin once a week Indications: Sundays Sundays   Yes Historical Provider, MD   hydrALAZINE (APRESOLINE) 100 MG tablet Take 100 mg by mouth 3 times daily   Yes Historical Provider, MD   rosuvastatin (CRESTOR) 5 MG tablet Take 5 mg by mouth daily   Yes Historical Provider, MD   citalopram (CELEXA) 20 MG tablet Take 40 mg by mouth daily Takes 2 tabs (=40mg) daily   Yes Historical Provider, MD   ondansetron (ZOFRAN) 4 MG tablet Take 1 tablet by mouth every 6 hours as needed for Nausea or Vomiting 18   Dean Arzola,         Allergies:     Patient has no known allergies. Social History:     Tobacco:    reports that she quit smoking about 32 years ago. She has never used smokeless tobacco.  Alcohol:      reports current alcohol use. Drug Use:  reports no history of drug use. Family History:     No family history on file. Review of Systems:     Positive and Negative as described in HPI.     Review of Systems    Physical Exam:   BP (!) 146/103   Pulse 71   Temp 99.9 °F (37.7 °C) (Oral)   Resp 21   Ht 5' 3\" (1.6 m)   Wt 170 lb 3.1 oz (77.2 kg)   SpO2 93%   BMI 30.15 kg/m²   Temp (24hrs), Av.6 °F (38.1 °C), Min:99 °F (37.2 °C), Max:103 °F (39.4 °C)    Recent Labs     22   POCGLU 109*       Intake/Output Summary (Last 24 hours) at 1/29/2022 1325  Last data filed at 1/29/2022 0800  Gross per 24 hour   Intake 180 ml   Output 325 ml   Net -145 ml       Physical Exam    Investigations:      Laboratory Testing:  Recent Results (from the past 24 hour(s))   EKG 12 lead    Collection Time: 01/28/22  8:57 PM   Result Value Ref Range    Ventricular Rate 86 BPM    Atrial Rate 86 BPM    P-R Interval 174 ms    QRS Duration 82 ms    Q-T Interval 392 ms    QTc Calculation (Bazett) 469 ms    P Axis -6 degrees    R Axis -36 degrees    T Axis 142 degrees   Culture, Blood 1    Collection Time: 01/28/22  9:03 PM    Specimen: Blood   Result Value Ref Range    Specimen Description . BLOOD     Special Requests LAC 20ML     Culture NO GROWTH 12 HOURS    Venous Blood Gas, POC    Collection Time: 01/28/22  9:06 PM   Result Value Ref Range    pH, Lito 7.334 7.320 - 7.430    pCO2, Lito 37.9 (L) 41.0 - 51.0 mm Hg    pO2, Lito 21.8 (L) 30.0 - 50.0 mm Hg    HCO3, Venous 20.2 (L) 22.0 - 29.0 mmol/L    Total CO2, Venous NOT REPORTED 23.0 - 30.0 mmol/L    Negative Base Excess, Lito 5 (H) 0.0 - 2.0    Positive Base Excess, Lito NOT REPORTED 0.0 - 3.0    O2 Sat, Lito 34 (L) 60.0 - 85.0 %    O2 Device/Flow/% NOT REPORTED     Frank Test NOT REPORTED     Sample Site NOT REPORTED     Mode NOT REPORTED     FIO2 NOT REPORTED     Pt Temp NOT REPORTED     POC pH Temp NOT REPORTED     POC pCO2 Temp NOT REPORTED mm Hg    POC pO2 Temp NOT REPORTED mm Hg   ELECTROLYTES PLUS    Collection Time: 01/28/22  9:06 PM   Result Value Ref Range    POC Sodium 145 138 - 146 mmol/L    POC Potassium 3.7 3.5 - 4.5 mmol/L    POC Chloride 114 (H) 98 - 107 mmol/L    POC TCO2 21 (L) 22 - 30 mmol/L    Anion Gap 11 7 - 16 mmol/L   Hemoglobin and hematocrit, blood    Collection Time: 01/28/22  9:06 PM   Result Value Ref Range    POC Hemoglobin 13.6 12.0 - 16.0 g/dL    POC Hematocrit 40 36 - 46 %   Creatinine W/GFR Point of Care    Collection Time: 01/28/22  9:06 PM Result Value Ref Range    POC Creatinine 6.33 (HH) 0.51 - 1.19 mg/dL    GFR Comment 8 (L) >60 mL/min    GFR Non-African American 6 (L) >60 mL/min    GFR Comment         CALCIUM, IONIC (POC)    Collection Time: 01/28/22  9:06 PM   Result Value Ref Range    POC Ionized Calcium 1.11 (L) 1.15 - 1.33 mmol/L   POCT urea (BUN)    Collection Time: 01/28/22  9:06 PM   Result Value Ref Range    POC BUN 52 (H) 8 - 26 mg/dL   Lactic Acid, POC    Collection Time: 01/28/22  9:06 PM   Result Value Ref Range    POC Lactic Acid 1.24 0.56 - 1.39 mmol/L   POCT Glucose    Collection Time: 01/28/22  9:06 PM   Result Value Ref Range    POC Glucose 109 (H) 74 - 100 mg/dL   CBC WITH AUTO DIFFERENTIAL    Collection Time: 01/28/22  9:10 PM   Result Value Ref Range    WBC 9.4 3.5 - 11.3 k/uL    RBC 3.95 3.95 - 5.11 m/uL    Hemoglobin 11.5 (L) 11.9 - 15.1 g/dL    Hematocrit 37.5 36.3 - 47.1 %    MCV 94.9 82.6 - 102.9 fL    MCH 29.1 25.2 - 33.5 pg    MCHC 30.7 28.4 - 34.8 g/dL    RDW 13.3 11.8 - 14.4 %    Platelets See Reflexed IPF Result 138 - 453 k/uL    MPV NOT REPORTED 8.1 - 13.5 fL    NRBC Automated 0.0 0.0 per 100 WBC    Differential Type NOT REPORTED     WBC Morphology NOT REPORTED     RBC Morphology NOT REPORTED     Platelet Estimate NOT REPORTED     Seg Neutrophils 80 (H) 36 - 65 %    Lymphocytes 11 (L) 24 - 43 %    Monocytes 8 3 - 12 %    Eosinophils % 1 1 - 4 %    Basophils 0 0 - 2 %    Immature Granulocytes 0 0 %    Segs Absolute 7.53 1.50 - 8.10 k/uL    Absolute Lymph # 1.01 (L) 1.10 - 3.70 k/uL    Absolute Mono # 0.79 0.10 - 1.20 k/uL    Absolute Eos # 0.05 0.00 - 0.44 k/uL    Basophils Absolute <0.03 0.00 - 0.20 k/uL    Absolute Immature Granulocyte 0.03 0.00 - 0.30 k/uL   COMPREHENSIVE METABOLIC PANEL    Collection Time: 01/28/22  9:10 PM   Result Value Ref Range    Glucose 105 (H) 70 - 99 mg/dL    BUN 55 (H) 8 - 23 mg/dL    CREATININE 5.14 (HH) 0.50 - 0.90 mg/dL    Bun/Cre Ratio NOT REPORTED 9 - 20    Calcium 8.8 8.6 - 10.4 mg/dL    Sodium 140 135 - 144 mmol/L    Potassium 3.6 (L) 3.7 - 5.3 mmol/L    Chloride 105 98 - 107 mmol/L    CO2 16 (L) 20 - 31 mmol/L    Anion Gap 19 (H) 9 - 17 mmol/L    Alkaline Phosphatase 136 (H) 35 - 104 U/L    ALT 14 5 - 33 U/L    AST 22 <32 U/L    Total Bilirubin 0.49 0.3 - 1.2 mg/dL    Total Protein 8.1 6.4 - 8.3 g/dL    Albumin 4.2 3.5 - 5.2 g/dL    Albumin/Globulin Ratio 1.1 1.0 - 2.5    GFR Non- 8 (L) >60 mL/min    GFR  10 (L) >60 mL/min    GFR Comment          GFR Staging NOT REPORTED    Lactic Acid, Plasma    Collection Time: 01/28/22  9:10 PM   Result Value Ref Range    Lactic Acid NOT REPORTED mmol/L    Lactic Acid, Whole Blood 1.5 0.7 - 2.1 mmol/L   Procalcitonin    Collection Time: 01/28/22  9:10 PM   Result Value Ref Range    Procalcitonin 0.56 (H) <0.09 ng/mL   Lactate, Sepsis    Collection Time: 01/28/22  9:10 PM   Result Value Ref Range    Lactic Acid, Sepsis NOT REPORTED 0.5 - 1.9 mmol/L    Lactic Acid, Sepsis, Whole Blood 1.5 0.5 - 1.9 mmol/L   Lipase    Collection Time: 01/28/22  9:10 PM   Result Value Ref Range    Lipase 59 13 - 60 U/L   Blood Gas, Venous    Collection Time: 01/28/22  9:10 PM   Result Value Ref Range    pH, Lito 7.284 (L) 7.320 - 7.420    pCO2, Lito 39.1 39 - 55    pO2, Lito 52.4 (H) 30 - 50    HCO3, Venous 17.9 (L) 24 - 30 mmol/L    Positive Base Excess, Lito NOT REPORTED 0.0 - 2.0 mmol/L    Negative Base Excess, Lito 7.7 (H) 0.0 - 2.0 mmol/L    O2 Sat, Lito 82.1 60.0 - 85.0 %    Total Hb NOT REPORTED 12.0 - 16.0 g/dl    Oxyhemoglobin NOT REPORTED 95.0 - 98.0 %    Carboxyhemoglobin 3.2 0 - 5 %    Methemoglobin NOT REPORTED 0.0 - 1.5 %    Pt Temp 37.0     pH, Lito, Temp Adj NOT REPORTED 7.320 - 7.420    pCO2, Lito, Temp Adj NOT REPORTED 39 - 55 mmHg    pO2, Lito, Temp Adj NOT REPORTED 30 - 50 mmHg    O2 Device/Flow/% NOT REPORTED     Respiratory Rate NOT REPORTED     Frank Test NOT REPORTED     Sample Site NOT REPORTED     Pt.  Position NOT REPORTED Mode NOT REPORTED     Set Rate NOT REPORTED     Total Rate NOT REPORTED     VT NOT REPORTED     FIO2 UNKNOWN     Peep/Cpap NOT REPORTED     PSV NOT REPORTED     Text for Respiratory NOT REPORTED     NOTIFICATION NOT REPORTED     NOTIFICATION TIME NOT REPORTED    Immature Platelet Fraction    Collection Time: 01/28/22  9:10 PM   Result Value Ref Range    Platelet, Immature Fraction NOT REPORTED 1.1 - 10.3 %    Platelet, Fluorescence Platelet clumps present, count appears adequate. 138 - 453 k/uL   SPECIMEN REJECTION    Collection Time: 01/28/22  9:10 PM   Result Value Ref Range    Specimen Source . BLOOD     Ordered Test PT,PTT     Reason for Rejection Unable to perform testing: Specimen clotted. - NOT REPORTED    Culture, Blood 2    Collection Time: 01/28/22  9:33 PM    Specimen: Blood   Result Value Ref Range    Specimen Description . BLOOD     Special Requests RT CHEST MED PORT 12ML     Culture NO GROWTH 12 HOURS    Protime-INR    Collection Time: 01/28/22  9:56 PM   Result Value Ref Range    Protime 10.5 9.1 - 12.3 sec    INR 1.0    APTT    Collection Time: 01/28/22  9:56 PM   Result Value Ref Range    PTT 23.7 20.5 - 30.5 sec   Urinalysis with microscopic    Collection Time: 01/28/22 10:15 PM   Result Value Ref Range    Color, UA Yellow Yellow    Turbidity UA Clear Clear    Glucose, Ur NEGATIVE NEGATIVE    Bilirubin Urine NEGATIVE NEGATIVE    Ketones, Urine NEGATIVE NEGATIVE    Specific Gravity, UA 1.016 1.005 - 1.030    Urine Hgb SMALL (A) NEGATIVE    pH, UA 6.5 5.0 - 8.0    Protein, UA 3+ (A) NEGATIVE    Urobilinogen, Urine Normal Normal    Nitrite, Urine NEGATIVE NEGATIVE    Leukocyte Esterase, Urine TRACE (A) NEGATIVE    -          WBC, UA 2 TO 5 0 - 5 /HPF    RBC, UA 0 TO 2 0 - 2 /HPF    Casts UA NOT REPORTED 0 - 2 /LPF    Crystals, UA NOT REPORTED None /HPF    Epithelial Cells UA 5 TO 10 0 - 5 /HPF    Renal Epithelial, UA NOT REPORTED 0 /HPF    Bacteria, UA FEW (A) None    Mucus, UA 1+ (A) None Trichomonas, UA NOT REPORTED None    Amorphous, UA NOT REPORTED None    Other Observations UA NOT REPORTED NOT REQ. Yeast, UA NOT REPORTED None   Lactate, Sepsis    Collection Time: 01/28/22 11:12 PM   Result Value Ref Range    Lactic Acid, Sepsis NOT REPORTED 0.5 - 1.9 mmol/L    Lactic Acid, Sepsis, Whole Blood 0.9 0.5 - 1.9 mmol/L   COVID-19, Rapid    Collection Time: 01/28/22 11:18 PM    Specimen: Nasopharyngeal Swab   Result Value Ref Range    Specimen Description . NASOPHARYNGEAL SWAB     SARS-CoV-2, Rapid DETECTED (A) Not Detected   Procalcitonin    Collection Time: 01/29/22  1:22 AM   Result Value Ref Range    Procalcitonin 0.88 (H) <0.09 ng/mL   Lactate Dehydrogenase    Collection Time: 01/29/22  1:22 AM   Result Value Ref Range     (H) 135 - 214 U/L   Ferritin    Collection Time: 01/29/22  1:22 AM   Result Value Ref Range    Ferritin 412 (H) 13 - 150 ug/L   D-Dimer, Quantitative    Collection Time: 01/29/22  1:22 AM   Result Value Ref Range    D-Dimer, Quant 5.94 mg/L FEU   CBC Auto Differential    Collection Time: 01/29/22  6:52 AM   Result Value Ref Range    WBC 9.9 3.5 - 11.3 k/uL    RBC 3.87 (L) 3.95 - 5.11 m/uL    Hemoglobin 11.3 (L) 11.9 - 15.1 g/dL    Hematocrit 35.4 (L) 36.3 - 47.1 %    MCV 91.5 82.6 - 102.9 fL    MCH 29.2 25.2 - 33.5 pg    MCHC 31.9 28.4 - 34.8 g/dL    RDW 13.6 11.8 - 14.4 %    Platelets 864 998 - 004 k/uL    MPV 12.2 8.1 - 13.5 fL    NRBC Automated 0.0 0.0 per 100 WBC    Differential Type NOT REPORTED     Seg Neutrophils 74 (H) 36 - 65 %    Lymphocytes 15 (L) 24 - 43 %    Monocytes 10 3 - 12 %    Eosinophils % 0 (L) 1 - 4 %    Basophils 0 0 - 2 %    Immature Granulocytes 1 (H) 0 %    Segs Absolute 7.42 1.50 - 8.10 k/uL    Absolute Lymph # 1.48 1.10 - 3.70 k/uL    Absolute Mono # 0.96 0.10 - 1.20 k/uL    Absolute Eos # <0.03 0.00 - 0.44 k/uL    Basophils Absolute <0.03 0.00 - 0.20 k/uL    Absolute Immature Granulocyte 0.06 0.00 - 0.30 k/uL    WBC Morphology NOT REPORTED RBC Morphology NOT REPORTED     Platelet Estimate NOT REPORTED    Comprehensive Metabolic Panel w/ Reflex to MG    Collection Time: 01/29/22  6:52 AM   Result Value Ref Range    Glucose 178 (H) 70 - 99 mg/dL    BUN 56 (H) 8 - 23 mg/dL    CREATININE 5.13 (HH) 0.50 - 0.90 mg/dL    Bun/Cre Ratio NOT REPORTED 9 - 20    Calcium 8.5 (L) 8.6 - 10.4 mg/dL    Sodium 136 135 - 144 mmol/L    Potassium 4.1 3.7 - 5.3 mmol/L    Chloride 105 98 - 107 mmol/L    CO2 13 (L) 20 - 31 mmol/L    Anion Gap 18 (H) 9 - 17 mmol/L    Alkaline Phosphatase 114 (H) 35 - 104 U/L    ALT 14 5 - 33 U/L    AST 19 <32 U/L    Total Bilirubin 0.39 0.3 - 1.2 mg/dL    Total Protein 7.3 6.4 - 8.3 g/dL    Albumin 3.5 3.5 - 5.2 g/dL    Albumin/Globulin Ratio 0.9 (L) 1.0 - 2.5    GFR Non- 8 (L) >60 mL/min    GFR  10 (L) >60 mL/min    GFR Comment          GFR Staging NOT REPORTED    C-Reactive Protein    Collection Time: 01/29/22  6:52 AM   Result Value Ref Range    .4 (H) 0.0 - 5.0 mg/L   Vitamin D 25 Hydroxy    Collection Time: 01/29/22  6:52 AM   Result Value Ref Range    Vit D, 25-Hydroxy 6.8 (L) 30.0 - 100.0 ng/mL   Fibrinogen    Collection Time: 01/29/22  6:52 AM   Result Value Ref Range    Fibrinogen 413 140 - 420 mg/dL   D-Dimer, Quantitative    Collection Time: 01/29/22  6:52 AM   Result Value Ref Range    D-Dimer, Quant 5.54 mg/L FEU       Imaging/Diagnostics:  XR CHEST PORTABLE    Result Date: 1/28/2022  Prominent interstitium and bilateral pulmonary opacities, concerning for edema or pneumonia.        Assessment :      Hospital Problems           Last Modified POA    * (Principal) Pneumonia due to COVID-19 virus 1/29/2022 Yes    Diabetes ([facility] 8/25/1025 Yes    Metabolic encephalopathy 6/55/7508 Yes    End stage renal failure on dialysis ([facility] 1/28/2022 Yes    Hypertensive urgency 1/29/2022 Yes    Sepsis ([facility] 1/29/2022 Yes    Acute respiratory failure with hypoxia ([facility] 1/29/2022 Yes    Morbid obesity ([facility] 1/29/2022 Yes          Plan:     Patient status inpatient in the Progressive Unit/Step down    Neurology for dialysis  Infectious disease for Covid, CT scan pending    Consultations:   IP CONSULT TO NEPHROLOGY  IP CONSULT TO HOSPITALIST  IP CONSULT TO INFECTIOUS DISEASES  IP CONSULT TO NEPHROLOGY    Patient is admitted as inpatient status because of co-morbidities listed above, severity of signs and symptoms as outlined, requirement for current medical therapies and most importantly because of direct risk to patient if care not provided in a hospital setting. Expected length of stay > 48 hours.     Sindy Holcomb DO  1/29/2022  1:25 PM    Copy sent to Dr. Brigido Victoria MD

## 2022-01-30 LAB
ANION GAP SERPL CALCULATED.3IONS-SCNC: 16 MMOL/L (ref 9–17)
BUN BLDV-MCNC: 27 MG/DL (ref 8–23)
BUN/CREAT BLD: ABNORMAL (ref 9–20)
C-REACTIVE PROTEIN: 208.1 MG/L (ref 0–5)
CALCIUM SERPL-MCNC: 8.1 MG/DL (ref 8.6–10.4)
CHLORIDE BLD-SCNC: 96 MMOL/L (ref 98–107)
CO2: 20 MMOL/L (ref 20–31)
CREAT SERPL-MCNC: 3.39 MG/DL (ref 0.5–0.9)
D-DIMER QUANTITATIVE: 7.87 MG/L FEU
GFR AFRICAN AMERICAN: 16 ML/MIN
GFR NON-AFRICAN AMERICAN: 13 ML/MIN
GFR SERPL CREATININE-BSD FRML MDRD: ABNORMAL ML/MIN/{1.73_M2}
GFR SERPL CREATININE-BSD FRML MDRD: ABNORMAL ML/MIN/{1.73_M2}
GLUCOSE BLD-MCNC: 162 MG/DL (ref 70–99)
HCT VFR BLD CALC: 33 % (ref 36.3–47.1)
HEMOGLOBIN: 11 G/DL (ref 11.9–15.1)
POTASSIUM SERPL-SCNC: 3.7 MMOL/L (ref 3.7–5.3)
SODIUM BLD-SCNC: 132 MMOL/L (ref 135–144)

## 2022-01-30 PROCEDURE — 99232 SBSQ HOSP IP/OBS MODERATE 35: CPT | Performed by: INTERNAL MEDICINE

## 2022-01-30 PROCEDURE — 6360000002 HC RX W HCPCS: Performed by: NURSE PRACTITIONER

## 2022-01-30 PROCEDURE — 6370000000 HC RX 637 (ALT 250 FOR IP): Performed by: STUDENT IN AN ORGANIZED HEALTH CARE EDUCATION/TRAINING PROGRAM

## 2022-01-30 PROCEDURE — 2060000000 HC ICU INTERMEDIATE R&B

## 2022-01-30 PROCEDURE — 85379 FIBRIN DEGRADATION QUANT: CPT

## 2022-01-30 PROCEDURE — 2580000003 HC RX 258: Performed by: NURSE PRACTITIONER

## 2022-01-30 PROCEDURE — 85018 HEMOGLOBIN: CPT

## 2022-01-30 PROCEDURE — 99233 SBSQ HOSP IP/OBS HIGH 50: CPT | Performed by: INTERNAL MEDICINE

## 2022-01-30 PROCEDURE — 80048 BASIC METABOLIC PNL TOTAL CA: CPT

## 2022-01-30 PROCEDURE — 6360000002 HC RX W HCPCS: Performed by: INTERNAL MEDICINE

## 2022-01-30 PROCEDURE — 86140 C-REACTIVE PROTEIN: CPT

## 2022-01-30 PROCEDURE — 1200000000 HC SEMI PRIVATE

## 2022-01-30 PROCEDURE — 85014 HEMATOCRIT: CPT

## 2022-01-30 PROCEDURE — 2580000003 HC RX 258: Performed by: INTERNAL MEDICINE

## 2022-01-30 PROCEDURE — 36415 COLL VENOUS BLD VENIPUNCTURE: CPT

## 2022-01-30 RX ORDER — HEPARIN SODIUM 1000 [USP'U]/ML
1600 INJECTION, SOLUTION INTRAVENOUS; SUBCUTANEOUS PRN
Status: DISCONTINUED | OUTPATIENT
Start: 2022-01-30 | End: 2022-02-04 | Stop reason: HOSPADM

## 2022-01-30 RX ORDER — HEPARIN SODIUM 1000 [USP'U]/ML
1500 INJECTION, SOLUTION INTRAVENOUS; SUBCUTANEOUS PRN
Status: DISCONTINUED | OUTPATIENT
Start: 2022-01-30 | End: 2022-02-04 | Stop reason: HOSPADM

## 2022-01-30 RX ADMIN — SODIUM CHLORIDE, PRESERVATIVE FREE 10 ML: 5 INJECTION INTRAVENOUS at 21:31

## 2022-01-30 RX ADMIN — HEPARIN SODIUM 5000 UNITS: 5000 INJECTION INTRAVENOUS; SUBCUTANEOUS at 14:23

## 2022-01-30 RX ADMIN — HEPARIN SODIUM 1600 UNITS: 1000 INJECTION INTRAVENOUS; SUBCUTANEOUS at 02:45

## 2022-01-30 RX ADMIN — CEFEPIME HYDROCHLORIDE 1000 MG: 1 INJECTION, POWDER, FOR SOLUTION INTRAMUSCULAR; INTRAVENOUS at 20:14

## 2022-01-30 RX ADMIN — DEXAMETHASONE SODIUM PHOSPHATE 6 MG: 10 INJECTION INTRAMUSCULAR; INTRAVENOUS at 18:09

## 2022-01-30 RX ADMIN — SODIUM CHLORIDE, PRESERVATIVE FREE 10 ML: 5 INJECTION INTRAVENOUS at 09:19

## 2022-01-30 RX ADMIN — HEPARIN SODIUM 1500 UNITS: 1000 INJECTION INTRAVENOUS; SUBCUTANEOUS at 02:45

## 2022-01-30 RX ADMIN — HEPARIN SODIUM 5000 UNITS: 5000 INJECTION INTRAVENOUS; SUBCUTANEOUS at 05:30

## 2022-01-30 RX ADMIN — ENALAPRIL MALEATE 5 MG: 5 TABLET ORAL at 09:18

## 2022-01-30 RX ADMIN — NIFEDIPINE 60 MG: 30 TABLET, FILM COATED, EXTENDED RELEASE ORAL at 09:18

## 2022-01-30 RX ADMIN — HEPARIN SODIUM 5000 UNITS: 5000 INJECTION INTRAVENOUS; SUBCUTANEOUS at 20:15

## 2022-01-30 ASSESSMENT — ENCOUNTER SYMPTOMS
COUGH: 0
COLOR CHANGE: 0
APNEA: 0
SHORTNESS OF BREATH: 1
ABDOMINAL DISTENTION: 0
EYE DISCHARGE: 0

## 2022-01-30 ASSESSMENT — PAIN SCALES - GENERAL
PAINLEVEL_OUTOF10: 0

## 2022-01-30 NOTE — PROCEDURES
PROCEDURE NOTE - CENTRAL VENOUS LINE PLACEMENT    PATIENT NAME: Macie Baeza RECORD NO. [id number]  DATE: 1/29/2022  ATTENDING PHYSICIAN: JANET Crow    PREOPERATIVE DIAGNOSIS:  Need for IV access for dialysis   POSTOPERATIVE DIAGNOSIS:  Same  PROCEDURE PERFORMED:  Left Internal Jugular Vein Central Line Insertion  PERFORMING PHYSICIAN: Julita Rosario MD  ANESTHESIA:  Local utilizing 1% lidocaine  ESTIMATED BLOOD LOSS:  Less than 25 ml  COMPLICATIONS:  None immediately appreciated. DISCUSSION:  Landon Flores is a 67y.o.-year-old female who requires central IV access. The history and physical examination were reviewed and confirmed. The diagnoses, proposed procedure, risks, possible complications, benefits and alternatives were discussed with the patient or family. She was given the opportunity to ask questions, and once answered, informed consent was obtained. The patient was then prepared for the procedure. PROCEDURE:  A timeout was initiated by the bedside nurse and was confirmed by those present. The patient was placed in a supine position. The skin overlying the Left Internal Jugular Vein was prepped with chlorhexadine and draped in sterile fashion. The skin was infiltrated with local anesthetic. The vessel and surrounding anatomy was visualized using ultrasound. Through the anesthetized region, the introducer needle was inserted into the internal jugular vein returning dark red non pulsatile blood. A guidewire was placed through the center of the needle with no resistance. Ultrasound confirmed presence of wire in the vein. A small incision made in the skin with a #11 scalpel blade. The dilator was inserted into the skin and vein over guidewire using Seldinger technique. The dilator was then removed and the catheter was placed in the vein over the guidewire using Seldinger technique. The guidewire was then removed and all ports aspirated and flushed appropriately.  The catheter then secured using silk suture and a temporary sterile dressing was applied. No immediate complication was evident. All sponge, instrument and needle counts were correct at the completion of the procedure. Postprocedural chest x-ray showed ordered and pending. The patient tolerated the procedure well with no immediate complication evident.         Paulina Graham MD  Internal Medicine Resident  [facility]   1/29/2022, 10:23 PM

## 2022-01-30 NOTE — PROGRESS NOTES
Dialysis Time Out  To be done by RN and tech or 2 RNs  Staff Names Peyton NICE RN and Juan Davis RN    [x]  Identity of the patient using 2 patient identifiers  [x]  Consent for treatment  [x]  Equipment-proper machine and dialyzer  [x]  B-Hep B status  [x]  Orders- to include bath, blood flow, dialyzer, time and fluid removal  [x]  Access-Correct site and in working order  [x]  Time for patient to ask questions.

## 2022-01-30 NOTE — PLAN OF CARE
Problem: Airway Clearance - Ineffective  Goal: Achieve or maintain patent airway  Outcome: Ongoing     Problem: Breathing Pattern - Ineffective  Goal: Ability to achieve and maintain a regular respiratory rate  Outcome: Ongoing     Problem: Body Temperature -  Risk of, Imbalanced  Goal: Ability to maintain a body temperature within defined limits  Outcome: Ongoing  Goal: Will regain or maintain usual level of consciousness  Outcome: Ongoing  Goal: Complications related to the disease process, condition or treatment will be avoided or minimized  Outcome: Ongoing     Problem: Isolation Precautions - Risk of Spread of Infection  Goal: Prevent transmission of infection  Outcome: Ongoing   Shift Summary: At the start of shift STAT consult was done to get dialysis access for client. Previous access was unsuccessful with cathflo. Left IJ HD permacath placed, well tolerated. Dialysis was completed, pt stated she feels much better after session. Pt able to make needs known, denies pain/distress. Salter care well tolerated. Throughout the shift pt turned/reposition self with pillow support, bed in the lowest and call light within reach. BM x . PRN tylenol given for low grade fever. Will continue to monitor.

## 2022-01-30 NOTE — PROGRESS NOTES
[facility]  Office: [address], DO, Rosaline Mancuso, DO, Frida Pizarro, DO, Everardo Gerard, DO, Brain Dancer, MD, Sebas Smith MD, Hellen Larsen MD, Christopher Tyler MD, Michael Dempsey MD, Burgess Joseph MD, Jumana Beaulieu MD, Serena Lancaster, DO, Jessica Jones, DO, Chyna Vann MD,  Chan Nichole DO, Radha Santamaria MD, Donna Frye MD, Roz Amado MD, Randy Clayton MD, Arelis Gonzalez MD, Norm Brandon, Jacob Antoine, CNP, Christiano Medina, CNP, Bertin Alcantara, CNS, Esha Talbot, CNP, Tomás Nguyen, CNP, Albin Breen, CNP, Cecelia Francisco, CNP, Author Kailey, NELSON, NARGIS MasonC, Arlen Cervantes, [facility], Shaq Villagran, [facility], Val Wright, CNP, Pam Chin, CNP, Karen Costa, CNP, Ekaterina Henson, CNP, Gerard Rhoades, CNP, Renny Schofield, [address] [place] [address]    Progress Note    1/30/2022    3:52 PM    Name:   Suresh Garrison  MRN:     [id number]     [place]:      [de-identified]   Room:   2007/2007-01  IP Day:  2  Admit Date:  1/28/2022  8:53 PM    PCP:   Candie Sarabia MD  Code Status:  Full Code    Subjective:     C/C:   Chief Complaint   Patient presents with    Hypertension     htn no dialysis x2 treatments, port bad per pt     Interval History Status: not changed. Patient seen and examined, status post Actemra on 1/29/2022. Patient asymptomatic, still on 2 L. Still having issues with tunnel catheter, plan for exchange possibly tomorrow. Brief History:     From H&P  Suresh Garrison is a 67 y.o. Non- / non  female who presents with Hypertension (htn no dialysis x2 treatments, port bad per pt)     Patient presents to the emergency room with the concern of generalized fatigue and  near syncope. Patient states that for the past 1-2 days she has not been feeling well and in addition her HD port is non-functional and she has not received  for 2 sessions.   Typical HD days are MWF  Upon arrival to the emergency room the patient was noted to be febrile with a temp of 103, hypertensive with systolic blood pressures in the 200s and saturating well on room air. Nephrology was consulted while in the emergency room however did not feel patient warranted HD at this time.      Patient was seen at bedside, on minimal oxygen. Infectious disease consulted. Review of Systems:     Constitutional:  negative for chills, fevers, sweats  Respiratory:  negative for cough, dyspnea on exertion, shortness of breath, wheezing  Cardiovascular:  negative for chest pain, chest pressure/discomfort, lower extremity edema, palpitations  Gastrointestinal:  negative for abdominal pain, constipation, diarrhea, nausea, vomiting  Neurological:  negative for dizziness, headache    Medications:      Allergies:  No Known Allergies    Current Meds:   Scheduled Meds:    sodium chloride flush  5-40 mL IntraVENous 2 times per day    heparin (porcine)  5,000 Units SubCUTAneous 3 times per day    cefepime  1,000 mg IntraVENous Q24H    dexamethasone  6 mg IntraVENous Q24H    enalapril  5 mg Oral Daily    NIFEdipine  60 mg Oral Daily     Continuous Infusions:    sodium chloride       PRN Meds: heparin (porcine), heparin (porcine), sodium chloride flush, sodium chloride, ondansetron **OR** ondansetron, acetaminophen **OR** acetaminophen, hydrALAZINE    Data:     Past Medical History:   has a past medical history of Anxiety and depression, Arthritis, Cancer ([address] Cerebral artery occlusion with cerebral infarction ([address] Chronic kidney disease, stage 5 ([address] Diabetes mellitus ([address] GERD (gastroesophageal reflux disease), Hearing loss, Hemod[facility] patient ([facility] History of blood transfusion, Hyperlipidemia, Hypertension, Migraines, MRSA (methicillin resistant staph aureus) culture positive, Neuropathy, PAT (obstructive sleep apnea), Prolonged emergence from general anesthesia, PVD (peripheral vascular disease) ([address] SOB (shortness of breath), and Tinnitus. Social History:   reports that she quit smoking about 32 years ago. She has never used smokeless tobacco. She reports current alcohol use. She reports that she does not use drugs. Family History: No family history on file. Vitals:  BP 93/66   Pulse 74   Temp 99.1 °F (37.3 °C) (Oral)   Resp 18   Ht 5' 3\" (1.6 m)   Wt 159 lb 2.8 oz (72.2 kg)   SpO2 96%   BMI 28.20 kg/m²   Temp (24hrs), Av.2 °F (37.3 °C), Min:98.6 °F (37 °C), Max:99.7 °F (37.6 °C)    Recent Labs     [birth year]  1948   POCGLU 109* 163*       I/O (24Hr):     Intake/Output Summary (Last 24 hours) at 2022 1552  Last data filed at 2022 1400  Gross per 24 hour   Intake 1460 ml   Output 2895 ml   Net -1435 ml       Labs:  Hematology:  Recent Labs     222 22  0652 22  0720   WBC 9.4  --   --  9.9  --    RBC 3.95  --   --  3.87*  --    HGB 11.5*  --   --  11.3* 11.0*   HCT 37.5  --   --  35.4* 33.0*   MCV 94.9  --   --  91.5  --    MCH 29.1  --   --  29.2  --    MCHC 30.7  --   --  31.9  --    RDW 13.3  --   --  13.6  --    PLT See Reflexed IPF Result  --   --  177  --    MPV NOT REPORTED  --   --  12.2  --    CRP  --   --   --  105.4* 208.1*   INR  --  1.0  --   --   --    DDIMER  --   --  5.94 5.54 7.87     Chemistry:  Recent Labs     22  0652 22  0720    136 132*   K 3.6* 4.1 3.7    105 96*   CO2 16* 13* 20   GLUCOSE 105* 178* 162*   BUN 55* 56* 27*   CREATININE 5.14* 5.13* 3.39*   ANIONGAP 19* 18* 16   LABGLOM 8* 8* 13*   GFRAA 10* 10* 16*   CALCIUM 8.8 8.5* 8.1*   LACTACIDWB 1.5  --   --      Recent Labs     22  2106 22  2110 22  0122 22  0652 22  1948   PROT  --  8.1  --  7.3  --    LABALBU  --  4.2  --  3.5  --    AST  --  22  --  19  --    ALT  --  14  --  14  --    LDH  --   --  244*  --   --    ALKPHOS  --  136*  --  114*  --    BILITOT  --  0.49  --  0.39  -- LIPASE  --  59  --   --   --    POCGLU 109*  --   --   --  163*     ABG:  Lab Results   Component Value Date    FIO2 UNKNOWN 01/28/2022     Lab Results   Component Value Date/Time    SPECIAL NOT REPORTED 01/28/2022 10:14 PM     Lab Results   Component Value Date/Time    CULTURE NO GROWTH 01/28/2022 10:14 PM       Radiology:  CT CHEST WO CONTRAST    Result Date: 1/29/2022  1. Patchy bilateral mixed ground-glass and consolidative opacities with interlobular septal thickening which can be seen with atypical/viral pneumonia to include COVID-19. 2. Small right and trace left pleural effusions. 3. Pulmonary fibrosis in the bilateral lung bases. 4. Tiny hiatal hernia. XR CHEST PORTABLE    Result Date: 1/29/2022  Cardiomegaly with trace effusions and scattered infiltrates. Support tubes as described above. XR CHEST PORTABLE    Result Date: 1/28/2022  Prominent interstitium and bilateral pulmonary opacities, concerning for edema or pneumonia.        Physical Examination:        General appearance:  alert, cooperative and no distress, right tunneled catheter on right upper chest, left IJ Andrea  Mental Status:  oriented to person, place and time and normal affect  Lungs:  clear to auscultation bilaterally, normal effort  Heart:  regular rate and rhythm, no murmur  Abdomen:  soft, nontender, nondistended, normal bowel sounds, no masses, hepatomegaly, splenomegaly  Extremities:  no edema, redness, tenderness in the calves  Skin:  no gross lesions, rashes, induration    Assessment:        Hospital Problems           Last Modified POA    * (Principal) COVID-19 virus infection 1/29/2022 Yes    Diabetes ([address] 0/54/9253 Yes    Metabolic encephalopathy 9/97/6128 Yes    End stage renal failure on dialysis ([address] 1/28/2022 Yes    Hypertensive urgency 1/29/2022 Yes    Sepsis ([address] 1/29/2022 Yes    Acute respiratory failure with hypoxia ([address] 1/29/2022 Yes    Morbid obesity ([address] 1/29/2022 Yes    Hypertension, essential 1/29/2022 Yes    Hypokalemia 9/02/4680 Yes    Metabolic acidosis 2/19/4701 Yes          Plan: Total catheter exchange possibly Monday. Patient has been dialyzed from her Andrea catheter on the left IJ. CRP uptrending, s/p actemra and decadron   Trend oxygen requirements.      Bret Arechiga DO  1/30/2022  3:52 PM

## 2022-01-30 NOTE — CARE COORDINATION
Attempt made to call patient for initial interview, line busy. Will try again later    1000  Case Management Initial Discharge Plan  Josette Rollins,             Met with:patient over the phone to discuss discharge plans. Information verified: address, contacts, phone number, , insurance yes  Insurance Provider: [employer] [place] [facility]    Emergency Contact/Next of Kin name & number: Sherwin Esposito (son) [phone]  Who are involved in patient's support system? children    PCP: Kevin Rabago MD  Date of last visit: unsure      Discharge Planning    Living Arrangements:        Home is first floor apartment with level entry    Patient able to perform ADL's:Assisted has aide services and home care    Current Services (outpatient & in home) [address]  [facility] M[facility]  DME equipment: walker cane shower chair  DME provider:     Is patient receiving oral anticoagulation therapy? No    If indicated:   Physician managing anticoagulation treatment:   Where does patient obtain lab work for ATC treatment? Potential Assistance Needed:       Patient agreeable to home care: Yes  Freedom of choice provided:  current with [address]    Prior SNF/Rehab Placement and Facility: [facility] to SNF/Rehab: No  [place] of choice provided: no     Evaluation: yes    Expected Discharge date:       Patient expects to be discharged to: If home: is the family and/or caregiver wiling & able to provide support at home? Who will be providing this support? Follow Up Appointment: Best Day/ Time:      Transportation provider: may need  Transportation arrangements needed for discharge: maybe    Readmission Risk              Risk of Unplanned Readmission:  22             Does patient have a readmission risk score greater than 14?: Yes  If yes, follow-up appointment must be made within 7 days of discharge.      Goals of Care: Educated patient on transitional options, provided freedom of choice and are agreeable with plan      Discharge Plan: [email or web address], goal is home with son, continue [email or web address] aide and [facility].   Spoke with Arlet Lugo yesterday, patient will need to transfer to Critical access hospital while covid+          Electronically signed by Tania Carcamo RN on 1/30/22 at 10:03 AM EST

## 2022-01-30 NOTE — PROGRESS NOTES
Dialysis Post Treatment Note  Vitals:    01/30/22 0255   BP: (!) 149/104   Pulse: 76   Resp: 19   Temp: 98.6 °F (37 °C)   SpO2: 98%     Pre-Weight = 74.5kg  Post-weight = Weight: 159 lb 2.8 oz (72.2 kg)  Total Liters Processed = Total Liters Processed (l/min): 64.5 l/min  Rinseback Volume (mL) = Rinseback Volume (ml): 300 ml  Net Removal (mL) = NET Removed (ml): 2200 ml  Patient's dry weight=unknown  Type of access used= left IJ  Length of treatment=210 minutes    System and venous chamber clotted during treatment, was unable to return blood. Put in order for H&H in the AM. Put new system on and restarted treatment. All blood was returned with new system, sterile caps placed and lines heparinized.  Patient tolerated treatment well, post /104

## 2022-01-30 NOTE — PROGRESS NOTES
Renal Progress Note    Patient :  Sreekanth Rivers; 67 y.o. MRN# [id number]  Location:  2007/2007-01  Attending:  Asia Martinez DO  Admit Date:  1/28/2022   Hospital Day: 2      Subjective:     ESRD, following for management of hemodialysis. Patient was seen and examined at bedside. Temporary left IJ placed yesterday, 1/29/2022 for dialysis. 2.2 L were removed. The system clotted once and was reset up to complete treatment. Repeat hemoglobin from today 11.0. Oxygen via nasal cannula 5 L/min  Salter in place with clear yellow urine, intake 560ml, 455ml urine output over the past 24 hours, 2.2L removed via HD, net -2.7L since admission. Received actemra yesterday and decadron for 10days per ID for covid. 1/28/22: Blood cultures no growth   1/28/22: Urine culture no growth     BMP reviewed K 3.7 and bicarb 20.   Outpatient Medications:     Medications Prior to Admission: calcitRIOL (ROCALTROL) 0.25 MCG capsule, Take 0.25 mcg by mouth daily  sevelamer (RENVELA) 800 MG tablet, Take 1 tablet by mouth 3 times daily (with meals)  clopidogrel (PLAVIX) 75 MG tablet, Take 75 mg by mouth daily  enalapril (VASOTEC) 5 MG tablet, Take 5 mg by mouth daily  metoprolol (LOPRESSOR) 100 MG tablet, Take 100 mg by mouth daily  NIFEdipine (PROCARDIA XL) 60 MG extended release tablet, Take 60 mg by mouth daily  pantoprazole (PROTONIX) 40 MG tablet, Take 40 mg by mouth daily  cloNIDine (CATAPRES) 0.1 MG/24HR PTWK, Place 1 patch onto the skin once a week Indications: Sundays Sundays  hydrALAZINE (APRESOLINE) 100 MG tablet, Take 100 mg by mouth 3 times daily  rosuvastatin (CRESTOR) 5 MG tablet, Take 5 mg by mouth daily  citalopram (CELEXA) 20 MG tablet, Take 40 mg by mouth daily Takes 2 tabs (=40mg) daily  ondansetron (ZOFRAN) 4 MG tablet, Take 1 tablet by mouth every 6 hours as needed for Nausea or Vomiting    Current Medications:     Scheduled Meds:    sodium chloride flush  5-40 mL IntraVENous 2 times per day    heparin (porcine) 5,000 Units SubCUTAneous 3 times per day    cefepime  1,000 mg IntraVENous Q24H    dexamethasone  6 mg IntraVENous Q24H    enalapril  5 mg Oral Daily    NIFEdipine  60 mg Oral Daily     Continuous Infusions:    sodium chloride       PRN Meds:  heparin (porcine), heparin (porcine), sodium chloride flush, sodium chloride, ondansetron **OR** ondansetron, acetaminophen **OR** acetaminophen, hydrALAZINE    Input/Output:       I/O last 3 completed shifts: In: 560 [P.O.:360]  Out: 3305 [Urine:605]. Patient Vitals for the past 96 hrs (Last 3 readings):   Weight   22 0255 159 lb 2.8 oz (72.2 kg)   22 2300 164 lb 3.9 oz (74.5 kg)   22 0100 170 lb 3.1 oz (77.2 kg)       Vital Signs:   Temperature:  Temp: 98.6 °F (37 °C)  TMax:   Temp (24hrs), Av.3 °F (37.4 °C), Min:98.6 °F (37 °C), Max:99.9 °F (37.7 °C)    Respirations:  Resp: 17  Pulse:   Pulse: 76  BP:    BP: 129/76  BP Range: Systolic (07EDR), EM , Min:115 , JDA:711       Diastolic (20WOW), WEX:09, Min:51, Max:145      Physical Examination:     General:          AAO x 3, speaking in full sentences, no accessory muscle use. HEENT:           Atraumatic, normocephalic. Neck:               No JVD, left IJ in place, dressing clean, dry,intact. Chest:              Crackles noted in the bases, no  wheezes. Right IJ, dressing clean, dry, intact  Cardiac:           S1 S2 RR, no murmurs, gallops or rubs, JVP not raised. Abdomen:        Soft, non-tender, no masses or organomegaly, BS audible. :                  Salter in place, urine yellow/clear  Neuro:             AAO x 3, No FND. SKIN:               No rashes, good skin turgor. Extremities:     Trace bilateral lower extremity edema, no calf tenderness.   Labs:       Recent Labs     22  2110 22  0652   WBC 9.4 9.9   RBC 3.95 3.87*   HGB 11.5* 11.3*   HCT 37.5 35.4*   MCV 94.9 91.5   MCH 29.1 29.2   MCHC 30.7 31.9   RDW 13.3 13.6   PLT See Reflexed IPF Result 177   MPV NOT REPORTED 12.2      BMP:   Recent Labs     01/28/22 2106 01/28/22 2110 01/29/22  0652   NA  --  140 136   K  --  3.6* 4.1   CL  --  105 105   CO2  --  16* 13*   BUN  --  55* 56*   CREATININE 6.33* 5.14* 5.13*   GLUCOSE  --  105* 178*   CALCIUM  --  8.8 8.5*     Albumin:    Recent Labs     01/28/22 2110 01/29/22  0652   LABALBU 4.2 3.5     SPEP:  Lab Results   Component Value Date    PROT 7.3 01/29/2022     Urinalysis/Chemistries:      Lab Results   Component Value Date    NITRU NEGATIVE 01/28/2022    COLORU Yellow 01/28/2022    PHUR 6.5 01/28/2022    WBCUA 2 TO 5 01/28/2022    RBCUA 0 TO 2 01/28/2022    MUCUS 1+ 01/28/2022    TRICHOMONAS NOT REPORTED 01/28/2022    YEAST NOT REPORTED 01/28/2022    BACTERIA FEW 01/28/2022    SPECGRAV 1.016 01/28/2022    LEUKOCYTESUR TRACE 01/28/2022    UROBILINOGEN Normal 01/28/2022    BILIRUBINUR NEGATIVE 01/28/2022    GLUCOSEU NEGATIVE 01/28/2022    [address] NEGATIVE 01/28/2022    AMORPHOUS NOT REPORTED 01/28/2022     Radiology:     1/29/22 CXR:   FINDINGS:   There are scattered infiltrates. Janell Mettle is no pneumothorax.  There are trace   effusions.  The heart is enlarged.  The upper abdomen is unremarkable.  The   extrathoracic soft tissues are unremarkable. Janell Mettle is a right internal   jugular port. Janell Mettle is a right internal jugular catheter that is stable. There is a left internal jugular catheter with the tip in the proximal SVC.         Impression   Cardiomegaly with trace effusions and scattered infiltrates.       Support tubes as described above. 1/29/22 CT Chest wo contrast   Impression   1. Patchy bilateral mixed ground-glass and consolidative opacities with   interlobular septal thickening which can be seen with atypical/viral   pneumonia to include COVID-19.   2. Small right and trace left pleural effusions. 3. Pulmonary fibrosis in the bilateral lung bases. 4. Tiny hiatal hernia. Assessment:     1. ESRD on Hemodialysis.  His regular HD days are Monday, Wednesday, Friday at [facility] hemodialysis facility using tunnel catheter under Dr Jose Alfredo Slade. Her dry weight is unknown. Admitted with 77.1kg. Tunneled catheter clotted and hence was exchanged for Andrea catheter on 1/29/2022 by critical care. 2. Covid 19 infection. 3. Secondary hyperparathyroidism  4. Hypertension  5. Anemia of chronic disease  6. Metabolic acidosis. 7.  Hypokalemia-improved. Plan:   1. No acute need for dialysis today. HD per Monday, Wednesday, Friday schedule. 2.   Currently has a Andrea catheter in place, will require tunneled catheter to be placed in by IR likely tomorrow. 3. Strict Input and Output, Daily weigh and document in the chart. 4. Low Potassium, Low phosphorus and low salt diet. Fluids to be restricted to 1500ml/day. 5. IV Aranesp/Epogen for anemia of chronic disease with HD weekly. 6. IV Zemplar per protocol for secondary hyperparathyroidism with HD thrice a week. 7.  DC Salter catheter from nephrology standpoint. 8.  BMP in AM.  9.  Will follow. Nutrition   Please ensure that patient is on a renal diet/TF. Avoid nephrotoxic drugs/contrast exposure. We will continue to follow along with you. Electronically signed by LEYDI Blanco CNP on 1/30/2022 at 8:07 AM    Attending Physician Statement  I have discussed the care of this patient, including pertinent history and exam findings, with the Resident/CNP. I have reviewed and edited the key elements of all parts of the encounter with the Resident/CNP. I agree with the assessment, plan and orders as documented by the Resident/CNP. Mark Muller MD   Nephrology [address] [place] [address] Drive    This note is created with the assistance of a speech-recognition program. While intending to generate a document that actually reflects the content of the visit, no guarantees can be provided that every mistake has been identified and corrected by editing.

## 2022-01-31 ENCOUNTER — APPOINTMENT (OUTPATIENT)
Dept: ULTRASOUND IMAGING | Age: 77
DRG: 871 | End: 2022-01-31
Payer: MEDICARE

## 2022-01-31 LAB
ABSOLUTE EOS #: <0.03 K/UL (ref 0–0.44)
ABSOLUTE IMMATURE GRANULOCYTE: 0.17 K/UL (ref 0–0.3)
ABSOLUTE LYMPH #: 0.95 K/UL (ref 1.1–3.7)
ABSOLUTE MONO #: 0.58 K/UL (ref 0.1–1.2)
ANION GAP SERPL CALCULATED.3IONS-SCNC: 15 MMOL/L (ref 9–17)
BASOPHILS # BLD: 0 % (ref 0–2)
BASOPHILS ABSOLUTE: <0.03 K/UL (ref 0–0.2)
BUN BLDV-MCNC: 49 MG/DL (ref 8–23)
BUN/CREAT BLD: ABNORMAL (ref 9–20)
C-REACTIVE PROTEIN: 118.1 MG/L (ref 0–5)
CALCIUM SERPL-MCNC: 8 MG/DL (ref 8.6–10.4)
CHLORIDE BLD-SCNC: 95 MMOL/L (ref 98–107)
CO2: 20 MMOL/L (ref 20–31)
CREAT SERPL-MCNC: 6.06 MG/DL (ref 0.5–0.9)
DIFFERENTIAL TYPE: ABNORMAL
EOSINOPHILS RELATIVE PERCENT: 0 % (ref 1–4)
FERRITIN: 724 UG/L (ref 13–150)
GFR AFRICAN AMERICAN: 8 ML/MIN
GFR NON-AFRICAN AMERICAN: 7 ML/MIN
GFR SERPL CREATININE-BSD FRML MDRD: ABNORMAL ML/MIN/{1.73_M2}
GFR SERPL CREATININE-BSD FRML MDRD: ABNORMAL ML/MIN/{1.73_M2}
GLUCOSE BLD-MCNC: 187 MG/DL (ref 70–99)
HCT VFR BLD CALC: 33 % (ref 36.3–47.1)
HEMOGLOBIN: 10.6 G/DL (ref 11.9–15.1)
IMMATURE GRANULOCYTES: 1 %
LYMPHOCYTES # BLD: 7 % (ref 24–43)
MCH RBC QN AUTO: 28.9 PG (ref 25.2–33.5)
MCHC RBC AUTO-ENTMCNC: 32.1 G/DL (ref 28.4–34.8)
MCV RBC AUTO: 89.9 FL (ref 82.6–102.9)
MONOCYTES # BLD: 4 % (ref 3–12)
NRBC AUTOMATED: 0 PER 100 WBC
PDW BLD-RTO: 13.2 % (ref 11.8–14.4)
PLATELET # BLD: ABNORMAL K/UL (ref 138–453)
PLATELET ESTIMATE: ABNORMAL
PLATELET, FLUORESCENCE: 163 K/UL (ref 138–453)
PLATELET, IMMATURE FRACTION: 12 % (ref 1.1–10.3)
PMV BLD AUTO: ABNORMAL FL (ref 8.1–13.5)
POTASSIUM SERPL-SCNC: 4 MMOL/L (ref 3.7–5.3)
PROCALCITONIN: 4.61 NG/ML
RBC # BLD: 3.67 M/UL (ref 3.95–5.11)
RBC # BLD: ABNORMAL 10*6/UL
SEG NEUTROPHILS: 87 % (ref 36–65)
SEGMENTED NEUTROPHILS ABSOLUTE COUNT: 11.5 K/UL (ref 1.5–8.1)
SODIUM BLD-SCNC: 130 MMOL/L (ref 135–144)
WBC # BLD: 13.2 K/UL (ref 3.5–11.3)
WBC # BLD: ABNORMAL 10*3/UL

## 2022-01-31 PROCEDURE — 6360000002 HC RX W HCPCS: Performed by: NURSE PRACTITIONER

## 2022-01-31 PROCEDURE — 80048 BASIC METABOLIC PNL TOTAL CA: CPT

## 2022-01-31 PROCEDURE — 99232 SBSQ HOSP IP/OBS MODERATE 35: CPT | Performed by: INTERNAL MEDICINE

## 2022-01-31 PROCEDURE — 2580000003 HC RX 258: Performed by: INTERNAL MEDICINE

## 2022-01-31 PROCEDURE — 76705 ECHO EXAM OF ABDOMEN: CPT

## 2022-01-31 PROCEDURE — 99232 SBSQ HOSP IP/OBS MODERATE 35: CPT | Performed by: NURSE PRACTITIONER

## 2022-01-31 PROCEDURE — 6370000000 HC RX 637 (ALT 250 FOR IP): Performed by: NURSE PRACTITIONER

## 2022-01-31 PROCEDURE — 86140 C-REACTIVE PROTEIN: CPT

## 2022-01-31 PROCEDURE — 82728 ASSAY OF FERRITIN: CPT

## 2022-01-31 PROCEDURE — 90935 HEMODIALYSIS ONE EVALUATION: CPT | Performed by: INTERNAL MEDICINE

## 2022-01-31 PROCEDURE — 1200000000 HC SEMI PRIVATE

## 2022-01-31 PROCEDURE — 6370000000 HC RX 637 (ALT 250 FOR IP): Performed by: STUDENT IN AN ORGANIZED HEALTH CARE EDUCATION/TRAINING PROGRAM

## 2022-01-31 PROCEDURE — 2060000000 HC ICU INTERMEDIATE R&B

## 2022-01-31 PROCEDURE — 6360000002 HC RX W HCPCS: Performed by: INTERNAL MEDICINE

## 2022-01-31 PROCEDURE — 2580000003 HC RX 258: Performed by: NURSE PRACTITIONER

## 2022-01-31 PROCEDURE — 90935 HEMODIALYSIS ONE EVALUATION: CPT

## 2022-01-31 PROCEDURE — 85055 RETICULATED PLATELET ASSAY: CPT

## 2022-01-31 PROCEDURE — 84145 PROCALCITONIN (PCT): CPT

## 2022-01-31 PROCEDURE — 36415 COLL VENOUS BLD VENIPUNCTURE: CPT

## 2022-01-31 PROCEDURE — 85025 COMPLETE CBC W/AUTO DIFF WBC: CPT

## 2022-01-31 RX ORDER — ALBUMIN (HUMAN) 12.5 G/50ML
25 SOLUTION INTRAVENOUS PRN
Status: DISCONTINUED | OUTPATIENT
Start: 2022-01-31 | End: 2022-02-04 | Stop reason: HOSPADM

## 2022-01-31 RX ADMIN — ACETAMINOPHEN 650 MG: 325 TABLET ORAL at 19:58

## 2022-01-31 RX ADMIN — HEPARIN SODIUM 1500 UNITS: 1000 INJECTION INTRAVENOUS; SUBCUTANEOUS at 13:20

## 2022-01-31 RX ADMIN — SODIUM CHLORIDE, PRESERVATIVE FREE 10 ML: 5 INJECTION INTRAVENOUS at 09:58

## 2022-01-31 RX ADMIN — HEPARIN SODIUM 5000 UNITS: 5000 INJECTION INTRAVENOUS; SUBCUTANEOUS at 20:01

## 2022-01-31 RX ADMIN — CEFEPIME HYDROCHLORIDE 1000 MG: 1 INJECTION, POWDER, FOR SOLUTION INTRAMUSCULAR; INTRAVENOUS at 19:59

## 2022-01-31 RX ADMIN — HEPARIN SODIUM 5000 UNITS: 5000 INJECTION INTRAVENOUS; SUBCUTANEOUS at 14:09

## 2022-01-31 RX ADMIN — HEPARIN SODIUM 5000 UNITS: 5000 INJECTION INTRAVENOUS; SUBCUTANEOUS at 06:05

## 2022-01-31 RX ADMIN — SODIUM CHLORIDE, PRESERVATIVE FREE 10 ML: 5 INJECTION INTRAVENOUS at 21:00

## 2022-01-31 RX ADMIN — HEPARIN SODIUM 1600 UNITS: 1000 INJECTION INTRAVENOUS; SUBCUTANEOUS at 13:20

## 2022-01-31 RX ADMIN — ENALAPRIL MALEATE 5 MG: 5 TABLET ORAL at 09:57

## 2022-01-31 RX ADMIN — ONDANSETRON 4 MG: 2 INJECTION INTRAMUSCULAR; INTRAVENOUS at 12:25

## 2022-01-31 RX ADMIN — DEXAMETHASONE SODIUM PHOSPHATE 6 MG: 10 INJECTION INTRAMUSCULAR; INTRAVENOUS at 18:06

## 2022-01-31 RX ADMIN — NIFEDIPINE 60 MG: 30 TABLET, FILM COATED, EXTENDED RELEASE ORAL at 09:57

## 2022-01-31 ASSESSMENT — ENCOUNTER SYMPTOMS
SHORTNESS OF BREATH: 1
APNEA: 0
COLOR CHANGE: 0
NAUSEA: 1
EYE DISCHARGE: 0
ABDOMINAL DISTENTION: 0
COUGH: 0

## 2022-01-31 ASSESSMENT — PAIN SCALES - GENERAL
PAINLEVEL_OUTOF10: 0
PAINLEVEL_OUTOF10: 6
PAINLEVEL_OUTOF10: 0

## 2022-01-31 NOTE — PROGRESS NOTES
[facility]  Office: [address], DO, Johnie Mao, DO, [place] Edmund, DO, Ana Gerard, DO, Jenny Conklin MD, Mu Gonzalez MD, Stan Calloway MD, Josue Doran MD, Laura Renteria MD, Aj Junior MD, Hardeep Rodriguez MD, Brandi Alcantar, DO, Filomena Norton DO, Chesley Saint, MD,  Glynda Fothergill, DO, Juancarlos Wang MD, Seema Mckinney MD, Maris Muñoz MD, Darryn Hall MD, Ziggy Candelario MD, Tere Clemons, Ivory Arellano, CNP, Dennie Hageman, CNP, Kimberley Jones, CNS, Renae Jarvis, CNP, Jakob Sweet, CNP, Zenobia Haq, CNP, Nabeel Ko, CNP, Nichole Thorpe, CNP, Joseph Reyes, PA-C, Jean Marie Figueredo, [facility], Jessie Nielson, [facility], Sony Rhodes, CNP, Ayush Lake, CNP, Asael Bone, CNP, Ambar Rolle, CNP, Kassie Rojas, CNP, Monica Shepherd, [address] [place] [address]    Progress Note    1/31/2022    11:18 AM    Name:   Ying Langston  MRN:     [id number]     [place]:      [de-identified]   Room:   2007/2007-01   Day:  3  Admit Date:  1/28/2022  8:53 PM    PCP:   Yamileth Paulino MD  Code Status:  Full Code    Subjective:     C/C:   Chief Complaint   Patient presents with    Hypertension     htn no dialysis x2 treatments, port bad per pt     Interval History Status: not changed. Patient undergoing dialysis via temporary Andrea catheter today. She still complains about the inconvenience of it hitting her face. We discussed being her nonfunctional catheter replaced with interventional radiology. We will have her temporary catheter removed, when she runs and is tolerating she can be discharged. Brief History:     From H&P  Ying Langston is a 67 y.o. Non- / non  female who presents with Hypertension (htn no dialysis x2 treatments, port bad per pt)     Patient presents to the emergency room with the concern of generalized fatigue and  near syncope.   Patient states that for the past 1-2 days she has not been feeling well and in addition her HD port is non-functional and she has not received  for 2 sessions. Typical HD days are MWF  Upon arrival to the emergency room the patient was noted to be febrile with a temp of 103, hypertensive with systolic blood pressures in the 200s and saturating well on room air. Nephrology was consulted while in the emergency room however did not feel patient warranted HD at this time.      Patient received Actemra, started on Decadron. Maintain on 3 L nasal cannula. Her Andrea catheter was placed due to issues with her tunneled catheter clotting off. Cathflo and heparin were attempted. Patient is waiting for exchange of tunneled catheter and discharge afterwards    Review of Systems:     Constitutional:  negative for chills, fevers, sweats  Respiratory:  negative for cough, dyspnea on exertion, shortness of breath, wheezing  Cardiovascular:  negative for chest pain, chest pressure/discomfort, lower extremity edema, palpitations  Gastrointestinal:  negative for abdominal pain, constipation, diarrhea, nausea, vomiting  Neurological:  negative for dizziness, headache    Medications:      Allergies:  No Known Allergies    Current Meds:   Scheduled Meds:    sodium chloride flush  5-40 mL IntraVENous 2 times per day    heparin (porcine)  5,000 Units SubCUTAneous 3 times per day    cefepime  1,000 mg IntraVENous Q24H    dexamethasone  6 mg IntraVENous Q24H    enalapril  5 mg Oral Daily    NIFEdipine  60 mg Oral Daily     Continuous Infusions:    sodium chloride       PRN Meds: albumin human, heparin (porcine), heparin (porcine), sodium chloride flush, sodium chloride, ondansetron **OR** ondansetron, acetaminophen **OR** acetaminophen, hydrALAZINE    Data:     Past Medical History:   has a past medical history of Anxiety and depression, Arthritis, Cancer ([facility] [address] Cerebral artery occlusion with cerebral infarction ([facility] [address] Chronic kidney disease, stage 5 ([facility] [address] Diabetes mellitus ([facility] [address] GERD (gastroesophageal reflux disease), Hearing loss, Hemodialysis patient ([facility] [address] History of blood transfusion, Hyperlipidemia, Hypertension, Migraines, MRSA (methicillin resistant staph aureus) culture positive, Neuropathy, PAT (obstructive sleep apnea), Prolonged emergence from general anesthesia, [facility]ipheral [facility]) [facility] SOB (shortness of breath), and Tinnitus. Social History:   reports that she quit smoking about 32 years ago. She has never used smokeless tobacco. She reports current alcohol use. She reports that she does not use drugs. Family History: No family history on file. Vitals:  /66   Pulse 74   Temp 98.1 °F (36.7 °C)   Resp 20   Ht 5' 3\" (1.6 m)   Wt 155 lb 10.3 oz (70.6 kg)   SpO2 94%   BMI 27.57 kg/m²   Temp (24hrs), Av.7 °F (37.1 °C), Min:98.1 °F (36.7 °C), Max:99.5 °F (37.5 °C)    Recent Labs     22  1947   POCGLU 109* 163*       I/O (24Hr):     Intake/Output Summary (Last 24 hours) at 2022 1118  Last data filed at 2022 0800  Gross per 24 hour   Intake 1620 ml   Output 20 ml   Net 1600 ml       Labs:  Hematology:  Recent Labs     22  2110 22  2156 22  0122 22  0652 22  0720 22  0602   WBC 9.4  --   --  9.9  --   --    RBC 3.95  --   --  3.87*  --   --    HGB 11.5*  --   --  11.3* 11.0*  --    HCT 37.5  --   --  35.4* 33.0*  --    MCV 94.9  --   --  91.5  --   --    MCH 29.1  --   --  29.2  --   --    MCHC 30.7  --   --  31.9  --   --    RDW 13.3  --   --  13.6  --   --    PLT See Reflexed IPF Result  --   --  177  --   --    MPV NOT REPORTED  --   --  12.2  --   --    CRP  --   --   --  105.4* 208.1* 118.1*   INR  --  1.0  --   --   --   --    DDIMER  --   --  5.94 5.54 7.87  --      Chemistry:  Recent Labs     22  0652 22  0720 22  0602      < > 136 132* 130*   K 3.6*   < > 4.1 3.7 4.0      < > 105 96* 95*   CO2 16*   < > 13* 20 20 GLUCOSE 105*   < > 178* 162* 187*   BUN 55*   < > 56* 27* 49*   CREATININE 5.14*   < > 5.13* 3.39* 6.06*   ANIONGAP 19*   < > 18* 16 15   LABGLOM 8*   < > 8* 13* 7*   GFRAA 10*   < > 10* 16* 8*   CALCIUM 8.8   < > 8.5* 8.1* 8.0*   LACTACIDWB 1.5  --   --   --   --     < > = values in this interval not displayed. Recent Labs     01/28/22  2106 01/28/22  2110 01/29/22  0122 01/29/22  0652 01/29/22  1947   PROT  --  8.1  --  7.3  --    LABALBU  --  4.2  --  3.5  --    AST  --  22  --  19  --    ALT  --  14  --  14  --    LDH  --   --  244*  --   --    ALKPHOS  --  136*  --  114*  --    BILITOT  --  0.49  --  0.39  --    LIPASE  --  59  --   --   --    POCGLU 109*  --   --   --  163*     ABG:  Lab Results   Component Value Date    FIO2 UNKNOWN 01/28/2022     Lab Results   Component Value Date/Time    SPECIAL NOT REPORTED 01/28/2022 10:14 PM     Lab Results   Component Value Date/Time    CULTURE NO GROWTH 01/28/2022 10:14 PM       Radiology:  CT CHEST WO CONTRAST    Result Date: 1/29/2022  1. Patchy bilateral mixed ground-glass and consolidative opacities with interlobular septal thickening which can be seen with atypical/viral pneumonia to include COVID-19. 2. Small right and trace left pleural effusions. 3. Pulmonary fibrosis in the bilateral lung bases. 4. Tiny hiatal hernia. XR CHEST PORTABLE    Result Date: 1/29/2022  Cardiomegaly with trace effusions and scattered infiltrates. Support tubes as described above. XR CHEST PORTABLE    Result Date: 1/28/2022  Prominent interstitium and bilateral pulmonary opacities, concerning for edema or pneumonia.        Physical Examination:        General appearance:  alert, cooperative and no distress, right tunneled catheter on right upper chest, left IJ Andrea  Mental Status:  oriented to person, place and time and normal affect  Lungs:  clear to auscultation bilaterally, normal effort  Heart:  regular rate and rhythm, no murmur  Abdomen:  soft, nontender, nondistended, normal bowel sounds, no masses, hepatomegaly, splenomegaly  Extremities:  no edema, redness, tenderness in the calves  Skin:  no gross lesions, rashes, induration    Assessment:        Hospital Problems           Last Modified POA    * (Principal) COVID-19 virus infection 1/29/2022 Yes    Diabetes ([address] 4/25/6003 Yes    Metabolic encephalopathy 0/14/0776 Yes    End stage renal failure on dialysis ([address] 1/28/2022 Yes    Hypertensive urgency 1/29/2022 Yes    Sepsis ([address] 1/29/2022 Yes    Acute respiratory failure with hypoxia ([address] 1/29/2022 Yes    Morbid obesity ([address] 1/29/2022 Yes    Hypertension, essential 1/29/2022 Yes    Hypokalemia 4/60/1711 Yes    Metabolic acidosis 8/15/8422 Yes          Plan:        Exchange catheter today, remove temporary catheter  Discharge once cleared by nephrology    Ricke Bosworth, DO  1/31/2022  11:18 AM

## 2022-01-31 NOTE — PROGRESS NOTES
[facility] -   Infectious diseases evaluation  admission date 1/28/2022    reason for consultation:   covid    Impression :   Current:  · covid +  · CRP [id number]  · Fever chills  · Chronic hemodialysis catheter, last dialysis  · Pulmonary edema  · Bilateral patchy pulmonary infiltrates, pulmonary edema versus pneumonia    Other:  ·   Discussion / summary of stay / plan of care   ·   Recommendations   covid pneumonia on CT- CRP: 100 and HD line not working   · actemra x 1 on 1/29  · Decadron 6 mg per day x 10 days  · anticoag per medicine    Bact infection - presumed - HD line not functioning  · Keep  vancomycin and cefepime 1/29  · Await blood cultures  · 1/30 Not sure why the CRP is higher, pt feels better, repeat CRP in the morning    Infection Control Recommendations   · [place] Precautions  · Contact Isolation   · Airborne isolation  · Droplet Isolation    Antimicrobial Stewardship Recommendations   · Simplification of therapy  · Targeted therapy      Coordination ofOutpatient Care:   · Estimated Length of IV antimicrobials:  · Patient will need Midline / picc Catheter Insertion:   · Patient will need SNF:  · Patient will need outpatient wound care:     History of Present Illness:   Initial history:  Carole Salinas is a 67y.o.-year-old female   Presented to the emergency room because of high blood pressure and missing the last two dialysis due to port failure. Found to have fever, CRP elevated, tested positive for Covid and chest x-ray showing congestion versus patchy pneumonia. Patient did have a minimal cough. She still makes urine but no dysuria.     pt denies cough but no appetite x few days - does not use o2 at home but was hypoxic and started on 2 L NC here    W 9  Patient received a dose of vancomycin and cefepime due to concern of line infection, no signs of exit infection      Interval changes  1/30/2022   Patient Vitals for the past 8 hrs: BP Temp Temp src Pulse Resp SpO2   01/30/22 1600 (!) 109/92 98.8 °F (37.1 °C) Oral 72 17 97 %   01/30/22 1200 93/66 99.1 °F (37.3 °C) Oral 74 18 96 %   1/30  She has a right tunneled old catheter is not working [id number] with a chest port that is in use  Left IJ Andrea in good condition and new  CRP increased to 8 but the patient feels much better otherwise, she is on 6 L of nasal cannula,  Blood culture still negative,  She has had Actemra 1/29    Summary of relevant labs:  Labs:  [id number] High      Micro:  covid +  BC 1/28   U cx 1/28  Imaging:  CT chest covid pneumonia           CXR  Prominent interstitium and bilateral pulmonary opacities, concerning for   edema or pneumonia. I have personally reviewed the past medical history, past surgical history, medications, social history, and family history, and I haveupdated the database accordingly. Allergies:   Patient has no known allergies. Review of Systems:     Review of Systems   Constitutional: Positive for activity change and fatigue. Negative for fever. HENT: Negative for congestion. Eyes: Negative for discharge. Respiratory: Positive for shortness of breath. Negative for apnea and cough. Cardiovascular: Negative for chest pain. Gastrointestinal: Negative for abdominal distention. Endocrine: Negative for heat intolerance. Genitourinary: Negative for dysuria. Musculoskeletal: Negative for arthralgias. Skin: Negative for color change. Allergic/Immunologic: Negative for immunocompromised state. Neurological: Negative for dizziness, light-headedness and headaches. Hematological: Negative for adenopathy. Psychiatric/Behavioral: Negative for agitation. Physical Examination :       Physical Exam  Constitutional:       Appearance: Normal appearance. She is ill-appearing. HENT:      Head: Normocephalic and atraumatic.       Nose: Nose normal.      Mouth/Throat:      Mouth: Mucous membranes are moist.   Eyes: General: No scleral icterus. Conjunctiva/sclera: Conjunctivae normal.   Cardiovascular:      Rate and Rhythm: Normal rate and regular rhythm. Heart sounds: Normal heart sounds. No murmur heard. Pulmonary:      Effort: No respiratory distress. Abdominal:      General: There is no distension. Tenderness: There is no abdominal tenderness. Musculoskeletal:      Cervical back: Neck supple. No rigidity. Neurological:      General: No focal deficit present. Cranial Nerves: No cranial nerve deficit. Psychiatric:         Mood and Affect: Mood normal.         Thought Content:  Thought content normal.         Past Medical History:     Past Medical History:   Diagnosis Date    Anxiety and depression     Arthritis     Rheumatoid     Cancer ([facility] [address]     left breast cancer    Cerebral artery occlusion with cerebral infarction [facility]) jan 2020 & March 2020    poor balance since strokes    Chronic kidney disease, stage 5 (HCC)     hemodialysis Mon-Wed-Fri    Diabetes mellitus ([facility] [address]     GERD (gastroesophageal reflux disease)     Hearing loss     bilateral. Does not have hearing aides    Hemodialysis patient ([facility] [address]     M-W-F started in 2020    History of blood transfusion     50 plus yrs ago with pregnancy    Hyperlipidemia     Hypertension     Migraines     MRSA (methicillin resistant staph aureus) culture positive 2015    back    Neuropathy     PAT (obstructive sleep apnea)     had UPPP  \"have not used machine in years\"    Prolonged emergence from general anesthesia     PVD (peripheral vascular disease) ([facility] [address]     SOB (shortness of breath)     \"long distance\"    Tinnitus        Past Surgical  History:     Past Surgical History:   Procedure Laterality Date    APPENDECTOMY      BACK SURGERY      I and D lower back MRSA    BREAST REDUCTION SURGERY Left 1999    CENTRAL LINE  1/29/2022         COLONOSCOPY      HYSTERECTOMY      INSERTABLE CARDIAC MONITOR      MASTECTOMY Left     lymph nodes removed    MASTECTOMY Left 2018    axillary mastectomy    OK EXC SKIN BENIG <5MM TRUNK,ARM,LEG Right 2018    RIGHT AXILLARY MASTECTOMY performed by Aleshia Ponce DO at [address] [place] [address]         Medications:      sodium chloride flush  5-40 mL IntraVENous 2 times per day    heparin (porcine)  5,000 Units SubCUTAneous 3 times per day    cefepime  1,000 mg IntraVENous Q24H    dexamethasone  6 mg IntraVENous Q24H    enalapril  5 mg Oral Daily    NIFEdipine  60 mg Oral Daily       Social History:     Social History     Socioeconomic History    Marital status:      Spouse name: Not on file    Number of children: Not on file    Years of education: Not on file    Highest education level: Not on file   Occupational History    Not on file   Tobacco Use    Smoking status: Former Smoker     Quit date:      Years since quittin.1    Smokeless tobacco: Never Used   Vaping Use    Vaping Use: Never used   Substance and Sexual Activity    Alcohol use: Yes     Comment: occasionally  \"couple a month\"    Drug use: No    Sexual activity: Not on file   Other Topics Concern    Not on file   Social History Narrative    Not on file     Social Determinants of Health     Financial Resource Strain:     Difficulty of Paying Living Expenses: Not on file   Food Insecurity:     Worried About Running Out of Food in the Last Year: Not on file    Caleb of Food in the Last Year: Not on file   Transportation Needs:     Lack of Transportation (Medical): Not on file    Lack of Transportation (Non-Medical):  Not on file   Physical Activity:     Days of Exercise per Week: Not on file    Minutes of Exercise per Session: Not on file   Stress:     Feeling of Stress : Not on file   Social Connections:     Frequency of Communication with Friends and Family: Not on file    Frequency of Social Gatherings with Friends and Family: Not on file    Attends Yarsanism Services: Not on file   [facility] [employer] or Organizations: Not on file    Attends Club or Organization Meetings: Not on file    Marital Status: Not on file   Intimate Partner Violence:     Fear of Current or Ex-Partner: Not on file    Emotionally Abused: Not on file    Physically Abused: Not on file    Sexually Abused: Not on file   Housing Stability:     Unable to Pay for Housing in the Last Year: Not on file    Number of [place] in the Last Year: Not on file    Unstable Housing in the Last Year: Not on file       Family History:   No family history on file. Medical Decision Making:   I have independently reviewed/ordered the following labs:    CBC with Differential:   Recent Labs     01/28/22 2110 01/28/22 2110 01/29/22  0652 01/30/22  0720   WBC 9.4  --  9.9  --    HGB 11.5*   < > 11.3* 11.0*   HCT 37.5   < > 35.4* 33.0*   PLT See Reflexed IPF Result  --  177  --    LYMPHOPCT 11*  --  15*  --    MONOPCT 8  --  10  --     < > = values in this interval not displayed. BMP:  Recent Labs     01/29/22  0652 01/30/22  0720    132*   K 4.1 3.7    96*   CO2 13* 20   BUN 56* 27*   CREATININE 5.13* 3.39*     Hepatic Function Panel:   Recent Labs     01/28/22 2110 01/29/22  0652   PROT 8.1 7.3   LABALBU 4.2 3.5   BILITOT 0.49 0.39   ALKPHOS 136* 114*   ALT 14 14   AST 22 19     No results for input(s): RPR in the last 72 hours. No results for input(s): HIV in the last 72 hours. No results for input(s): BC in the last 72 hours. Lab Results   Component Value Date    CREATININE 3.39 01/30/2022    GLUCOSE 162 01/30/2022       Detailed results: Thank you for allowing us to participate in the care of this patient. Please call with questions.     This note is created with the assistance of a speech recognition program.  While intending to generate adocument that actually reflects the content of the visit, the document can still have some errors including those of syntax and sound a like substitutions which may escape proof reading. It such instances, actual meaningcan be extrapolated by contextual diversion.     Chele Coronel MD  Office: [phone]  Perfect serve / office [phone]

## 2022-01-31 NOTE — PROGRESS NOTES
[facility] -   Infectious diseases evaluation  admission date 1/28/2022    reason for consultation:   covid    Impression :   Current:  · covid +  · CRP [id number]  · Fever chills  · Elevated Procalcitonin  · Elevated CRP  · Chronic hemodialysis catheter, last dialysis  · Pulmonary edema  · Bilateral patchy pulmonary infiltrates, pulmonary edema versus pneumonia    Other:  ·   Discussion / summary of stay / plan of care   ·   Recommendations   covid pneumonia on CT- CRP: 100 and HD line not working   · actemra x 1 on 1/29  · Decadron 6 mg per day x 10 days  · anticoag per medicine    Bact infection - presumed - HD line not functioning  · Keep  vancomycin and cefepime 1/29  · Await blood cultures  · 1/30 Not sure why the CRP is higher, pt feels better, repeat CRP in the morning  · 1/31-Not feeling well. CRP is down, Procalcitonin markedly elevated. Check US of gallbladder for possible cholecystitis. CBC with diff, Ferritin ordered. Discusses with Dr. Sakshi Trinh. Infection Control Recommendations   · Bradshaw Precautions  · Contact Isolation   · Airborne isolation  · Droplet Isolation    Antimicrobial Stewardship Recommendations   · Simplification of therapy  · Targeted therapy      Coordination ofOutpatient Care:   · Estimated Length of IV antimicrobials:  · Patient will need Midline / picc Catheter Insertion:   · Patient will need SNF:  · Patient will need outpatient wound care:     History of Present Illness:   Initial history:  Silvina Peacock is a 67y.o.-year-old female   Presented to the emergency room because of high blood pressure and missing the last two dialysis due to port failure. Found to have fever, CRP elevated, tested positive for Covid and chest x-ray showing congestion versus patchy pneumonia. Patient did have a minimal cough. She still makes urine but no dysuria.     pt denies cough but no appetite x few days - does not use o2 at home but was hypoxic and started on 2 L NC here    W 9  Patient received a dose of vancomycin and cefepime due to concern of line infection, no signs of exit infection        Interval changes  1/31/2022   Patient Vitals for the past 8 hrs:   BP Temp Temp src Pulse Resp SpO2 Weight   01/31/22 1100 116/66   75      01/31/22 1045 120/68   75      01/31/22 1030 (!) 111/50   75      01/31/22 1015 130/87   73      01/31/22 1000 117/62   76      01/31/22 0957 115/70         01/31/22 0940 (!) 117/47   73      01/31/22 0923  98.1 °F (36.7 °C)     155 lb 10.3 oz (70.6 kg)   01/31/22 0800 115/70 98.1 °F (36.7 °C) Oral 76 20 94 %    01/31/22 0400 (!) 99/50 98.7 °F (37.1 °C) Oral 75 17 98 %    1/30  She has a right tunneled old catheter is not working [id number] with a chest port that is in use  Left IJ Andrea in good condition and new  CRP increased to 8 but the patient feels much better otherwise, she is on 6 L of nasal cannula,  Blood culture still negative,  She has had Actemra 1/29 1/31  Afebrile. CRP trending down, procalcitonin up. SpO2 94% on 3 L NC.  BC, urine cx remain negative to date. Not feeling well today. C/o nausea, myalgias, arthralgias. No vomiting, diarrhea. HD cath sites clean. Summary of relevant labs:  Labs:  [id number]    Procalcitonin: 0.88-4.61    Micro:  covid +  BC 1/28   U cx 1/28  Imaging:  CT chest covid pneumonia           CXR  Prominent interstitium and bilateral pulmonary opacities, concerning for   edema or pneumonia. I have personally reviewed the past medical history, past surgical history, medications, social history, and family history, and I haveupdated the database accordingly. Allergies:   Patient has no known allergies. Review of Systems:     Review of Systems   Constitutional: Positive for activity change and fatigue. Negative for fever. HENT: Negative for congestion. Eyes: Negative for discharge. Respiratory: Positive for shortness of breath. Negative for apnea and cough. Cardiovascular: Negative for chest pain. Gastrointestinal: Positive for nausea. Negative for abdominal distention. Endocrine: Negative for heat intolerance. Genitourinary: Negative for dysuria. Musculoskeletal: Negative for arthralgias. Skin: Negative for color change. Allergic/Immunologic: Negative for immunocompromised state. Neurological: Negative for dizziness, light-headedness and headaches. Hematological: Negative for adenopathy. Psychiatric/Behavioral: Negative for agitation. Physical Examination :       Physical Exam  Constitutional:       Appearance: Normal appearance. She is ill-appearing. HENT:      Head: Normocephalic and atraumatic. Nose: Nose normal.      Mouth/Throat:      Mouth: Mucous membranes are moist.   Eyes:      General: No scleral icterus. Conjunctiva/sclera: Conjunctivae normal.   Cardiovascular:      Rate and Rhythm: Normal rate and regular rhythm. Heart sounds: Normal heart sounds. No murmur heard. Pulmonary:      Effort: No respiratory distress. Abdominal:      General: There is no distension. Tenderness: There is no abdominal tenderness. Musculoskeletal:      Cervical back: Neck supple. No rigidity. Neurological:      General: No focal deficit present. Cranial Nerves: No cranial nerve deficit. Psychiatric:         Mood and Affect: Mood normal.         Thought Content:  Thought content normal.         Past Medical History:     Past Medical History:   Diagnosis Date    Anxiety and depression     Arthritis     Rheumatoid     Cancer ([facility] [address]     left breast cancer    Cerebral artery occlusion with cerebral infarction [facility]) jan 2020 & March 2020    poor balance since strokes    Chronic kidney disease, stage 5 (HCC)     hemodialysis Mon-Wed-Fri    Diabetes mellitus ([facility] [address]     GERD (gastroesophageal reflux disease)     Hearing loss     bilateral. Does not have hearing aides    Hemodialysis patient [facility])     M-W-F started in     History of blood transfusion     50 plus yrs ago with pregnancy    Hyperlipidemia     Hypertension     Migraines     MRSA (methicillin resistant staph aureus) culture positive     back    Neuropathy     PAT (obstructive sleep apnea)     had UPPP  \"have not used machine in years\"    Prolonged emergence from general anesthesia     PVD (peripheral vascular disease) ([address]     SOB (shortness of breath)     \"long distance\"    Tinnitus        Past Surgical  History:     Past Surgical History:   Procedure Laterality Date    APPENDECTOMY      BACK SURGERY      I and D lower back MRSA    BREAST REDUCTION SURGERY Left     CENTRAL LINE  2022         COLONOSCOPY      HYSTERECTOMY      INSERTABLE CARDIAC MONITOR      MASTECTOMY Left     lymph nodes removed    MASTECTOMY Left 2018    axillary mastectomy    MN EXC SKIN BENIG <5MM TRUNK,ARM,LEG Right 2018    RIGHT AXILLARY MASTECTOMY performed by Aleshia Ponce DO at [facility]    UVULOPALATOPHARYGOPLASTY         Medications:      sodium chloride flush  5-40 mL IntraVENous 2 times per day    heparin (porcine)  5,000 Units SubCUTAneous 3 times per day    cefepime  1,000 mg IntraVENous Q24H    dexamethasone  6 mg IntraVENous Q24H    enalapril  5 mg Oral Daily    NIFEdipine  60 mg Oral Daily       Social History:     Social History     Socioeconomic History    Marital status:      Spouse name: Not on file    Number of children: Not on file    Years of education: Not on file    Highest education level: Not on file   Occupational History    Not on file   Tobacco Use    Smoking status: Former Smoker     Quit date:      Years since quittin.1    Smokeless tobacco: Never Used   Vaping Use    Vaping Use: Never used   Substance and Sexual Activity    Alcohol use: Yes     Comment: occasionally  \"couple a month\"    Drug use:  No  Sexual activity: Not on file   Other Topics Concern    Not on file   Social History Narrative    Not on file     Social Determinants of Health     Financial Resource Strain:     Difficulty of Paying Living Expenses: Not on file   Food Insecurity:     Worried About Running Out of Food in the Last Year: Not on file    Caleb of Food in the Last Year: Not on file   Transportation Needs:     Lack of Transportation (Medical): Not on file    Lack of Transportation (Non-Medical): Not on file   Physical Activity:     Days of Exercise per Week: Not on file    Minutes of Exercise per Session: Not on file   Stress:     Feeling of Stress : Not on file   Social Connections:     Frequency of Communication with Friends and Family: Not on file    Frequency of Social Gatherings with Friends and Family: Not on file    Attends [employer] Services: Not on file    Active Member of [address] [place] [address] [employer] or Organizations: Not on file    Attends Club or Organization Meetings: Not on file    Marital Status: Not on file   Intimate Partner Violence:     Fear of Current or Ex-Partner: Not on file    Emotionally Abused: Not on file    Physically Abused: Not on file    Sexually Abused: Not on file   Housing Stability:     Unable to Pay for Housing in the Last Year: Not on file    Number of [place] in the Last Year: Not on file    Unstable Housing in the Last Year: Not on file       Family History:   No family history on file. Medical Decision Making:   I have independently reviewed/ordered the following labs:    CBC with Differential:   Recent Labs     01/28/22 2110 01/28/22 2110 01/29/22  0652 01/30/22  0720   WBC 9.4  --  9.9  --    HGB 11.5*   < > 11.3* 11.0*   HCT 37.5   < > 35.4* 33.0*   PLT See Reflexed IPF Result  --  177  --    LYMPHOPCT 11*  --  15*  --    MONOPCT 8  --  10  --     < > = values in this interval not displayed.      BMP:  Recent Labs     01/30/22  0720 01/31/22  0602   * 130*   K 3.7 4.0   CL 96* 95*   CO2 20 20   BUN 27* 49*   CREATININE 3.39* 6.06*     Hepatic Function Panel:   Recent Labs     01/28/22  2110 01/29/22  0652   PROT 8.1 7.3   LABALBU 4.2 3.5   BILITOT 0.49 0.39   ALKPHOS 136* 114*   ALT 14 14   AST 22 19     No results for input(s): RPR in the last 72 hours. No results for input(s): HIV in the last 72 hours. No results for input(s): BC in the last 72 hours. Lab Results   Component Value Date    CREATININE 6.06 01/31/2022    GLUCOSE 187 01/31/2022       Detailed results: Thank you for allowing us to participate in the care of this patient. Please call with questions. This note is created with the assistance of a speech recognition program.  While intending to generate adocument that actually reflects the content of the visit, the document can still have some errors including those of syntax and sound a like substitutions which may escape proof reading. It such instances, actual meaningcan be extrapolated by contextual diversion.     LEYDI Price - CNP  Office: [phone]  Perfect serve / office [phone]

## 2022-01-31 NOTE — PROGRESS NOTES
Dialysis Post Treatment Note  Vitals:    01/31/22 1310   BP: (!) 143/67   Pulse: 76   Resp: 18   Temp: 97.7 °F (36.5 °C)   SpO2: 95%     Pre-Weight = 70.6kg  Post-weight = Weight: 154 lb 1.6 oz (69.9 kg)  Total Liters Processed = Total Liters Processed (l/min): 69.3 l/min  Rinseback Volume (mL) = Rinseback Volume (ml): 300 ml  Net Removal (mL) = NET Removed (ml): 850 ml  Patient's dry weight=78.5kg  Type of access used= left IJ  Length of treatment=210 minutes    All blood returned, sterile caps placed and lines heparinized. Patient did have some nausea and felt light headed during last part of treatment. Was given 150 mLs of NS and said she felt better, otherwise patient tolerated treatment well.  Post /67

## 2022-01-31 NOTE — PROGRESS NOTES
Dialysis Time Out  To be done by RN and tech or 2 RNs  Staff Names Gil Duverney, RN & Ashley Cortez, floor RN    [x]  Identity of the patient using 2 patient identifiers  [x]  Consent for treatment  [x]  Equipment-proper machine and dialyzer  [x]  B-Hep B status  [x]  Orders- to include bath, blood flow, dialyzer, time and fluid removal  [x]  Access-Correct site and in working order  [x]  Time for patient to ask questions.

## 2022-01-31 NOTE — CARE COORDINATION
KIM spoke with Charge RN. Mari Paris aware of patient being Covid positive. Will notify clinic once patient ready for discharge to confirm covid cohort time/schedule with [facility]. 64  KIM spoke with Teresa Zamora at [facility], discussed catheter exchange then possible discharge. Patient will be TTS 4pm at [facility] upon discharge. SW notified patient of information.

## 2022-01-31 NOTE — PROGRESS NOTES
Renal Progress Note    Patient :  Iván Wolf; 67 y.o. MRN# [id number]  Location:  2007/2007-01  Attending:  Aicha Ray DO  Admit Date:  1/28/2022   Hospital Day: 3      Subjective:     ESRD, following for management of hemodialysis. Patient was seen and examined at bedside.   Currently on dialysis using left temporary IJ catheter  Oxygen via nasal cannula 5 L/min  1/28/22: Blood cultures no growth   1/28/22: Urine culture no growth  Plans to get tunnel catheter placement today    Outpatient Medications:     Medications Prior to Admission: calcitRIOL (ROCALTROL) 0.25 MCG capsule, Take 0.25 mcg by mouth daily  sevelamer (RENVELA) 800 MG tablet, Take 1 tablet by mouth 3 times daily (with meals)  clopidogrel (PLAVIX) 75 MG tablet, Take 75 mg by mouth daily  enalapril (VASOTEC) 5 MG tablet, Take 5 mg by mouth daily  metoprolol (LOPRESSOR) 100 MG tablet, Take 100 mg by mouth daily  NIFEdipine (PROCARDIA XL) 60 MG extended release tablet, Take 60 mg by mouth daily  pantoprazole (PROTONIX) 40 MG tablet, Take 40 mg by mouth daily  cloNIDine (CATAPRES) 0.1 MG/24HR PTWK, Place 1 patch onto the skin once a week Indications: Sundays Sundays  hydrALAZINE (APRESOLINE) 100 MG tablet, Take 100 mg by mouth 3 times daily  rosuvastatin (CRESTOR) 5 MG tablet, Take 5 mg by mouth daily  citalopram (CELEXA) 20 MG tablet, Take 40 mg by mouth daily Takes 2 tabs (=40mg) daily  ondansetron (ZOFRAN) 4 MG tablet, Take 1 tablet by mouth every 6 hours as needed for Nausea or Vomiting    Current Medications:     Scheduled Meds:    sodium chloride flush  5-40 mL IntraVENous 2 times per day    heparin (porcine)  5,000 Units SubCUTAneous 3 times per day    cefepime  1,000 mg IntraVENous Q24H    dexamethasone  6 mg IntraVENous Q24H    enalapril  5 mg Oral Daily    NIFEdipine  60 mg Oral Daily     Continuous Infusions:    sodium chloride       PRN Meds:  albumin human, heparin (porcine), heparin (porcine), sodium chloride flush, sodium chloride, ondansetron **OR** ondansetron, acetaminophen **OR** acetaminophen, hydrALAZINE    Input/Output:       I/O last 3 completed shifts: In: 6766 [P.O.:1620]  Out: 4268 [Urine:185]. Patient Vitals for the past 96 hrs (Last 3 readings):   Weight   22 0923 155 lb 10.3 oz (70.6 kg)   22 0255 159 lb 2.8 oz (72.2 kg)   22 2300 164 lb 3.9 oz (74.5 kg)       Vital Signs:   Temperature:  Temp: 98.1 °F (36.7 °C)  TMax:   Temp (24hrs), Av.7 °F (37.1 °C), Min:98.1 °F (36.7 °C), Max:99.5 °F (37.5 °C)    Respirations:  Resp: 20  Pulse:   Pulse: 73  BP:    BP: 115/70  BP Range: Systolic (62BQZ), CRISTY:145 , Min:93 , FQC:364       Diastolic (93YNX), XAQ:66, Min:47, Max:92      Physical Examination:     General:          AAO x 3, speaking in full sentences, no accessory muscle use. HEENT:           Atraumatic, normocephalic. Neck:               No JVD, left IJ in place, dressing clean, dry,intact. Chest:              Crackles noted in the bases, no  wheezes. Right IJ, dressing clean, dry, intact  Cardiac:           S1 S2 RR, no murmurs, gallops or rubs, JVP not raised. Abdomen:        Soft, non-tender, no masses or organomegaly, BS audible. :                  Salter in place, urine yellow/clear  Neuro:             AAO x 3, No FND. SKIN:               No rashes, good skin turgor. Extremities:     Trace bilateral lower extremity edema, no calf tenderness.   Labs:       Recent Labs     22  2110 22  0652 22  0720   WBC 9.4 9.9  --    RBC 3.95 3.87*  --    HGB 11.5* 11.3* 11.0*   HCT 37.5 35.4* 33.0*   MCV 94.9 91.5  --    MCH 29.1 29.2  --    MCHC 30.7 31.9  --    RDW 13.3 13.6  --    PLT See Reflexed IPF Result 177  --    MPV NOT REPORTED 12.2  --       BMP:   Recent Labs     22  0652 22  0720 22  0602    132* 130*   K 4.1 3.7 4.0    96* 95*   CO2 13* 20 20   BUN 56* 27* 49*   CREATININE 5.13* 3.39* 6.06*   GLUCOSE 178* 162* 187*   CALCIUM 8.5* 8.1* 8.0*     Albumin:    Recent Labs     01/28/22  2110 01/29/22  0652   LABALBU 4.2 3.5     SPEP:  Lab Results   Component Value Date    PROT 7.3 01/29/2022     Urinalysis/Chemistries:      Lab Results   Component Value Date    NITRU NEGATIVE 01/28/2022    COLORU Yellow 01/28/2022    PHUR 6.5 01/28/2022    WBCUA 2 TO 5 01/28/2022    RBCUA 0 TO 2 01/28/2022    MUCUS 1+ 01/28/2022    TRICHOMONAS NOT REPORTED 01/28/2022    YEAST NOT REPORTED 01/28/2022    BACTERIA FEW 01/28/2022    SPECGRAV 1.016 01/28/2022    LEUKOCYTESUR TRACE 01/28/2022    UROBILINOGEN Normal 01/28/2022    BILIRUBINUR NEGATIVE 01/28/2022    GLUCOSEU NEGATIVE 01/28/2022    KETUA NEGATIVE 01/28/2022    AMORPHOUS NOT REPORTED 01/28/2022     Radiology:     1/29/22 CXR:   FINDINGS:   There are scattered infiltrates. Rhetta Stephanie is no pneumothorax.  There are trace   effusions.  The heart is enlarged.  The upper abdomen is unremarkable.  The   extrathoracic soft tissues are unremarkable. Rhetta Stephanie is a right internal   jugular port. Rhetta Stephanie is a right internal jugular catheter that is stable. There is a left internal jugular catheter with the tip in the proximal SVC.         Impression   Cardiomegaly with trace effusions and scattered infiltrates.       Support tubes as described above. 1/29/22 CT Chest wo contrast   Impression   1. Patchy bilateral mixed ground-glass and consolidative opacities with   interlobular septal thickening which can be seen with atypical/viral   pneumonia to include COVID-19.   2. Small right and trace left pleural effusions. 3. Pulmonary fibrosis in the bilateral lung bases. 4. Tiny hiatal hernia. Assessment:     1. ESRD on Hemodialysis. His regular HD days are Monday, Wednesday, Friday at [facility] hemodialysis facility using tunnel catheter under Dr Davis Bains. Her dry weight is unknown. Admitted with 77.1kg.   Tunneled catheter clotted and hence was exchanged for Andrea catheter on 1/29/2022 by critical care. 2. Covid 19 pneumonia  3. Secondary hyperparathyroidism  4. Hypertension  5. Anemia of chronic disease      Plan:     #1 seen and examined on hemodialysis. Orders reviewed with the nursing staff. #2 discontinue temporary Andrea catheter after hemodialysis today. Followed by insertion of tunneled catheter by IR    Nutrition   Please ensure that patient is on a renal diet/TF. Avoid nephrotoxic drugs/contrast exposure. We will continue to follow along with you. Electronically signed by Jae Rascon MD on 1/31/2022 at 10:47 AM    This note is created with the assistance of a speech-recognition program. While intending to generate a document that actually reflects the content of the visit, no guarantees can be provided that every mistake has been identified and corrected by editing.

## 2022-01-31 NOTE — CARE COORDINATION
Transitional planning:  Nurse rounds: Pt's right subclavian port is clogged. Dr. Mustapha Cross aware. He is contgacting Nephrology. Needs dialysis today. Decadron and IV antibiotics. O2 at 3 L nc.

## 2022-02-01 ENCOUNTER — APPOINTMENT (OUTPATIENT)
Dept: INTERVENTIONAL RADIOLOGY/VASCULAR | Age: 77
DRG: 871 | End: 2022-02-01
Payer: MEDICARE

## 2022-02-01 LAB
ANION GAP SERPL CALCULATED.3IONS-SCNC: 16 MMOL/L (ref 9–17)
BUN BLDV-MCNC: 47 MG/DL (ref 8–23)
BUN/CREAT BLD: ABNORMAL (ref 9–20)
C-REACTIVE PROTEIN: 48.4 MG/L (ref 0–5)
CALCIUM SERPL-MCNC: 8.1 MG/DL (ref 8.6–10.4)
CHLORIDE BLD-SCNC: 96 MMOL/L (ref 98–107)
CO2: 22 MMOL/L (ref 20–31)
CREAT SERPL-MCNC: 4.99 MG/DL (ref 0.5–0.9)
GFR AFRICAN AMERICAN: 10 ML/MIN
GFR NON-AFRICAN AMERICAN: 8 ML/MIN
GFR SERPL CREATININE-BSD FRML MDRD: ABNORMAL ML/MIN/{1.73_M2}
GFR SERPL CREATININE-BSD FRML MDRD: ABNORMAL ML/MIN/{1.73_M2}
GLUCOSE BLD-MCNC: 135 MG/DL (ref 70–99)
POTASSIUM SERPL-SCNC: 4.2 MMOL/L (ref 3.7–5.3)
SODIUM BLD-SCNC: 134 MMOL/L (ref 135–144)

## 2022-02-01 PROCEDURE — 6360000002 HC RX W HCPCS: Performed by: FAMILY MEDICINE

## 2022-02-01 PROCEDURE — 6360000002 HC RX W HCPCS: Performed by: RADIOLOGY

## 2022-02-01 PROCEDURE — 36415 COLL VENOUS BLD VENIPUNCTURE: CPT

## 2022-02-01 PROCEDURE — 6370000000 HC RX 637 (ALT 250 FOR IP): Performed by: FAMILY MEDICINE

## 2022-02-01 PROCEDURE — 6360000002 HC RX W HCPCS: Performed by: NURSE PRACTITIONER

## 2022-02-01 PROCEDURE — C1769 GUIDE WIRE: HCPCS

## 2022-02-01 PROCEDURE — 77001 FLUOROGUIDE FOR VEIN DEVICE: CPT

## 2022-02-01 PROCEDURE — 6360000004 HC RX CONTRAST MEDICATION: Performed by: FAMILY MEDICINE

## 2022-02-01 PROCEDURE — 80048 BASIC METABOLIC PNL TOTAL CA: CPT

## 2022-02-01 PROCEDURE — 99232 SBSQ HOSP IP/OBS MODERATE 35: CPT | Performed by: INTERNAL MEDICINE

## 2022-02-01 PROCEDURE — 2709999900 HC NON-CHARGEABLE SUPPLY

## 2022-02-01 PROCEDURE — 2580000003 HC RX 258: Performed by: INTERNAL MEDICINE

## 2022-02-01 PROCEDURE — 0J2SXYZ CHANGE OTHER DEVICE IN HEAD AND NECK SUBCUTANEOUS TISSUE AND FASCIA, EXTERNAL APPROACH: ICD-10-PCS | Performed by: RADIOLOGY

## 2022-02-01 PROCEDURE — C1750 CATH, HEMODIALYSIS,LONG-TERM: HCPCS

## 2022-02-01 PROCEDURE — 86140 C-REACTIVE PROTEIN: CPT

## 2022-02-01 PROCEDURE — C1894 INTRO/SHEATH, NON-LASER: HCPCS

## 2022-02-01 PROCEDURE — 99232 SBSQ HOSP IP/OBS MODERATE 35: CPT | Performed by: FAMILY MEDICINE

## 2022-02-01 PROCEDURE — 36581 REPLACE TUNNELED CV CATH: CPT

## 2022-02-01 PROCEDURE — 6360000002 HC RX W HCPCS: Performed by: INTERNAL MEDICINE

## 2022-02-01 PROCEDURE — 2580000003 HC RX 258: Performed by: NURSE PRACTITIONER

## 2022-02-01 PROCEDURE — 6370000000 HC RX 637 (ALT 250 FOR IP): Performed by: STUDENT IN AN ORGANIZED HEALTH CARE EDUCATION/TRAINING PROGRAM

## 2022-02-01 PROCEDURE — 2060000000 HC ICU INTERMEDIATE R&B

## 2022-02-01 RX ORDER — DIPHENHYDRAMINE HYDROCHLORIDE 50 MG/ML
25 INJECTION INTRAMUSCULAR; INTRAVENOUS ONCE
Status: COMPLETED | OUTPATIENT
Start: 2022-02-01 | End: 2022-02-01

## 2022-02-01 RX ORDER — HYDRALAZINE HYDROCHLORIDE 50 MG/1
50 TABLET, FILM COATED ORAL 3 TIMES DAILY
Status: DISCONTINUED | OUTPATIENT
Start: 2022-02-01 | End: 2022-02-04

## 2022-02-01 RX ORDER — HEPARIN SODIUM (PORCINE) LOCK FLUSH IV SOLN 100 UNIT/ML 100 UNIT/ML
SOLUTION INTRAVENOUS
Status: COMPLETED | OUTPATIENT
Start: 2022-02-01 | End: 2022-02-01

## 2022-02-01 RX ORDER — HEPARIN SODIUM 5000 [USP'U]/ML
INJECTION, SOLUTION INTRAVENOUS; SUBCUTANEOUS
Status: COMPLETED | OUTPATIENT
Start: 2022-02-01 | End: 2022-02-01

## 2022-02-01 RX ORDER — SODIUM CHLORIDE FOR INHALATION 0.9 %
3 VIAL, NEBULIZER (ML) INHALATION EVERY 4 HOURS PRN
Status: DISCONTINUED | OUTPATIENT
Start: 2022-02-01 | End: 2022-02-04 | Stop reason: HOSPADM

## 2022-02-01 RX ORDER — VANCOMYCIN HYDROCHLORIDE 1 G/200ML
1000 INJECTION, SOLUTION INTRAVENOUS ONCE
Status: COMPLETED | OUTPATIENT
Start: 2022-02-01 | End: 2022-02-01

## 2022-02-01 RX ORDER — METOPROLOL TARTRATE 50 MG/1
100 TABLET, FILM COATED ORAL DAILY
Status: DISCONTINUED | OUTPATIENT
Start: 2022-02-01 | End: 2022-02-04 | Stop reason: HOSPADM

## 2022-02-01 RX ADMIN — SODIUM CHLORIDE, PRESERVATIVE FREE 10 ML: 5 INJECTION INTRAVENOUS at 21:04

## 2022-02-01 RX ADMIN — HEPARIN SODIUM 1.6 ML: 5000 INJECTION INTRAVENOUS; SUBCUTANEOUS at 10:05

## 2022-02-01 RX ADMIN — HYDRALAZINE HYDROCHLORIDE 10 MG: 20 INJECTION INTRAMUSCULAR; INTRAVENOUS at 18:29

## 2022-02-01 RX ADMIN — IOPAMIDOL 11 ML: 755 INJECTION, SOLUTION INTRAVENOUS at 10:16

## 2022-02-01 RX ADMIN — ENALAPRIL MALEATE 5 MG: 5 TABLET ORAL at 08:24

## 2022-02-01 RX ADMIN — NIFEDIPINE 60 MG: 30 TABLET, FILM COATED, EXTENDED RELEASE ORAL at 08:24

## 2022-02-01 RX ADMIN — HEPARIN 500 UNITS: 100 SYRINGE at 10:25

## 2022-02-01 RX ADMIN — HEPARIN SODIUM 5000 UNITS: 5000 INJECTION INTRAVENOUS; SUBCUTANEOUS at 21:05

## 2022-02-01 RX ADMIN — METOPROLOL TARTRATE 100 MG: 50 TABLET, FILM COATED ORAL at 21:04

## 2022-02-01 RX ADMIN — HEPARIN SODIUM 5000 UNITS: 5000 INJECTION INTRAVENOUS; SUBCUTANEOUS at 14:11

## 2022-02-01 RX ADMIN — HYDRALAZINE HYDROCHLORIDE 50 MG: 50 TABLET, FILM COATED ORAL at 23:27

## 2022-02-01 RX ADMIN — DEXAMETHASONE SODIUM PHOSPHATE 6 MG: 10 INJECTION INTRAMUSCULAR; INTRAVENOUS at 17:20

## 2022-02-01 RX ADMIN — CEFEPIME HYDROCHLORIDE 1000 MG: 1 INJECTION, POWDER, FOR SOLUTION INTRAMUSCULAR; INTRAVENOUS at 20:52

## 2022-02-01 RX ADMIN — VANCOMYCIN HYDROCHLORIDE 1000 MG: 1 INJECTION, SOLUTION INTRAVENOUS at 15:20

## 2022-02-01 RX ADMIN — DIPHENHYDRAMINE HYDROCHLORIDE 25 MG: 50 INJECTION INTRAMUSCULAR; INTRAVENOUS at 18:29

## 2022-02-01 RX ADMIN — HEPARIN SODIUM 5000 UNITS: 5000 INJECTION INTRAVENOUS; SUBCUTANEOUS at 05:54

## 2022-02-01 RX ADMIN — ONDANSETRON 4 MG: 2 INJECTION INTRAMUSCULAR; INTRAVENOUS at 17:20

## 2022-02-01 ASSESSMENT — PAIN SCALES - GENERAL
PAINLEVEL_OUTOF10: 0
PAINLEVEL_OUTOF10: 0

## 2022-02-01 ASSESSMENT — ENCOUNTER SYMPTOMS
EYE DISCHARGE: 0
SHORTNESS OF BREATH: 0
ABDOMINAL DISTENTION: 0
DIARRHEA: 0
COUGH: 0
BLOOD IN STOOL: 0
NAUSEA: 0
SHORTNESS OF BREATH: 1
APNEA: 0
CONSTIPATION: 0
WHEEZING: 0
CHEST TIGHTNESS: 0
COLOR CHANGE: 0
VOMITING: 0
ABDOMINAL PAIN: 0

## 2022-02-01 NOTE — PROGRESS NOTES
[address] [facility] Pharmacokinetic Monitoring Service - Vancomycin    Mayme Class is a 67 y.o. female starting on vancomycin therapy for sepsis. Pharmacy consulted by Kenroy Contreras for monitoring and adjustment. Target Concentration: Pre-Dialysis Concentration 15-20 mg/L  Additional Antimicrobials: cefepime    Pertinent Laboratory Values: Wt Readings from Last 1 Encounters:   01/31/22 154 lb 1.6 oz (69.9 kg)     Temp Readings from Last 1 Encounters:   02/01/22 98.4 °F (36.9 °C) (Oral)     Recent Labs     01/31/22  0602 01/31/22  1422 02/01/22  0912   CREATININE 6.06*  --  4.99*   WBC  --  13.2*  --      Procalcitonin: 4.61    Pertinent Cultures:  Culture Date Source Results   none NA NA   MRSA Nasal Swab: N/A. Non-respiratory infection. Plan:  Concentration-guided dosing due to renal impairment and intermittent hemodialysis   Start vancomycin 1000 mg now. Received 2000 mg 1/29/22 0440, dialyzed on 1/31/22.  Next dialysis 2/2/22. (MWF)  Vancomycin concentration ordered for 2/2/22 @ 0600, prior to HD session   Pharmacy will continue to monitor patient and adjust therapy as indicated    Thank you for the consult,  Carlean Babinski, [facility]  2/1/2022 2:47 PM
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
## 2022-02-01 NOTE — CARE COORDINATION
Transitional planning-called and talked with patient. Plan is to go home with her son David Chung. Has [facility] and [facility]. O2 3L per N/C, may need O2. To have HD at [facility] on TTS at [facility]. Needs scripts faxed to them for antibiotics.  To start 2-5-2022

## 2022-02-01 NOTE — PROGRESS NOTES
[facility]  Office: [address], DO, Surinder Mendoza, DO, Marty Mancia, DO, Pallavi Ramachandran Blood, DO, Lupe Stone MD, Dorinda Heck MD, Migdalia Crews MD, Linn Avery MD, Daren Klinefelter, MD, Michael Moss MD, Corinna Guzman MD, Cher Collet, DO, Ricke Bosworth, DO, Lita Perez MD,  Lesia Tanner, DO, Jayda Wise MD, Panchito Denney MD, Natalia Isaac MD, Marielos Reed MD, Caty Pearl MD, Elly Reeves, CNP, Areli Mendes, CNP, Chantal Salvador, CNP, Betsy Albarran, CNS, Karime Vidal, CNP, Aide Bennett, CNP, Natalee Jeffrey, CNP, Gino Bardales, CNP, Dane Josue, CNP, Robson Denney PA-C, Kash Marks, [facility], Monique Cortez, [facility], Cordelia Dennison, CNP, Shawn Zamudio, CNP, Alena Edmond, CNP, García Fleming, CNP, Bill Bain, CNP, Yanci Olea, [address] [place] [address]    Progress Note    2/1/2022    4:00 PM    Name:   Jaden Person  MRN:     [id number]     [place]:      [de-identified]   Room:   2002/2002-01   Day:  4  Admit Date:  1/28/2022  8:53 PM    PCP:   Meliton Avila MD  Code Status:  Full Code    Subjective:     C/C:   Chief Complaint   Patient presents with    Hypertension     htn no dialysis x2 treatments, port bad per pt     Interval History Status: improved. Patient seen and examined at bedside, no acute events overnight.   She just came back from  where she had tunneled cath placement  Afebrile overnight  Continues to be on antibiotics per ID  Patient vitals, labs and all providers notes were reviewed,from overnight shift and morning updates were noted and discussed with the nurse    Brief History:     From H&P  Arminsteffen Leigh is a 67 y.o. Non- / non  female who presents with Hypertension (htn no dialysis x2 treatments, port bad per pt)  Patient presents to the emergency room with the concern of generalized fatigue and  near syncope.  Patient states that for the past 1-2 days she has not been feeling well and in addition her HD port is non-functional and she has not received  for 2 sessions.  Typical HD days are MWF  Upon arrival to the emergency room the patient was noted to be febrile with a temp of 103, hypertensive with systolic blood pressures in the 200s and saturating well on room air.  Nephrology was consulted while in the emergency room however did not feel patient warranted HD at this time.   Patient received Actemra, started on Decadron. Maintain on 3 L nasal cannula. Her Andrea catheter was placed due to issues with her tunneled catheter clotting off. Cathflo and heparin were attempted. Patient is waiting for exchange of tunneled catheter and discharge afterwards    Review of Systems:     Review of Systems   Constitutional: Positive for activity change, appetite change and fatigue. Negative for chills, diaphoresis and fever. HENT: Negative for congestion. Eyes: Negative for visual disturbance. Respiratory: Positive for shortness of breath (improving). Negative for cough, chest tightness and wheezing. Cardiovascular: Negative for chest pain, palpitations and leg swelling. Gastrointestinal: Negative for abdominal pain, blood in stool, constipation, diarrhea, nausea and vomiting. Genitourinary: Negative for difficulty urinating. Neurological: Positive for weakness. Negative for dizziness, light-headedness, numbness and headaches. All other systems reviewed and are negative. Medications:      Allergies:  No Known Allergies    Current Meds:   Scheduled Meds:    vancomycin  1,000 mg IntraVENous Once    vancomycin (VANCOCIN) intermittent dosing (placeholder)   Other RX Placeholder    sodium chloride flush  5-40 mL IntraVENous 2 times per day    heparin (porcine)  5,000 Units SubCUTAneous 3 times per day    cefepime  1,000 mg IntraVENous Q24H    dexamethasone  6 mg IntraVENous Q24H    enalapril  5 mg Oral Daily    NIFEdipine  60 mg Oral Daily     Continuous Infusions:    sodium chloride       PRN Meds: albumin human, heparin (porcine), heparin (porcine), sodium chloride flush, sodium chloride, ondansetron **OR** ondansetron, acetaminophen **OR** acetaminophen, hydrALAZINE    Data:     Past Medical History:   has a past medical history of Anxiety and depression, Arthritis, Cancer ([facility] Cerebral artery occlusion with cerebral infarction ([facility] Chronic kidney disease, stage 5 ([facility] Diabetes mellitus ([facility] GERD (gastroesophageal reflux disease), Hearing loss, Hemodialysis patient ([facility] History of blood transfusion, Hyperlipidemia, Hypertension, Migraines, MRSA (methicillin resistant staph aureus) culture positive, Neuropathy, PAT (obstructive sleep apnea), Prolonged emergence from general anesthesia, PVD (peripheral vascular disease) ([facility] SOB (shortness of breath), and Tinnitus. Social History:   reports that she quit smoking about 32 years ago. She has never used smokeless tobacco. She reports current alcohol use. She reports that she does not use drugs. Family History: No family history on file. Vitals:  /84   Pulse 73   Temp 98.4 °F (36.9 °C) (Oral)   Resp 15   Ht 5' 3\" (1.6 m)   Wt 154 lb 1.6 oz (69.9 kg)   SpO2 93%   BMI 27.30 kg/m²   Temp (24hrs), Av.2 °F (36.8 °C), Min:97.9 °F (36.6 °C), Max:98.4 °F (36.9 °C)    Recent Labs     22  1948   POCGLU 163*       I/O (24Hr):   No intake or output data in the 24 hours ending 22 1600    Labs:  Hematology:  Recent Labs     22  0720 22  0602 22  1422 22  1502   WBC  --   --  13.2*  --    RBC  --   --  3.67*  --    HGB 11.0*  --  10.6*  --    HCT 33.0*  --  33.0*  --    MCV  --   --  89.9  --    MCH  --   --  28.9  --    MCHC  --   --  32.1  --    RDW  --   --  13.2  --    PLT  --   --  See Reflexed IPF Result  --    MPV  --   --  NOT REPORTED  --    .1* 118.1*  --  48.4*   DDIMER 7.87  --   --   --      Chemistry:  Recent Labs     22  0796 01/31/22  0602 02/01/22  0912   * 130* 134*   K 3.7 4.0 4.2   CL 96* 95* 96*   CO2 20 20 22   GLUCOSE 162* 187* 135*   BUN 27* 49* 47*   CREATININE 3.39* 6.06* 4.99*   ANIONGAP 16 15 16   LABGLOM 13* 7* 8*   GFRAA 16* 8* 10*   CALCIUM 8.1* 8.0* 8.1*     Recent Labs     01/29/22  1947   POCGLU 163*     ABG:  Lab Results   Component Value Date    FIO2 UNKNOWN 01/28/2022     Lab Results   Component Value Date/Time    SPECIAL NOT REPORTED 01/28/2022 10:14 PM     Lab Results   Component Value Date/Time    CULTURE NO GROWTH 01/28/2022 10:14 PM       Radiology:  CT CHEST WO CONTRAST    Result Date: 1/29/2022  1. Patchy bilateral mixed ground-glass and consolidative opacities with interlobular septal thickening which can be seen with atypical/viral pneumonia to include COVID-19. 2. Small right and trace left pleural effusions. 3. Pulmonary fibrosis in the bilateral lung bases. 4. Tiny hiatal hernia. US GALLBLADDER RUQ    Result Date: 1/31/2022  Gallbladder is normal in appearance with no evidence of acute cholecystitis. Atrophic right kidney measuring 8.3 cm     XR CHEST PORTABLE    Result Date: 1/29/2022  Cardiomegaly with trace effusions and scattered infiltrates. Support tubes as described above. XR CHEST PORTABLE    Result Date: 1/28/2022  Prominent interstitium and bilateral pulmonary opacities, concerning for edema or pneumonia. IR TUNNELED CVC PLACE WO SQ PORT/PUMP > 5 YEARS    Result Date: 2/1/2022  Successful fluoroscopy guided tunneled catheter exchange . Okay to use. Physical Examination:        Physical Exam  Vitals and nursing note reviewed. Constitutional:       General: She is not in acute distress. Interventions: Nasal cannula in place. HENT:      Head: Normocephalic and atraumatic. Eyes:      Conjunctiva/sclera: Conjunctivae normal.      Pupils: Pupils are equal, round, and reactive to light. Neck:     Cardiovascular:      Rate and Rhythm: Normal rate and regular rhythm. Heart sounds: No murmur heard. Pulmonary:      Effort: Pulmonary effort is normal. No accessory muscle usage or respiratory distress. Breath sounds: No stridor. No decreased breath sounds, wheezing, rhonchi or rales. Chest:      Comments: Right-sided tunneled cath  Abdominal:      General: Bowel sounds are normal. There is no distension. Palpations: Abdomen is soft. Abdomen is not rigid. Tenderness: There is no abdominal tenderness. There is no guarding. Musculoskeletal:         General: No tenderness. Skin:     General: Skin is warm and dry. Findings: No erythema, lesion or rash. Neurological:      Mental Status: She is alert and oriented to person, place, and time. Cranial Nerves: No cranial nerve deficit. Motor: No seizure activity. Psychiatric:         Speech: Speech normal.         Behavior: Behavior normal. Behavior is cooperative.          Assessment:        Hospital Problems           Last Modified POA    * (Principal) COVID-19 virus infection 1/29/2022 Yes    Diabetes ([address] 1/29/2022 Yes    Admission for dialysis ([address] 7/06/2201 Yes    Metabolic encephalopathy 2/98/9407 Yes    End stage renal failure on dialysis ([address] 1/28/2022 Yes    Hypertensive urgency 1/29/2022 Yes    Sepsis ([address] 1/29/2022 Yes    Acute respiratory failure with hypoxia ([address] 1/29/2022 Yes    Morbid obesity ([address] 1/29/2022 Yes    Hypertension, essential 1/29/2022 Yes    Hypokalemia 3/94/2798 Yes    Metabolic acidosis 4/14/9578 Yes    Elevated procalcitonin 1/31/2022 Yes    Elevated C-reactive protein (CRP) 1/31/2022 Yes          Plan:        Principal Problem:    COVID-19 virus infection  Active Problems:    Diabetes ([address]    Admission for dialysis ([address]    Metabolic encephalopathy    End stage renal failure on dialysis [facility])    Hypertensive urgency    Sepsis ([address]    Acute respiratory failure with hypoxia (HCC)    Morbid obesity ([address]    Hypertension, essential    Hypokalemia    Metabolic acidosis    Elevated procalcitonin    Elevated C-reactive protein (CRP)  Resolved Problems:    * No resolved hospital problems.  *    S/P tunneled cath placement 2/1  Ok to remove L sided Andrea    Abx per ID , plan to continue for 2 weeks with her HD   BP and glycemic control  Discussed with patient and nurse   Home O2 evaluation     DC planning pending completion of arrangements        Mallika Del Toro MD  2/1/2022  4:00 PM

## 2022-02-01 NOTE — BRIEF OP NOTE
Brief Postoperative Note    Wilfrido Jacques  YOB: 1945  [id number]    Pre-operative Diagnosis: Chronic Renal Failure/Malfunctioning HD Catheter    Post-operative Diagnosis: Same    Procedure: Tunneled HD Catheter Exchange    Anesthesia: Local 2% Lidocaine    Medications Given: none    Surgeons/Assistants: JIN Fields PA-C and Dimitrios Aquino MD    Estimated Blood Loss: Minimal    Complications: none    Specimens: were not obtained    Tunneled HD catheter exchanged for new 14Fr x 19cm tip to cuff Palindrome HD catheter. Dressing applied. May use HD catheter for dialysis. Catheter locked with heparin. Vitals signs were reviewed and were stable after the procedure.       Electronically signed by JIN Fields on 2/1/2022 at 10:43 AM

## 2022-02-01 NOTE — CARE COORDINATION
KIM contacted [address] [place] [address] Dr. [facility] to schedule covid dialysis transportation beginning 2/5/2022. Confirmation number [id number].    KIM notified patient of information and  time of 3:25pm.

## 2022-02-01 NOTE — PROGRESS NOTES
[facility] -   Infectious diseases evaluation  admission date 1/28/2022    reason for consultation:   covid    Impression :   Current:  · covid +  · CRP [id number]  · Fever chills  · Elevated Procalcitonin  · Elevated CRP  · Chronic hemodialysis catheter, last dialysis  · Pulmonary edema  · Bilateral patchy pulmonary infiltrates, pulmonary edema versus pneumonia    Other:  ·   Discussion / summary of stay / plan of care   ·   Recommendations   covid pneumonia on CT- CRP: 100 and HD line not working   · actemra x 1 on 1/29  · Decadron 6 mg per day x 10 days  · anticoag per medicine    Bact infection - presumed - HD line not functioning  · admission blood cultures are still neg  · 1/31-Not feeling well. CRP is down, Procalcitonin markedly elevated. · 2/1 - GB US neg - right tunneled HD line exchanged over wire - repeat procal today - if better, anticipate DC on AB as below, w HD    AB:  · 1/29 cefepime  · Add 2/1 vanco w HD  · Keep both AB 2 weeks till 2/12      Infection Control Recommendations   · [place] Precautions  · Contact Isolation   · Airborne isolation  · Droplet Isolation    Antimicrobial Stewardship Recommendations   · Simplification of therapy  · Targeted therapy      Coordination ofOutpatient Care:   · Estimated Length of IV antimicrobials:  · Patient will need Midline / picc Catheter Insertion:   · Patient will need SNF:  · Patient will need outpatient wound care:     History of Present Illness:   Initial history:  Kt Santana is a 67y.o.-year-old female   Presented to the emergency room because of high blood pressure and missing the last two dialysis due to port failure. Found to have fever, CRP elevated, tested positive for Covid and chest x-ray showing congestion versus patchy pneumonia. Patient did have a minimal cough. She still makes urine but no dysuria.     pt denies cough but no appetite x few days - does not use o2 at home but was hypoxic and started on 2 L NC here    W 9  Patient received a dose of vancomycin and cefepime due to concern of line infection, no signs of exit infection        Interval changes  2/1/2022   Patient Vitals for the past 8 hrs:   BP Temp Temp src Pulse Resp SpO2   02/01/22 1007 139/84   73 15 93 %   02/01/22 1001 (!) 145/84   72 15 92 %   02/01/22 0949 (!) 154/85   74 16 95 %   02/01/22 0900 (!) 137/101   77 15 97 %   02/01/22 0759 (!) 162/101 98.4 °F (36.9 °C) Oral 80 15 96 %   1/30  She has a right tunneled old catheter is not working [id number] with a chest port that is in use  Left IJ Andrea in good condition and new  CRP increased to 8 but the patient feels much better otherwise, she is on 6 L of nasal cannula,  Blood culture still negative,  She has had Actemra 1/29 1/31  Afebrile. CRP trending down, procalcitonin up. SpO2 94% on 3 L NC.  BC, urine cx remain negative to date. Not feeling well today. C/o nausea, myalgias, arthralgias. No vomiting, diarrhea. HD cath sites clean. 2/1  No fever - only fever was at admission   Feels better and no nausea, vomiting, diarrhea and no aches  HD tunneled cath exchanged over wire - IR  2/1    No CRP and BC still neg  WBc 13 and Gall bladder US neg  pend repeat procal for today      Summary of relevant labs:  Labs:  [id number]    Procalcitonin: 0.88-4.61    Micro:  covid +  BC 1/28   U cx 1/28  Imaging:  CT chest covid pneumonia           CXR  Prominent interstitium and bilateral pulmonary opacities, concerning for   edema or pneumonia. I have personally reviewed the past medical history, past surgical history, medications, social history, and family history, and I haveupdated the database accordingly. Allergies:   Patient has no known allergies. Review of Systems:     Review of Systems   Constitutional: Positive for activity change. Negative for fatigue and fever. HENT: Negative for congestion.     Eyes: Negative for discharge. Respiratory: Negative for apnea, cough and shortness of breath. Cardiovascular: Negative for chest pain. Gastrointestinal: Negative for abdominal distention and nausea. Endocrine: Negative for heat intolerance. Genitourinary: Negative for dysuria. Musculoskeletal: Negative for arthralgias. Skin: Negative for color change. Allergic/Immunologic: Negative for immunocompromised state. Neurological: Negative for dizziness, light-headedness and headaches. Hematological: Negative for adenopathy. Psychiatric/Behavioral: Negative for agitation. Physical Examination :       Physical Exam  Constitutional:       Appearance: Normal appearance. She is not ill-appearing. HENT:      Head: Normocephalic and atraumatic. Nose: Nose normal.      Mouth/Throat:      Mouth: Mucous membranes are moist.   Eyes:      General: No scleral icterus. Conjunctiva/sclera: Conjunctivae normal.   Cardiovascular:      Rate and Rhythm: Normal rate and regular rhythm. Heart sounds: Normal heart sounds. No murmur heard. No friction rub. Pulmonary:      Effort: No respiratory distress. Abdominal:      General: There is no distension. Tenderness: There is no abdominal tenderness. Musculoskeletal:      Cervical back: Neck supple. No rigidity. Neurological:      General: No focal deficit present. Cranial Nerves: No cranial nerve deficit. Sensory: No sensory deficit. Psychiatric:         Mood and Affect: Mood normal.         Thought Content:  Thought content normal.         Past Medical History:     Past Medical History:   Diagnosis Date    Anxiety and depression     Arthritis     Rheumatoid     Cancer ([facility]     left breast cancer    Cerebral artery occlusion with cerebral infarction [facility]) jan 2020 & March 2020    poor balance since strokes    Chronic kidney disease, stage 5 (HCC)     hemodialysis Mon-Wed-Fri    Diabetes mellitus (HCC)     GERD (gastroesophageal reflux disease)     Hearing loss     bilateral. Does not have hearing aides    [facility]od[facility] ([facility]     M-W-F started in     History of blood transfusion     50 plus yrs ago with pregnancy    Hyperlipidemia     Hypertension     Migraines     MRSA (methicillin resistant staph aureus) culture positive     back    Neuropathy     PAT (obstructive sleep apnea)     had UPPP  \"have not used machine in years\"    Prolonged emergence from general anesthesia     PVD (peripheral vascular disease) ([facility]     SOB (shortness of breath)     \"long distance\"    Tinnitus        Past Surgical  History:     Past Surgical History:   Procedure Laterality Date    APPENDECTOMY      BACK SURGERY      I and D lower back MRSA    BREAST REDUCTION SURGERY Left     CENTRAL LINE  2022         COLONOSCOPY      HYSTERECTOMY      INSERTABLE CARDIAC MONITOR      MASTECTOMY Left     lymph nodes removed    MASTECTOMY Left 2018    axillary mastectomy    NE EXC SKIN BENIG <5MM TRUNK,ARM,LEG Right 2018    RIGHT AXILLARY MASTECTOMY performed by Reji Morales DO at [facility]    UVULOPALATOPHARYGOPLASTY         Medications:      sodium chloride flush  5-40 mL IntraVENous 2 times per day    heparin (porcine)  5,000 Units SubCUTAneous 3 times per day    cefepime  1,000 mg IntraVENous Q24H    dexamethasone  6 mg IntraVENous Q24H    enalapril  5 mg Oral Daily    NIFEdipine  60 mg Oral Daily       Social History:     Social History     Socioeconomic History    Marital status:      Spouse name: Not on file    Number of children: Not on file    Years of education: Not on file    Highest education level: Not on file   Occupational History    Not on file   Tobacco Use    Smoking status: Former Smoker     Quit date:      Years since quittin.1    Smokeless tobacco: Never Used   Vaping Use    Vaping Use: Never used   Substance and Sexual Activity    Alcohol use: Yes     Comment: occasionally  \"couple a month\"    Drug use: No    Sexual activity: Not on file   Other Topics Concern    Not on file   Social History Narrative    Not on file     Social Determinants of Health     Financial Resource Strain:     Difficulty of Paying Living Expenses: Not on file   Food Insecurity:     Worried About Running Out of Food in the Last Year: Not on file    Caleb of Food in the Last Year: Not on file   Transportation Needs:     Lack of Transportation (Medical): Not on file    Lack of Transportation (Non-Medical): Not on file   Physical Activity:     Days of Exercise per Week: Not on file    Minutes of Exercise per Session: Not on file   Stress:     Feeling of Stress : Not on file   Social Connections:     Frequency of Communication with Friends and Family: Not on file    Frequency of Social Gatherings with Friends and Family: Not on file    Attends Gnosticism Services: Not on file    Active Member of [address] [place] [address] [employer] or Organizations: Not on file    Attends Club or Organization Meetings: Not on file    Marital Status: Not on file   Intimate Partner Violence:     Fear of Current or Ex-Partner: Not on file    Emotionally Abused: Not on file    Physically Abused: Not on file    Sexually Abused: Not on file   Housing Stability:     Unable to Pay for Housing in the Last Year: Not on file    Number of [place] in the Last Year: Not on file    Unstable Housing in the Last Year: Not on file       Family History:   No family history on file.    Medical Decision Making:   I have independently reviewed/ordered the following labs:    CBC with Differential:   Recent Labs     01/30/22  0720 01/31/22  1422   WBC  --  13.2*   HGB 11.0* 10.6*   HCT 33.0* 33.0*   PLT  --  See Reflexed IPF Result   LYMPHOPCT  --  7*   MONOPCT  --  4     BMP:  Recent Labs     01/31/22  0602 02/01/22  0912   * 134*   K 4.0 4.2   CL 95* 96*   CO2 20 22   BUN 49* 47*   CREATININE 6.06* 4.99*     Hepatic Function Panel:   No results for input(s): PROT, LABALBU, BILIDIR, IBILI, BILITOT, ALKPHOS, ALT, AST in the last 72 hours. No results for input(s): RPR in the last 72 hours. No results for input(s): HIV in the last 72 hours. No results for input(s): BC in the last 72 hours. Lab Results   Component Value Date    CREATININE 4.99 02/01/2022    GLUCOSE 135 02/01/2022       Detailed results: Thank you for allowing us to participate in the care of this patient. Please call with questions. This note is created with the assistance of a speech recognition program.  While intending to generate adocument that actually reflects the content of the visit, the document can still have some errors including those of syntax and sound a like substitutions which may escape proof reading. It such instances, actual meaningcan be extrapolated by contextual diversion.     Lucy Gomes MD  Office: [phone]  Perfect serve / office [phone]

## 2022-02-01 NOTE — PROGRESS NOTES
Called and left voicemail for patient's son Tonya Holcomb) to get list of home medications. Pt does not know what she takes.

## 2022-02-02 LAB
ANION GAP SERPL CALCULATED.3IONS-SCNC: 18 MMOL/L (ref 9–17)
BUN BLDV-MCNC: 70 MG/DL (ref 8–23)
BUN/CREAT BLD: ABNORMAL (ref 9–20)
CALCIUM SERPL-MCNC: 7.8 MG/DL (ref 8.6–10.4)
CHLORIDE BLD-SCNC: 96 MMOL/L (ref 98–107)
CO2: 19 MMOL/L (ref 20–31)
CREAT SERPL-MCNC: 7.04 MG/DL (ref 0.5–0.9)
CULTURE: NORMAL
CULTURE: NORMAL
GFR AFRICAN AMERICAN: 7 ML/MIN
GFR NON-AFRICAN AMERICAN: 6 ML/MIN
GFR SERPL CREATININE-BSD FRML MDRD: ABNORMAL ML/MIN/{1.73_M2}
GFR SERPL CREATININE-BSD FRML MDRD: ABNORMAL ML/MIN/{1.73_M2}
GLUCOSE BLD-MCNC: 198 MG/DL (ref 70–99)
Lab: NORMAL
Lab: NORMAL
POTASSIUM SERPL-SCNC: 4.4 MMOL/L (ref 3.7–5.3)
PROCALCITONIN: 2.54 NG/ML
SODIUM BLD-SCNC: 133 MMOL/L (ref 135–144)
SPECIMEN DESCRIPTION: NORMAL
SPECIMEN DESCRIPTION: NORMAL
VANCOMYCIN RANDOM DATE LAST DOSE: NORMAL
VANCOMYCIN RANDOM DOSE AMOUNT: NORMAL
VANCOMYCIN RANDOM TIME LAST DOSE: NORMAL
VANCOMYCIN RANDOM: 23.3 UG/ML

## 2022-02-02 PROCEDURE — 90935 HEMODIALYSIS ONE EVALUATION: CPT | Performed by: INTERNAL MEDICINE

## 2022-02-02 PROCEDURE — 80202 ASSAY OF VANCOMYCIN: CPT

## 2022-02-02 PROCEDURE — 99232 SBSQ HOSP IP/OBS MODERATE 35: CPT | Performed by: NURSE PRACTITIONER

## 2022-02-02 PROCEDURE — 94761 N-INVAS EAR/PLS OXIMETRY MLT: CPT

## 2022-02-02 PROCEDURE — 6370000000 HC RX 637 (ALT 250 FOR IP): Performed by: STUDENT IN AN ORGANIZED HEALTH CARE EDUCATION/TRAINING PROGRAM

## 2022-02-02 PROCEDURE — 6370000000 HC RX 637 (ALT 250 FOR IP): Performed by: NURSE PRACTITIONER

## 2022-02-02 PROCEDURE — 36415 COLL VENOUS BLD VENIPUNCTURE: CPT

## 2022-02-02 PROCEDURE — 6360000002 HC RX W HCPCS: Performed by: NURSE PRACTITIONER

## 2022-02-02 PROCEDURE — 99232 SBSQ HOSP IP/OBS MODERATE 35: CPT | Performed by: FAMILY MEDICINE

## 2022-02-02 PROCEDURE — 84145 PROCALCITONIN (PCT): CPT

## 2022-02-02 PROCEDURE — 6360000002 HC RX W HCPCS: Performed by: INTERNAL MEDICINE

## 2022-02-02 PROCEDURE — 2580000003 HC RX 258: Performed by: NURSE PRACTITIONER

## 2022-02-02 PROCEDURE — 90935 HEMODIALYSIS ONE EVALUATION: CPT

## 2022-02-02 PROCEDURE — 2700000000 HC OXYGEN THERAPY PER DAY

## 2022-02-02 PROCEDURE — 2060000000 HC ICU INTERMEDIATE R&B

## 2022-02-02 PROCEDURE — 6370000000 HC RX 637 (ALT 250 FOR IP): Performed by: FAMILY MEDICINE

## 2022-02-02 PROCEDURE — 2580000003 HC RX 258: Performed by: INTERNAL MEDICINE

## 2022-02-02 PROCEDURE — 80048 BASIC METABOLIC PNL TOTAL CA: CPT

## 2022-02-02 RX ORDER — CLONIDINE HYDROCHLORIDE 0.1 MG/1
0.1 TABLET ORAL ONCE
Status: DISCONTINUED | OUTPATIENT
Start: 2022-02-02 | End: 2022-02-04 | Stop reason: HOSPADM

## 2022-02-02 RX ORDER — CLONIDINE HYDROCHLORIDE 0.1 MG/1
0.1 TABLET ORAL ONCE
Status: COMPLETED | OUTPATIENT
Start: 2022-02-02 | End: 2022-02-02

## 2022-02-02 RX ADMIN — CEFEPIME HYDROCHLORIDE 1000 MG: 1 INJECTION, POWDER, FOR SOLUTION INTRAMUSCULAR; INTRAVENOUS at 20:25

## 2022-02-02 RX ADMIN — HEPARIN SODIUM 5000 UNITS: 5000 INJECTION INTRAVENOUS; SUBCUTANEOUS at 06:17

## 2022-02-02 RX ADMIN — METOPROLOL TARTRATE 100 MG: 50 TABLET, FILM COATED ORAL at 14:13

## 2022-02-02 RX ADMIN — HEPARIN SODIUM 5000 UNITS: 5000 INJECTION INTRAVENOUS; SUBCUTANEOUS at 20:25

## 2022-02-02 RX ADMIN — HEPARIN SODIUM 1600 UNITS: 1000 INJECTION INTRAVENOUS; SUBCUTANEOUS at 13:22

## 2022-02-02 RX ADMIN — SODIUM CHLORIDE, PRESERVATIVE FREE 10 ML: 5 INJECTION INTRAVENOUS at 20:25

## 2022-02-02 RX ADMIN — HYDRALAZINE HYDROCHLORIDE 50 MG: 50 TABLET, FILM COATED ORAL at 20:25

## 2022-02-02 RX ADMIN — HYDRALAZINE HYDROCHLORIDE 10 MG: 20 INJECTION INTRAMUSCULAR; INTRAVENOUS at 03:09

## 2022-02-02 RX ADMIN — HEPARIN SODIUM 1500 UNITS: 1000 INJECTION INTRAVENOUS; SUBCUTANEOUS at 13:20

## 2022-02-02 RX ADMIN — VANCOMYCIN HYDROCHLORIDE 750 MG: 10 INJECTION, POWDER, LYOPHILIZED, FOR SOLUTION INTRAVENOUS at 14:13

## 2022-02-02 RX ADMIN — HYDRALAZINE HYDROCHLORIDE 50 MG: 50 TABLET, FILM COATED ORAL at 14:12

## 2022-02-02 RX ADMIN — DEXAMETHASONE SODIUM PHOSPHATE 6 MG: 10 INJECTION INTRAMUSCULAR; INTRAVENOUS at 18:42

## 2022-02-02 RX ADMIN — SODIUM CHLORIDE 25 ML: 0.9 INJECTION, SOLUTION INTRAVENOUS at 14:17

## 2022-02-02 RX ADMIN — SODIUM CHLORIDE, PRESERVATIVE FREE 10 ML: 5 INJECTION INTRAVENOUS at 09:00

## 2022-02-02 RX ADMIN — NIFEDIPINE 60 MG: 30 TABLET, FILM COATED, EXTENDED RELEASE ORAL at 14:12

## 2022-02-02 RX ADMIN — HEPARIN SODIUM 5000 UNITS: 5000 INJECTION INTRAVENOUS; SUBCUTANEOUS at 14:11

## 2022-02-02 RX ADMIN — CLONIDINE HYDROCHLORIDE 0.1 MG: 0.1 TABLET ORAL at 04:23

## 2022-02-02 RX ADMIN — ENALAPRIL MALEATE 5 MG: 5 TABLET ORAL at 14:12

## 2022-02-02 ASSESSMENT — ENCOUNTER SYMPTOMS
COUGH: 0
DIARRHEA: 0
BLOOD IN STOOL: 0
NAUSEA: 0
WHEEZING: 0
VOMITING: 0
CONSTIPATION: 0
SHORTNESS OF BREATH: 1
CHEST TIGHTNESS: 0
ABDOMINAL DISTENTION: 0
ABDOMINAL PAIN: 0
COLOR CHANGE: 0
APNEA: 0
EYE DISCHARGE: 0

## 2022-02-02 ASSESSMENT — PAIN SCALES - GENERAL
PAINLEVEL_OUTOF10: 0

## 2022-02-02 NOTE — PROGRESS NOTES
Pharmacy Vancomycin Consult     Vancomycin Day: 5  Current Dosin mg with Dialysis  Current indication: sepsis    Temp max:  98.2    Recent Labs     22  0602 22  1422 22  0912 22  0411   BUN   < >  --  47* 70*   CREATININE   < >  --  4.99* 7.04*   WBC  --  13.2*  --   --     < > = values in this interval not displayed. No intake or output data in the 24 hours ending 22 1059    Ht Readings from Last 1 Encounters:   22 5' 3\" (1.6 m)        Wt Readings from Last 1 Encounters:   22 157 lb 10.1 oz (71.5 kg)       Body mass index is 27.92 kg/m². Estimated Creatinine Clearance: 6 mL/min (A) (based on SCr of 7.04 mg/dL [facility])).     Level: 23.3    Assessment/Plan:  Will reduce dose to 750 mg with dialysis    Bella Valdez Pharm D.  2022  11:00 AM

## 2022-02-02 NOTE — PROGRESS NOTES
Renal Progress Note    Patient :  Josette Rollins; 67 y.o. MRN# [id number]  Location:  2002/2002-01  Attending:  Chad Luke MD  Admit Date:  1/28/2022   Hospital Day: 5      Subjective:     ESRD, following for management of hemodialysis. Patient was seen and examined at bedside.   Currently on dialysis using tunnel catheter  Oxygen via nasal cannula 2 L/min  1/28/22: Blood cultures no growth   1/28/22: Urine culture no growth  Plans to get tunnel catheter placement today    Outpatient Medications:     Medications Prior to Admission: calcitRIOL (ROCALTROL) 0.25 MCG capsule, Take 0.25 mcg by mouth daily  sevelamer (RENVELA) 800 MG tablet, Take 1 tablet by mouth 3 times daily (with meals)  clopidogrel (PLAVIX) 75 MG tablet, Take 75 mg by mouth daily  enalapril (VASOTEC) 5 MG tablet, Take 5 mg by mouth daily  metoprolol (LOPRESSOR) 100 MG tablet, Take 100 mg by mouth daily  NIFEdipine (PROCARDIA XL) 60 MG extended release tablet, Take 60 mg by mouth daily  pantoprazole (PROTONIX) 40 MG tablet, Take 40 mg by mouth daily  cloNIDine (CATAPRES) 0.1 MG/24HR PTWK, Place 1 patch onto the skin once a week Indications: Sundays Sundays  hydrALAZINE (APRESOLINE) 100 MG tablet, Take 100 mg by mouth 3 times daily  rosuvastatin (CRESTOR) 5 MG tablet, Take 5 mg by mouth daily  citalopram (CELEXA) 20 MG tablet, Take 40 mg by mouth daily Takes 2 tabs (=40mg) daily  ondansetron (ZOFRAN) 4 MG tablet, Take 1 tablet by mouth every 6 hours as needed for Nausea or Vomiting    Current Medications:     Scheduled Meds:    cloNIDine  0.1 mg Oral Once    vancomycin  750 mg IntraVENous Q MWF    vancomycin (VANCOCIN) intermittent dosing (placeholder)   Other RX Placeholder    racepinephrine HCl  11.25 mg Nebulization Once    metoprolol  100 mg Oral Daily    hydrALAZINE  50 mg Oral TID    sodium chloride flush  5-40 mL IntraVENous 2 times per day    heparin (porcine)  5,000 Units SubCUTAneous 3 times per day    cefepime  1,000 mg IntraVENous Q24H    dexamethasone  6 mg IntraVENous Q24H    enalapril  5 mg Oral Daily    NIFEdipine  60 mg Oral Daily     Continuous Infusions:    sodium chloride       PRN Meds:  sodium chloride nebulizer, albumin human, heparin (porcine), heparin (porcine), sodium chloride flush, sodium chloride, ondansetron **OR** ondansetron, acetaminophen **OR** acetaminophen, hydrALAZINE    Input/Output:       No intake/output data recorded. .      Patient Vitals for the past 96 hrs (Last 3 readings):   Weight   22 0930 157 lb 10.1 oz (71.5 kg)   22 1310 154 lb 1.6 oz (69.9 kg)   22 0923 155 lb 10.3 oz (70.6 kg)       Vital Signs:   Temperature:  Temp: 98.2 °F (36.8 °C)  TMax:   Temp (24hrs), Av.3 °F (36.8 °C), Min:98.2 °F (36.8 °C), Max:98.6 °F (37 °C)    Respirations:  Resp: 13  Pulse:   Pulse: 66  BP:    BP: 136/85  BP Range: Systolic (04ZAR), ENW:023 , Min:122 , GHB:880       Diastolic (90PSA), KUZ:20, Min:48, Max:97      Physical Examination:     General:          AAO x 3, speaking in full sentences, no accessory muscle use. HEENT:           Atraumatic, normocephalic. Neck:               No JVD, left IJ in place, dressing clean, dry,intact. Chest:               No wheezes  Cardiac:           S1 S2 RR, no murmurs, gallops or rubs, JVP not raised. Abdomen:        Soft, non-tender, no masses or organomegaly, BS audible. :                  Salter in place, urine yellow/clear  Neuro:             AAO x 3, No FND. SKIN:               No rashes, good skin turgor. Extremities:     Trace bilateral lower extremity edema, no calf tenderness.   Labs:       Recent Labs     22  1422   WBC 13.2*   RBC 3.67*   HGB 10.6*   HCT 33.0*   MCV 89.9   MCH 28.9   MCHC 32.1   RDW 13.2   PLT See Reflexed IPF Result   MPV NOT REPORTED      BMP:   Recent Labs     22  0602 22  0912 22  0411   * 134* 133*   K 4.0 4.2 4.4   CL 95* 96* 96*   CO2 20 22 19*   BUN 49* 47* 70*   CREATININE 6.06* 4.99* 7.04*   GLUCOSE 187* 135* 198*   CALCIUM 8.0* 8.1* 7.8*     Albumin:    No results for input(s): LABALBU in the last 72 hours. SPEP:  Lab Results   Component Value Date    PROT 7.3 01/29/2022     Urinalysis/Chemistries:      Lab Results   Component Value Date    NITRU NEGATIVE 01/28/2022    COLORU Yellow 01/28/2022    PHUR 6.5 01/28/2022    WBCUA 2 TO 5 01/28/2022    RBCUA 0 TO 2 01/28/2022    MUCUS 1+ 01/28/2022    TRICHOMONAS NOT REPORTED 01/28/2022    YEAST NOT REPORTED 01/28/2022    BACTERIA FEW 01/28/2022    SPECGRAV 1.016 01/28/2022    LEUKOCYTESUR TRACE 01/28/2022    UROBILINOGEN Normal 01/28/2022    BILIRUBINUR NEGATIVE 01/28/2022    GLUCOSEU NEGATIVE 01/28/2022    KETUA NEGATIVE 01/28/2022    AMORPHOUS NOT REPORTED 01/28/2022     Radiology:     1/29/22 CXR:   FINDINGS:   There are scattered infiltrates. Malachi Motto is no pneumothorax.  There are trace   effusions.  The heart is enlarged.  The upper abdomen is unremarkable.  The   extrathoracic soft tissues are unremarkable. Malachi Motto is a right internal   jugular port. Malachi Motto is a right internal jugular catheter that is stable. There is a left internal jugular catheter with the tip in the proximal SVC.         Impression   Cardiomegaly with trace effusions and scattered infiltrates.       Support tubes as described above. 1/29/22 CT Chest wo contrast   Impression   1. Patchy bilateral mixed ground-glass and consolidative opacities with   interlobular septal thickening which can be seen with atypical/viral   pneumonia to include COVID-19.   2. Small right and trace left pleural effusions. 3. Pulmonary fibrosis in the bilateral lung bases. 4. Tiny hiatal hernia. Assessment:     1. ESRD on Hemodialysis. His regular HD days are Monday, Wednesday, Friday at [facility] hemodialysis facility using tunnel catheter under Dr Jane Salcido. Her dry weight is unknown. Admitted with 77.1kg.   Tunneled catheter clotted and hence was exchanged for Andrea catheter on 1/29/2022 by critical care. Now has tunneled catheter  2. Covid 19 pneumonia  3. Secondary hyperparathyroidism  4. Hypertension  5. Anemia of chronic disease      Plan:     #1 seen and examined on hemodialysis. Orders reviewed with the nursing staff. #2 antibiotics as per ESRD dosing      Nutrition   Please ensure that patient is on a renal diet/TF. Avoid nephrotoxic drugs/contrast exposure. We will continue to follow along with you. Electronically signed by Nikolai Kyle MD on 2/2/2022 at 11:57 AM    This note is created with the assistance of a speech-recognition program. While intending to generate a document that actually reflects the content of the visit, no guarantees can be provided that every mistake has been identified and corrected by editing.

## 2022-02-02 NOTE — DISCHARGE INSTR - COC
Continuity of Care Form    Patient Name: Tierra Pérez   :  [birth year]  MRN:  [id number]    Admit date:  2022  Discharge date:  2022    Code Status Order: Full Code   Advance Directives:      Admitting Physician:  Jozef Su MD  PCP: Brigido Victoria MD    Discharging Nurse: [facility] Unit/Room#:   Discharging Unit Phone Number: [phone]    Emergency Contact:   Extended Emergency Contact Information  Primary Emergency Contact: Magalys Grandan [address] Phone: [phone]  Relation: Child  Secondary Emergency Contact: Wilma garcia  [place] Phone: [phone]  Relation: Child   needed?  No    Past Surgical History:  Past Surgical History:   Procedure Laterality Date    APPENDECTOMY      BACK SURGERY      I and D lower back MRSA    BREAST REDUCTION SURGERY Left     CENTRAL LINE  2022         COLONOSCOPY      HYSTERECTOMY      INSERTABLE CARDIAC MONITOR      IR TUNNELED CATHETER PLACEMENT GREATER THAN 5 YEARS  2022    IR TUNNELED CATHETER PLACEMENT GREATER THAN 5 YEARS 2022 [facility]    MASTECTOMY Left     lymph nodes removed    MASTECTOMY Left 2018    axillary mastectomy    NH EXC SKIN BENIG <5MM TRUNK,ARM,LEG Right 2018    RIGHT AXILLARY MASTECTOMY performed by Jey Bailey DO at [address]         Immunization History:   Immunization History   Administered Date(s) Administered    KRISHNAID-19, Nancy Bear, Primary or Immunocompromised, PF, 100mcg/0.5mL 2021, 2021       Active Problems:  Patient Active Problem List   Diagnosis Code    AMS (altered mental status) R41.82    Diabetes ([facility] E11.9    Admission for dialysis [facility]) Z99.2    Hypoglycemia E16.2    Encephalopathy K01.16    Metabolic encephalopathy G45.11    End stage renal failure on dialysis ([facility] N18.6, Z99.2    COVID-19 virus infection U07.1    Hypertensive urgency I16.0    Sepsis ([facility] A41.9 Acute respiratory failure with hypoxia (HCC) J96.01    Morbid obesity (HCC) E66.01    Hypertension, essential I10    Hypokalemia K76.4    Metabolic acidosis E72.9    Elevated procalcitonin R79.89    Elevated C-reactive protein (CRP) R79.82       Isolation/Infection:   Isolation            Contact  Droplet Plus          Patient Infection Status       Infection Onset Added Last Indicated Last Indicated By Review Planned Expiration Resolved Resolved By    COVID-19 01/28/22 01/28/22 01/28/22 COVID-19, Rapid 02/04/22 02/11/22      MRSA  05/14/18 05/14/18 Sav Ruffin RN        1 negative nasal swab - 5/2018 (needs 2nd nasal swab)  Back - 2015    Resolved    COVID-19 (Rule Out) 01/28/22 01/28/22 01/28/22 COVID-19 Rapid (Ordered)   01/28/22 Rule-Out Test Resulted            Nurse Assessment:  Last Vital Signs: /69   Pulse 65   Temp 98.3 °F (36.8 °C) (Oral)   Resp 16   Ht 5' 3\" (1.6 m)   Wt 154 lb 1.6 oz (69.9 kg)   SpO2 100%   BMI 27.30 kg/m²     Last documented pain score (0-10 scale): Pain Level: 0  Last Weight:   Wt Readings from Last 1 Encounters:   01/31/22 154 lb 1.6 oz (69.9 kg)     Mental Status:  oriented, alert, coherent, logical, thought processes intact, and able to concentrate and follow conversation    IV Access:  - None    Nursing Mobility/ADLs:  Walking   Dependent  Transfer  Assisted  Bathing  Assisted  Dressing  Assisted  Toileting  Assisted  Feeding  Assisted  Med Admin  Independent  Med Delivery   whole    Wound Care Documentation and Therapy:  Wound 11/08/21 Buttocks Right (Active)   Number of days: 85        Elimination:  Continence: Bowel: No  Bladder: No  Urinary Catheter: None   Colostomy/Ileostomy/Ileal Conduit: No       Date of Last BM: 02/01/2022  No intake or output data in the 24 hours ending 02/02/22 0492  I/O last 3 completed shifts: In: 1 [P.O.:720]  Out: 1430 [Urine:30]    Safety Concerns:      At Risk for Falls and generalized weakness    Impairments/Disabilities: N/A    Nutrition Therapy:  Current Nutrition Therapy:   - Oral Diet:  General    Routes of Feeding: Oral  Liquids: No Restrictions  Daily Fluid Restriction: no  Last Modified Barium Swallow with Video (Video Swallowing Test): not done    Treatments at the Time of Hospital Discharge:   Respiratory Treatments: N/A  Oxygen Therapy:  is not on home oxygen therapy. Ventilator:    - No ventilator support    Rehab Therapies: Physical Therapy and Occupational Therapy  Weight Bearing Status/Restrictions: No weight bearing restirctions  Other Medical Equipment (for information only, NOT a DME order):  N/A  Other Treatments: skilled nursing eval and treat, [facility] [id number] [facility] 2[facility]    Patient's personal belongings (please select all that are sent with patient):  Patient has all personal belongings on discharge    RN SIGNATURE:  Electronically signed by Mikal Blanca RN on 2/2/22 at 3:00 PM EST    CASE MANAGEMENT/SOCIAL WORK SECTION    Inpatient Status Date: 1-    Readmission Risk Assessment Score:  Readmission Risk              Risk of Unplanned Readmission:  24           Discharging to Facility/ Agency   Name:[facility]  Address:  Phone:  Fax:    Dialysis Facility (if applicable)   Name:  Address:  Dialysis Schedule:  Phone:  Fax:    / signature: Electronically signed by Ester Webber RN on 2/2/22 at 6:33 AM EST    PHYSICIAN SECTION    Prognosis: Fair    Condition at Discharge: Stable    Rehab Potential (if transferring to Rehab): Fair    Recommended Labs or Other Treatments After Discharge: skilled nursing eval and treat, PT/OT, Home Health aide, isolate until [phone]    Physician Certification: I certify the above information and transfer of Rosalind Johnson  is necessary for the continuing treatment of the diagnosis listed and that she requires [address] for greater 30 days.      Update Admission H&P: Changes in H&P as follows - as in DC summary    PHYSICIAN SIGNATURE:  Electronically signed by Chad Luke MD on 2/2/22 at 7:40 AM EST

## 2022-02-02 NOTE — PROGRESS NOTES
Writer spoke to patient about discharge today & patient stated \" I'm worried about going home with out no one there to help me, when [facility] isn't at the house & my son is also hospitalized at [place] with COVID-that's why I don't have help at home right now\" Writer updated Dr. Matt Lr and TRACI

## 2022-02-02 NOTE — PLAN OF CARE
Problem: Breathing Pattern - Ineffective  Goal: Ability to achieve and maintain a regular respiratory rate  2/2/2022 0809 by Carl Li RN  Outcome: Ongoing  2/2/2022 0133 by Ferny Cotto RN  Outcome: Ongoing     Problem: Isolation Precautions - Risk of Spread of Infection  Goal: Prevent transmission of infection  2/2/2022 0809 by Carl Li RN  Outcome: Ongoing  2/2/2022 0133 by Ferny Cotto RN  Outcome: Ongoing     Problem: Loneliness or Risk for Loneliness  Goal: Demonstrate positive use of time alone when socialization is not possible  2/2/2022 0809 by Carl Li RN  Outcome: Ongoing  2/2/2022 0133 by Ferny Cotto RN  Outcome: Ongoing     Problem: Fatigue  Goal: Verbalize increase energy and improved vitality  2/2/2022 0809 by Carl Li RN  Outcome: Ongoing  2/2/2022 0133 by Ferny Cotto RN  Outcome: Ongoing     Will continue to monitor patient

## 2022-02-02 NOTE — PROGRESS NOTES
[facility] -   Infectious diseases evaluation  admission date 1/28/2022    reason for consultation:   covid    Impression :   Current:  · covid +  · CRP [id number]  · Fever chills  · Elevated Procalcitonin  · Elevated CRP  · Chronic hemodialysis catheter, last dialysis  · Pulmonary edema  · Bilateral patchy pulmonary infiltrates, pulmonary edema versus pneumonia    Other:  ·   Discussion / summary of stay / plan of care   ·   Recommendations   covid pneumonia on CT- CRP: 100 and HD line not working   · actemra x 1 on 1/29  · Decadron 6 mg per day x 10 days  · anticoag per medicine    Bact infection - presumed - HD line not functioning  · admission blood cultures are still neg  · 1/31-Not feeling well. CRP is down, Procalcitonin markedly elevated. · 2/1 - GB US neg - right tunneled HD line exchanged over wire - repeat procal today - if better, anticipate DC on AB as below, w HD    AB:  · 1/29 cefepime  · Add 2/1 vanco w HD  · Keep both AB 2 weeks till 2/12  · 2/2 Discharging today. Med rec for antibiotics completed. · Follow-up with Dr. Marcellus Santos in the office in 2-weeks. Isolate until 2/8/22  Infection Control Recommendations   · [place] Precautions  · Contact Isolation   · Airborne isolation  · Droplet Isolation    Antimicrobial Stewardship Recommendations   · Simplification of therapy  · Targeted therapy      Coordination ofOutpatient Care:   · Estimated Length of IV antimicrobials:  · Patient will need Midline / picc Catheter Insertion:   · Patient will need SNF:  · Patient will need outpatient wound care:     History of Present Illness:   Initial history:  Claudia Navarro is a 67y.o.-year-old female   Presented to the emergency room because of high blood pressure and missing the last two dialysis due to port failure. Found to have fever, CRP elevated, tested positive for Covid and chest x-ray showing congestion versus patchy pneumonia.   Patient did have a minimal cough. She still makes urine but no dysuria. pt denies cough but no appetite x few days - does not use o2 at home but was hypoxic and started on 2 L NC here    W 9  Patient received a dose of vancomycin and cefepime due to concern of line infection, no signs of exit infection        Interval changes  2/2/2022   Patient Vitals for the past 8 hrs:   BP Temp Temp src Pulse Resp SpO2   02/02/22 0631 122/69        02/02/22 0620 122/69   65 16 100 %   02/02/22 0543 (!) 170/59   66 15 100 %   02/02/22 0500 (!) 167/67   66 14 100 %   02/02/22 0400 (!) 183/72 98.3 °F (36.8 °C) Oral 71 14 96 %   02/02/22 0313  98.3 °F (36.8 °C) Oral      02/02/22 0300 (!) 179/61   69 14 99 %   02/02/22 0200 (!) 184/82   73 15 92 %   02/02/22 0100 (!) 188/55   68 15 99 %   1/30  She has a right tunneled old catheter is not working [id number] with a chest port that is in use  Left IJ Andrea in good condition and new  CRP increased to 8 but the patient feels much better otherwise, she is on 6 L of nasal cannula,  Blood culture still negative,  She has had Actemra 1/29 1/31  Afebrile. CRP trending down, procalcitonin up. SpO2 94% on 3 L NC.  BC, urine cx remain negative to date. Not feeling well today. C/o nausea, myalgias, arthralgias. No vomiting, diarrhea. HD cath sites clean. 2/1  No fever - only fever was at admission   Feels better and no nausea, vomiting, diarrhea and no aches  HD tunneled cath exchanged over wire - IR  2/1    No CRP and BC still neg  WBc 13 and Gall bladder US neg  pend repeat procal for today    2/2  Afebrile. Vanco trough 23.3  CRP improved. SpO2 100 % on 3 L NC. Feeling better. Summary of relevant labs:Labs:  [id number] - 48.4  Procalcitonin: 0.88-4.61    Micro:  covid +  BC 1/28   U cx 1/28  Imaging:  CT chest covid pneumonia           CXR  Prominent interstitium and bilateral pulmonary opacities, concerning for   edema or pneumonia.        I have personally reviewed the past medical history, past surgical history, medications, social history, and family history, and I haveupdated the database accordingly. Allergies:   Patient has no known allergies. Review of Systems:     Review of Systems   Constitutional: Negative for activity change, fatigue and fever. HENT: Negative for congestion. Eyes: Negative for discharge. Respiratory: Positive for shortness of breath. Negative for apnea and cough. FERRO at times. Cardiovascular: Negative for chest pain. Gastrointestinal: Negative for abdominal distention and nausea. Endocrine: Negative for heat intolerance. Genitourinary: Negative for dysuria. Musculoskeletal: Negative for arthralgias. Skin: Negative for color change. Allergic/Immunologic: Negative for immunocompromised state. Neurological: Negative for dizziness, light-headedness and headaches. Neuropathy to bilateral feet. Hematological: Negative for adenopathy. Psychiatric/Behavioral: Negative for agitation. Physical Examination :       Physical Exam  Vitals and nursing note reviewed. Constitutional:       Appearance: Normal appearance. She is not ill-appearing. HENT:      Head: Normocephalic and atraumatic. Right Ear: External ear normal.      Left Ear: External ear normal.      Nose: Nose normal.      Mouth/Throat:      Mouth: Mucous membranes are moist.      Pharynx: Oropharynx is clear. Eyes:      General: No scleral icterus. Extraocular Movements: Extraocular movements intact. Conjunctiva/sclera: Conjunctivae normal.      Pupils: Pupils are equal, round, and reactive to light. Cardiovascular:      Rate and Rhythm: Normal rate and regular rhythm. Heart sounds: Normal heart sounds. No murmur heard. No friction rub. Pulmonary:      Effort: Pulmonary effort is normal. No respiratory distress.       Comments: 3 L NC  Abdominal:      General: Bowel sounds are normal. There is no distension. Palpations: Abdomen is soft. Tenderness: There is no abdominal tenderness. Genitourinary:     Comments: No blackwell. Musculoskeletal:      Cervical back: Neck supple. No rigidity. Skin:     General: Skin is warm and dry. Capillary Refill: Capillary refill takes less than 2 seconds. Findings: No rash. Neurological:      Mental Status: She is alert and oriented to person, place, and time. Cranial Nerves: No cranial nerve deficit. Sensory: No sensory deficit. Psychiatric:         Mood and Affect: Mood normal.         Behavior: Behavior normal.         Thought Content:  Thought content normal.         Judgment: Judgment normal.         Past Medical History:     Past Medical History:   Diagnosis Date    Anxiety and depression     Arthritis     Rheumatoid     Cancer ([facility]     left breast cancer    Cerebral artery occlusion with cerebral infarction [facility]) jan 2020 & March 2020    poor balance since strokes    Chronic kidney disease, stage 5 (HCC)     hemodialysis Mon-Wed-Fri    Diabetes mellitus ([facility] [facility]     GERD (gastroesophageal reflux disease)     Hearing loss     bilateral. Does not have hearing aides    Hemodialysis patient ([facility] [address] [facility]     M-W-F started in 2020    History of blood transfusion     50 plus yrs ago with pregnancy    Hyperlipidemia     Hypertension     Migraines     MRSA (methicillin resistant staph aureus) culture positive 2015    back    Neuropathy     PAT (obstructive sleep apnea)     had UPPP  \"have not used machine in years\"    Prolonged emergence from general anesthesia     PVD (peripheral vascular disease) ([facility] [facility]     SOB (shortness of breath)     \"long distance\"    Tinnitus        Past Surgical  History:     Past Surgical History:   Procedure Laterality Date    APPENDECTOMY      BACK SURGERY      I and D lower back MRSA    BREAST REDUCTION SURGERY Left 1999    CENTRAL LINE  1/29/2022         COLONOSCOPY      HYSTERECTOMY      INSERTABLE CARDIAC MONITOR      IR TUNNELED CATHETER PLACEMENT GREATER THAN 5 YEARS  2022    IR TUNNELED CATHETER PLACEMENT GREATER THAN 5 YEARS 2022 [facility]    MASTECTOMY Left     lymph nodes removed    MASTECTOMY Left 2018    axillary mastectomy    FL EXC SKIN BENIG <5MM TRUNK,ARM,LEG Right 2018    RIGHT AXILLARY MASTECTOMY performed by Lita Apley, DO at [address] [facility]         Medications:      cloNIDine  0.1 mg Oral Once    vancomycin (VANCOCIN) intermittent dosing (placeholder)   Other RX Placeholder    racepinephrine HCl  11.25 mg Nebulization Once    metoprolol  100 mg Oral Daily    hydrALAZINE  50 mg Oral TID    sodium chloride flush  5-40 mL IntraVENous 2 times per day    heparin (porcine)  5,000 Units SubCUTAneous 3 times per day    cefepime  1,000 mg IntraVENous Q24H    dexamethasone  6 mg IntraVENous Q24H    enalapril  5 mg Oral Daily    NIFEdipine  60 mg Oral Daily       Social History:     Social History     Socioeconomic History    Marital status:      Spouse name: Not on file    Number of children: Not on file    Years of education: Not on file    Highest education level: Not on file   Occupational History    Not on file   Tobacco Use    Smoking status: Former Smoker     Quit date:      Years since quittin.1    Smokeless tobacco: Never Used   Vaping Use    Vaping Use: Never used   Substance and Sexual Activity    Alcohol use: Yes     Comment: occasionally  \"couple a month\"    Drug use: No    Sexual activity: Not on file   Other Topics Concern    Not on file   Social History Narrative    Not on file     Social Determinants of Health     Financial Resource Strain:     Difficulty of Paying Living Expenses: Not on file   Food Insecurity:     Worried About Running Out of Food in the Last Year: Not on file    Caleb of Food in the Last Year: Not on file   Transportation Needs:     Lack of Transportation (Medical): Not on file    Lack of Transportation (Non-Medical): Not on file   Physical Activity:     Days of Exercise per Week: Not on file    Minutes of Exercise per Session: Not on file   Stress:     Feeling of Stress : Not on file   Social Connections:     Frequency of Communication with Friends and Family: Not on file    Frequency of Social Gatherings with Friends and Family: Not on file    Attends Jew Services: Not on file    Active Member of [address] [place] [address] [employer] or Organizations: Not on file    Attends Club or Organization Meetings: Not on file    Marital Status: Not on file   Intimate Partner Violence:     Fear of Current or Ex-Partner: Not on file    Emotionally Abused: Not on file    Physically Abused: Not on file    Sexually Abused: Not on file   Housing Stability:     Unable to Pay for Housing in the Last Year: Not on file    Number of [place] in the Last Year: Not on file    Unstable Housing in the Last Year: Not on file       Family History:   No family history on file. Medical Decision Making:   I have independently reviewed/ordered the following labs:    CBC with Differential:   Recent Labs     01/31/22  1422   WBC 13.2*   HGB 10.6*   HCT 33.0*   PLT See Reflexed IPF Result   LYMPHOPCT 7*   MONOPCT 4     BMP:  Recent Labs     02/01/22  0912 02/02/22  0411   * 133*   K 4.2 4.4   CL 96* 96*   CO2 22 19*   BUN 47* 70*   CREATININE 4.99* 7.04*     Hepatic Function Panel:   No results for input(s): PROT, LABALBU, BILIDIR, IBILI, BILITOT, ALKPHOS, ALT, AST in the last 72 hours. No results for input(s): RPR in the last 72 hours. No results for input(s): HIV in the last 72 hours. No results for input(s): BC in the last 72 hours. Lab Results   Component Value Date    CREATININE 7.04 02/02/2022    GLUCOSE 198 02/02/2022       Detailed results: Thank you for allowing us to participate in the care of this patient. Please call with questions.     This note is created with the assistance of a speech recognition program.  While intending to generate adocument that actually reflects the content of the visit, the document can still have some errors including those of syntax and sound a like substitutions which may escape proof reading. It such instances, actual meaningcan be extrapolated by contextual diversion.     [facility], APRN - [facility]  Office: [phone]  Perfect serve / office [phone]

## 2022-02-02 NOTE — PROGRESS NOTES
Dialysis Post Treatment Note  Vitals:    02/02/22 1314   BP: (!) 138/90   Pulse: 66   Resp: 16   Temp: 98 °F (36.7 °C)   SpO2: 97%     Pre-Weight = 71.5 kg  Post-weight = Weight: 154 lb 15.7 oz (70.3 kg)  Total Liters Processed = Total Liters Processed (l/min): 67.3 l/min  Rinseback Volume (mL) = Rinseback Volume (ml): 250 ml  Net Removal (mL) = NET Removed (ml): 850 ml  Patient's dry weight=85.5 kg  Type of access used= cath  Length of treatment=185 minutes    Pt tolerated tx fair with pressures alarming intermittently. Pt BP high throughout tx. Pt clotted venous chamber and system with 25 minutes left so tx was ended.

## 2022-02-02 NOTE — PROGRESS NOTES
Dialysis Time Out  To be done by RN and tech or 2 RNs  Staff Names Duane Bean RN, Alo Buckley RN    [x]  Identity of the patient using 2 patient identifiers  [x]  Consent for treatment  [x]  Equipment-proper machine and dialyzer  [x]  B-Hep B status  [x]  Orders- to include bath, blood flow, dialyzer, time and fluid removal  [x]  Access-Correct site and in working order  [x]  Time for patient to ask questions.

## 2022-02-02 NOTE — CARE COORDINATION
Transitional planning-called Mignon Velasco-faxed antibiotic scripts and lab orders to [phone]. [facility] and talked with Dianne-she said to call [place] [phone] and talk with Tash Barragan [phone] message. 1344 call from Tash Barragan at [facility]-she is not the patient's -Salome Malhotra is. 1405 called Beck Cassidy at [phone] and left message. 1420 call from Salome at [place] [address]. Notified her that patient is supposed to be discharged today. Wanting the AVS faxed to [phone]. Didn't qualify for home O2    1611 called patient-asked her how she was getting home today-asking me to call her family-son Jenna Knowles- also asked her about SNF-she said no. Called son Derrick Oliveros will call back when he figures out how to get her picked up and who can stay with her. Her son she lives with is in the hospital with COVID.  Brenda Rahman from [facility] called-wanting AVS and home care order faxed to [phone].    6030 no call back from [employer]

## 2022-02-02 NOTE — PROGRESS NOTES
[facility]  Office: [address], DO, Shana Juarez, DO, Michelle Mason, DO, Marlo Kanner Rajani, DO, Chikis Santamaria MD, Iman Curtis MD, Radha Moser MD, Juju Burkett MD, Betsy Ogden MD, Amanda Lao MD, Blaise Wall MD, Sandee Petersen, DO, Swetha Aviles, DO, Man Webber MD,  Merlene Smith DO, Suze Arambula MD, Johanna Ly MD, Mary Lou Bagley MD, Adela Barriga MD, Jeanine Washington MD, Paresh Simon, CNP, Ai Krueger, CNP, Eladio Berg, CNP, Artemio Chen, CNS, Nathaniel Spicer, CNP, Bartolome Lao, CNP, Magalis Loving, CNP, Jaja Kaminski, CNP, Mitchell Vann, CNP, Pham Wong PA-C, Branden Curran, [facility], Solo Wen, [facility], Rodolfo Shell, CNP, Teresa Kasper, CNP, Jose Thakkar, CNP, Herb Rowan, CNP, Leon Patterson, CNP, Nico Prince, [address] [place] [address]    Progress Note    2/2/2022    3:02 PM    Name:   Feli Pruitt  MRN:     [id number]     [place]:      [de-identified]   Room:   2002/2002-01   Day:  5  Admit Date:  1/28/2022  8:53 PM    PCP:   Alexandro Florence MD  Code Status:  Full Code    Subjective:     C/C:   Chief Complaint   Patient presents with    Hypertension     htn no dialysis x2 treatments, port bad per pt     Interval History Status: improved. Patient seen and examined at bedside, no acute events overnight. She is getting her dialysis currently   Felt nauseous after Vancomycin =  Afebrile overnight  Had some nausea after vancomycin yesterday  Continues to be on antibiotics per ID  Patient vitals, labs and all providers notes were reviewed,from overnight shift and morning updates were noted and discussed with the nurse    Brief History:     From H&P  Sheri Leigh is a 67 y.o. Non- / non  female who presents with Hypertension (htn no dialysis x2 treatments, port bad per pt)  Patient presents to the emergency room with the concern of generalized fatigue and  near syncope.  Patient states that for the past 1-2 days she has not been feeling well and in addition her HD port is non-functional and she has not received  for 2 sessions.  Typical HD days are MWF  Upon arrival to the emergency room the patient was noted to be febrile with a temp of 103, hypertensive with systolic blood pressures in the 200s and saturating well on room air.  Nephrology was consulted while in the emergency room however did not feel patient warranted HD at this time.   Patient received Actemra, started on Decadron. Maintain on 3 L nasal cannula. Her Andrea catheter was placed due to issues with her tunneled catheter clotting off. Cathflo and heparin were attempted. Patient is waiting for exchange of tunneled catheter and discharge afterwards    Review of Systems:     Review of Systems   Constitutional: Positive for activity change, appetite change and fatigue. Negative for chills, diaphoresis and fever. HENT: Negative for congestion. Eyes: Negative for visual disturbance. Respiratory: Positive for shortness of breath (improving). Negative for cough, chest tightness and wheezing. Cardiovascular: Negative for chest pain, palpitations and leg swelling. Gastrointestinal: Negative for abdominal pain, blood in stool, constipation, diarrhea, nausea and vomiting. Genitourinary: Negative for difficulty urinating. Neurological: Positive for weakness. Negative for dizziness, light-headedness, numbness and headaches. All other systems reviewed and are negative. Medications:      Allergies:  No Known Allergies    Current Meds:   Scheduled Meds:    cloNIDine  0.1 mg Oral Once    vancomycin  750 mg IntraVENous Q MWF    vancomycin (VANCOCIN) intermittent dosing (placeholder)   Other RX Placeholder    racepinephrine HCl  11.25 mg Nebulization Once    metoprolol  100 mg Oral Daily    hydrALAZINE  50 mg Oral TID    sodium chloride flush  5-40 mL IntraVENous 2 times per day    heparin (porcine) 5,000 Units SubCUTAneous 3 times per day    cefepime  1,000 mg IntraVENous Q24H    dexamethasone  6 mg IntraVENous Q24H    enalapril  5 mg Oral Daily    NIFEdipine  60 mg Oral Daily     Continuous Infusions:    sodium chloride 25 mL (22 1417)     PRN Meds: sodium chloride nebulizer, albumin human, heparin (porcine), heparin (porcine), sodium chloride flush, sodium chloride, ondansetron **OR** ondansetron, acetaminophen **OR** acetaminophen, hydrALAZINE    Data:     Past Medical History:   has a past medical history of Anxiety and depression, Arthritis, Cancer ([facility] Cerebral artery occlusion with cerebral infarction ([facility] Chronic kidney disease, stage 5 ([facility] Diabetes mellitus ([facility] GERD (gastroesophageal reflux disease), Hearing loss, Hemodialysis patient ([facility] History of blood transfusion, Hyperlipidemia, Hypertension, Migraines, MRSA (methicillin resistant staph aureus) culture positive, Neuropathy, PAT (obstructive sleep apnea), Prolonged emergence from general anesthesia, PVD (peripheral vascular disease) ([facility] SOB (shortness of breath), and Tinnitus. Social History:   reports that she quit smoking about 32 years ago. She has never used smokeless tobacco. She reports current alcohol use. She reports that she does not use drugs. Family History: No family history on file. Vitals:  BP (!) 138/90   Pulse 66   Temp 98 °F (36.7 °C)   Resp 16   Ht 5' 3\" (1.6 m)   Wt 154 lb 15.7 oz (70.3 kg)   SpO2 97%   BMI 27.45 kg/m²   Temp (24hrs), Av.3 °F (36.8 °C), Min:98 °F (36.7 °C), Max:98.6 °F (37 °C)    No results for input(s): POCGLU in the last 72 hours. I/O (24Hr):     Intake/Output Summary (Last 24 hours) at 2022 1502  Last data filed at 2022 1314  Gross per 24 hour   Intake 100 ml   Output 1200 ml   Net -1100 ml       Labs:  Hematology:  Recent Labs     22  0602 22  1422 22  1502   WBC  --  13.2*  --    RBC  --  3.67*  --    HGB  --  10.6*  --    HCT  -- 33.0*  --    MCV  --  89.9  --    MCH  --  28.9  --    MCHC  --  32.1  --    RDW  --  13.2  --    PLT  --  See Reflexed IPF Result  --    MPV  --  NOT REPORTED  --    .1*  --  48.4*     Chemistry:  Recent Labs     01/31/22  0602 02/01/22  0912 02/02/22  0411   * 134* 133*   K 4.0 4.2 4.4   CL 95* 96* 96*   CO2 20 22 19*   GLUCOSE 187* 135* 198*   BUN 49* 47* 70*   CREATININE 6.06* 4.99* 7.04*   ANIONGAP 15 16 18*   LABGLOM 7* 8* 6*   GFRAA 8* 10* 7*   CALCIUM 8.0* 8.1* 7.8*     No results for input(s): PROT, LABALBU, LABA1C, B0NTDQA, X2ZFFYU, FT4, TSH, AST, ALT, LDH, GGT, ALKPHOS, LABGGT, BILITOT, BILIDIR, AMMONIA, AMYLASE, LIPASE, LACTATE, CHOL, HDL, LDLCHOLESTEROL, CHOLHDLRATIO, TRIG, VLDL, LVQ49BH, PHENYTOIN, PHENYF, URICACID, POCGLU in the last 72 hours. ABG:  Lab Results   Component Value Date    FIO2 UNKNOWN 01/28/2022     Lab Results   Component Value Date/Time    SPECIAL NOT REPORTED 01/28/2022 10:14 PM     Lab Results   Component Value Date/Time    CULTURE NO GROWTH 01/28/2022 10:14 PM       Radiology:  CT CHEST WO CONTRAST    Result Date: 1/29/2022  1. Patchy bilateral mixed ground-glass and consolidative opacities with interlobular septal thickening which can be seen with atypical/viral pneumonia to include COVID-19. 2. Small right and trace left pleural effusions. 3. Pulmonary fibrosis in the bilateral lung bases. 4. Tiny hiatal hernia. US GALLBLADDER RUQ    Result Date: 1/31/2022  Gallbladder is normal in appearance with no evidence of acute cholecystitis. Atrophic right kidney measuring 8.3 cm     XR CHEST PORTABLE    Result Date: 1/29/2022  Cardiomegaly with trace effusions and scattered infiltrates. Support tubes as described above. XR CHEST PORTABLE    Result Date: 1/28/2022  Prominent interstitium and bilateral pulmonary opacities, concerning for edema or pneumonia.      IR TUNNELED CVC PLACE WO SQ PORT/PUMP > 5 YEARS    Result Date: 2/1/2022  Successful fluoroscopy guided tunneled catheter exchange . Okay to use. Physical Examination:        Physical Exam  Vitals and nursing note reviewed. Constitutional:       General: She is not in acute distress. Interventions: Nasal cannula in place. HENT:      Head: Normocephalic and atraumatic. Eyes:      Conjunctiva/sclera: Conjunctivae normal.      Pupils: Pupils are equal, round, and reactive to light. Neck:     Cardiovascular:      Rate and Rhythm: Normal rate and regular rhythm. Heart sounds: No murmur heard. Pulmonary:      Effort: Pulmonary effort is normal. No accessory muscle usage or respiratory distress. Breath sounds: No stridor. No decreased breath sounds, wheezing, rhonchi or rales. Chest:      Comments: Right-sided tunneled cath  Abdominal:      General: Bowel sounds are normal. There is no distension. Palpations: Abdomen is soft. Abdomen is not rigid. Tenderness: There is no abdominal tenderness. There is no guarding. Musculoskeletal:         General: No tenderness. Skin:     General: Skin is warm and dry. Findings: No erythema, lesion or rash. Neurological:      Mental Status: She is alert and oriented to person, place, and time. Cranial Nerves: No cranial nerve deficit. Motor: No seizure activity. Psychiatric:         Speech: Speech normal.         Behavior: Behavior normal. Behavior is cooperative.          Assessment:        Hospital Problems           Last Modified POA    * (Principal) COVID-19 virus infection 1/29/2022 Yes    Diabetes ([address] 1/29/2022 Yes    Admission for dialysis ([address] 3/69/8567 Yes    Metabolic encephalopathy 4/23/7449 Yes    End stage renal failure on dialysis ([address] 1/28/2022 Yes    Hypertensive urgency 1/29/2022 Yes    Sepsis ([address] 1/29/2022 Yes    Acute respiratory failure with hypoxia ([address] 1/29/2022 Yes    Morbid obesity ([address] 1/29/2022 Yes    Hypertension, essential 1/29/2022 Yes    Hypokalemia 6/31/7001 Yes    Metabolic acidosis 1/29/2022 Yes    Elevated procalcitonin 1/31/2022 Yes    Elevated C-reactive protein (CRP) 1/31/2022 Yes          Plan:        Principal Problem:    COVID-19 virus infection  Active Problems:    Diabetes ([facility]    Admission for dialysis [facility])    Metabolic encephalopathy    End stage renal failure on dialysis [facility])    Hypertensive urgency    Sepsis ([facility]    Acute respiratory failure with hypoxia ([facility])    Morbid obesity ([facility]    Hypertension, essential    Hypokalemia    Metabolic acidosis    Elevated procalcitonin    Elevated C-reactive protein (CRP)  Resolved Problems:    * No resolved hospital problems.  *    S/P tunneled cath placement 2/1  S/P  removal L sided Andrea    Abx per ID , plan to continue for 2 weeks with her HD   BP and glycemic control  Discussed with patient and nurse   Home O2 evaluation     DC planning pending completion of arrangements        Manpreet Villalta MD  2/2/2022  3:02 PM

## 2022-02-02 NOTE — PLAN OF CARE
Home Oxygen Evaluation    Home Oxygen Evaluation completed. Patient is on 2 liters per minute via nasal cannula. Resting SpO2 = 95%  Resting SpO2 on room air = 93%    SpO2 on room air with exercise = 92%  SpO2 on oxygen as above with exercise = NA    Nocturnal Oximetry with patient on room air is recommended is SpO2 is between 89% and 95% (requires additional order).     MARTA BAUTISTA, RICHIE  2:18 PM

## 2022-02-03 LAB
ANION GAP SERPL CALCULATED.3IONS-SCNC: 16 MMOL/L (ref 9–17)
BUN BLDV-MCNC: 61 MG/DL (ref 8–23)
BUN/CREAT BLD: ABNORMAL (ref 9–20)
CALCIUM SERPL-MCNC: 8.1 MG/DL (ref 8.6–10.4)
CHLORIDE BLD-SCNC: 95 MMOL/L (ref 98–107)
CO2: 21 MMOL/L (ref 20–31)
CREAT SERPL-MCNC: 5.33 MG/DL (ref 0.5–0.9)
GFR AFRICAN AMERICAN: 9 ML/MIN
GFR NON-AFRICAN AMERICAN: 8 ML/MIN
GFR SERPL CREATININE-BSD FRML MDRD: ABNORMAL ML/MIN/{1.73_M2}
GFR SERPL CREATININE-BSD FRML MDRD: ABNORMAL ML/MIN/{1.73_M2}
GLUCOSE BLD-MCNC: 140 MG/DL (ref 65–105)
GLUCOSE BLD-MCNC: 142 MG/DL (ref 65–105)
GLUCOSE BLD-MCNC: 152 MG/DL (ref 65–105)
GLUCOSE BLD-MCNC: 173 MG/DL (ref 65–105)
GLUCOSE BLD-MCNC: 237 MG/DL (ref 70–99)
GLUCOSE BLD-MCNC: 276 MG/DL (ref 65–105)
POTASSIUM SERPL-SCNC: 4.4 MMOL/L (ref 3.7–5.3)
SODIUM BLD-SCNC: 132 MMOL/L (ref 135–144)

## 2022-02-03 PROCEDURE — 6360000002 HC RX W HCPCS: Performed by: INTERNAL MEDICINE

## 2022-02-03 PROCEDURE — 97530 THERAPEUTIC ACTIVITIES: CPT

## 2022-02-03 PROCEDURE — 6370000000 HC RX 637 (ALT 250 FOR IP): Performed by: NURSE PRACTITIONER

## 2022-02-03 PROCEDURE — 6370000000 HC RX 637 (ALT 250 FOR IP): Performed by: STUDENT IN AN ORGANIZED HEALTH CARE EDUCATION/TRAINING PROGRAM

## 2022-02-03 PROCEDURE — 82947 ASSAY GLUCOSE BLOOD QUANT: CPT

## 2022-02-03 PROCEDURE — 90935 HEMODIALYSIS ONE EVALUATION: CPT

## 2022-02-03 PROCEDURE — 80048 BASIC METABOLIC PNL TOTAL CA: CPT

## 2022-02-03 PROCEDURE — 99232 SBSQ HOSP IP/OBS MODERATE 35: CPT | Performed by: INTERNAL MEDICINE

## 2022-02-03 PROCEDURE — 36415 COLL VENOUS BLD VENIPUNCTURE: CPT

## 2022-02-03 PROCEDURE — 97162 PT EVAL MOD COMPLEX 30 MIN: CPT

## 2022-02-03 PROCEDURE — 6360000002 HC RX W HCPCS: Performed by: NURSE PRACTITIONER

## 2022-02-03 PROCEDURE — 2580000003 HC RX 258: Performed by: INTERNAL MEDICINE

## 2022-02-03 PROCEDURE — 99232 SBSQ HOSP IP/OBS MODERATE 35: CPT | Performed by: NURSE PRACTITIONER

## 2022-02-03 PROCEDURE — 2580000003 HC RX 258: Performed by: NURSE PRACTITIONER

## 2022-02-03 PROCEDURE — 2060000000 HC ICU INTERMEDIATE R&B

## 2022-02-03 PROCEDURE — 97116 GAIT TRAINING THERAPY: CPT

## 2022-02-03 PROCEDURE — 6370000000 HC RX 637 (ALT 250 FOR IP): Performed by: FAMILY MEDICINE

## 2022-02-03 PROCEDURE — 99232 SBSQ HOSP IP/OBS MODERATE 35: CPT | Performed by: FAMILY MEDICINE

## 2022-02-03 RX ORDER — DEXTROSE MONOHYDRATE 25 G/50ML
12.5 INJECTION, SOLUTION INTRAVENOUS PRN
Status: DISCONTINUED | OUTPATIENT
Start: 2022-02-03 | End: 2022-02-04 | Stop reason: HOSPADM

## 2022-02-03 RX ORDER — NICOTINE POLACRILEX 4 MG
15 LOZENGE BUCCAL PRN
Status: DISCONTINUED | OUTPATIENT
Start: 2022-02-03 | End: 2022-02-04 | Stop reason: HOSPADM

## 2022-02-03 RX ORDER — DEXTROSE MONOHYDRATE 50 MG/ML
100 INJECTION, SOLUTION INTRAVENOUS PRN
Status: DISCONTINUED | OUTPATIENT
Start: 2022-02-03 | End: 2022-02-04 | Stop reason: HOSPADM

## 2022-02-03 RX ORDER — HYDRALAZINE HYDROCHLORIDE 50 MG/1
50 TABLET, FILM COATED ORAL 3 TIMES DAILY
Qty: 90 TABLET | Refills: 3 | Status: SHIPPED | OUTPATIENT
Start: 2022-02-03 | End: 2022-02-04 | Stop reason: HOSPADM

## 2022-02-03 RX ADMIN — HEPARIN SODIUM 1600 UNITS: 1000 INJECTION INTRAVENOUS; SUBCUTANEOUS at 15:08

## 2022-02-03 RX ADMIN — METOPROLOL TARTRATE 100 MG: 50 TABLET, FILM COATED ORAL at 08:56

## 2022-02-03 RX ADMIN — ENALAPRIL MALEATE 5 MG: 5 TABLET ORAL at 08:57

## 2022-02-03 RX ADMIN — INSULIN LISPRO 2 UNITS: 100 INJECTION, SOLUTION INTRAVENOUS; SUBCUTANEOUS at 17:59

## 2022-02-03 RX ADMIN — HYDRALAZINE HYDROCHLORIDE 50 MG: 50 TABLET, FILM COATED ORAL at 20:32

## 2022-02-03 RX ADMIN — INSULIN LISPRO 3 UNITS: 100 INJECTION, SOLUTION INTRAVENOUS; SUBCUTANEOUS at 04:32

## 2022-02-03 RX ADMIN — DEXAMETHASONE SODIUM PHOSPHATE 6 MG: 10 INJECTION INTRAMUSCULAR; INTRAVENOUS at 17:59

## 2022-02-03 RX ADMIN — INSULIN LISPRO 1 UNITS: 100 INJECTION, SOLUTION INTRAVENOUS; SUBCUTANEOUS at 21:50

## 2022-02-03 RX ADMIN — SODIUM CHLORIDE, PRESERVATIVE FREE 10 ML: 5 INJECTION INTRAVENOUS at 20:34

## 2022-02-03 RX ADMIN — HEPARIN SODIUM 5000 UNITS: 5000 INJECTION INTRAVENOUS; SUBCUTANEOUS at 06:24

## 2022-02-03 RX ADMIN — HEPARIN SODIUM 1500 UNITS: 1000 INJECTION INTRAVENOUS; SUBCUTANEOUS at 15:07

## 2022-02-03 RX ADMIN — INSULIN LISPRO 2 UNITS: 100 INJECTION, SOLUTION INTRAVENOUS; SUBCUTANEOUS at 09:06

## 2022-02-03 RX ADMIN — HYDRALAZINE HYDROCHLORIDE 50 MG: 50 TABLET, FILM COATED ORAL at 08:56

## 2022-02-03 RX ADMIN — INSULIN LISPRO 2 UNITS: 100 INJECTION, SOLUTION INTRAVENOUS; SUBCUTANEOUS at 13:50

## 2022-02-03 RX ADMIN — HYDRALAZINE HYDROCHLORIDE 50 MG: 50 TABLET, FILM COATED ORAL at 13:48

## 2022-02-03 RX ADMIN — CEFEPIME HYDROCHLORIDE 1000 MG: 1 INJECTION, POWDER, FOR SOLUTION INTRAMUSCULAR; INTRAVENOUS at 20:32

## 2022-02-03 RX ADMIN — SODIUM CHLORIDE, PRESERVATIVE FREE 5 ML: 5 INJECTION INTRAVENOUS at 08:59

## 2022-02-03 RX ADMIN — HEPARIN SODIUM 5000 UNITS: 5000 INJECTION INTRAVENOUS; SUBCUTANEOUS at 13:47

## 2022-02-03 RX ADMIN — HEPARIN SODIUM 5000 UNITS: 5000 INJECTION INTRAVENOUS; SUBCUTANEOUS at 22:11

## 2022-02-03 RX ADMIN — NIFEDIPINE 60 MG: 30 TABLET, FILM COATED, EXTENDED RELEASE ORAL at 08:56

## 2022-02-03 ASSESSMENT — PAIN SCALES - GENERAL
PAINLEVEL_OUTOF10: 0

## 2022-02-03 ASSESSMENT — ENCOUNTER SYMPTOMS
CHEST TIGHTNESS: 0
CONSTIPATION: 0
BLOOD IN STOOL: 0
DIARRHEA: 0
NAUSEA: 0
WHEEZING: 0
APNEA: 0
COUGH: 0
SHORTNESS OF BREATH: 1
EYE DISCHARGE: 0
VOMITING: 0
ABDOMINAL PAIN: 0
COLOR CHANGE: 0
ABDOMINAL DISTENTION: 0

## 2022-02-03 NOTE — CARE COORDINATION
Transitional planning-1120am- called and talked with daughter Lauren Zheng is wanting [facility]-explained to her that there are only a few nursing homes accepting COVID patient's. Read list to her-she chose [facility] and [facility]. Faxed referral to [facility] and talked with Sheree Montiel. Faxed referral to [facility] and left message with Nadya Romero.

## 2022-02-03 NOTE — PLAN OF CARE
Nutrition Problem #1: Inadequate oral intake  Intervention: Food and/or Nutrient Delivery: Continue Current Diet,Start Oral Nutrition Supplement (Provide Nepro oral supplements x 1 per day.)  Nutritional Goals: Oral intakes to meet at least 75% of estimated nutrition needs.

## 2022-02-03 NOTE — PROGRESS NOTES
[facility] -   Infectious diseases evaluation  admission date 1/28/2022    reason for consultation:   covid    Impression :   Current:  · covid +  · CRP [id number]  · Fever chills  · Elevated Procalcitonin  · Elevated CRP  · Chronic hemodialysis catheter, last dialysis  · Pulmonary edema  · Bilateral patchy pulmonary infiltrates, pulmonary edema versus pneumonia    Other:  ·   Discussion / summary of stay / plan of care   ·   Recommendations   covid pneumonia on CT- CRP: 100 and HD line not working   · actemra x 1 on 1/29  · Decadron 6 mg per day x 10 days  · anticoag per medicine    Bact infection - presumed - HD line not functioning  · admission blood cultures are still neg  · 1/31-Not feeling well. CRP is down, Procalcitonin markedly elevated. · 2/1 - GB US neg - right tunneled HD line exchanged over wire - repeat procal today - if better, anticipate DC on AB as below, w HD    AB:  · 1/29 cefepime  · Add 2/1 vanco w HD  · Keep both AB 2 weeks till 2/12  · 2/2 Discharging today. Med rec for antibiotics completed. Vancomycin dose adjusted. Script reprinted. · Follow-up with Dr. Pam Cornejo in the office in 2-weeks. Isolate until 2/8/22  Infection Control Recommendations   · [place] Precautions  · Contact Isolation   · Airborne isolation  · Droplet Isolation    Antimicrobial Stewardship Recommendations   · Simplification of therapy  · Targeted therapy      Coordination ofOutpatient Care:   · Estimated Length of IV antimicrobials:  · Patient will need Midline / picc Catheter Insertion:   · Patient will need SNF:  · Patient will need outpatient wound care:     History of Present Illness:   Initial history:  Feli Pruitt is a 67y.o.-year-old female   Presented to the emergency room because of high blood pressure and missing the last two dialysis due to port failure.   Found to have fever, CRP elevated, tested positive for Covid and chest x-ray showing congestion versus patchy pneumonia. Patient did have a minimal cough. She still makes urine but no dysuria. pt denies cough but no appetite x few days - does not use o2 at home but was hypoxic and started on 2 L NC here    W 9  Patient received a dose of vancomycin and cefepime due to concern of line infection, no signs of exit infection        Interval changes  2/3/2022   Patient Vitals for the past 8 hrs:   BP Pulse Resp SpO2   02/03/22 0315 133/72 70 16 98 %   1/30  She has a right tunneled old catheter is not working [id number] with a chest port that is in use  Left IJ Andrea in good condition and new  CRP increased to 8 but the patient feels much better otherwise, she is on 6 L of nasal cannula,  Blood culture still negative,  She has had Actemra 1/29 1/31  Afebrile. CRP trending down, procalcitonin up. SpO2 94% on 3 L NC.  BC, urine cx remain negative to date. Not feeling well today. C/o nausea, myalgias, arthralgias. No vomiting, diarrhea. HD cath sites clean. 2/1  No fever - only fever was at admission   Feels better and no nausea, vomiting, diarrhea and no aches  HD tunneled cath exchanged over wire - IR  2/1    No CRP and BC still neg  WBc 13 and Gall bladder US neg  pend repeat procal for today    2/2  Afebrile. Vanco trough 23.3  CRP improved. SpO2 100 % on 3 L NC. Feeling better. 2/3  Afebrile. SpO2 98% on 3 L NC. Patient was to discharge home yesterday. Discharge held 2/2 her son who would assist with her care is in TTH with Covid. Summary of relevant labs:Labs:  [id number] - 48.4  Procalcitonin: 0.88-4.61    Micro:  covid +  BC 1/28   U cx 1/28  Imaging:  CT chest covid pneumonia           CXR  Prominent interstitium and bilateral pulmonary opacities, concerning for   edema or pneumonia.        I have personally reviewed the past medical history, past surgical history, medications, social history, and family history, and I haveupdated the database accordingly. Allergies:   Patient has no known allergies. Review of Systems:     Review of Systems   Constitutional: Negative for activity change, fatigue and fever. HENT: Negative for congestion. Eyes: Negative for discharge. Respiratory: Positive for shortness of breath. Negative for apnea and cough. FERRO at times. Cardiovascular: Negative for chest pain. Gastrointestinal: Negative for abdominal distention and nausea. Endocrine: Negative for heat intolerance. Genitourinary: Negative for dysuria. Musculoskeletal: Negative for arthralgias. Skin: Negative for color change. Allergic/Immunologic: Negative for immunocompromised state. Neurological: Negative for dizziness, light-headedness and headaches. Neuropathy to bilateral feet. Hematological: Negative for adenopathy. Psychiatric/Behavioral: Negative for agitation. Physical Examination :       Physical Exam  Vitals and nursing note reviewed. Constitutional:       Appearance: Normal appearance. She is not ill-appearing. HENT:      Head: Normocephalic and atraumatic. Right Ear: External ear normal.      Left Ear: External ear normal.      Nose: Nose normal.      Mouth/Throat:      Mouth: Mucous membranes are moist.      Pharynx: Oropharynx is clear. Eyes:      General: No scleral icterus. Extraocular Movements: Extraocular movements intact. Conjunctiva/sclera: Conjunctivae normal.      Pupils: Pupils are equal, round, and reactive to light. Cardiovascular:      Rate and Rhythm: Normal rate and regular rhythm. Heart sounds: Normal heart sounds. No murmur heard. No friction rub. Pulmonary:      Effort: Pulmonary effort is normal. No respiratory distress. Comments: 3 L NC  Abdominal:      General: Bowel sounds are normal. There is no distension. Palpations: Abdomen is soft. Tenderness: There is no abdominal tenderness.    Genitourinary:     Comments: No rishi.  Musculoskeletal:      Cervical back: Neck supple. No rigidity. Skin:     General: Skin is warm and dry. Capillary Refill: Capillary refill takes less than 2 seconds. Findings: No rash. Neurological:      Mental Status: She is alert and oriented to person, place, and time. Cranial Nerves: No cranial nerve deficit. Sensory: No sensory deficit. Psychiatric:         Mood and Affect: Mood normal.         Behavior: Behavior normal.         Thought Content:  Thought content normal.         Judgment: Judgment normal.         Past Medical History:     Past Medical History:   Diagnosis Date    Anxiety and depression     Arthritis     Rheumatoid     Cancer ([facility] [address]     left breast cancer    Cerebral artery occlusion with cerebral infarction [facility]) jan 2020 & March 2020    poor balance since strokes    Chronic kidney disease, stage 5 (HCC)     hemodialysis Mon-Wed-Fri    Diabetes mellitus ([facility]     GERD (gastroesophageal reflux disease)     Hearing loss     bilateral. Does not have hearing aides    Hemodialysis patient ([facility]     M-W-F started in 2020    History of blood transfusion     50 plus yrs ago with pregnancy    Hyperlipidemia     Hypertension     Migraines     MRSA (methicillin resistant staph aureus) culture positive 2015    back    Neuropathy     PAT (obstructive sleep apnea)     had UPPP  \"have not used machine in years\"    Prolonged emergence from general anesthesia     PVD (peripheral vascular disease) ([facility] [facility]     SOB (shortness of breath)     \"long distance\"    Tinnitus        Past Surgical  History:     Past Surgical History:   Procedure Laterality Date    APPENDECTOMY      BACK SURGERY      I and D lower back MRSA    BREAST REDUCTION SURGERY Left 1999    CENTRAL LINE  1/29/2022         COLONOSCOPY      HYSTERECTOMY      INSERTABLE CARDIAC MONITOR      IR TUNNELED CATHETER PLACEMENT GREATER THAN 5 YEARS  2/1/2022    IR TUNNELED CATHETER PLACEMENT GREATER THAN 5 YEARS 2022 [facility]    MASTECTOMY Left     lymph nodes removed    MASTECTOMY Left 2018    axillary mastectomy    PA EXC SKIN BENIG <5MM TRUNK,ARM,LEG Right 2018    RIGHT AXILLARY MASTECTOMY performed by Trenton Arenas DO at [address] [facility]         Medications:      insulin lispro  0-12 Units SubCUTAneous TID WC    insulin lispro  0-6 Units SubCUTAneous Nightly    cloNIDine  0.1 mg Oral Once    vancomycin  750 mg IntraVENous Q MWF    vancomycin (VANCOCIN) intermittent dosing (placeholder)   Other RX Placeholder    racepinephrine HCl  11.25 mg Nebulization Once    metoprolol  100 mg Oral Daily    hydrALAZINE  50 mg Oral TID    sodium chloride flush  5-40 mL IntraVENous 2 times per day    heparin (porcine)  5,000 Units SubCUTAneous 3 times per day    cefepime  1,000 mg IntraVENous Q24H    dexamethasone  6 mg IntraVENous Q24H    enalapril  5 mg Oral Daily    NIFEdipine  60 mg Oral Daily       Social History:     Social History     Socioeconomic History    Marital status:      Spouse name: Not on file    Number of children: Not on file    Years of education: Not on file    Highest education level: Not on file   Occupational History    Not on file   Tobacco Use    Smoking status: Former Smoker     Quit date:      Years since quittin.1    Smokeless tobacco: Never Used   Vaping Use    Vaping Use: Never used   Substance and Sexual Activity    Alcohol use: Yes     Comment: occasionally  \"couple a month\"    Drug use: No    Sexual activity: Not on file   Other Topics Concern    Not on file   Social History Narrative    Not on file     Social Determinants of Health     Financial Resource Strain:     Difficulty of Paying Living Expenses: Not on file   Food Insecurity:     Worried About Running Out of Food in the Last Year: Not on file    Caleb of Food in the Last Year: Not on file   Transportation Needs:     Lack of Transportation (Medical): Not on file    Lack of Transportation (Non-Medical): Not on file   Physical Activity:     Days of Exercise per Week: Not on file    Minutes of Exercise per Session: Not on file   Stress:     Feeling of Stress : Not on file   Social Connections:     Frequency of Communication with Friends and Family: Not on file    Frequency of Social Gatherings with Friends and Family: Not on file    Attends Ten[employer] Services: Not on file    Active Member of [address] [place] [address] [employer] or Organizations: Not on file    Attends Club or Organization Meetings: Not on file    Marital Status: Not on file   Intimate Partner Violence:     Fear of Current or Ex-Partner: Not on file    Emotionally Abused: Not on file    Physically Abused: Not on file    Sexually Abused: Not on file   Housing Stability:     Unable to Pay for Housing in the Last Year: Not on file    Number of [place] in the Last Year: Not on file    Unstable Housing in the Last Year: Not on file       Family History:   No family history on file. Medical Decision Making:   I have independently reviewed/ordered the following labs:    CBC with Differential:   Recent Labs     01/31/22  1422   WBC 13.2*   HGB 10.6*   HCT 33.0*   PLT See Reflexed IPF Result   LYMPHOPCT 7*   MONOPCT 4     BMP:  Recent Labs     02/02/22  0411 02/03/22  0552   * 132*   K 4.4 4.4   CL 96* 95*   CO2 19* 21   BUN 70* 61*   CREATININE 7.04* 5.33*     Hepatic Function Panel:   No results for input(s): PROT, LABALBU, BILIDIR, IBILI, BILITOT, ALKPHOS, ALT, AST in the last 72 hours. No results for input(s): RPR in the last 72 hours. No results for input(s): HIV in the last 72 hours. No results for input(s): BC in the last 72 hours. Lab Results   Component Value Date    CREATININE 5.33 02/03/2022    GLUCOSE 237 02/03/2022       Detailed results: Thank you for allowing us to participate in the care of this patient. Please call with questions.     This note is created with the assistance of a speech recognition program.  While intending to generate adocument that actually reflects the content of the visit, the document can still have some errors including those of syntax and sound a like substitutions which may escape proof reading. It such instances, actual meaningcan be extrapolated by contextual diversion.     LEYDI Mascorro - CNP  Office: [phone]  Perfect serve / office [phone]

## 2022-02-03 NOTE — PROGRESS NOTES
Dialysis Post Treatment Note  Patient tolerated treatment well. Denies complaints at time of discharge. Vitals:    02/03/22 1510   BP: (!) 179/64   Pulse: 66   Resp: 16   Temp: 97.6 °F (36.4 °C)   SpO2: 95%     Pre-Weight = 71.6kg  Post-weight = Weight: 155 lb 10.3 oz (70.6 kg)  Total Liters Processed = Total Liters Processed (l/min): 44.9 l/min  Rinseback Volume (mL) = Rinseback Volume (ml): 300 ml  Net Removal (mL) = 1000ml  Length of treatment=120  Access: right chest TDC    Good flow once reversed    Interdialytic hypotension noted. Decreased UF goal to 1L from 1.6L. NS bolus 300ml. Pt responded well. VSS post treatment. CVC limbs heparinized and capped. Pt resting, voices no complaints.

## 2022-02-03 NOTE — PROGRESS NOTES
Renal Progress Note    Patient :  Aristeo Baron; 67 y.o. MRN# [id number]  Location:  2002/2002-01  Attending:  Heriberto Recinos MD  Admit Date:  1/28/2022   Hospital Day: 6      Subjective:     ESRD, following for management of hemodialysis. Patient was seen and examined at bedside. Underwent hemodialysis yesterday.   Tunnel catheter was placed yesterday  Not needing supplemental oxygen  1/28/22: Blood cultures no growth   1/28/22: Urine culture no growth      Outpatient Medications:     Medications Prior to Admission: calcitRIOL (ROCALTROL) 0.25 MCG capsule, Take 0.25 mcg by mouth daily  sevelamer (RENVELA) 800 MG tablet, Take 1 tablet by mouth 3 times daily (with meals)  clopidogrel (PLAVIX) 75 MG tablet, Take 75 mg by mouth daily  enalapril (VASOTEC) 5 MG tablet, Take 5 mg by mouth daily  metoprolol (LOPRESSOR) 100 MG tablet, Take 100 mg by mouth daily  NIFEdipine (PROCARDIA XL) 60 MG extended release tablet, Take 60 mg by mouth daily  pantoprazole (PROTONIX) 40 MG tablet, Take 40 mg by mouth daily  cloNIDine (CATAPRES) 0.1 MG/24HR PTWK, Place 1 patch onto the skin once a week Indications: Sundays Sundays  hydrALAZINE (APRESOLINE) 100 MG tablet, Take 100 mg by mouth 3 times daily  rosuvastatin (CRESTOR) 5 MG tablet, Take 5 mg by mouth daily  citalopram (CELEXA) 20 MG tablet, Take 40 mg by mouth daily Takes 2 tabs (=40mg) daily  ondansetron (ZOFRAN) 4 MG tablet, Take 1 tablet by mouth every 6 hours as needed for Nausea or Vomiting    Current Medications:     Scheduled Meds:    insulin lispro  0-12 Units SubCUTAneous TID WC    insulin lispro  0-6 Units SubCUTAneous Nightly    cloNIDine  0.1 mg Oral Once    vancomycin  750 mg IntraVENous Q MWF    vancomycin (VANCOCIN) intermittent dosing (placeholder)   Other RX Placeholder    racepinephrine HCl  11.25 mg Nebulization Once    metoprolol  100 mg Oral Daily    hydrALAZINE  50 mg Oral TID    sodium chloride flush  5-40 mL IntraVENous 2 times per day    heparin (porcine)  5,000 Units SubCUTAneous 3 times per day    cefepime  1,000 mg IntraVENous Q24H    dexamethasone  6 mg IntraVENous Q24H    enalapril  5 mg Oral Daily    NIFEdipine  60 mg Oral Daily     Continuous Infusions:    dextrose      sodium chloride 25 mL (22 1417)     PRN Meds:  glucose, dextrose, glucagon (rDNA), dextrose, sodium chloride nebulizer, albumin human, heparin (porcine), heparin (porcine), sodium chloride flush, sodium chloride, ondansetron **OR** ondansetron, acetaminophen **OR** acetaminophen, hydrALAZINE    Input/Output:       I/O last 3 completed shifts: In: 968 [P.O.:570; I.V.:298]  Out: 1200 . Patient Vitals for the past 96 hrs (Last 3 readings):   Weight   22 1314 154 lb 15.7 oz (70.3 kg)   22 0930 157 lb 10.1 oz (71.5 kg)   22 1310 154 lb 1.6 oz (69.9 kg)       Vital Signs:   Temperature:  Temp: 98.4 °F (36.9 °C)  TMax:   Temp (24hrs), Av.2 °F (36.8 °C), Min:98 °F (36.7 °C), Max:98.4 °F (36.9 °C)    Respirations:  Resp: 16  Pulse:   Pulse: 70  BP:    BP: 133/72  BP Range: Systolic (00PKJ), QKI:817 , Min:120 , OOA:223       Diastolic (82NNQ), ZIJ:42, Min:56, Max:102      Physical Examination:     General:          AAO x 3, speaking in full sentences, no accessory muscle use. HEENT:           Atraumatic, normocephalic. Neck:               No JVD, left IJ in place, dressing clean, dry,intact. Chest:               No wheezes  Cardiac:           S1 S2 RR, no murmurs, gallops or rubs, JVP not raised. Abdomen:        Soft, non-tender, no masses or organomegaly, BS audible. :                  Salter in place, urine yellow/clear  Neuro:             AAO x 3, No FND. SKIN:               No rashes, good skin turgor. Extremities:     Trace bilateral lower extremity edema, no calf tenderness.   Labs:       Recent Labs     22  1422   WBC 13.2*   RBC 3.67*   HGB 10.6*   HCT 33.0*   MCV 89.9   MCH 28.9   MCHC 32.1   RDW 13.2   PLT See Reflexed IPF Result   MPV NOT REPORTED      BMP:   Recent Labs     02/01/22  0912 02/02/22  0411 02/03/22  0552   * 133* 132*   K 4.2 4.4 4.4   CL 96* 96* 95*   CO2 22 19* 21   BUN 47* 70* 61*   CREATININE 4.99* 7.04* 5.33*   GLUCOSE 135* 198* 237*   CALCIUM 8.1* 7.8* 8.1*     Albumin:    No results for input(s): LABALBU in the last 72 hours. SPEP:  Lab Results   Component Value Date    PROT 7.3 01/29/2022     Urinalysis/Chemistries:      Lab Results   Component Value Date    NITRU NEGATIVE 01/28/2022    COLORU Yellow 01/28/2022    PHUR 6.5 01/28/2022    WBCUA 2 TO 5 01/28/2022    RBCUA 0 TO 2 01/28/2022    MUCUS 1+ 01/28/2022    TRICHOMONAS NOT REPORTED 01/28/2022    YEAST NOT REPORTED 01/28/2022    BACTERIA FEW 01/28/2022    SPECGRAV 1.016 01/28/2022    LEUKOCYTESUR TRACE 01/28/2022    UROBILINOGEN Normal 01/28/2022    BILIRUBINUR NEGATIVE 01/28/2022    GLUCOSEU NEGATIVE 01/28/2022    KETUA NEGATIVE 01/28/2022    AMORPHOUS NOT REPORTED 01/28/2022     Radiology:     1/29/22 CXR:   FINDINGS:   There are scattered infiltrates. Mario Fuelling is no pneumothorax.  There are trace   effusions.  The heart is enlarged.  The upper abdomen is unremarkable.  The   extrathoracic soft tissues are unremarkable. Mario Fuelling is a right internal   jugular port. Mario Fuelling is a right internal jugular catheter that is stable. There is a left internal jugular catheter with the tip in the proximal SVC.         Impression   Cardiomegaly with trace effusions and scattered infiltrates.       Support tubes as described above. 1/29/22 CT Chest wo contrast   Impression   1. Patchy bilateral mixed ground-glass and consolidative opacities with   interlobular septal thickening which can be seen with atypical/viral   pneumonia to include COVID-19.   2. Small right and trace left pleural effusions. 3. Pulmonary fibrosis in the bilateral lung bases. 4. Tiny hiatal hernia. Assessment:     1. ESRD on Hemodialysis.  His regular HD days are Monday, Wednesday, Friday at [facility] hemodialysis facility using tunnel catheter under Dr Artemio Wood. Her dry weight is unknown. Admitted with 77.1kg. Tunneled catheter clotted and hence was exchanged for Andrea catheter on 1/29/2022 by critical care. Now has tunneled catheter  2. Covid 19 pneumonia  3. Secondary hyperparathyroidism  4. Hypertension  5. Anemia of chronic disease      Plan:     #1 hemodialysis tomorrow  #2 arranging for outpatient hemodialysis placement      Nutrition   Please ensure that patient is on a renal diet/TF. Avoid nephrotoxic drugs/contrast exposure. We will continue to follow along with you. Electronically signed by Jae Rascon MD on 2/3/2022 at 10:06 AM    This note is created with the assistance of a speech-recognition program. While intending to generate a document that actually reflects the content of the visit, no guarantees can be provided that every mistake has been identified and corrected by editing.

## 2022-02-03 NOTE — PROGRESS NOTES
Dialysis Time Out  To be done by RN and tech or 2 RNs  Staff Names PATRICIA Dean and Martha Scott RN    [x]  Identity of the patient using 2 patient identifiers  [x]  Consent for treatment  [x]  Equipment-proper machine and dialyzer  [x]  B-Hep B status  [x]  Orders- to include bath, blood flow, dialyzer, time and fluid removal  [x]  Access-Correct site and in working order  [x]  Time for patient to ask questions.

## 2022-02-03 NOTE — PROGRESS NOTES
Comprehensive Nutrition Assessment    Type and Reason for Visit:  Initial (LOS)    Nutrition Recommendations/Plan: Continue current diet. Will provide 1 Nepro oral supplement per day. Encourage/monitor PO intakes as tolerated. Will monitor labs, weights, and plan of care. Nutrition Assessment:  Chart reviewed for length of stay. Admitted with high blood pressure and dialysis issues - dialysis port not working properly and missed several sessions of dialysis. Pt +Covid-19. PMH includes: ESRD on HD, DM, GERD, HTN. Pt reports not having much of an appetite for several days before admission. Currently appetite is \"fair\". Pt has been consuming variable amounts of her meals. Ate more than 50% of breakfast today and is taking fluids well. Noted per chart review, recorded PO intakes of 1-100%. Weight fluctuations noted per chart - weight down since admission - ? likely d/t dialysis and fluids. Labs reviewed: Na 132 mmol/L, Cl 95 mmol/L, BUN 61 mg/dL, CR 5.33 mg/dL, Glucose 173-276 mg/dL. Meds include: Decadron, Humalog SS, Antibiotics. Malnutrition Assessment:  Malnutrition Status: At risk for malnutrition     Context:  Acute Illness     Findings of the 6 clinical characteristics of malnutrition:  Energy Intake:  1 - 75% or less of estimated energy requirements for 7 or more days  Weight Loss:  Unable to assess - Weight fluctuations since admission noted - ? accuracy/possibly d/t dialysis and fluid shifts.      Body Fat Loss:  No significant body fat loss   Muscle Mass Loss:  No significant muscle mass loss  Fluid Accumulation:  No significant fluid accumulation   Strength:  Not Performed    Estimated Daily Nutrient Needs:  Energy (kcal):  25 kcal/kg = 1800 kcals/day; Weight Used for Energy Requirements:  Current     Protein (g):  1.5-2.0 gm/kg =  gm pro/day; Weight Used for Protein Requirements:  Ideal        Fluid (ml/day):  25 mL/kg = 1800 mL/day or per MD; Method Used for Fluid

## 2022-02-03 NOTE — PROGRESS NOTES
[facility])  Occupational Therapy Not Seen Note    DATE: 2/3/2022    NAME: Danielle Medina  MRN: [id number]   : 1945      Patient not seen this date for Occupational Therapy due to:    Hemodialysis:    Next Scheduled Treatment: CK      Electronically signed by Angela Duran OT on 2/3/2022 at 1:35 PM

## 2022-02-03 NOTE — PROGRESS NOTES
[facility]  Office: [address], DO, Richard Leblanc, DO, Dahiana Palafox, DO, Arile Leon Blood, DO, Espinoza Callahan MD, Marina Francis MD, Hossein Duong MD, Mila Ferguson MD, Bonnie Hodgkin, MD, Miriam Garcia MD, Ebenezer Calhoun MD, Siva Valentin, DO, Gorge Acosta, DO, Bruce Gaytan MD,  Aura Wallace, DO, Luci Puentes MD, Hermelinda Morley MD, Bandar Davis MD, Sherly Lobato MD, Marquez Levine MD, Geoff Bautista, Wilton Olguin, CNP, Adrianne Jreonimo, CNP, Edenilson Montano, CNS, Feli White, CNP, Janneth Chin, CNP, Cabrera Dempsey, CNP, Petrona Wesley, CNP, Dawson Foley, CNP, Radonna Aschoff, PA-C, Rika [facility], Don Mukherjee, [facility], Marycarmen Clemons, CNP, Piedad Arguello, CNP, Jayden Perez, CNP, Aicha Woodall, CNP, Melody Gama, CNP, Jeffrey Huber, [address] [place] [address]    Progress Note    2/3/2022    8:28 AM    Name:   Khanh Goodman  MRN:     [id number]     [place]:      [de-identified]   Room:   2002/2002-01   Day:  6  Admit Date:  1/28/2022  8:53 PM    PCP:   Fredis Loaiza MD  Code Status:  Full Code    Subjective:     C/C:   Chief Complaint   Patient presents with    Hypertension     htn no dialysis x2 treatments, port bad per pt     Interval History Status: improved. Patient seen and examined at bedside, no acute events overnight. She is getting her dialysis currently elt nauseous after Vancomycin =  Afebrile overnight  Had some nausea after vancomycin yesterday  Continues to be on antibiotics per ID  Patient vitals, labs and all providers notes were reviewed,from overnight shift and morning updates were noted and discussed with the nurse    Brief History:     From H&P  Sheri Leigh is a 67 y.o. Non- / non  female who presents with Hypertension (htn no dialysis x2 treatments, port bad per pt)  Patient presents to the emergency room with the concern of generalized fatigue and  near syncope.  Patient states that for the past 1-2 days she has not been feeling well and in addition her HD port is non-functional and she has not received  for 2 sessions.  Typical HD days are MWF  Upon arrival to the emergency room the patient was noted to be febrile with a temp of 103, hypertensive with systolic blood pressures in the 200s and saturating well on room air.  Nephrology was consulted while in the emergency room however did not feel patient warranted HD at this time.   Patient received Actemra, started on Decadron. Maintain on 3 L nasal cannula. Her Andrea catheter was placed due to issues with her tunneled catheter clotting off. Cathflo and heparin were attempted. Patient is waiting for exchange of tunneled catheter and discharge afterwards    Review of Systems:     Review of Systems   Constitutional: Positive for activity change, appetite change and fatigue. Negative for chills, diaphoresis and fever. HENT: Negative for congestion. Eyes: Negative for visual disturbance. Respiratory: Positive for shortness of breath (improving). Negative for cough, chest tightness and wheezing. Cardiovascular: Negative for chest pain, palpitations and leg swelling. Gastrointestinal: Negative for abdominal pain, blood in stool, constipation, diarrhea, nausea and vomiting. Genitourinary: Negative for difficulty urinating. Neurological: Positive for weakness. Negative for dizziness, light-headedness, numbness and headaches. All other systems reviewed and are negative. Medications:      Allergies:  No Known Allergies    Current Meds:   Scheduled Meds:    insulin lispro  0-12 Units SubCUTAneous TID     insulin lispro  0-6 Units SubCUTAneous Nightly    cloNIDine  0.1 mg Oral Once    vancomycin  750 mg IntraVENous Q MWF    vancomycin (VANCOCIN) intermittent dosing (placeholder)   Other RX Placeholder    racepinephrine HCl  11.25 mg Nebulization Once    metoprolol  100 mg Oral Daily    hydrALAZINE 50 mg Oral TID    sodium chloride flush  5-40 mL IntraVENous 2 times per day    heparin (porcine)  5,000 Units SubCUTAneous 3 times per day    cefepime  1,000 mg IntraVENous Q24H    dexamethasone  6 mg IntraVENous Q24H    enalapril  5 mg Oral Daily    NIFEdipine  60 mg Oral Daily     Continuous Infusions:    dextrose      sodium chloride 25 mL (22 1417)     PRN Meds: glucose, dextrose, glucagon (rDNA), dextrose, sodium chloride nebulizer, albumin human, heparin (porcine), heparin (porcine), sodium chloride flush, sodium chloride, ondansetron **OR** ondansetron, acetaminophen **OR** acetaminophen, hydrALAZINE    Data:     Past Medical History:   has a past medical history of Anxiety and depression, Arthritis, Cancer ([facility] [address] Cerebral artery occlusion with cerebral infarction ([facility] [address] Chronic kidney disease, stage 5 ([facility] [address] Diabetes mellitus ([facility] [address] GERD (gastroesophageal reflux disease), Hearing loss, Hemodialysis patient ([facility] [address] History of blood transfusion, Hyperlipidemia, Hypertension, Migraines, MRSA (methicillin resistant staph aureus) culture positive, Neuropathy, PAT (obstructive sleep apnea), Prolonged emergence from general anesthesia, PVD (peripheral vascular disease) ([facility] [address] SOB (shortness of breath), and Tinnitus. Social History:   reports that she quit smoking about 32 years ago. She has never used smokeless tobacco. She reports current alcohol use. She reports that she does not use drugs. Family History: No family history on file. Vitals:  /72   Pulse 70   Temp 98.4 °F (36.9 °C) (Oral)   Resp 16   Ht 5' 3\" (1.6 m)   Wt 154 lb 15.7 oz (70.3 kg)   SpO2 98%   BMI 27.45 kg/m²   Temp (24hrs), Av.2 °F (36.8 °C), Min:98 °F (36.7 °C), Max:98.4 °F (36.9 °C)    Recent Labs     22  0401   POCGLU 276*       I/O (24Hr):     Intake/Output Summary (Last 24 hours) at 2/3/2022 0828  Last data filed at 2022 1842  Gross per 24 hour   Intake 968 ml   Output 1200 ml   Net -232 ml Labs:  Hematology:  Recent Labs     01/31/22  1422 02/01/22  1502   WBC 13.2*  --    RBC 3.67*  --    HGB 10.6*  --    HCT 33.0*  --    MCV 89.9  --    MCH 28.9  --    MCHC 32.1  --    RDW 13.2  --    PLT See Reflexed IPF Result  --    MPV NOT REPORTED  --    CRP  --  48.4*     Chemistry:  Recent Labs     02/01/22  0912 02/02/22  0411 02/03/22  0552   * 133* 132*   K 4.2 4.4 4.4   CL 96* 96* 95*   CO2 22 19* 21   GLUCOSE 135* 198* 237*   BUN 47* 70* 61*   CREATININE 4.99* 7.04* 5.33*   ANIONGAP 16 18* 16   LABGLOM 8* 6* 8*   GFRAA 10* 7* 9*   CALCIUM 8.1* 7.8* 8.1*     Recent Labs     02/03/22  0401   POCGLU 276*     ABG:  Lab Results   Component Value Date    FIO2 UNKNOWN 01/28/2022     Lab Results   Component Value Date/Time    SPECIAL NOT REPORTED 01/28/2022 10:14 PM     Lab Results   Component Value Date/Time    CULTURE NO GROWTH 01/28/2022 10:14 PM       Radiology:  CT CHEST WO CONTRAST    Result Date: 1/29/2022  1. Patchy bilateral mixed ground-glass and consolidative opacities with interlobular septal thickening which can be seen with atypical/viral pneumonia to include COVID-19. 2. Small right and trace left pleural effusions. 3. Pulmonary fibrosis in the bilateral lung bases. 4. Tiny hiatal hernia. US GALLBLADDER RUQ    Result Date: 1/31/2022  Gallbladder is normal in appearance with no evidence of acute cholecystitis. Atrophic right kidney measuring 8.3 cm     XR CHEST PORTABLE    Result Date: 1/29/2022  Cardiomegaly with trace effusions and scattered infiltrates. Support tubes as described above. XR CHEST PORTABLE    Result Date: 1/28/2022  Prominent interstitium and bilateral pulmonary opacities, concerning for edema or pneumonia. IR TUNNELED CVC PLACE WO SQ PORT/PUMP > 5 YEARS    Result Date: 2/1/2022  Successful fluoroscopy guided tunneled catheter exchange . Okay to use. Physical Examination:        Physical Exam  Vitals and nursing note reviewed.    Constitutional: General: She is not in acute distress. Interventions: Nasal cannula in place. HENT:      Head: Normocephalic and atraumatic. Eyes:      Conjunctiva/sclera: Conjunctivae normal.      Pupils: Pupils are equal, round, and reactive to light. Neck:     Cardiovascular:      Rate and Rhythm: Normal rate and regular rhythm. Heart sounds: No murmur heard. Pulmonary:      Effort: Pulmonary effort is normal. No accessory muscle usage or respiratory distress. Breath sounds: No stridor. No decreased breath sounds, wheezing, rhonchi or rales. Chest:      Comments: Right-sided tunneled cath  Abdominal:      General: Bowel sounds are normal. There is no distension. Palpations: Abdomen is soft. Abdomen is not rigid. Tenderness: There is no abdominal tenderness. There is no guarding. Musculoskeletal:         General: No tenderness. Skin:     General: Skin is warm and dry. Findings: No erythema, lesion or rash. Neurological:      Mental Status: She is alert and oriented to person, place, and time. Cranial Nerves: No cranial nerve deficit. Motor: No seizure activity. Psychiatric:         Speech: Speech normal.         Behavior: Behavior normal. Behavior is cooperative.          Assessment:        Hospital Problems           Last Modified POA    * (Principal) COVID-19 virus infection 1/29/2022 Yes    Diabetes ([facility] [address] 1/29/2022 Yes    Admission for dialysis ([facility] [address] 8/50/4679 Yes    Metabolic encephalopathy 4/17/8286 Yes    End stage renal failure on dialysis ([address] 1/28/2022 Yes    Hypertensive urgency 1/29/2022 Yes    Sepsis ([address] 1/29/2022 Yes    Acute respiratory failure with hypoxia ([address] 1/29/2022 Yes    Morbid obesity ([address] 1/29/2022 Yes    Hypertension, essential 1/29/2022 Yes    Hypokalemia 0/48/6428 Yes    Metabolic acidosis 0/79/2551 Yes    Elevated procalcitonin 1/31/2022 Yes    Elevated C-reactive protein (CRP) 1/31/2022 Yes          Plan:        Principal Problem: COVID-19 virus infection  Active Problems:    Diabetes ([facility] [address]    Admission for dialysis [facility])    Metabolic encephalopathy    End stage renal failure on dialysis [facility])    Hypertensive urgency    Sepsis ([facility] [address]    Acute respiratory failure with hypoxia (HCC)    Morbid obesity ([facility] [address]    Hypertension, essential    Hypokalemia    Metabolic acidosis    Elevated procalcitonin    Elevated C-reactive protein (CRP)  Resolved Problems:    * No resolved hospital problems. *    S/P tunneled cath placement 2/1  S/P  removal L sided Andrea    Abx per ID , plan to continue for 2 weeks with her HD   BP and glycemic control  Discussed with patient and nurse   Home O2 evaluation, qualified for 2 L     Patient was evaluated today for the diagnosis of COVID PNA . I entered a DME order for home oxygen because the diagnosis and testing requires the patient to have supplemental oxygen. Condition will improve or be benefited by oxygen use. The patient is not able to perform good mobility in a home setting and therefore does require the use of a portable oxygen system. The need for this equipment was discussed with the patient and she understands and is in agreement. qualified     DC planning pending completion of arrangements    All orders are in     Deep Garcia MD  2/3/2022  8:28 AM

## 2022-02-03 NOTE — PLAN OF CARE
Problem: Airway Clearance - Ineffective  Goal: Achieve or maintain patent airway  2/3/2022 1014 by Francisco Rowland RN  Outcome: Ongoing  2/3/2022 1014 by Francisco Rowland RN  Outcome: Ongoing  2/3/2022 0353 by Elie Valentin RN  Outcome: Ongoing  2/3/2022 0352 by Elie Valentin RN  Outcome: Ongoing     Problem: Gas Exchange - Impaired  Goal: Absence of hypoxia  2/3/2022 1014 by Francisco Rowland RN  Outcome: Ongoing  2/3/2022 1014 by Francisco Rowland RN  Outcome: Ongoing  2/3/2022 0353 by Elie Valentin RN  Outcome: Ongoing  2/3/2022 0352 by Elie Valentin RN  Outcome: Ongoing  Goal: Promote optimal lung function  2/3/2022 1014 by Francisco Rowland RN  Outcome: Ongoing  2/3/2022 1014 by Francisco Rowland RN  Outcome: Ongoing  2/3/2022 0353 by Elie Valentin RN  Outcome: Ongoing  2/3/2022 0352 by Elie Valentin RN  Outcome: Ongoing     Problem: Breathing Pattern - Ineffective  Goal: Ability to achieve and maintain a regular respiratory rate  2/3/2022 1014 by Francisco Rowland RN  Outcome: Ongoing  2/3/2022 1014 by Francisco Rowland RN  Outcome: Ongoing  2/3/2022 0353 by Elie Valentin RN  Outcome: Ongoing  2/3/2022 0352 by Elie Valentin RN  Outcome: Ongoing     Problem:  Body Temperature -  Risk of, Imbalanced  Goal: Ability to maintain a body temperature within defined limits  2/3/2022 1014 by Francisco Rowland RN  Outcome: Ongoing  2/3/2022 1014 by Francisco Rowland RN  Outcome: Ongoing  2/3/2022 0353 by Elie Valentin RN  Outcome: Ongoing  2/3/2022 0352 by Elie Valentin RN  Outcome: Ongoing  Goal: Will regain or maintain usual level of consciousness  2/3/2022 1014 by Francisco Rowland RN  Outcome: Ongoing  2/3/2022 1014 by Francisco Rowland RN  Outcome: Ongoing  2/3/2022 0353 by Elie Valentin RN  Outcome: Ongoing  2/3/2022 0352 by Elie Valentin RN  Outcome: Ongoing  Goal: Complications related to the disease process, condition or treatment will be avoided or minimized  2/3/2022 1014 by Francisco Rowland RN  Outcome: Ongoing  2/3/2022 1014 by Luisito Bray RN  Outcome: Ongoing  2/3/2022 0353 by Kaleb Cartwright RN  Outcome: Ongoing  2/3/2022 0352 by Kaleb Cartwright RN  Outcome: Ongoing     Problem: Isolation Precautions - Risk of Spread of Infection  Goal: Prevent transmission of infection  2/3/2022 1014 by Luisito Bray RN  Outcome: Ongoing  2/3/2022 0353 by Kaleb Cartwright RN  Outcome: Ongoing  2/3/2022 0352 by Kaleb Cartwright RN  Outcome: Ongoing     Problem: Nutrition Deficits  Goal: Optimize nutritional status  2/3/2022 1014 by Luisito Bray RN  Outcome: Ongoing  2/3/2022 1014 by Luisito Bray RN  Outcome: Ongoing  2/3/2022 0353 by Kaleb Cartwright RN  Outcome: Ongoing  2/3/2022 0352 by Kaleb Cartwright RN  Outcome: Ongoing     Problem: Risk for Fluid Volume Deficit  Goal: Maintain normal heart rhythm  2/3/2022 1014 by Luisito Bray RN  Outcome: Ongoing  2/3/2022 1014 by Luisito Bray RN  Outcome: Ongoing  2/3/2022 0353 by Kaleb Cartwright RN  Outcome: Ongoing  2/3/2022 0352 by Kaleb Cartwright RN  Outcome: Ongoing  Goal: Maintain absence of muscle cramping  2/3/2022 1014 by Luisito Bray RN  Outcome: Ongoing  2/3/2022 1014 by Luisito Bray RN  Outcome: Ongoing  2/3/2022 0353 by Kaleb Cartwright RN  Outcome: Ongoing  2/3/2022 0352 by Kaleb Cartwright RN  Outcome: Ongoing  Goal: Maintain normal serum potassium, sodium, calcium, phosphorus, and pH  2/3/2022 1014 by Luisito Bray RN  Outcome: Ongoing  2/3/2022 1014 by Luisito Bray RN  Outcome: Ongoing  2/3/2022 0353 by Kaleb Cartwright RN  Outcome: Ongoing  2/3/2022 0352 by Kaleb Cartwright RN  Outcome: Ongoing     Problem: Loneliness or Risk for Loneliness  Goal: Demonstrate positive use of time alone when socialization is not possible  2/3/2022 1014 by Luisito Bray RN  Outcome: Ongoing  2/3/2022 1014 by Luisito Bray RN  Outcome: Ongoing  2/3/2022 0353 by Kaleb Cartwright RN  Outcome: Ongoing  2/3/2022 0352 by Kaleb Cartwright RN  Outcome: Ongoing     Problem: Fatigue  Goal: Verbalize increase energy and improved vitality  2/3/2022 1014 by Lorena Henson RN  Outcome: Ongoing  2/3/2022 1014 by Lorena Henson RN  Outcome: Ongoing  2/3/2022 0353 by Pilar Thorpe RN  Outcome: Ongoing  2/3/2022 0352 by Pilar Thorpe RN  Outcome: Ongoing     Problem: Patient Education: Go to Patient Education Activity  Goal: Patient/Family Education  2/3/2022 1014 by Lorena Henson RN  Outcome: Ongoing  2/3/2022 1014 by Lorena Henson RN  Outcome: Ongoing  2/3/2022 0353 by Pilar Thorpe RN  Outcome: Ongoing  2/3/2022 0352 by Pilar Thorpe RN  Outcome: Ongoing     Problem: Falls - Risk of:  Goal: Will remain free from falls  Description: Will remain free from falls  2/3/2022 1014 by Lorena Henson RN  Outcome: Ongoing  2/3/2022 1014 by Lorena Henson RN  Outcome: Ongoing  2/3/2022 0353 by Pilar Thorpe RN  Outcome: Ongoing  2/3/2022 0352 by Pilar Thorpe RN  Outcome: Ongoing  Goal: Absence of physical injury  Description: Absence of physical injury  2/3/2022 1014 by Lroena Henson RN  Outcome: Ongoing  2/3/2022 1014 by Lorena Henson RN  Outcome: Ongoing  2/3/2022 0353 by Pilar Thorpe RN  Outcome: Ongoing  2/3/2022 0352 by Pilar Thorpe RN  Outcome: Ongoing     Problem: Skin Integrity:  Goal: Will show no infection signs and symptoms  Description: Will show no infection signs and symptoms  2/3/2022 1014 by Lorena Henson RN  Outcome: Ongoing  2/3/2022 1014 by Lorena Henson RN  Outcome: Ongoing  2/3/2022 0353 by Pilar Thorpe RN  Outcome: Ongoing  2/3/2022 0352 by Pilar Thorpe RN  Outcome: Ongoing  Goal: Absence of new skin breakdown  Description: Absence of new skin breakdown  2/3/2022 1014 by Lorena Henson RN  Outcome: Ongoing  2/3/2022 1014 by Lorena Henson RN  Outcome: Ongoing  2/3/2022 0353 by Pilar Thorpe RN  Outcome: Ongoing  2/3/2022 0352 by Pilar Thorpe RN  Outcome: Ongoing

## 2022-02-03 NOTE — PROGRESS NOTES
transfusion, Hyperlipidemia, Hypertension, Migraines, MRSA (methicillin resistant staph aureus) culture positive, Neuropathy, PAT (obstructive sleep apnea), Prolonged emergence from general anesthesia, PVD (peripheral vascular disease) ([facility] [address] SOB (shortness of breath), and Tinnitus. has a past surgical history that includes Uvulopalatopharygoplasty; Mastectomy (Left); Hysterectomy; Appendectomy; back surgery; Mastectomy (Left, 05/14/2018); pr exc skin benig <5mm trunk,arm,leg (Right, 5/14/2018); Breast reduction surgery (Left, 1999); Colonoscopy; Insertable Cardiac Monitor; central line (1/29/2022); and IR TUNNELED CVC PLACE WO SQ PORT/PUMP > 5 YEARS (2/1/2022). Restrictions  Restrictions/Precautions  Restrictions/Precautions: Up as Tolerated,Isolation,Contact Precautions (droplet+)  Required Braces or Orthoses?: No  Vision/Hearing  Vision: Within Functional Limits  Hearing: Exceptions to [facility] Exceptions: Hard of hearing/hearing concerns     Subjective  General  Patient assessed for rehabilitation services?: Yes  Response To Previous Treatment: Not applicable  Family / Caregiver Present: No  Follows Commands: Within Functional Limits  Subjective  Subjective: Pt supine in bed and agreeable to therapy, RN agreeable to therapy. Pt pleasant and cooperative throughout today's session.   Pain Screening  Patient Currently in Pain: Denies  Vital Signs  Patient Currently in Pain: Denies       Social/Functional History  Social/Functional History  Lives With: Son  Type of Home: Apartment  Home Layout: One level  Home Access: Level entry (lift up the stairs)  Bathroom Shower/Tub: Tub/Shower unit  Bathroom Toilet: Standard  Bathroom Equipment: Shower chair,Toilet raiser  Home Equipment: 4 wheeled walker,Wheelchair-electric (pt reports using rollator in apt, uses electric w/c for community mobility)  ADL Assistance: Independent  Homemaking Assistance: Needs assistance  Homemaking Responsibilities: No (son performs)  Ambulation Assistance: Independent  Transfer Assistance: Independent  Active : No  Patient's  Info: son drives  Mode of Transportation: Car  Occupation: On disability  Leisure & Hobbies: watch tv  Additional Comments: Pt reports that her son is also in the hospital and unable to assist, unsure if anyone else would be able to stay with her and assist  Cognition   Cognition  Overall Cognitive Status: WFL    Objective     Observation/Palpation  Posture: Fair    AROM RLE (degrees)  RLE AROM: WFL  AROM LLE (degrees)  LLE AROM : WFL  AROM RUE (degrees)  RUE AROM : WFL  AROM LUE (degrees)  LUE AROM : WFL  Strength RLE  Strength RLE: WFL  Comment: Grossly 4/5  Strength LLE  Strength LLE: WFL  Comment: Grossly 4/5  Strength RUE  Strength RUE: WFL  Comment: Grossly 4-/5  Strength LUE  Strength LUE: WFL  Comment: Grossly 4-/5     Sensation  Overall Sensation Status: Impaired (pt reports chronic numbness from feet up to hips secondary to neuropathy)  Bed mobility  Supine to Sit: Stand by assistance  Sit to Supine: Stand by assistance  Scooting: Stand by assistance  Comment: Bed mobility performed with the [facility] elevated ~35 degrees with use of handrails. Transfers  Sit to Stand: Contact guard assistance  Stand to sit: Contact guard assistance  Comment: Transfers performed 5x this date. Pt's static/dyanamic sitting balance was challenged x 20 minutes this date. Ambulation  Ambulation?: Yes  More Ambulation?: No  Ambulation 1  Surface: level tile  Device: Rolling Walker  Assistance: Contact guard assistance  Quality of Gait: mildly unsteady, decreased stride length, no true LOB. Gait Deviations: Slow Alisia;Decreased step length  Distance: 5 feet fwd/retro, seated rest break, 10 feet, seated rest break, 10 feet. Comments: moderate reliance on UE support. Pt requires ~ 2 minute rest break in between ambulation bouts.   Stairs/Curb  Stairs?: No     Balance  Posture: Fair  Sitting - Static: Good  Sitting - Dynamic: Good;-  Standing - Static: Fair  Standing - Dynamic: Fair  Comments: standing balance assessed while using a RW        Plan   Plan  Times per week: 5-6x  Times per day: Daily  Current Treatment Recommendations: Strengthening,Transfer Training,Endurance Training,Balance Training,Gait Training,Functional Mobility Training,Safety Education & Training,Home Exercise Program,Equipment Evaluation, Education, & procurement,Patient/Caregiver Education & Training  Safety Devices  Type of devices: Patient at risk for falls,Left in bed,Bed alarm in place,Call light within reach,Gait belt,Nurse notified  Restraints  Initially in place: No                                                 AM-PAC Score     AM-PAC Inpatient Mobility without Stair Climbing Raw Score : 15 (02/03/22 1219)  AM-PAC Inpatient without Stair Climbing T-Scale Score : 43.03 (02/03/22 1219)  Mobility Inpatient CMS 0-100% Score: 47.43 (02/03/22 1219)  Mobility Inpatient without Stair CMS G-Code Modifier : CK (02/03/22 1219)       Goals  Short term goals  Time Frame for Short term goals: 14 visits  Short term goal 1: Pt will ambulate 200 feet with a RW and modified independence. Short term goal 2: Pt will tolerate a 35 minute therapy session to promote increased endurance. Short term goal 3: Pt will perform sit<>stand transfer with independence to increase functional independence. Short term goal 4: Pt will demonstrate good- standing balance to decrease fall risk.   Short term goal 5: Pt will perform bed mobility independently       Therapy Time   Individual Concurrent Group Co-treatment   Time In 0946         Time Out 1030         Minutes 44         Timed Code Treatment Minutes: GERARDO Palmer 20, PT

## 2022-02-04 VITALS
TEMPERATURE: 98.6 F | DIASTOLIC BLOOD PRESSURE: 56 MMHG | BODY MASS INDEX: 27.58 KG/M2 | RESPIRATION RATE: 22 BRPM | HEART RATE: 69 BPM | OXYGEN SATURATION: 96 % | HEIGHT: 63 IN | SYSTOLIC BLOOD PRESSURE: 113 MMHG | WEIGHT: 155.65 LBS

## 2022-02-04 LAB
ANION GAP SERPL CALCULATED.3IONS-SCNC: 16 MMOL/L (ref 9–17)
BUN BLDV-MCNC: 48 MG/DL (ref 8–23)
BUN/CREAT BLD: ABNORMAL (ref 9–20)
CALCIUM SERPL-MCNC: 8.1 MG/DL (ref 8.6–10.4)
CHLORIDE BLD-SCNC: 98 MMOL/L (ref 98–107)
CO2: 22 MMOL/L (ref 20–31)
CREAT SERPL-MCNC: 4.95 MG/DL (ref 0.5–0.9)
GFR AFRICAN AMERICAN: 10 ML/MIN
GFR NON-AFRICAN AMERICAN: 8 ML/MIN
GFR SERPL CREATININE-BSD FRML MDRD: ABNORMAL ML/MIN/{1.73_M2}
GFR SERPL CREATININE-BSD FRML MDRD: ABNORMAL ML/MIN/{1.73_M2}
GLUCOSE BLD-MCNC: 108 MG/DL (ref 65–105)
GLUCOSE BLD-MCNC: 112 MG/DL (ref 65–105)
GLUCOSE BLD-MCNC: 113 MG/DL (ref 70–99)
GLUCOSE BLD-MCNC: 117 MG/DL (ref 65–105)
GLUCOSE BLD-MCNC: 143 MG/DL (ref 65–105)
GLUCOSE BLD-MCNC: 160 MG/DL (ref 65–105)
POTASSIUM SERPL-SCNC: 4 MMOL/L (ref 3.7–5.3)
SODIUM BLD-SCNC: 136 MMOL/L (ref 135–144)

## 2022-02-04 PROCEDURE — 6360000002 HC RX W HCPCS: Performed by: NURSE PRACTITIONER

## 2022-02-04 PROCEDURE — 6370000000 HC RX 637 (ALT 250 FOR IP): Performed by: NURSE PRACTITIONER

## 2022-02-04 PROCEDURE — 82947 ASSAY GLUCOSE BLOOD QUANT: CPT

## 2022-02-04 PROCEDURE — 6360000002 HC RX W HCPCS: Performed by: INTERNAL MEDICINE

## 2022-02-04 PROCEDURE — 97110 THERAPEUTIC EXERCISES: CPT

## 2022-02-04 PROCEDURE — 99232 SBSQ HOSP IP/OBS MODERATE 35: CPT | Performed by: INTERNAL MEDICINE

## 2022-02-04 PROCEDURE — 6370000000 HC RX 637 (ALT 250 FOR IP): Performed by: FAMILY MEDICINE

## 2022-02-04 PROCEDURE — 99232 SBSQ HOSP IP/OBS MODERATE 35: CPT | Performed by: NURSE PRACTITIONER

## 2022-02-04 PROCEDURE — 2580000003 HC RX 258: Performed by: NURSE PRACTITIONER

## 2022-02-04 PROCEDURE — 97535 SELF CARE MNGMENT TRAINING: CPT

## 2022-02-04 PROCEDURE — 36415 COLL VENOUS BLD VENIPUNCTURE: CPT

## 2022-02-04 PROCEDURE — 97116 GAIT TRAINING THERAPY: CPT

## 2022-02-04 PROCEDURE — 80048 BASIC METABOLIC PNL TOTAL CA: CPT

## 2022-02-04 PROCEDURE — 6370000000 HC RX 637 (ALT 250 FOR IP): Performed by: STUDENT IN AN ORGANIZED HEALTH CARE EDUCATION/TRAINING PROGRAM

## 2022-02-04 PROCEDURE — 97530 THERAPEUTIC ACTIVITIES: CPT

## 2022-02-04 PROCEDURE — 97166 OT EVAL MOD COMPLEX 45 MIN: CPT

## 2022-02-04 PROCEDURE — 99239 HOSP IP/OBS DSCHRG MGMT >30: CPT | Performed by: FAMILY MEDICINE

## 2022-02-04 PROCEDURE — 2580000003 HC RX 258: Performed by: INTERNAL MEDICINE

## 2022-02-04 RX ORDER — SODIUM BICARBONATE 650 MG/1
650 TABLET ORAL 2 TIMES DAILY
COMMUNITY

## 2022-02-04 RX ORDER — SPIRONOLACTONE 50 MG/1
50 TABLET, FILM COATED ORAL DAILY
Status: ON HOLD | COMMUNITY
End: 2022-04-07 | Stop reason: HOSPADM

## 2022-02-04 RX ADMIN — SODIUM CHLORIDE, PRESERVATIVE FREE 5 ML: 5 INJECTION INTRAVENOUS at 09:00

## 2022-02-04 RX ADMIN — HEPARIN SODIUM 5000 UNITS: 5000 INJECTION INTRAVENOUS; SUBCUTANEOUS at 13:34

## 2022-02-04 RX ADMIN — HYDRALAZINE HYDROCHLORIDE 50 MG: 50 TABLET, FILM COATED ORAL at 08:21

## 2022-02-04 RX ADMIN — ENALAPRIL MALEATE 5 MG: 5 TABLET ORAL at 08:21

## 2022-02-04 RX ADMIN — HYDRALAZINE HYDROCHLORIDE 50 MG: 50 TABLET, FILM COATED ORAL at 13:34

## 2022-02-04 RX ADMIN — INSULIN LISPRO 2 UNITS: 100 INJECTION, SOLUTION INTRAVENOUS; SUBCUTANEOUS at 17:07

## 2022-02-04 RX ADMIN — METOPROLOL TARTRATE 100 MG: 50 TABLET, FILM COATED ORAL at 08:21

## 2022-02-04 RX ADMIN — VANCOMYCIN HYDROCHLORIDE 750 MG: 10 INJECTION, POWDER, LYOPHILIZED, FOR SOLUTION INTRAVENOUS at 13:35

## 2022-02-04 RX ADMIN — NIFEDIPINE 60 MG: 30 TABLET, FILM COATED, EXTENDED RELEASE ORAL at 08:21

## 2022-02-04 RX ADMIN — HEPARIN SODIUM 5000 UNITS: 5000 INJECTION INTRAVENOUS; SUBCUTANEOUS at 06:13

## 2022-02-04 ASSESSMENT — ENCOUNTER SYMPTOMS
CONSTIPATION: 0
BLOOD IN STOOL: 0
COUGH: 0
SHORTNESS OF BREATH: 0
EYE DISCHARGE: 0
CHEST TIGHTNESS: 0
SHORTNESS OF BREATH: 1
APNEA: 0
COLOR CHANGE: 0
DIARRHEA: 0
VOMITING: 0
NAUSEA: 0
ABDOMINAL DISTENTION: 0
WHEEZING: 0
ABDOMINAL PAIN: 0

## 2022-02-04 ASSESSMENT — PAIN SCALES - GENERAL
PAINLEVEL_OUTOF10: 0

## 2022-02-04 NOTE — PROGRESS NOTES
Physical Therapy  Facility/Department: [facility] CAR 2  Daily Treatment Note  NAME: Carole Salinas  : [birth year]  MRN: [id number]    Date of Service: 2022    Discharge Recommendations:  Patient would benefit from continued therapy after discharge        Assessment   Body structures, Functions, Activity limitations: Decreased functional mobility ; Decreased posture;Decreased endurance;Decreased strength;Decreased balance  Assessment: Pt with mobility deficits requiring CGA to ambulate 20 feet with a RW. Pt mildy unsteady with ambulation. Sit<->stand transfers with CGA/Toby with moderate effort. Pt with significant endurance deficits, decreased BLE strength, and decreased standing balance this date. Pt will require 24 hour assistance with mobility upon discharge. Pt would benefit from additional therapy upon discharge to assist in return to prior level of functional independence. Prognosis: Good  PT Education: Goals;Transfer Training;PT Role;Functional Mobility Training;General Safety;Plan of Care;Gait Training  REQUIRES PT FOLLOW UP: Yes  Activity Tolerance  Activity Tolerance: Patient limited by endurance; Patient limited by fatigue  Activity Tolerance: Pt's SpO2 remained above 90% throughout today's session on room air     Patient Diagnosis(es): The primary encounter diagnosis was Admission for dialysis [facility]). Diagnoses of COVID and Sepsis without acute organ dysfunction, due to unspecified organism [facility]) were also pertinent to this visit.      has a past medical history of Anxiety and depression, Arthritis, Cancer ([facility] [address] Cerebral artery occlusion with cerebral infarction ([facility] [address] Chronic kidney disease, stage 5 ([facility] [address] Diabetes mellitus ([facility] [address] GERD (gastroesophageal reflux disease), Hearing loss, Hemodialysis patient ([facility] [address] History of blood transfusion, Hyperlipidemia, Hypertension, Migraines, MRSA (methicillin resistant staph aureus) culture positive, Neuropathy, PAT (obstructive sleep apnea), Prolonged emergence from general anesthesia, PVD (peripheral vascular disease) ([facility] [address] SOB (shortness of breath), and Tinnitus. has a past surgical history that includes Uvulopalatopharygoplasty; Mastectomy (Left); Hysterectomy; Appendectomy; back surgery; Mastectomy (Left, 05/14/2018); pr exc skin benig <5mm trunk,arm,leg (Right, 5/14/2018); Breast reduction surgery (Left, 1999); Colonoscopy; Insertable Cardiac Monitor; central line (1/29/2022); and IR TUNNELED CVC PLACE WO SQ PORT/PUMP > 5 YEARS (2/1/2022). Restrictions  Restrictions/Precautions  Restrictions/Precautions: Up as Tolerated,Isolation,Contact Precautions  Required Braces or Orthoses?: No  Position Activity Restriction  Other position/activity restrictions: up with assistance  Subjective   General  Chart Reviewed: Yes  Response To Previous Treatment: Patient with no complaints from previous session. Family / Caregiver Present: No  Subjective  Subjective: Pt supine in bed and agreeable to therapy, but needed some encouragment. \"I just want to go home. \" RN agreeable to therapy. Pt pleasant and cooperative throughout today's session. Pain Screening  Patient Currently in Pain: No (no c/o pain, \"Just tingling in my feet. \")  Vital Signs  Patient Currently in Pain: No (no c/o pain, \"Just tingling in my feet. \")       Orientation     Cognition      Objective   Bed mobility  Supine to Sit: Stand by assistance  Sit to Supine: Stand by assistance  Comment: Increased time and effort with use of 1 bedrail  Transfers  Sit to Stand: Contact guard assistance;Minimal Assistance  Stand to sit: Contact guard assistance  Comment: Assessed to RW and moderate effort.  Pt has 1 episode of posterior lean with inability to recover without assistance  Ambulation  Ambulation?: Yes  Ambulation 1  Surface: level tile  Device: Rolling Walker  Assistance: Contact guard assistance  Quality of Gait: mildly unsteady, decreased stride length, no LOB  Distance: 20 ft, seated rest period x 3 minutes, 20 ft  Comments: moderate reliance on UE support; slightly unsteady     Balance  Posture: Fair  Sitting - Static: Good  Sitting - Dynamic: Good;-  Standing - Static: Fair  Standing - Dynamic: Fair  Comments: standing balance assessed while using a RW  Exercises  Hip Flexion: x 15 reps seated in recliner  Hip Abduction: x 15 reps seated in recliner  Knee Long Arc Quad: x 15 reps seated in recliner  Ankle Pumps: x 15 reps seated in recliner     AM-PAC Score  AM-PAC Inpatient Mobility Raw Score : 16 (02/04/22 1422)  AM-PAC Inpatient T-Scale Score : 40.78 (02/04/22 1422)  Mobility Inpatient CMS 0-100% Score: 54.16 (02/04/22 1422)  Mobility Inpatient CMS G-Code Modifier : CK (02/04/22 1422)          Goals  Short term goals  Time Frame for Short term goals: 14 visits  Short term goal 1: Pt will ambulate 200 feet with a RW and modified independence. Short term goal 2: Pt will tolerate a 35 minute therapy session to promote increased endurance. Short term goal 3: Pt will perform sit<>stand transfer with independence to increase functional independence. Short term goal 4: Pt will demonstrate good- standing balance to decrease fall risk.   Short term goal 5: Pt will perform bed mobility independently    Plan    Plan  Times per week: 5-6x  Times per day: Daily  Current Treatment Recommendations: Strengthening,Transfer Training,Endurance Training,Balance Training,Gait Training,Functional Mobility Training,Safety Education & Training,Home Exercise Program,Equipment Evaluation, Education, & procurement,Patient/Caregiver Education & Training  Safety Devices  Type of devices: Patient at risk for falls,Left in bed,Bed alarm in place,Call light within reach,Gait belt,Nurse notified  Restraints  Initially in place: No     Therapy Time   Individual Concurrent Group Co-treatment   Time In 1348         Time Out 1414         Minutes 26         Timed Code Treatment Minutes: Margaret Novak

## 2022-02-04 NOTE — PROGRESS NOTES
Approximately 05:30 patient woke up and began crying and stated that she wants to go home. Patient stated \"I'm tired of being here and I want to go home, I'm going to call my son to come get me\". Nursing staff tried to talk patient into stay however patient stated, \"you won't let me leave this room, I feel locked up\". Patient educated about isolation protocols which restricts here from leaving her room. Patient was unable to reach her son by phone however she was able to call her daughter Devon Soto, who was able to convince her to stay until she was able to speak to case manger in the morning. After talking to her daughter patient was encourage to get back in bed.  Will continue to monitor

## 2022-02-04 NOTE — CARE COORDINATION
Transitional planning. Amadou Luciano calls from [place] and they can take pt today. Spoke with pt and she said she wanted to go home and to call her son Kirsten Recinos. CM calls Kirsten Recinos and he states the family cannot take care of her and needs SNF. Given "[facility]" address and phone number. Kirsten Recinos said he will call his Mom.     1500 Spoke with pt and she is agreeable to [facility]. I told her she will be picked up at  [facility] and spoke with Harman Arreola. They can  at 6:00pm and aware covid +. Called Amadou Luciano at [facility] and notified of time. Report number [phone] and ask for covid unit. Fax to [phone]. Bedside RN aware and will complete the RED. 1600 RED already completed. AVS will not print D/T meds need reconciled. MAHNAZ BALBUENA about this. Report number to bedside RN Albin.    3190 AVS(RED), HENS, face sheet, H&P and MAR faxed to "[facility]" [phone]    Discharge 1 [facility] Case Management Department  Written by: Eloy Hackett RN    Patient Name: Praveena Vernell  Attending Provider: Pierre Youngblood MD  Admit Date: 2022  8:53 PM  MRN: [id number]  Account: [de-identified]                     : 1945  Discharge Date:       Disposition: SNF-[facility] ambulance.  O2 RA    Eloy Hackett RN

## 2022-02-04 NOTE — PROGRESS NOTES
[facility]  Office: [address], DO, Garth Brar, DO, [place] Grace, DO, Hossein Stricklandd Blood, DO, Bernadene Ahumada, MD, Yanick Dent MD, Severo oNel MD, aGbbie Grullon MD, Nilda Silva MD, Gregorio Montez MD, Jared Gray MD, Yomaira England, DO, Bret Arechiga, DO, Lucas Carballo MD,  Cecilia Doran, DO, Blaise Calles MD, Lyssa Castellon MD, Angeline Hagan MD, Ronald Woodall MD, Arcelia Parks MD, Tutu Balderas, CNP, Bubba Wu, CNP, Kayleigh Sigala, CNP, Bryant Roche, [facility], Berna Burnett, CNP, Mckinley Sandoval, CNP, Twylla Boeck, CNP, Jo Watkins, CNP, Nikolas Sharma, CNP, Leena Wyatt PA-C, Luis James, DNP, Jayy Rodriguez, DNP, Makenna Gonzales, CNP, Tarsha Lockwood, CNP, Dinh Mask, CNP, Roberta Hay, CNP, Karla Winston, [facility], Dylon Mack, [address] [place] [address]    Progress Note    2/4/2022    9:21 AM    Name:   Carole Salinas  MRN:     [id number]     [place]:      [de-identified]   Room:   2002/2002-01   Day:  7  Admit Date:  1/28/2022  8:53 PM    PCP:   Melyssa Meadows MD  Code Status:  Full Code    Subjective:     C/C:   Chief Complaint   Patient presents with    Hypertension     htn no dialysis x2 treatments, port bad per pt     Interval History Status: improved. Patient seen and examined at bedside, no acute events overnight.    No new complaint, wants to go home   continues to be on antibiotics per ID  Patient vitals, labs and all providers notes were reviewed,from overnight shift and morning updates were noted and discussed with the nurse    Brief History:     From H&P  Sheri Leigh is a 67 y.o. Non- / non  female who presents with Hypertension (htn no dialysis x2 treatments, port bad per pt)  Patient presents to the emergency room with the concern of generalized fatigue and  near syncope.  Patient states that for the past 1-2 days she has not been feeling well and in addition her HD port is 5,000 Units SubCUTAneous 3 times per day    cefepime  1,000 mg IntraVENous Q24H    dexamethasone  6 mg IntraVENous Q24H    enalapril  5 mg Oral Daily    NIFEdipine  60 mg Oral Daily     Continuous Infusions:    dextrose      sodium chloride 25 mL (22 1417)     PRN Meds: glucose, dextrose, glucagon (rDNA), dextrose, sodium chloride nebulizer, albumin human, heparin (porcine), heparin (porcine), sodium chloride flush, sodium chloride, ondansetron **OR** ondansetron, acetaminophen **OR** acetaminophen, hydrALAZINE    Data:     Past Medical History:   has a past medical history of Anxiety and depression, Arthritis, Cancer ([facility] [address] Cerebral artery occlusion with cerebral infarction ([facility] [address] Chronic kidney disease, stage 5 ([facility] [address] Diabetes mellitus ([facility] [address] GERD (gastroesophageal reflux disease), Hearing loss, Hemodialysis patient ([facility] [address] History of blood transfusion, Hyperlipidemia, Hypertension, Migraines, MRSA (methicillin resistant staph aureus) culture positive, Neuropathy, PAT (obstructive sleep apnea), Prolonged emergence from general anesthesia, PVD (peripheral vascular disease) ([facility] [address] SOB (shortness of breath), and Tinnitus. Social History:   reports that she quit smoking about 32 years ago. She has never used smokeless tobacco. She reports current alcohol use. She reports that she does not use drugs. Family History: No family history on file. Vitals:  BP (!) 155/80   Pulse 76   Temp 98.8 °F (37.1 °C) (Oral)   Resp 18   Ht 5' 3\" (1.6 m)   Wt 155 lb 10.3 oz (70.6 kg)   SpO2 93%   BMI 27.57 kg/m²   Temp (24hrs), Av.1 °F (36.7 °C), Min:96.8 °F (36 °C), Max:99 °F (37.2 °C)    Recent Labs     22  17522  0649 22  0817   POCGLU 142* 152* 112* 117*       I/O (24Hr):     Intake/Output Summary (Last 24 hours) at 2022 6166  Last data filed at 2/3/2022 1510  Gross per 24 hour   Intake 600 ml   Output 1600 ml   Net -1000 ml       Labs:  Hematology:  Recent Labs Conjunctiva/sclera: Conjunctivae normal.      Pupils: Pupils are equal, round, and reactive to light. Neck:     Cardiovascular:      Rate and Rhythm: Normal rate and regular rhythm. Heart sounds: No murmur heard. Pulmonary:      Effort: Pulmonary effort is normal. No accessory muscle usage or respiratory distress. Breath sounds: No stridor. No decreased breath sounds, wheezing, rhonchi or rales. Chest:      Comments: Right-sided tunneled cath  Abdominal:      General: Bowel sounds are normal. There is no distension. Palpations: Abdomen is soft. Abdomen is not rigid. Tenderness: There is no abdominal tenderness. There is no guarding. Musculoskeletal:         General: No tenderness. Skin:     General: Skin is warm and dry. Findings: No erythema, lesion or rash. Neurological:      Mental Status: She is alert and oriented to person, place, and time. Cranial Nerves: No cranial nerve deficit. Motor: No seizure activity. Psychiatric:         Speech: Speech normal.         Behavior: Behavior normal. Behavior is cooperative.          Assessment:        Hospital Problems           Last Modified POA    * (Principal) COVID-19 virus infection 1/29/2022 Yes    Diabetes ([address] 1/29/2022 Yes    Admission for dialysis ([address] 1/37/4380 Yes    Metabolic encephalopathy 6/34/3359 Yes    End stage renal failure on dialysis ([address] 1/28/2022 Yes    Hypertensive urgency 1/29/2022 Yes    Sepsis ([address] 1/29/2022 Yes    Acute respiratory failure with hypoxia ([address] 1/29/2022 Yes    Morbid obesity ([address] 1/29/2022 Yes    Hypertension, essential 1/29/2022 Yes    Hypokalemia 5/47/6380 Yes    Metabolic acidosis 2/36/1076 Yes    Elevated procalcitonin 1/31/2022 Yes    Elevated C-reactive protein (CRP) 1/31/2022 Yes          Plan:        Principal Problem:    COVID-19 virus infection  Active Problems:    Diabetes ([address]    Admission for dialysis ([address]    Metabolic encephalopathy    End stage renal failure on dialysis [facility])    Hypertensive urgency    Sepsis ([facility] [address]    Acute respiratory failure with hypoxia (HCC)    Morbid obesity ([facility] [address]    Hypertension, essential    Hypokalemia    Metabolic acidosis    Elevated procalcitonin    Elevated C-reactive protein (CRP)  Resolved Problems:    * No resolved hospital problems. *    S/P tunneled cath placement 2/1  S/P  removal L sided Andrea    Abx per ID , plan to continue for 2 weeks with her HD , ending 2/12   BP and glycemic control  Discussed with patient and nurse   DC planning pending completion of arrangements    All orders are in     Discussed with the patient and the nurse , discussed also with the patient's granddaughter on the phone    Discussed with the pharmacist, on medication check and reconciled with patient pharmacy, not on hydralazine rest of meds seem to be accurate.   Patient did not fill her medication since September 2021    Linn Avery MD  2/4/2022  9:21 AM

## 2022-02-04 NOTE — PLAN OF CARE
Problem: Airway Clearance - Ineffective  Goal: Achieve or maintain patent airway  2/4/2022 1706 by Morena Tang RN  Outcome: Completed  2/4/2022 0938 by Morena Tang RN  Outcome: Ongoing     Problem: Gas Exchange - Impaired  Goal: Absence of hypoxia  2/4/2022 1706 by Morena Tang RN  Outcome: Completed  2/4/2022 25 [id number] by Morena Tang RN  Outcome: Ongoing  Goal: Promote optimal lung function  2/4/2022 1706 by Morena Tang RN  Outcome: Completed  2/4/2022 0938 by Morena Tang RN  Outcome: Ongoing     Problem: Breathing Pattern - Ineffective  Goal: Ability to achieve and maintain a regular respiratory rate  2/4/2022 1706 by Morena Tang RN  Outcome: Completed  2/4/2022 0938 by Morena Tang RN  Outcome: Ongoing     Problem:  Body Temperature -  Risk of, Imbalanced  Goal: Ability to maintain a body temperature within defined limits  2/4/2022 1706 by Morena Tang RN  Outcome: Completed  2/4/2022 0938 by Morena Tang RN  Outcome: Ongoing  Goal: Will regain or maintain usual level of consciousness  2/4/2022 1706 by Morena Tang RN  Outcome: Completed  2/4/2022 0938 by Morena Tang RN  Outcome: Ongoing  Goal: Complications related to the disease process, condition or treatment will be avoided or minimized  2/4/2022 1706 by Morena Tang RN  Outcome: Completed  2/4/2022 0938 by Morena Tang RN  Outcome: Ongoing     Problem: Isolation Precautions - Risk of Spread of Infection  Goal: Prevent transmission of infection  2/4/2022 1706 by Morena Tang RN  Outcome: Completed  2/4/2022 0938 by Morena Tang RN  Outcome: Ongoing     Problem: Nutrition Deficits  Goal: Optimize nutritional status  2/4/2022 1706 by Morena Tang RN  Outcome: Completed  2/4/2022 0938 by Morena Tang RN  Outcome: Ongoing     Problem: Risk for Fluid Volume Deficit  Goal: Maintain normal heart rhythm  2/4/2022 1706 by Morena Tang RN  Outcome: Completed  2/4/2022 2809 by Sanna Rivera RN  Outcome: Ongoing  Goal: Maintain absence of muscle cramping  2/4/2022 1706 by Sanna Rivera RN  Outcome: Completed  2/4/2022 0938 by Sanna Rivera RN  Outcome: Ongoing  Goal: Maintain normal serum potassium, sodium, calcium, phosphorus, and pH  2/4/2022 1706 by Sanna Rivera RN  Outcome: Completed  2/4/2022 0938 by Sanna Rivera RN  Outcome: Ongoing     Problem: Loneliness or Risk for Loneliness  Goal: Demonstrate positive use of time alone when socialization is not possible  2/4/2022 1706 by Sanna Rivera RN  Outcome: Completed  2/4/2022 0938 by Sanna Rivera RN  Outcome: Ongoing     Problem: Fatigue  Goal: Verbalize increase energy and improved vitality  2/4/2022 1706 by Sanna Rivera RN  Outcome: Completed  2/4/2022 0938 by Sanna Rivera RN  Outcome: Ongoing     Problem: Patient Education: Go to Patient Education Activity  Goal: Patient/Family Education  2/4/2022 1706 by Sanna Rivera RN  Outcome: Completed  2/4/2022 25 [id number] by Sanna Rivera RN  Outcome: Ongoing     Problem: Falls - Risk of:  Goal: Will remain free from falls  Description: Will remain free from falls  2/4/2022 1706 by Sanna Rivera RN  Outcome: Completed  2/4/2022 0938 by Sanna Rivera RN  Outcome: Ongoing  Goal: Absence of physical injury  Description: Absence of physical injury  2/4/2022 1706 by Sanna Rivera RN  Outcome: Completed  2/4/2022 0938 by Sanna Rivera RN  Outcome: Ongoing     Problem: Skin Integrity:  Goal: Will show no infection signs and symptoms  Description: Will show no infection signs and symptoms  2/4/2022 1706 by Sanna Rivera RN  Outcome: Completed  2/4/2022 0938 by Sanna Rivera RN  Outcome: Ongoing  Goal: Absence of new skin breakdown  Description: Absence of new skin breakdown  2/4/2022 1706 by Sanna Rivera RN  Outcome: Completed  2/4/2022 0938 by Sanna Rivera RN  Outcome: Ongoing     Problem: Nutrition  Goal: Optimal nutrition therapy  2/4/2022 1706 by Ramonita Jay RN  Outcome: Completed  2/4/2022 0938 by Ramonita Jay RN  Outcome: Ongoing

## 2022-02-04 NOTE — PROGRESS NOTES
congestion versus patchy pneumonia. Patient did have a minimal cough. She still makes urine but no dysuria. pt denies cough but no appetite x few days - does not use o2 at home but was hypoxic and started on 2 L NC here    W 9  Patient received a dose of vancomycin and cefepime due to concern of line infection, no signs of exit infection        Interval changes  2/4/2022   Patient Vitals for the past 8 hrs:   BP Temp Temp src Pulse Resp SpO2   02/04/22 0900 (!) 144/70   64 14 94 %   02/04/22 0800 (!) 153/81 98.6 °F (37 °C) Oral 69 14 94 %   02/04/22 0700 (!) 135/59   67 11 94 %   02/04/22 0400 (!) 155/80 98.8 °F (37.1 °C) Oral 76 18 93 %   1/30  She has a right tunneled old catheter is not working [id number] with a chest port that is in use  Left IJ Andrea in good condition and new  CRP increased to 8 but the patient feels much better otherwise, she is on 6 L of nasal cannula,  Blood culture still negative,  She has had Actemra 1/29 1/31  Afebrile. CRP trending down, procalcitonin up. SpO2 94% on 3 L NC.  BC, urine cx remain negative to date. Not feeling well today. C/o nausea, myalgias, arthralgias. No vomiting, diarrhea. HD cath sites clean. 2/1  No fever - only fever was at admission   Feels better and no nausea, vomiting, diarrhea and no aches  HD tunneled cath exchanged over wire - IR  2/1    No CRP and BC still neg  WBc 13 and Gall bladder US neg  pend repeat procal for today    2/2  Afebrile. Vanco trough 23.3  CRP improved. SpO2 100 % on 3 L NC. Feeling better. 2/3  Afebrile. SpO2 98% on 3 L NC. Patient was to discharge home yesterday. Discharge held 2/2 her son who would assist with her care is in TTH with Covid. 2/4  Afebrile. SpO2 94% on RA. No SOB. Awaiting discharge.         Summary of relevant labs:Labs:  .4-208.1-118.1 - 48.4  Procalcitonin: 0.88-4.61    Micro:  covid +  BC 1/28   U cx 1/28  Imaging:  CT chest covid pneumonia CXR  Prominent interstitium and bilateral pulmonary opacities, concerning for   edema or pneumonia. I have personally reviewed the past medical history, past surgical history, medications, social history, and family history, and I haveupdated the database accordingly. Allergies:   Patient has no known allergies. Review of Systems:     Review of Systems   Constitutional: Negative for activity change, fatigue and fever. HENT: Negative for congestion. Eyes: Negative for discharge. Respiratory: Negative for apnea, cough and shortness of breath. Cardiovascular: Negative for chest pain. Gastrointestinal: Negative for abdominal distention and nausea. Endocrine: Negative for heat intolerance. Genitourinary: Negative for dysuria. Musculoskeletal: Negative for arthralgias. Skin: Negative for color change. Allergic/Immunologic: Negative for immunocompromised state. Neurological: Negative for dizziness, light-headedness and headaches. Neuropathy to bilateral feet. Hematological: Negative for adenopathy. Psychiatric/Behavioral: Negative for agitation. Physical Examination :       Physical Exam  Vitals and nursing note reviewed. Constitutional:       Appearance: Normal appearance. She is not ill-appearing. HENT:      Head: Normocephalic and atraumatic. Right Ear: External ear normal.      Left Ear: External ear normal.      Nose: Nose normal.      Mouth/Throat:      Mouth: Mucous membranes are moist.      Pharynx: Oropharynx is clear. Eyes:      General: No scleral icterus. Extraocular Movements: Extraocular movements intact. Conjunctiva/sclera: Conjunctivae normal.      Pupils: Pupils are equal, round, and reactive to light. Cardiovascular:      Rate and Rhythm: Normal rate and regular rhythm. Heart sounds: Normal heart sounds. No murmur heard. No friction rub.    Pulmonary:      Effort: Pulmonary effort is normal. No respiratory distress. Breath sounds: No wheezing. Comments: RA  Abdominal:      General: Bowel sounds are normal. There is no distension. Palpations: Abdomen is soft. Tenderness: There is no abdominal tenderness. Genitourinary:     Comments: No blackwell. Musculoskeletal:      Cervical back: Neck supple. No rigidity. Skin:     General: Skin is warm and dry. Capillary Refill: Capillary refill takes less than 2 seconds. Findings: No rash. Neurological:      Mental Status: She is alert and oriented to person, place, and time. Cranial Nerves: No cranial nerve deficit. Sensory: No sensory deficit. Psychiatric:         Mood and Affect: Mood normal.         Behavior: Behavior normal.         Thought Content:  Thought content normal.         Judgment: Judgment normal.         Past Medical History:     Past Medical History:   Diagnosis Date    Anxiety and depression     Arthritis     Rheumatoid     Cancer ([facility]     left breast cancer    Cerebral artery occlusion with cerebral infarction [facility]) jan 2020 & March 2020    poor balance since strokes    Chronic kidney disease, stage 5 (HCC)     hemodialysis Mon-Wed-Fri    Diabetes mellitus ([facility]     GERD (gastroesophageal reflux disease)     Hearing loss     bilateral. Does not have hearing aides    Hemodialysis patient ([facility]     M-W-F started in 2020    History of blood transfusion     50 plus yrs ago with pregnancy    Hyperlipidemia     Hypertension     Migraines     MRSA (methicillin resistant staph aureus) culture positive 2015    back    Neuropathy     PAT (obstructive sleep apnea)     had UPPP  \"have not used machine in years\"    Prolonged emergence from general anesthesia     PVD (peripheral vascular disease) ([facility]     SOB (shortness of breath)     \"long distance\"    Tinnitus        Past Surgical  History:     Past Surgical History:   Procedure Laterality Date    APPENDECTOMY      BACK SURGERY      I and D lower back MRSA  BREAST REDUCTION SURGERY Left     CENTRAL LINE  2022         COLONOSCOPY      HYSTERECTOMY      INSERTABLE CARDIAC MONITOR      IR TUNNELED CATHETER PLACEMENT GREATER THAN 5 YEARS  2022    IR TUNNELED CATHETER PLACEMENT GREATER THAN 5 YEARS 2022 [facility]    MASTECTOMY Left     lymph nodes removed    MASTECTOMY Left 2018    axillary mastectomy    WA EXC SKIN BENIG <5MM TRUNK,ARM,LEG Right 2018    RIGHT AXILLARY MASTECTOMY performed by Leah Marie DO at [address] [place]         Medications:      insulin lispro  0-12 Units SubCUTAneous TID WC    insulin lispro  0-6 Units SubCUTAneous Nightly    cloNIDine  0.1 mg Oral Once    vancomycin  750 mg IntraVENous Q MWF    vancomycin (VANCOCIN) intermittent dosing (placeholder)   Other RX Placeholder    racepinephrine HCl  11.25 mg Nebulization Once    metoprolol  100 mg Oral Daily    hydrALAZINE  50 mg Oral TID    sodium chloride flush  5-40 mL IntraVENous 2 times per day    heparin (porcine)  5,000 Units SubCUTAneous 3 times per day    cefepime  1,000 mg IntraVENous Q24H    dexamethasone  6 mg IntraVENous Q24H    enalapril  5 mg Oral Daily    NIFEdipine  60 mg Oral Daily       Social History:     Social History     Socioeconomic History    Marital status:      Spouse name: Not on file    Number of children: Not on file    Years of education: Not on file    Highest education level: Not on file   Occupational History    Not on file   Tobacco Use    Smoking status: Former Smoker     Quit date:      Years since quittin.1    Smokeless tobacco: Never Used   Vaping Use    Vaping Use: Never used   Substance and Sexual Activity    Alcohol use: Yes     Comment: occasionally  \"couple a month\"    Drug use: No    Sexual activity: Not on file   Other Topics Concern    Not on file   Social History Narrative    Not on file     Social Determinants of Health Financial Resource Strain:     Difficulty of Paying Living Expenses: Not on file   Food Insecurity:     Worried About Running Out of Food in the Last Year: Not on file    Caleb of Food in the Last Year: Not on file   Transportation Needs:     Lack of Transportation (Medical): Not on file    Lack of Transportation (Non-Medical): Not on file   Physical Activity:     Days of Exercise per Week: Not on file    Minutes of Exercise per Session: Not on file   Stress:     Feeling of Stress : Not on file   Social Connections:     Frequency of Communication with Friends and Family: Not on file    Frequency of Social Gatherings with Friends and Family: Not on file    Attends Sabianism Services: Not on file    Active Member of [address] [place] [address] [employer] or Organizations: Not on file    Attends Club or Organization Meetings: Not on file    Marital Status: Not on file   Intimate Partner Violence:     Fear of Current or Ex-Partner: Not on file    Emotionally Abused: Not on file    Physically Abused: Not on file    Sexually Abused: Not on file   Housing Stability:     Unable to Pay for Housing in the Last Year: Not on file    Number of [place] in the Last Year: Not on file    Unstable Housing in the Last Year: Not on file       Family History:   No family history on file. Medical Decision Making:   I have independently reviewed/ordered the following labs:    CBC with Differential:   No results for input(s): WBC, HGB, HCT, PLT, SEGSPCT, BANDSPCT, LYMPHOPCT, MONOPCT, EOSPCT in the last 72 hours. BMP:  Recent Labs     02/03/22  0552 02/04/22  0515   * 136   K 4.4 4.0   CL 95* 98   CO2 21 22   BUN 61* 48*   CREATININE 5.33* 4.95*     Hepatic Function Panel:   No results for input(s): PROT, LABALBU, BILIDIR, IBILI, BILITOT, ALKPHOS, ALT, AST in the last 72 hours. No results for input(s): RPR in the last 72 hours. No results for input(s): HIV in the last 72 hours.   No results for input(s): BC in the last 72 hours. Lab Results   Component Value Date    CREATININE 4.95 02/04/2022    GLUCOSE 113 02/04/2022       Detailed results: Thank you for allowing us to participate in the care of this patient. Please call with questions. This note is created with the assistance of a speech recognition program.  While intending to generate adocument that actually reflects the content of the visit, the document can still have some errors including those of syntax and sound a like substitutions which may escape proof reading. It such instances, actual meaningcan be extrapolated by contextual diversion.     Kenda Cheadle, APRN - CNP  Office: [phone]  Perfect serve / office [phone]

## 2022-02-04 NOTE — PROGRESS NOTES
Renal Progress Note    Patient :  Tierra Pérez; 67 y.o. MRN# [id number]  Location:  2002/2002-01  Attending:  Jozef Su MD  Admit Date:  1/28/2022   Hospital Day: 7      Subjective: Following for ESRD on HD   No new issues reported overnight. Patient on HD with schedule of TTS now and has been confirmed at [facility] as per  notes. Tunnel catheter placed on 02/01/22. 1.6 L removed during dialysis on 02/02/22. Scheduled for dialysis today.     Outpatient Medications:     Medications Prior to Admission: calcitRIOL (ROCALTROL) 0.25 MCG capsule, Take 0.25 mcg by mouth daily  sevelamer (RENVELA) 800 MG tablet, Take 1 tablet by mouth 3 times daily (with meals)  clopidogrel (PLAVIX) 75 MG tablet, Take 75 mg by mouth daily  enalapril (VASOTEC) 5 MG tablet, Take 5 mg by mouth daily  metoprolol (LOPRESSOR) 100 MG tablet, Take 100 mg by mouth daily  NIFEdipine (PROCARDIA XL) 60 MG extended release tablet, Take 60 mg by mouth daily  pantoprazole (PROTONIX) 40 MG tablet, Take 40 mg by mouth daily  cloNIDine (CATAPRES) 0.1 MG/24HR PTWK, Place 1 patch onto the skin once a week Indications: Sundays Sundays  rosuvastatin (CRESTOR) 5 MG tablet, Take 5 mg by mouth daily  citalopram (CELEXA) 20 MG tablet, Take 40 mg by mouth daily Takes 2 tabs (=40mg) daily  [DISCONTINUED] hydrALAZINE (APRESOLINE) 100 MG tablet, Take 100 mg by mouth 3 times daily  ondansetron (ZOFRAN) 4 MG tablet, Take 1 tablet by mouth every 6 hours as needed for Nausea or Vomiting    Current Medications:     Scheduled Meds:    insulin lispro  0-12 Units SubCUTAneous TID WC    insulin lispro  0-6 Units SubCUTAneous Nightly    cloNIDine  0.1 mg Oral Once    vancomycin  750 mg IntraVENous Q MWF    vancomycin (VANCOCIN) intermittent dosing (placeholder)   Other RX Placeholder    racepinephrine HCl  11.25 mg Nebulization Once    metoprolol  100 mg Oral Daily    hydrALAZINE  50 mg Oral TID    sodium chloride flush  5-40 mL IntraVENous 2 times per day    heparin (porcine)  5,000 Units SubCUTAneous 3 times per day    cefepime  1,000 mg IntraVENous Q24H    dexamethasone  6 mg IntraVENous Q24H    enalapril  5 mg Oral Daily    NIFEdipine  60 mg Oral Daily     Continuous Infusions:    dextrose      sodium chloride 25 mL (22 1417)     PRN Meds:  glucose, dextrose, glucagon (rDNA), dextrose, sodium chloride nebulizer, albumin human, heparin (porcine), heparin (porcine), sodium chloride flush, sodium chloride, ondansetron **OR** ondansetron, acetaminophen **OR** acetaminophen, hydrALAZINE    Input/Output:       I/O last 3 completed shifts: In: 600   Out: 1600 . Patient Vitals for the past 96 hrs (Last 3 readings):   Weight   22 1510 155 lb 10.3 oz (70.6 kg)   22 1250 157 lb 13.6 oz (71.6 kg)   22 1314 154 lb 15.7 oz (70.3 kg)       Vital Signs:   Temperature:  Temp: 98.8 °F (37.1 °C)  TMax:   Temp (24hrs), Av.4 °F (36.9 °C), Min:97.6 °F (36.4 °C), Max:98.8 °F (37.1 °C)    Respirations:  Resp: 13  Pulse:   Pulse: 66  BP:    BP: (!) 130/110  BP Range: Systolic (68XEV), FKR:647 , Min:91 , PVE:283       Diastolic (18RVL), DR, Min:56, Max:110      Physical Examination:     General:  AAO x 3, speaking in full sentences, no accessory muscle use. HEENT: Atraumatic, normocephalic. Eyes:   Pupils equal, round and reactive to light, EOMI. Neck:   R subclavian tunneled cath in place  Chest:   Bilateral vesicular breath sounds, no rales or wheezes. Cardiac:  S1 S2 RR, no murmurs, gallops or rubs. Abdomen: Soft, non-tender, no masses or organomegaly, BS audible. :   No suprapubic or flank tenderness. Neuro:  AAO x 3, No FND. SKIN:  No rashes, good skin turgor. Extremities:  Mild bilateral lower extremity edema.     Labs:        BMP:   Recent Labs     22  0411 22  0552 22  0515   * 132* 136   K 4.4 4.4 4.0   CL 96* 95* 98   CO2 19* 21 22   BUN 70* 61* 48*   CREATININE 7.04* 5.33* 4.95*   GLUCOSE 198* 237* 113*   CALCIUM 7.8* 8.1* 8.1*      SPEP:  Lab Results   Component Value Date    PROT 7.3 01/29/2022       Urinalysis/Chemistries:      Lab Results   Component Value Date    NITRU NEGATIVE 01/28/2022    COLORU Yellow 01/28/2022    PHUR 6.5 01/28/2022    WBCUA 2 TO 5 01/28/2022    RBCUA 0 TO 2 01/28/2022    MUCUS 1+ 01/28/2022    TRICHOMONAS NOT REPORTED 01/28/2022    YEAST NOT REPORTED 01/28/2022    BACTERIA FEW 01/28/2022    SPECGRAV 1.016 01/28/2022    LEUKOCYTESUR TRACE 01/28/2022    UROBILINOGEN Normal 01/28/2022    BILIRUBINUR NEGATIVE 01/28/2022    GLUCOSEU NEGATIVE 01/28/2022    KETUA NEGATIVE 01/28/2022    AMORPHOUS NOT REPORTED 01/28/2022       Radiology:     CT Chest (01/29/22)  Impression:        1. Patchy bilateral mixed ground-glass and consolidative opacities with   interlobular septal thickening which can be seen with atypical/viral   pneumonia to include COVID-19.   2. Small right and trace left pleural effusions. 3. Pulmonary fibrosis in the bilateral lung bases. 4. Tiny hiatal hernia. Assessment:     1. ESRD on HD scheduled TTS at [facility] hemodialysis (currently due to COVID-19 infection) facility using tunnel catheter under Dr Peña. Lizbeth Rise weight is unknown. Admitted with 77.1kg. Tunneled catheter clotted and hence was exchanged for Andrea catheter on 1/29/2022 by critical care. Now has tunneled catheter placed on 2/1/2021.  2. COVID-19 pneumonia  3. Secondary hyperparathyroidism  4. Hypertension  5. Anemia of chronic disease     Plan:   1. No acute need for dialysis today. Will get regular dialysis in a.m. as per TTS schedule. 2.   did confirm outpatient dialysis spot on TTS schedule at [facility]. 3.  Okay to discharge from nephrology standpoint. Nutrition   Please ensure that patient is on a renal diet/TF. Avoid nephrotoxic drugs/contrast exposure. We will continue to follow along with you.      Daisy Hatfield, MS4    Attending Physician Statement  I have discussed the care of Silvina Peacock, including pertinent history and exam findings, with the Resident/CNP/medical student. I also saw and examined the patient myself. I have reviewed all the key elements of all parts of the encounter and edited all that was required. Mark Polanco MD   Nephrology [address] [place] [address] Drive    This note is created with the assistance of a speech-recognition program. While intending to generate a document that actually reflects the content of the visit, no guarantees can be provided that every mistake has been identified and corrected by editing.

## 2022-02-04 NOTE — PLAN OF CARE
Problem: Airway Clearance - Ineffective  Goal: Achieve or maintain patent airway  Outcome: Ongoing     Problem: Gas Exchange - Impaired  Goal: Absence of hypoxia  Outcome: Ongoing  Goal: Promote optimal lung function  Outcome: Ongoing     Problem: Breathing Pattern - Ineffective  Goal: Ability to achieve and maintain a regular respiratory rate  Outcome: Ongoing     Problem:  Body Temperature -  Risk of, Imbalanced  Goal: Ability to maintain a body temperature within defined limits  Outcome: Ongoing  Goal: Will regain or maintain usual level of consciousness  Outcome: Ongoing  Goal: Complications related to the disease process, condition or treatment will be avoided or minimized  Outcome: Ongoing     Problem: Isolation Precautions - Risk of Spread of Infection  Goal: Prevent transmission of infection  Outcome: Ongoing     Problem: Nutrition Deficits  Goal: Optimize nutritional status  Outcome: Ongoing     Problem: Risk for Fluid Volume Deficit  Goal: Maintain normal heart rhythm  Outcome: Ongoing  Goal: Maintain absence of muscle cramping  Outcome: Ongoing  Goal: Maintain normal serum potassium, sodium, calcium, phosphorus, and pH  Outcome: Ongoing     Problem: Loneliness or Risk for Loneliness  Goal: Demonstrate positive use of time alone when socialization is not possible  Outcome: Ongoing     Problem: Fatigue  Goal: Verbalize increase energy and improved vitality  Outcome: Ongoing     Problem: Patient Education: Go to Patient Education Activity  Goal: Patient/Family Education  Outcome: Ongoing     Problem: Falls - Risk of:  Goal: Will remain free from falls  Description: Will remain free from falls  Outcome: Ongoing  Goal: Absence of physical injury  Description: Absence of physical injury  Outcome: Ongoing     Problem: Skin Integrity:  Goal: Will show no infection signs and symptoms  Description: Will show no infection signs and symptoms  Outcome: Ongoing  Goal: Absence of new skin breakdown  Description: Absence of new skin breakdown  Outcome: Ongoing     Problem: Nutrition  Goal: Optimal nutrition therapy  2/4/2022 0102 by Ping Acosta RN  Outcome: Ongoing  2/3/2022 1159 by Justin Barragan RD, LD  Outcome: Ongoing  Note: Nutrition Problem #1: Inadequate oral intake  Intervention: Food and/or Nutrient Delivery: Continue Current Diet,Start Oral Nutrition Supplement (Provide Nepro oral supplements x 1 per day.)  Nutritional Goals: Oral intakes to meet at least 75% of estimated nutrition needs.

## 2022-02-04 NOTE — PLAN OF CARE
muscle cramping  2/4/2022 0938 by Keya Kohli RN  Outcome: Ongoing  2/4/2022 0102 by Alley Mccallum RN  Outcome: Ongoing  Goal: Maintain normal serum potassium, sodium, calcium, phosphorus, and pH  2/4/2022 0938 by Keya Kohli RN  Outcome: Ongoing  2/4/2022 0102 by Alley Mccallum RN  Outcome: Ongoing     Problem: Loneliness or Risk for Loneliness  Goal: Demonstrate positive use of time alone when socialization is not possible  2/4/2022 0938 by Keya Kohli RN  Outcome: Ongoing  2/4/2022 0102 by Alley Mccallum RN  Outcome: Ongoing     Problem: Fatigue  Goal: Verbalize increase energy and improved vitality  2/4/2022 0938 by Keya Kohli RN  Outcome: Ongoing  2/4/2022 0102 by Alley Mccallum RN  Outcome: Ongoing     Problem: Patient Education: Go to Patient Education Activity  Goal: Patient/Family Education  2/4/2022 2246 by Keya Kohli RN  Outcome: Ongoing  2/4/2022 0102 by Alley Mccallum RN  Outcome: Ongoing     Problem: Falls - Risk of:  Goal: Will remain free from falls  Description: Will remain free from falls  2/4/2022 0938 by Keya Kohli RN  Outcome: Ongoing  2/4/2022 0102 by Alley Mccallum RN  Outcome: Ongoing  Goal: Absence of physical injury  Description: Absence of physical injury  2/4/2022 0938 by Keya Kohli RN  Outcome: Ongoing  2/4/2022 0102 by Alley Mccallum RN  Outcome: Ongoing     Problem: Skin Integrity:  Goal: Will show no infection signs and symptoms  Description: Will show no infection signs and symptoms  2/4/2022 0938 by Keya Kohli RN  Outcome: Ongoing  2/4/2022 0102 by Alley Mccallum RN  Outcome: Ongoing  Goal: Absence of new skin breakdown  Description: Absence of new skin breakdown  2/4/2022 0938 by Keya Kohli RN  Outcome: Ongoing  2/4/2022 0102 by Alley Mccallum RN  Outcome: Ongoing     Problem: Nutrition  Goal: Optimal nutrition therapy  2/4/2022 0938 by Keya Kohli RN  Outcome: Ongoing  2/4/2022 0102 by Alley Mccallum RN  Outcome: Ongoing

## 2022-02-04 NOTE — PROGRESS NOTES
Occupational Therapy   Occupational Therapy Initial Assessment  Date: 2022   Patient Name: Rosalind Johnson  MRN: [id number]     : 1945    Date of Service: 2022  Chief Complaint   Patient presents with    Hypertension     htn no dialysis x2 treatments, port bad per pt     Discharge Recommendations:  Patient would benefit from continued therapy after discharge     Assessment   Performance deficits / Impairments: Decreased functional mobility ; Decreased safe awareness;Decreased balance;Decreased ADL status; Decreased endurance;Decreased high-level IADLs;Decreased strength  Assessment: Patient demonstrates functional transfers at [place] [address] this date and engagement in LB dressing at [place] [address]. Pt required Min A for balance for personal hygiene this date d/t decreased dynamic standing balance reaching outside CATE. Pt is limited d/t decreased endurance impacting performance and safety with functional tasks. Patient would benefit from continued acute OT services to address functional deficits through skilled intervention of ADL and IADL compensatory training, balance, safety and transfer training, education of EC/WS techs and implementation, use of AE/DME to increase independence, and strengthening activities to improve function for ADLs to promote functional outcomes. Prognosis: Good  Decision Making: Medium Complexity  Patient Education: OT role, OT POC, purpose of evaluation, hand placement for transfers, activity promotion, safety awareness, fall prevention - fair/good return  REQUIRES OT FOLLOW UP: Yes  Activity Tolerance  Activity Tolerance: Patient limited by fatigue  Safety Devices  Safety Devices in place: Yes  Type of devices: Gait belt;Call light within reach; Left in bed;Bed alarm in place (unable to locate RN)  Restraints  Initially in place: No        Patient Diagnosis(es): The primary encounter diagnosis was Admission for dialysis [facility]).  Diagnoses of COVID and Sepsis without acute organ dysfunction, due to unspecified organism [facility]) were also pertinent to this visit. has a past medical history of Anxiety and depression, Arthritis, Cancer ([facility] [address] Cerebral artery occlusion with cerebral infarction ([facility] [address] Chronic kidney disease, stage 5 ([facility] [address] Diabetes mellitus ([facility] [address] GERD (gastroesophageal reflux disease), Hearing loss, Hemodialysis patient ([facility] [address] History of blood transfusion, Hyperlipidemia, Hypertension, Migraines, MRSA (methicillin resistant staph aureus) culture positive, Neuropathy, PAT (obstructive sleep apnea), Prolonged emergence from general anesthesia, PVD (peripheral vascular disease) ([facility] [address] SOB (shortness of breath), and Tinnitus. has a past surgical history that includes Uvulopalatopharygoplasty; Mastectomy (Left); Hysterectomy; Appendectomy; back surgery; Mastectomy (Left, 05/14/2018); pr exc skin benig <5mm trunk,arm,leg (Right, 5/14/2018); Breast reduction surgery (Left, 1999); Colonoscopy; Insertable Cardiac Monitor; central line (1/29/2022); and IR TUNNELED CVC PLACE WO SQ PORT/PUMP > 5 YEARS (2/1/2022). Restrictions  Restrictions/Precautions  Restrictions/Precautions: Up as Tolerated,Isolation,Contact Precautions (Droplet +)  Required Braces or Orthoses?: No    Subjective   General  Patient assessed for rehabilitation services?: Yes  Family / Caregiver Present: No  General Comment  Comments: RN ok'd patient for therapy. Patient cooperative and participatory throughout evaluation.   Patient Currently in Pain: Denies  Vital Signs  Pulse: 65  Resp: 13  BP: 114/67  MAP (mmHg): 83  BP cuff on the R UE upon arrival, moved to    Patient Currently in Pain: Denies  Oxygen Therapy  SpO2: 96 %    Social/Functional History  Social/Functional History  Lives With: Son  Type of Home: Apartment  Home Layout: One level  Home Access: Level entry (lift up the stairs)  Bathroom Shower/Tub: Tub/Shower unit  Bathroom Toilet: Standard  Bathroom Equipment: Shower chair,Toilet raiser  Home Equipment: 4 wheeled walker,Wheelchair-electric (pt reports using rollator in apt, uses electric w/c for community mobility)  ADL Assistance: [address]: Needs assistance  Homemaking Responsibilities: No (son performs)  Ambulation Assistance: Independent  Transfer Assistance: Independent  Active : No  Patient's  Info: son drives  Mode of Transportation: Car  Occupation: On disability  Leisure & Hobbies: watch tv, grandchildren and great grandchildren  Additional Comments: Pt reports that her son is also in the hospital and unable to assist, unsure if anyone else would be able to stay with her and assist. Home information obtained from PT evaluation. Objective   Vision: Within Functional Limits  Hearing: Exceptions to [facility]  Hearing Exceptions: Hard of hearing/hearing concerns          Balance  Sitting Balance: Contact guard assistance (EOB for ~16 minutes)  Standing Balance: Contact guard assistance  Standing Balance  Time: 3-4 minutes  Activity: EOB, brief mgmt tasks  Comment: x1 posterior LOB requiring Min A to maintain balance  ADL  Feeding: Independent  Grooming: Increased time to complete;Modified independent   UE Bathing: Stand by assistance; Increased time to complete  LE Bathing: Contact guard assistance; Increased time to complete  UE Dressing: Stand by assistance; Increased time to complete  LE Dressing: Contact guard assistance; Increased time to complete  Toileting: Increased time to complete;Minimal assistance  Additional Comments: Patient lethargic throughout evaluation and treatment, limiting engagement in ADL tasks. Pt stood and doffed brief standing EOB at [facility], pt engaged in pericare in seated position at SBA to complete front hygiene and stood for back hygiene at [address] d/t posterior lean. Pt donned brief EOB at [facility] for threading and for clothing mgmt over hips. Pt required increased time d/t lethargy and fatigue.   Tone RUE  RUE Tone: Normotonic  Tone LUE  LUE Tone: Normotonic  Coordination  Movements Are Fluid And Coordinated: Yes     Bed mobility  Supine to Sit: Stand by assistance  Sit to Supine: Stand by assistance  Scooting: Stand by assistance  Comment: Increased time and effort to complete  Transfers  Sit to stand: Contact guard assistance  Stand to sit: Stand by assistance  Transfer Comments: VCs for proper hand placement     Cognition  Overall Cognitive Status: WFL        Sensation  Overall Sensation Status: Impaired (reports numbness and tingling from feet to hips)      LUE AROM : WFL  Left Hand AROM: WFL  RUE AROM : WFL  Right Hand AROM: WFL  LUE Strength  Gross LUE Strength: Exceptions to [facility]  L Shoulder Flex: 4-/5  L Elbow Flex: 4-/5  L Elbow Ext: 4-/5  L Hand General: 4-/5  LUE Strength Comment: grossly 4-/5 observed during functional tasks  RUE Strength  Gross RUE Strength: Exceptions to [facility]  R Shoulder Flex: 4-/5  R Elbow Flex: 4-/5  R Elbow Ext: 4-/5  R Hand General: 4-/5  RUE Strength Comment: grossly 4-/5 observed during functional tasks     Plan   Plan  Times per week: 3-4x/wk  Current Treatment Recommendations: Strengthening,Endurance Training,Patient/Caregiver Education & Training,Equipment Evaluation, Education, & procurement,Self-Care / ADL,Home Management Training,Functional Mobility Training,Balance Training,Safety Education & Training    AM-PAC Score        -[facility] Inpatient Daily Activity Raw Score: 20 (02/04/22 1245)  AM-PAC Inpatient ADL T-Scale Score : 42.03 (02/04/22 1245)  ADL Inpatient CMS 0-100% Score: 38.32 (02/04/22 1245)  ADL Inpatient CMS G-Code Modifier : Mert Hard (02/04/22 1245)    Goals  Short term goals  Time Frame for Short term goals: Patient will, by discharge  Short term goal 1: demo UB ADLs independently  Short term goal 2: demo LB ADLs at SBA to engage in ADLs  Short term goal 3: demo functional transfers/mobility using LRD at Supervision to engage in ADLs  Short term goal 4: demo 10+ min of dynamic sitting tolerance at SBA reaching outside CATE in all planes  Short term goal 5: demo 5+ min of dynamic standing tolerance at CGA to engage in ADLs  Short term goal 6: demo 25+ min of functional activity tolerance using EC/WS techs to engage in functional tasks     Therapy Time   Individual Concurrent Group Co-treatment   Time In 1155         Time Out 1221         Minutes 26         Timed Code Treatment Minutes: [address], OTR/L

## 2022-02-08 ENCOUNTER — HOSPITAL ENCOUNTER (OUTPATIENT)
Age: 77
Setting detail: SPECIMEN
Discharge: HOME | End: 2022-02-08

## 2022-02-08 LAB
ABSOLUTE EOS #: 0.24 K/UL (ref 0–0.44)
ABSOLUTE IMMATURE GRANULOCYTE: 0.2 K/UL (ref 0–0.3)
ABSOLUTE LYMPH #: 1.3 K/UL (ref 1.1–3.7)
ABSOLUTE MONO #: 1.2 K/UL (ref 0.1–1.2)
ANION GAP SERPL CALCULATED.3IONS-SCNC: 17 MMOL/L (ref 9–17)
BASOPHILS # BLD: 0 % (ref 0–2)
BASOPHILS ABSOLUTE: 0.04 K/UL (ref 0–0.2)
BUN BLDV-MCNC: 65 MG/DL (ref 8–23)
BUN/CREAT BLD: 9 (ref 9–20)
CALCIUM SERPL-MCNC: 8.8 MG/DL (ref 8.6–10.4)
CHLORIDE BLD-SCNC: 96 MMOL/L (ref 98–107)
CO2: 24 MMOL/L (ref 20–31)
CREAT SERPL-MCNC: 7.38 MG/DL (ref 0.5–0.9)
DIFFERENTIAL TYPE: ABNORMAL
EOSINOPHILS RELATIVE PERCENT: 2 % (ref 1–4)
GFR AFRICAN AMERICAN: 6 ML/MIN
GFR NON-AFRICAN AMERICAN: 5 ML/MIN
GFR SERPL CREATININE-BSD FRML MDRD: ABNORMAL ML/MIN/{1.73_M2}
GFR SERPL CREATININE-BSD FRML MDRD: ABNORMAL ML/MIN/{1.73_M2}
GLUCOSE BLD-MCNC: 200 MG/DL (ref 70–99)
HCT VFR BLD CALC: 28.4 % (ref 36.3–47.1)
HEMOGLOBIN: 8.8 G/DL (ref 11.9–15.1)
IMMATURE GRANULOCYTES: 2 %
LYMPHOCYTES # BLD: 10 % (ref 24–43)
MCH RBC QN AUTO: 28.7 PG (ref 25.2–33.5)
MCHC RBC AUTO-ENTMCNC: 31 G/DL (ref 28.4–34.8)
MCV RBC AUTO: 92.5 FL (ref 82.6–102.9)
MONOCYTES # BLD: 9 % (ref 3–12)
NRBC AUTOMATED: 0 PER 100 WBC
PDW BLD-RTO: 13.8 % (ref 11.8–14.4)
PLATELET # BLD: 185 K/UL (ref 138–453)
PLATELET ESTIMATE: ABNORMAL
PMV BLD AUTO: 12.3 FL (ref 8.1–13.5)
POTASSIUM SERPL-SCNC: 4 MMOL/L (ref 3.7–5.3)
RBC # BLD: 3.07 M/UL (ref 3.95–5.11)
RBC # BLD: ABNORMAL 10*6/UL
SEG NEUTROPHILS: 77 % (ref 36–65)
SEGMENTED NEUTROPHILS ABSOLUTE COUNT: 9.73 K/UL (ref 1.5–8.1)
SODIUM BLD-SCNC: 137 MMOL/L (ref 135–144)
VANCOMYCIN TROUGH DATE LAST DOSE: ABNORMAL
VANCOMYCIN TROUGH DOSE AMOUNT: ABNORMAL
VANCOMYCIN TROUGH TIME LAST DOSE: ABNORMAL
VANCOMYCIN TROUGH: 21 UG/ML (ref 10–20)
WBC # BLD: 12.7 K/UL (ref 3.5–11.3)
WBC # BLD: ABNORMAL 10*3/UL

## 2022-02-08 PROCEDURE — 80048 BASIC METABOLIC PNL TOTAL CA: CPT

## 2022-02-08 PROCEDURE — 36415 COLL VENOUS BLD VENIPUNCTURE: CPT

## 2022-02-08 PROCEDURE — 85025 COMPLETE CBC W/AUTO DIFF WBC: CPT

## 2022-02-08 PROCEDURE — P9603 ONE-WAY ALLOW PRORATED MILES: HCPCS

## 2022-02-08 PROCEDURE — 80202 ASSAY OF VANCOMYCIN: CPT

## 2022-02-10 ENCOUNTER — HOSPITAL ENCOUNTER (OUTPATIENT)
Age: 77
Setting detail: SPECIMEN
Discharge: HOME | End: 2022-02-10

## 2022-02-10 LAB
VANCOMYCIN TROUGH DATE LAST DOSE: ABNORMAL
VANCOMYCIN TROUGH DOSE AMOUNT: ABNORMAL
VANCOMYCIN TROUGH TIME LAST DOSE: ABNORMAL
VANCOMYCIN TROUGH: 22.1 UG/ML (ref 10–20)

## 2022-02-10 PROCEDURE — 36415 COLL VENOUS BLD VENIPUNCTURE: CPT

## 2022-02-10 PROCEDURE — 80202 ASSAY OF VANCOMYCIN: CPT

## 2022-02-10 PROCEDURE — P9603 ONE-WAY ALLOW PRORATED MILES: HCPCS

## 2022-02-17 ENCOUNTER — HOSPITAL ENCOUNTER (OUTPATIENT)
Age: 77
Setting detail: SPECIMEN
Discharge: HOME | End: 2022-02-17

## 2022-02-17 LAB
-: NORMAL
ANION GAP SERPL CALCULATED.3IONS-SCNC: 15 MMOL/L (ref 9–17)
BUN BLDV-MCNC: 28 MG/DL (ref 8–23)
BUN/CREAT BLD: ABNORMAL (ref 9–20)
CALCIUM SERPL-MCNC: 8.5 MG/DL (ref 8.6–10.4)
CHLORIDE BLD-SCNC: 100 MMOL/L (ref 98–107)
CO2: 23 MMOL/L (ref 20–31)
CREAT SERPL-MCNC: 6.07 MG/DL (ref 0.5–0.9)
GFR AFRICAN AMERICAN: 8 ML/MIN
GFR NON-AFRICAN AMERICAN: 7 ML/MIN
GFR SERPL CREATININE-BSD FRML MDRD: ABNORMAL ML/MIN/{1.73_M2}
GFR SERPL CREATININE-BSD FRML MDRD: ABNORMAL ML/MIN/{1.73_M2}
GLUCOSE BLD-MCNC: 102 MG/DL (ref 70–99)
POTASSIUM SERPL-SCNC: 4.7 MMOL/L (ref 3.7–5.3)
REASON FOR REJECTION: NORMAL
SODIUM BLD-SCNC: 138 MMOL/L (ref 135–144)
ZZ NTE CLEAN UP: ORDERED TEST: NORMAL
ZZ NTE WITH NAME CLEAN UP: SPECIMEN SOURCE: NORMAL

## 2022-02-17 PROCEDURE — 36415 COLL VENOUS BLD VENIPUNCTURE: CPT

## 2022-02-17 PROCEDURE — P9603 ONE-WAY ALLOW PRORATED MILES: HCPCS

## 2022-02-17 PROCEDURE — 80048 BASIC METABOLIC PNL TOTAL CA: CPT

## 2022-02-21 ENCOUNTER — HOSPITAL ENCOUNTER (OUTPATIENT)
Age: 77
Setting detail: SPECIMEN
Discharge: HOME | End: 2022-02-21

## 2022-03-18 ENCOUNTER — APPOINTMENT (OUTPATIENT)
Dept: INTERVENTIONAL RADIOLOGY/VASCULAR | Age: 77
End: 2022-03-18
Payer: MEDICARE

## 2022-03-18 ENCOUNTER — HOSPITAL ENCOUNTER (EMERGENCY)
Age: 77
Discharge: HOME | End: 2022-03-18
Attending: EMERGENCY MEDICINE
Payer: MEDICARE

## 2022-03-18 VITALS
WEIGHT: 170 LBS | BODY MASS INDEX: 30.12 KG/M2 | HEART RATE: 77 BPM | OXYGEN SATURATION: 99 % | SYSTOLIC BLOOD PRESSURE: 188 MMHG | RESPIRATION RATE: 17 BRPM | TEMPERATURE: 98.3 F | DIASTOLIC BLOOD PRESSURE: 163 MMHG | HEIGHT: 63 IN

## 2022-03-18 DIAGNOSIS — T82.9XXA COMPLICATION ASSOCIATED WITH DIALYSIS CATHETER: Primary | ICD-10-CM

## 2022-03-18 LAB
ABSOLUTE EOS #: 0.43 K/UL (ref 0–0.44)
ABSOLUTE IMMATURE GRANULOCYTE: 0.05 K/UL (ref 0–0.3)
ABSOLUTE LYMPH #: 1.5 K/UL (ref 1.1–3.7)
ABSOLUTE MONO #: 0.74 K/UL (ref 0.1–1.2)
ANION GAP SERPL CALCULATED.3IONS-SCNC: 12 MMOL/L (ref 9–17)
BASOPHILS # BLD: 1 % (ref 0–2)
BASOPHILS ABSOLUTE: 0.04 K/UL (ref 0–0.2)
BUN BLDV-MCNC: 62 MG/DL (ref 8–23)
BUN/CREAT BLD: 10 (ref 9–20)
CALCIUM SERPL-MCNC: 9.1 MG/DL (ref 8.6–10.4)
CHLORIDE BLD-SCNC: 107 MMOL/L (ref 98–107)
CO2: 23 MMOL/L (ref 20–31)
CREAT SERPL-MCNC: 6.15 MG/DL (ref 0.5–0.9)
EOSINOPHILS RELATIVE PERCENT: 6 % (ref 1–4)
GFR AFRICAN AMERICAN: 8 ML/MIN
GFR NON-AFRICAN AMERICAN: 7 ML/MIN
GFR SERPL CREATININE-BSD FRML MDRD: ABNORMAL ML/MIN/{1.73_M2}
GLUCOSE BLD-MCNC: 98 MG/DL (ref 70–99)
HCT VFR BLD CALC: 29.2 % (ref 36.3–47.1)
HEMOGLOBIN: 9 G/DL (ref 11.9–15.1)
IMMATURE GRANULOCYTES: 1 %
INR BLD: 1.1
LYMPHOCYTES # BLD: 20 % (ref 24–43)
MAGNESIUM: 2 MG/DL (ref 1.6–2.6)
MCH RBC QN AUTO: 29.3 PG (ref 25.2–33.5)
MCHC RBC AUTO-ENTMCNC: 30.8 G/DL (ref 28.4–34.8)
MCV RBC AUTO: 95.1 FL (ref 82.6–102.9)
MONOCYTES # BLD: 10 % (ref 3–12)
NRBC AUTOMATED: 0 PER 100 WBC
PARTIAL THROMBOPLASTIN TIME: 40.8 SEC (ref 23.9–33.8)
PDW BLD-RTO: 15.4 % (ref 11.8–14.4)
PHOSPHORUS: 3.9 MG/DL (ref 2.6–4.5)
PLATELET # BLD: 283 K/UL (ref 138–453)
PMV BLD AUTO: 11.6 FL (ref 8.1–13.5)
POTASSIUM SERPL-SCNC: 4.8 MMOL/L (ref 3.7–5.3)
PROTHROMBIN TIME: 13.8 SEC (ref 11.5–14.2)
RBC # BLD: 3.07 M/UL (ref 3.95–5.11)
RBC # BLD: ABNORMAL 10*6/UL
SEG NEUTROPHILS: 62 % (ref 36–65)
SEGMENTED NEUTROPHILS ABSOLUTE COUNT: 4.79 K/UL (ref 1.5–8.1)
SODIUM BLD-SCNC: 142 MMOL/L (ref 135–144)
WBC # BLD: 7.6 K/UL (ref 3.5–11.3)

## 2022-03-18 PROCEDURE — 6360000002 HC RX W HCPCS: Performed by: EMERGENCY MEDICINE

## 2022-03-18 PROCEDURE — 2580000003 HC RX 258: Performed by: RADIOLOGY

## 2022-03-18 PROCEDURE — 77001 FLUOROGUIDE FOR VEIN DEVICE: CPT

## 2022-03-18 PROCEDURE — 6360000002 HC RX W HCPCS: Performed by: RADIOLOGY

## 2022-03-18 PROCEDURE — 96375 TX/PRO/DX INJ NEW DRUG ADDON: CPT

## 2022-03-18 PROCEDURE — 96365 THER/PROPH/DIAG IV INF INIT: CPT

## 2022-03-18 PROCEDURE — 36581 REPLACE TUNNELED CV CATH: CPT

## 2022-03-18 PROCEDURE — 85610 PROTHROMBIN TIME: CPT

## 2022-03-18 PROCEDURE — 80048 BASIC METABOLIC PNL TOTAL CA: CPT

## 2022-03-18 PROCEDURE — 85730 THROMBOPLASTIN TIME PARTIAL: CPT

## 2022-03-18 PROCEDURE — 36558 INSERT TUNNELED CV CATH: CPT

## 2022-03-18 PROCEDURE — 99284 EMERGENCY DEPT VISIT MOD MDM: CPT

## 2022-03-18 PROCEDURE — 36415 COLL VENOUS BLD VENIPUNCTURE: CPT

## 2022-03-18 PROCEDURE — 83735 ASSAY OF MAGNESIUM: CPT

## 2022-03-18 PROCEDURE — 84100 ASSAY OF PHOSPHORUS: CPT

## 2022-03-18 PROCEDURE — 85025 COMPLETE CBC W/AUTO DIFF WBC: CPT

## 2022-03-18 PROCEDURE — 2709999900 IR TUBE/CATH CHANGE W CONTRAST

## 2022-03-18 RX ORDER — HEPARIN SODIUM (PORCINE) LOCK FLUSH IV SOLN 100 UNIT/ML 100 UNIT/ML
100 SOLUTION INTRAVENOUS PRN
Status: DISCONTINUED | OUTPATIENT
Start: 2022-03-18 | End: 2022-03-18 | Stop reason: HOSPADM

## 2022-03-18 RX ORDER — HYDRALAZINE HYDROCHLORIDE 20 MG/ML
10 INJECTION INTRAMUSCULAR; INTRAVENOUS ONCE
Status: COMPLETED | OUTPATIENT
Start: 2022-03-18 | End: 2022-03-18

## 2022-03-18 RX ORDER — HEPARIN SODIUM 1000 [USP'U]/ML
INJECTION, SOLUTION INTRAVENOUS; SUBCUTANEOUS
Status: COMPLETED | OUTPATIENT
Start: 2022-03-18 | End: 2022-03-18

## 2022-03-18 RX ORDER — SODIUM POLYSTYRENE SULFONATE 15 G/60ML
30 SUSPENSION ORAL; RECTAL ONCE
Status: DISCONTINUED | OUTPATIENT
Start: 2022-03-18 | End: 2022-03-18 | Stop reason: HOSPADM

## 2022-03-18 RX ADMIN — HEPARIN SODIUM 1900 UNITS: 1000 INJECTION, SOLUTION INTRAVENOUS; SUBCUTANEOUS at 14:55

## 2022-03-18 RX ADMIN — CEFAZOLIN 1000 MG: 1 INJECTION, POWDER, FOR SOLUTION INTRAMUSCULAR; INTRAVENOUS at 15:37

## 2022-03-18 RX ADMIN — HYDRALAZINE HYDROCHLORIDE 10 MG: 20 INJECTION INTRAMUSCULAR; INTRAVENOUS at 15:30

## 2022-03-18 ASSESSMENT — ENCOUNTER SYMPTOMS
SHORTNESS OF BREATH: 0
COLOR CHANGE: 0
VOMITING: 0
EYE REDNESS: 0
DIARRHEA: 0
RHINORRHEA: 0
NAUSEA: 0
COUGH: 0
SORE THROAT: 0
EYE DISCHARGE: 0

## 2022-03-18 NOTE — BRIEF OP NOTE
Brief Postoperative Note    Carole Salinas  YOB: 1945  [id number]    Pre-operative Diagnosis: Nonfunctioning dialysis catheter    Post-operative Diagnosis: Same    Procedure:  Tunneled dialysis catheter exchange    Anesthesia: Local    Surgeons/Assistants: Suraj    Estimated Blood Loss: less than 50     Complications: None    Specimens: Was Not Obtained    Electronically signed by Sonia Ascencio MD on 3/18/2022 at 3:02 PM

## 2022-03-18 NOTE — ED PROVIDER NOTES
EMERGENCY DEPARTMENT ENCOUNTER    Pt Name: Carole Salinas  MRN: [id number]  [place] 1945  Date of evaluation: 3/18/22  CHIEF COMPLAINT       Chief Complaint   Patient presents with    Other     Dialysis port clogged, came from dialysis. HISTORY OF PRESENT ILLNESS   This is a 41-year-old female that presents with complaints of a clogged dialysis catheter. Patient states that she went to dialysis today, they were unable to access her port appropriately so she was sent here for further evaluation. Patient was not dialyzed on Wednesday due to transportation issue. Her last dialysis was on Monday. She denies shortness of breath, she has no nausea or vomiting, she denies fevers or chills. She describes her symptoms as moderate. REVIEW OF SYSTEMS     Review of Systems   Constitutional: Negative for chills and fever. HENT: Negative for rhinorrhea and sore throat. Eyes: Negative for discharge, redness and visual disturbance. Respiratory: Negative for cough and shortness of breath. Cardiovascular: Negative for chest pain, palpitations and leg swelling. Gastrointestinal: Negative for diarrhea, nausea and vomiting. Genitourinary: Negative for dysuria and hematuria. Musculoskeletal: Negative for arthralgias, myalgias and neck pain. Skin: Negative for color change and rash. Neurological: Negative for seizures, weakness and headaches. Psychiatric/Behavioral: Negative for hallucinations, self-injury and suicidal ideas.      PASTMEDICAL HISTORY     Past Medical History:   Diagnosis Date    Anxiety and depression     Arthritis     Rheumatoid     Cancer ([facility] [address]     left breast cancer    Cerebral artery occlusion with cerebral infarction [facility]) jan 2020 & March 2020    poor balance since strokes    Chronic kidney disease, stage 5 (HCC)     hemodialysis Mon-Wed-Fri    Diabetes mellitus ([facility] [address]     GERD (gastroesophageal reflux disease)     Hearing loss     bilateral. Does not have hearing aides    Hemodialysis patient [facility])     M-W-F started in 2020    History of blood transfusion     50 plus yrs ago with pregnancy    Hyperlipidemia     Hypertension     Migraines     MRSA (methicillin resistant staph aureus) culture positive 2015    back    Neuropathy     PAT (obstructive sleep apnea)     had UPPP  \"have not used machine in years\"    Prolonged emergence from general anesthesia     PVD (peripheral vascular disease) ([facility] [address]     SOB (shortness of breath)     \"long distance\"    Tinnitus      Past Problem List  Patient Active Problem List   Diagnosis Code    AMS (altered mental status) R41.82    Diabetes ([facility] [address] E11.9    Admission for dialysis ([facility] [address] Z99.2    Hypoglycemia E16.2    Encephalopathy W15.19    Metabolic encephalopathy J79.60    End stage renal failure on dialysis ([address] N18.6, Z99.2    COVID-19 virus infection U07.1    Hypertensive urgency I16.0    Sepsis ([address] A41.9    Acute respiratory failure with hypoxia (HCC) J96.01    Morbid obesity (HCC) E66.01    Hypertension, essential I10    Hypokalemia A73.7    Metabolic acidosis X79.4    Elevated procalcitonin R79.89    Elevated C-reactive protein (CRP) R79.82     SURGICAL HISTORY       Past Surgical History:   Procedure Laterality Date    APPENDECTOMY      BACK SURGERY      I and D lower back MRSA    BREAST REDUCTION SURGERY Left 1999    CENTRAL LINE  1/29/2022         COLONOSCOPY      HYSTERECTOMY      INSERTABLE CARDIAC MONITOR      IR TUNNELED CATHETER PLACEMENT GREATER THAN 5 YEARS  2/1/2022    IR TUNNELED CATHETER PLACEMENT GREATER THAN 5 YEARS 2/1/2022 [facility]    MASTECTOMY Left     lymph nodes removed    MASTECTOMY Left 05/14/2018    axillary mastectomy    MS EXC SKIN BENIG <5MM TRUNK,ARM,LEG Right 5/14/2018    RIGHT AXILLARY MASTECTOMY performed by Mohsen Osullivan DO at [address]       Current Discharge Medication List CONTINUE these medications which have NOT CHANGED    Details   spironolactone (ALDACTONE) 50 MG tablet Take 50 mg by mouth daily      sodium bicarbonate 650 MG tablet Take 650 mg by mouth 2 times daily      sevelamer (RENVELA) 800 MG tablet Take 1 tablet by mouth 3 times daily (with meals)  Qty: 90 tablet, Refills: 3      clopidogrel (PLAVIX) 75 MG tablet Take 75 mg by mouth daily      enalapril (VASOTEC) 5 MG tablet Take 5 mg by mouth daily      metoprolol (LOPRESSOR) 100 MG tablet Take 100 mg by mouth daily      NIFEdipine (PROCARDIA XL) 60 MG extended release tablet Take 60 mg by mouth daily      pantoprazole (PROTONIX) 40 MG tablet Take 40 mg by mouth daily      cloNIDine (CATAPRES) 0.1 MG/24HR PTWK Place 1 patch onto the skin once a week Indications: Sundays Sundays      rosuvastatin (CRESTOR) 5 MG tablet Take 5 mg by mouth daily      citalopram (CELEXA) 20 MG tablet Take 40 mg by mouth daily Takes 2 tabs (=40mg) daily           ALLERGIES     has No Known Allergies. FAMILY HISTORY     has no family status information on file. SOCIAL HISTORY       Social History     Tobacco Use    Smoking status: Former Smoker     Quit date:      Years since quittin.2    Smokeless tobacco: Never Used   Vaping Use    Vaping Use: Never used   Substance Use Topics    Alcohol use: Yes     Comment: occasionally  \"couple a month\"    Drug use: No     PHYSICAL EXAM     INITIAL VITALS: BP (!) 233/109   Pulse 72   Temp 98.3 °F (36.8 °C) (Oral)   Resp 18   Ht 5' 3\" (1.6 m)   Wt 170 lb (77.1 kg)   SpO2 98%   BMI 30.11 kg/m²    Physical Exam  Constitutional:       Appearance: Normal appearance. HENT:      Head: Normocephalic and atraumatic. Eyes:      Extraocular Movements: Extraocular movements intact. Pupils: Pupils are equal, round, and reactive to light. Cardiovascular:      Rate and Rhythm: Normal rate and regular rhythm.    Pulmonary:      Effort: Pulmonary effort is normal.      Breath sounds: Normal breath sounds. Chest:       Abdominal:      General: Abdomen is flat. Palpations: Abdomen is soft. Tenderness: There is no abdominal tenderness. Neurological:      Mental Status: She is alert. MEDICAL DECISION MAKIN-year-old female presents with complaints of nonfunctioning dialysis catheter. Plan is IR consultation and reevaluation. 12:40 PM EDT  Patient's care was discussed with the on-call interventional radiologist, they will evaluate the patient after a few of their cases and after her laboratory studies have come back. 3:33 PM EDT  Dialysis port replaced. Discussed the case with Dr. Balbina Kennedy, he recommends we discharge the patient to her dialysis center for dialysis tomorrow. I called and discussed with them, they are going to call around and see where the patient can be taken care of.    4:03 PM EDT  Discussed with Marylou Stuart, patient set up for [facility] dialysis on Saturday at 8 am.           CRITICAL CARE:       PROCEDURES:    Procedures    DIAGNOSTIC RESULTS   EKG:All EKG's are interpreted by the Emergency Department Physician who either signs or Co-signs this chart in the absence of a cardiologist.        RADIOLOGY:All plain film, CT, MRI, and formal ultrasound images (except ED bedside ultrasound) are read by the radiologist, see reports below, unless otherwisenoted in MDM or here. IR TUBE/CATH CHANGE W CONTRAST   Final Result   Successful placement/exchange to a 14.5 Marion Stefanie by 23 cm palindrome dialysis   catheter. Ready for use. LABS: All lab results were reviewed by myself, and all abnormals are listed below.   Labs Reviewed   CBC WITH AUTO DIFFERENTIAL - Abnormal; Notable for the following components:       Result Value    RBC 3.07 (*)     Hemoglobin 9.0 (*)     Hematocrit 29.2 (*)     RDW 15.4 (*)     Lymphocytes 20 (*)     Eosinophils % 6 (*)     Immature Granulocytes 1 (*)     All other components within normal limits   BASIC METABOLIC PANEL - Abnormal; Notable for the following components:    BUN 62 (*)     CREATININE 6.15 (*)     GFR Non- 7 (*)     GFR  8 (*)     All other components within normal limits   APTT - Abnormal; Notable for the following components:    PTT 40.8 (*)     All other components within normal limits   MAGNESIUM   PHOSPHORUS   PROTIME-INR       EMERGENCY DEPARTMENTCOURSE:         Vitals:    Vitals:    03/18/22 1213 03/18/22 1442 03/18/22 1513   BP: (!) 185/95 (!) 209/101 (!) 233/109   Pulse: 74 70 72   Resp: 18 11 18   Temp: 98.3 °F (36.8 °C)     TempSrc: Oral     SpO2: 99% 100% 98%   Weight: 170 lb (77.1 kg)     Height: 5' 3\" (1.6 m)         The patient was given the following medications while in the emergency department:  Orders Placed This Encounter   Medications    ceFAZolin (ANCEF) 1,000 mg in dextrose 5 % 50 mL IVPB (mini-bag)     Order Specific Question:   Antimicrobial Indications     Answer:   Surgical Prophylaxis    heparin flush 100 UNIT/ML injection 100 Units    heparin (porcine) injection    heparin (porcine) injection    hydrALAZINE (APRESOLINE) injection 10 mg    sodium polystyrene (KAYEXALATE) powder 30 g     CONSULTS:  IP CONSULT TO INTERVENTIONAL RADIOLOGY  IP CONSULT TO NEPHROLOGY    FINAL IMPRESSION      1. Complication associated with dialysis catheter          DISPOSITION/PLAN   DISPOSITION Decision To Discharge 03/18/2022 04:01:10 PM      PATIENT REFERRED TO:  [facility]  [facility]  Go on 3/19/2022  8 am chair time    DISCHARGE MEDICATIONS:  Current Discharge Medication List        The care is provided during an unprecedented national emergency due to the novel coronavirus, COVID 19.   MD Robson Lucio MD  03/18/22 3702

## 2022-04-06 ENCOUNTER — HOSPITAL ENCOUNTER (INPATIENT)
Age: 77
LOS: 1 days | Discharge: HOME HEALTH | DRG: 682 | End: 2022-04-07
Attending: EMERGENCY MEDICINE | Admitting: FAMILY MEDICINE
Payer: MEDICARE

## 2022-04-06 ENCOUNTER — APPOINTMENT (OUTPATIENT)
Dept: GENERAL RADIOLOGY | Age: 77
DRG: 682 | End: 2022-04-06
Payer: MEDICARE

## 2022-04-06 DIAGNOSIS — R06.00 DYSPNEA, UNSPECIFIED TYPE: Primary | ICD-10-CM

## 2022-04-06 PROBLEM — N18.6 ESRD (END STAGE RENAL DISEASE) (HCC): Status: ACTIVE | Noted: 2022-04-06

## 2022-04-06 LAB
ABSOLUTE EOS #: 0.42 K/UL (ref 0–0.44)
ABSOLUTE IMMATURE GRANULOCYTE: 0.1 K/UL (ref 0–0.3)
ABSOLUTE LYMPH #: 1.65 K/UL (ref 1.1–3.7)
ABSOLUTE MONO #: 1.04 K/UL (ref 0.1–1.2)
ALBUMIN SERPL-MCNC: 4.4 G/DL (ref 3.5–5.2)
ALP BLD-CCNC: 139 U/L (ref 35–104)
ALT SERPL-CCNC: 9 U/L (ref 5–33)
ANION GAP SERPL CALCULATED.3IONS-SCNC: 20 MMOL/L (ref 9–17)
AST SERPL-CCNC: 21 U/L
BASOPHILS # BLD: 1 % (ref 0–2)
BASOPHILS ABSOLUTE: 0.05 K/UL (ref 0–0.2)
BILIRUB SERPL-MCNC: 0.42 MG/DL (ref 0.3–1.2)
BUN BLDV-MCNC: 57 MG/DL (ref 8–23)
BUN/CREAT BLD: 8 (ref 9–20)
CALCIUM SERPL-MCNC: 9.8 MG/DL (ref 8.6–10.4)
CHLORIDE BLD-SCNC: 103 MMOL/L (ref 98–107)
CO2: 16 MMOL/L (ref 20–31)
CREAT SERPL-MCNC: 7.56 MG/DL (ref 0.5–0.9)
EOSINOPHILS RELATIVE PERCENT: 4 % (ref 1–4)
GFR AFRICAN AMERICAN: 6 ML/MIN
GFR NON-AFRICAN AMERICAN: 5 ML/MIN
GFR SERPL CREATININE-BSD FRML MDRD: ABNORMAL ML/MIN/{1.73_M2}
GLUCOSE BLD-MCNC: 120 MG/DL (ref 65–105)
GLUCOSE BLD-MCNC: 158 MG/DL (ref 65–105)
GLUCOSE BLD-MCNC: 192 MG/DL (ref 70–99)
HCT VFR BLD CALC: 34.5 % (ref 36.3–47.1)
HEMOGLOBIN: 10.4 G/DL (ref 11.9–15.1)
IMMATURE GRANULOCYTES: 1 %
LYMPHOCYTES # BLD: 17 % (ref 24–43)
MCH RBC QN AUTO: 29.1 PG (ref 25.2–33.5)
MCHC RBC AUTO-ENTMCNC: 30.1 G/DL (ref 28.4–34.8)
MCV RBC AUTO: 96.6 FL (ref 82.6–102.9)
MONOCYTES # BLD: 11 % (ref 3–12)
NRBC AUTOMATED: 0 PER 100 WBC
PDW BLD-RTO: 15.5 % (ref 11.8–14.4)
PLATELET # BLD: 296 K/UL (ref 138–453)
PMV BLD AUTO: 11.9 FL (ref 8.1–13.5)
POTASSIUM SERPL-SCNC: 5 MMOL/L (ref 3.7–5.3)
PRO-BNP: 6179 PG/ML
RBC # BLD: 3.57 M/UL (ref 3.95–5.11)
RBC # BLD: ABNORMAL 10*6/UL
SEG NEUTROPHILS: 66 % (ref 36–65)
SEGMENTED NEUTROPHILS ABSOLUTE COUNT: 6.28 K/UL (ref 1.5–8.1)
SODIUM BLD-SCNC: 139 MMOL/L (ref 135–144)
TOTAL PROTEIN: 8.3 G/DL (ref 6.4–8.3)
TROPONIN, HIGH SENSITIVITY: 96 NG/L (ref 0–14)
TROPONIN, HIGH SENSITIVITY: 97 NG/L (ref 0–14)
WBC # BLD: 9.5 K/UL (ref 3.5–11.3)

## 2022-04-06 PROCEDURE — 2500000003 HC RX 250 WO HCPCS: Performed by: INTERNAL MEDICINE

## 2022-04-06 PROCEDURE — 6370000000 HC RX 637 (ALT 250 FOR IP): Performed by: FAMILY MEDICINE

## 2022-04-06 PROCEDURE — 02HV33Z INSERTION OF INFUSION DEVICE INTO SUPERIOR VENA CAVA, PERCUTANEOUS APPROACH: ICD-10-PCS | Performed by: INTERNAL MEDICINE

## 2022-04-06 PROCEDURE — 1200000000 HC SEMI PRIVATE

## 2022-04-06 PROCEDURE — 82947 ASSAY GLUCOSE BLOOD QUANT: CPT

## 2022-04-06 PROCEDURE — 83880 ASSAY OF NATRIURETIC PEPTIDE: CPT

## 2022-04-06 PROCEDURE — 5A1D70Z PERFORMANCE OF URINARY FILTRATION, INTERMITTENT, LESS THAN 6 HOURS PER DAY: ICD-10-PCS | Performed by: INTERNAL MEDICINE

## 2022-04-06 PROCEDURE — 85025 COMPLETE CBC W/AUTO DIFF WBC: CPT

## 2022-04-06 PROCEDURE — 99282 EMERGENCY DEPT VISIT SF MDM: CPT

## 2022-04-06 PROCEDURE — 80053 COMPREHEN METABOLIC PANEL: CPT

## 2022-04-06 PROCEDURE — 84484 ASSAY OF TROPONIN QUANT: CPT

## 2022-04-06 PROCEDURE — 90935 HEMODIALYSIS ONE EVALUATION: CPT

## 2022-04-06 PROCEDURE — 83036 HEMOGLOBIN GLYCOSYLATED A1C: CPT

## 2022-04-06 PROCEDURE — 71045 X-RAY EXAM CHEST 1 VIEW: CPT

## 2022-04-06 PROCEDURE — 93005 ELECTROCARDIOGRAM TRACING: CPT | Performed by: EMERGENCY MEDICINE

## 2022-04-06 RX ORDER — CITALOPRAM 20 MG/1
40 TABLET ORAL DAILY
Status: DISCONTINUED | OUTPATIENT
Start: 2022-04-06 | End: 2022-04-07 | Stop reason: HOSPADM

## 2022-04-06 RX ORDER — CLONIDINE 0.1 MG/24H
1 PATCH, EXTENDED RELEASE TRANSDERMAL WEEKLY
Status: DISCONTINUED | OUTPATIENT
Start: 2022-04-06 | End: 2022-04-07 | Stop reason: HOSPADM

## 2022-04-06 RX ORDER — SODIUM CITRATE 4 % (5 ML)
1.9 SYRINGE (ML) MISCELLANEOUS PRN
Status: DISCONTINUED | OUTPATIENT
Start: 2022-04-06 | End: 2022-04-07 | Stop reason: HOSPADM

## 2022-04-06 RX ORDER — CLOPIDOGREL BISULFATE 75 MG/1
75 TABLET ORAL DAILY
Status: DISCONTINUED | OUTPATIENT
Start: 2022-04-06 | End: 2022-04-07 | Stop reason: HOSPADM

## 2022-04-06 RX ORDER — DEXTROSE MONOHYDRATE 50 MG/ML
100 INJECTION, SOLUTION INTRAVENOUS PRN
Status: DISCONTINUED | OUTPATIENT
Start: 2022-04-06 | End: 2022-04-07 | Stop reason: HOSPADM

## 2022-04-06 RX ORDER — SODIUM CHLORIDE 0.9 % (FLUSH) 0.9 %
5-40 SYRINGE (ML) INJECTION EVERY 12 HOURS SCHEDULED
Status: DISCONTINUED | OUTPATIENT
Start: 2022-04-06 | End: 2022-04-07 | Stop reason: HOSPADM

## 2022-04-06 RX ORDER — SODIUM CHLORIDE 9 MG/ML
INJECTION, SOLUTION INTRAVENOUS PRN
Status: DISCONTINUED | OUTPATIENT
Start: 2022-04-06 | End: 2022-04-07 | Stop reason: HOSPADM

## 2022-04-06 RX ORDER — DEXTROSE MONOHYDRATE 25 G/50ML
12.5 INJECTION, SOLUTION INTRAVENOUS PRN
Status: DISCONTINUED | OUTPATIENT
Start: 2022-04-06 | End: 2022-04-06

## 2022-04-06 RX ORDER — ACETAMINOPHEN 650 MG/1
650 SUPPOSITORY RECTAL EVERY 6 HOURS PRN
Status: DISCONTINUED | OUTPATIENT
Start: 2022-04-06 | End: 2022-04-07 | Stop reason: HOSPADM

## 2022-04-06 RX ORDER — ONDANSETRON 2 MG/ML
4 INJECTION INTRAMUSCULAR; INTRAVENOUS EVERY 6 HOURS PRN
Status: DISCONTINUED | OUTPATIENT
Start: 2022-04-06 | End: 2022-04-07 | Stop reason: HOSPADM

## 2022-04-06 RX ORDER — ROSUVASTATIN CALCIUM 10 MG/1
5 TABLET, COATED ORAL DAILY
Status: DISCONTINUED | OUTPATIENT
Start: 2022-04-06 | End: 2022-04-07 | Stop reason: HOSPADM

## 2022-04-06 RX ORDER — MAGNESIUM SULFATE 1 G/100ML
1000 INJECTION INTRAVENOUS PRN
Status: DISCONTINUED | OUTPATIENT
Start: 2022-04-06 | End: 2022-04-06

## 2022-04-06 RX ORDER — SODIUM BICARBONATE 650 MG/1
650 TABLET ORAL 2 TIMES DAILY
Status: DISCONTINUED | OUTPATIENT
Start: 2022-04-06 | End: 2022-04-07 | Stop reason: HOSPADM

## 2022-04-06 RX ORDER — NICOTINE POLACRILEX 4 MG
15 LOZENGE BUCCAL PRN
Status: DISCONTINUED | OUTPATIENT
Start: 2022-04-06 | End: 2022-04-06

## 2022-04-06 RX ORDER — ACETAMINOPHEN 325 MG/1
650 TABLET ORAL EVERY 6 HOURS PRN
Status: DISCONTINUED | OUTPATIENT
Start: 2022-04-06 | End: 2022-04-07 | Stop reason: HOSPADM

## 2022-04-06 RX ORDER — NIFEDIPINE 60 MG/1
60 TABLET, EXTENDED RELEASE ORAL DAILY
Status: DISCONTINUED | OUTPATIENT
Start: 2022-04-06 | End: 2022-04-07 | Stop reason: HOSPADM

## 2022-04-06 RX ORDER — ENALAPRIL MALEATE 5 MG/1
5 TABLET ORAL DAILY
Status: DISCONTINUED | OUTPATIENT
Start: 2022-04-06 | End: 2022-04-07 | Stop reason: HOSPADM

## 2022-04-06 RX ORDER — SEVELAMER CARBONATE 800 MG/1
800 TABLET, FILM COATED ORAL
Status: DISCONTINUED | OUTPATIENT
Start: 2022-04-06 | End: 2022-04-07 | Stop reason: HOSPADM

## 2022-04-06 RX ORDER — SODIUM CHLORIDE 0.9 % (FLUSH) 0.9 %
10 SYRINGE (ML) INJECTION PRN
Status: DISCONTINUED | OUTPATIENT
Start: 2022-04-06 | End: 2022-04-07 | Stop reason: HOSPADM

## 2022-04-06 RX ORDER — HEPARIN SODIUM 5000 [USP'U]/ML
5000 INJECTION, SOLUTION INTRAVENOUS; SUBCUTANEOUS EVERY 8 HOURS SCHEDULED
Status: DISCONTINUED | OUTPATIENT
Start: 2022-04-06 | End: 2022-04-07 | Stop reason: HOSPADM

## 2022-04-06 RX ORDER — ONDANSETRON 4 MG/1
4 TABLET, ORALLY DISINTEGRATING ORAL EVERY 8 HOURS PRN
Status: DISCONTINUED | OUTPATIENT
Start: 2022-04-06 | End: 2022-04-07 | Stop reason: HOSPADM

## 2022-04-06 RX ORDER — METOPROLOL TARTRATE 100 MG/1
100 TABLET ORAL DAILY
Status: DISCONTINUED | OUTPATIENT
Start: 2022-04-06 | End: 2022-04-07 | Stop reason: HOSPADM

## 2022-04-06 RX ADMIN — Medication 1.9 ML: at 23:09

## 2022-04-06 RX ADMIN — INSULIN LISPRO 3 UNITS: 100 INJECTION, SOLUTION INTRAVENOUS; SUBCUTANEOUS at 17:34

## 2022-04-06 ASSESSMENT — PAIN SCALES - GENERAL
PAINLEVEL_OUTOF10: 0
PAINLEVEL_OUTOF10: 9

## 2022-04-06 ASSESSMENT — PAIN DESCRIPTION - LOCATION: LOCATION: FOOT

## 2022-04-06 NOTE — ED PROVIDER NOTES
EMERGENCY DEPARTMENT ENCOUNTER    Pt Name: Emory Murillo  MRN: [id number]  [place] 1945  Date of evaluation: 4/9/22  CHIEF COMPLAINT       Chief Complaint   Patient presents with    Fatigue    Shortness of Breath     HISTORY OF PRESENT ILLNESS   Patient is a 54-year-old female with PMH of diabetes, ESRD on dialysis M/W/F, last dialysis was last Friday, missed dialysis 2 days ago because she was feeling unwell who is sent from dialysis center after she reported feeling short of breath. Symptoms started gradually 3 days ago. She did not receive any dialysis today. No other issues at this time. ROS:  No fevers, cough, chest pain, abdominal pain, nausea, vomiting, changes in urine or stool. REVIEW OF SYSTEMS     Review of Systems   All other systems reviewed and are negative.     PASTMEDICAL HISTORY     Past Medical History:   Diagnosis Date    Anxiety and depression     Arthritis     Rheumatoid     Cancer ([facility]     left breast cancer    Cerebral artery occlusion with cerebral infarction [facility]) jan 2020 & March 2020    poor balance since strokes    Chronic kidney disease, stage 5 (HCC)     hemodialysis Mon-Wed-Fri    Diabetes mellitus ([address]     GERD (gastroesophageal reflux disease)     Hearing loss     bilateral. Does not have hearing aides    Hemodialysis patient ([facility] [facility]     M-W-F started in 2020    History of blood transfusion     50 plus yrs ago with pregnancy    Hyperlipidemia     Hypertension     Migraines     MRSA (methicillin resistant staph aureus) culture positive 2015    back    Neuropathy     PAT (obstructive sleep apnea)     had UPPP  \"have not used machine in years\"    Prolonged emergence from general anesthesia     PVD (peripheral vascular disease) ([facility] [facility]     SOB (shortness of breath)     \"long distance\"    Tinnitus      SURGICAL HISTORY       Past Surgical History:   Procedure Laterality Date    APPENDECTOMY      BACK SURGERY      I and D lower back MRSA    BREAST REDUCTION SURGERY Left     CENTRAL LINE  2022         COLONOSCOPY      HYSTERECTOMY      INSERTABLE CARDIAC MONITOR      IR TUNNELED CATHETER PLACEMENT GREATER THAN 5 YEARS  2022    IR TUNNELED CATHETER PLACEMENT GREATER THAN 5 YEARS 2022 [facility]    MASTECTOMY Left     lymph nodes removed    MASTECTOMY Left 2018    axillary mastectomy    OR EXC SKIN BENIG <5MM TRUNK,ARM,LEG Right 2018    RIGHT AXILLARY MASTECTOMY performed by Tree Ansari DO at  [address]       Discharge Medication List as of 2022  5:49 PM      CONTINUE these medications which have NOT CHANGED    Details   sodium bicarbonate 650 MG tablet Take 650 mg by mouth 2 times dailyHistorical Med      sevelamer (RENVELA) 800 MG tablet Take 1 tablet by mouth 3 times daily (with meals), Disp-90 tablet, R-3Normal      clopidogrel (PLAVIX) 75 MG tablet Take 75 mg by mouth dailyHistorical Med      enalapril (VASOTEC) 5 MG tablet Take 5 mg by mouth dailyHistorical Med      metoprolol (LOPRESSOR) 100 MG tablet Take 100 mg by mouth dailyHistorical Med      NIFEdipine (PROCARDIA XL) 60 MG extended release tablet Take 60 mg by mouth dailyHistorical Med      pantoprazole (PROTONIX) 40 MG tablet Take 40 mg by mouth dailyHistorical Med      cloNIDine (CATAPRES) 0.1 MG/24HR PTWK Place 1 patch onto the skin once a week Indications: Sundays SundaysHistorical Med      rosuvastatin (CRESTOR) 5 MG tablet Take 5 mg by mouth dailyHistorical Med      citalopram (CELEXA) 20 MG tablet Take 40 mg by mouth daily Takes 2 tabs (=40mg) dailyHistorical Med           ALLERGIES     has No Known Allergies. FAMILY HISTORY     has no family status information on file.       SOCIAL HISTORY       Social History     Tobacco Use    Smoking status: Former Smoker     Quit date:      Years since quittin.2    Smokeless tobacco: Never Used   Vaping Use    Vaping Use: Never used   Substance Use Topics    Alcohol use: Yes     Comment: occasionally  \"couple a month\"    Drug use: No     PHYSICAL EXAM     INITIAL VITALS: BP (!) 145/79   Pulse 94   Temp 99 °F (37.2 °C) (Oral)   Resp 18   Ht 5' 3\" (1.6 m)   Wt 163 lb 4.8 oz (74.1 kg)   SpO2 97%   BMI 28.93 kg/m²    Physical Exam  Constitutional:       Comments: Lying in bed, looking exhausted, with mild respiratory distress. HENT:      Head: Normocephalic. Right Ear: External ear normal.      Left Ear: External ear normal.      Nose: Nose normal.   Eyes:      Conjunctiva/sclera: Conjunctivae normal.   Cardiovascular:      Rate and Rhythm: Normal rate. Heart sounds: Normal heart sounds. Pulmonary:      Effort: Pulmonary effort is normal. No respiratory distress. Breath sounds: No wheezing or rales. Abdominal:      General: Abdomen is flat. Musculoskeletal:      Right lower leg: No edema. Left lower leg: No edema. Skin:     General: Skin is dry. Neurological:      Mental Status: She is alert. Mental status is at baseline. Psychiatric:         Mood and Affect: Mood normal.         Behavior: Behavior normal.         MEDICAL DECISION MAKING:   The patient is hemodynamically stable, afebrile, in mild respiratory distress. Physical exam unremarkable. Based on history and exam likely mild fluid overload, concern for worsening renal function in the setting of missing dialysis. ED plan for basic labs, likely admission for dialysis. DIAGNOSTIC RESULTS   EKG:All EKG's are interpreted by the Emergency Department Physician who either signs or Co-signs this chart in the absence of a cardiologist.        RADIOLOGY:All plain film, CT, MRI, and formal ultrasound images (except ED bedside ultrasound) are read by the radiologist, see reports below, unless otherwisenoted in MDM or here.   XR CHEST PORTABLE   Final Result   Interval improvement patchy COVID-19 related airspace abnormalities with some residual opacities left mid and both lung bases and probable bibasilar   atelectasis or scarring. Cardiomegaly but no radiographic CHF. Additional   findings, as above. LABS: All lab results were reviewed by myself, and all abnormals are listed below.   Labs Reviewed   CBC WITH AUTO DIFFERENTIAL - Abnormal; Notable for the following components:       Result Value    RBC 3.57 (*)     Hemoglobin 10.4 (*)     Hematocrit 34.5 (*)     RDW 15.5 (*)     Seg Neutrophils 66 (*)     Lymphocytes 17 (*)     Immature Granulocytes 1 (*)     All other components within normal limits   COMPREHENSIVE METABOLIC PANEL W/ REFLEX TO MG FOR LOW K - Abnormal; Notable for the following components:    Glucose 192 (*)     BUN 57 (*)     CREATININE 7.56 (*)     Bun/Cre Ratio 8 (*)     CO2 16 (*)     Anion Gap 20 (*)     Alkaline Phosphatase 139 (*)     GFR Non- 5 (*)     GFR  6 (*)     All other components within normal limits   BRAIN NATRIURETIC PEPTIDE - Abnormal; Notable for the following components:    Pro-BNP 6,179 (*)     All other components within normal limits   TROPONIN - Abnormal; Notable for the following components:    Troponin, High Sensitivity 97 (*)     All other components within normal limits   TROPONIN - Abnormal; Notable for the following components:    Troponin, High Sensitivity 96 (*)     All other components within normal limits   BASIC METABOLIC PANEL W/ REFLEX TO MG FOR LOW K - Abnormal; Notable for the following components:    BUN 25 (*)     CREATININE 4.84 (*)     Bun/Cre Ratio 5 (*)     GFR Non- 9 (*)     GFR  11 (*)     All other components within normal limits   CBC WITH AUTO DIFFERENTIAL - Abnormal; Notable for the following components:    RBC 3.39 (*)     Hemoglobin 9.7 (*)     Hematocrit 31.1 (*)     RDW 15.2 (*)     Lymphocytes 22 (*)     Monocytes 15 (*)     Immature Granulocytes 1 (*)     Absolute Mono # 1.52 (*)     All other components within normal limits   POC GLUCOSE FINGERSTICK - Abnormal; Notable for the following components:    POC Glucose 158 (*)     All other components within normal limits   POC GLUCOSE FINGERSTICK - Abnormal; Notable for the following components:    POC Glucose 120 (*)     All other components within normal limits   POC GLUCOSE FINGERSTICK - Abnormal; Notable for the following components:    POC Glucose 130 (*)     All other components within normal limits   HEMOGLOBIN A1C   PROTIME-INR   POC GLUCOSE FINGERSTICK   POCT GLUCOSE   POCT GLUCOSE   POCT GLUCOSE   POCT GLUCOSE   POCT GLUCOSE   POCT GLUCOSE   POCT GLUCOSE   POCT GLUCOSE   POCT GLUCOSE   POCT GLUCOSE   POCT GLUCOSE       EMERGENCY DEPARTMENTCOURSE:   Patient remained stable in the ED. Labs:  Potassium 5.0   Creatinine 7.56  Troponin 97  BNP 6179  WBC 9.5  We will admit for dyspnea and requiring dialysis.       Vitals:    Vitals:    04/07/22 0422 04/07/22 0630 04/07/22 1152 04/07/22 1758   BP:  139/73 125/64 (!) 145/79   Pulse:  93 85 94   Resp:  16 16 18   Temp:  97.9 °F (36.6 °C) 98 °F (36.7 °C) 99 °F (37.2 °C)   TempSrc:  Oral Oral Oral   SpO2:  97% 97% 97%   Weight: 163 lb 4.8 oz (74.1 kg)      Height:           The patient was given the following medications while in the emergency department:  Orders Placed This Encounter   Medications    DISCONTD: citalopram (CELEXA) tablet 40 mg    DISCONTD: cloNIDine (CATAPRES) 0.1 MG/24HR 1 patch    DISCONTD: clopidogrel (PLAVIX) tablet 75 mg    DISCONTD: enalapril (VASOTEC) tablet 5 mg    DISCONTD: metoprolol (LOPRESSOR) tablet 100 mg    DISCONTD: NIFEdipine (PROCARDIA XL) extended release tablet 60 mg    DISCONTD: rosuvastatin (CRESTOR) tablet 5 mg    DISCONTD: sevelamer (RENVELA) tablet 800 mg    DISCONTD: sodium bicarbonate tablet 650 mg    DISCONTD: sodium chloride flush 0.9 % injection 5-40 mL    DISCONTD: sodium chloride flush 0.9 % injection 10 mL    DISCONTD: 0.9 % sodium chloride infusion  DISCONTD: magnesium sulfate 1000 mg in dextrose 5% 100 mL IVPB    DISCONTD: heparin (porcine) injection 5,000 Units    DISCONTD: ondansetron (ZOFRAN-ODT) disintegrating tablet 4 mg    DISCONTD: ondansetron (ZOFRAN) injection 4 mg    DISCONTD: magnesium hydroxide (MILK OF MAGNESIA) 400 MG/5ML suspension 30 mL    DISCONTD: acetaminophen (TYLENOL) tablet 650 mg    DISCONTD: acetaminophen (TYLENOL) suppository 650 mg    DISCONTD: insulin lispro (HUMALOG) injection vial 0-18 Units    DISCONTD: insulin lispro (HUMALOG) injection vial 0-9 Units    DISCONTD: glucose (GLUTOSE) 40 % oral gel 15 g    DISCONTD: dextrose 50 % IV solution    DISCONTD: glucagon (rDNA) injection 1 mg    DISCONTD: dextrose 5 % solution    DISCONTD: dextrose bolus (hypoglycemia) 10% 125 mL    DISCONTD: dextrose bolus (hypoglycemia) 10% 250 mL    DISCONTD: glucose chewable tablet 4 each    DISCONTD: sodium citrate 4 % injection 1.9 mL    DISCONTD: sodium citrate 4 % injection 1.9 mL     CONSULTS:  IP CONSULT TO INTERNAL MEDICINE  IP CONSULT TO NEPHROLOGY  IP CONSULT TO NEPHROLOGY    FINAL IMPRESSION      1. Dyspnea, unspecified type          DISPOSITION/PLAN   DISPOSITION Admitted 04/06/2022 04:38:13 PM      PATIENT REFERRED TO:  Mansi Carlson MD  [address] [place] [address]  [phone]    In 2 days      Delbert Mcgrath MD  [address] [place] [id number]  [phone]    In 1 week  please review all home medications with Dr Margareth Dao at your next visit     DISCHARGE MEDICATIONS:  Discharge Medication List as of 4/7/2022  5:49 PM        Otilio Gutierrez MD  Attending Emergency Physician                   Scar Albarran MD  04/09/22 [id number]

## 2022-04-06 NOTE — PROGRESS NOTES
The magnesium sliding scale has been automatically discontinued per [facility] approved policy because the patient has decreased renal function (CrCl<30 ml/min). Estimated Creatinine Clearance: 6 mL/min (A) (based on SCr of 7.56 mg/dL [facility])). For patients with decreased renal function (below 30ml/min) needing magnesium supplementation, please order individual bolus doses with appropriate monitoring. Please contact the inpatient pharmacy at [phone] with any concerns. Thank you.     Hossein Velasquez, [facility]  4/6/2022  7:59 PM

## 2022-04-06 NOTE — H&P
History & Physical  Formerly [facility].,    Adult Hospitalist      Name: Eric Walsh  MRN: [id number]     Acct: [de-identified]  Room: [facility]/    Admit Date: 4/6/2022 12:06 PM  PCP: Tiana Myers MD    Primary Problem  Principal Problem:    ESRD (end stage renal disease) ([facility] [address]  Resolved Problems:    * No resolved hospital problems. *        Assesment:   Fluid overload  Problem with dialysis catheter  End-stage renal disease on hemodialysis, missed hemodialysis  Essential hypertension  Hyperlipidemia, mixed  Diabetes mellitus type 2  Anemia of chronic disease  Morbid obesity        Plan:   Admit patient to intermediate floor  We will give aspirin more than 90% centimeter  Check vitals closely  CBC BMP daily  Consulted nephrology, patient likely would benefit from stat dialysis  Essential skilled close monitor blood sugar  Continue Plavix  Continue metoprolol, Procardia, clonidine  Continue Crestor  Continue Renvela  Continue sodium bicarb  Continue to monitor  Further recommendation to follow          Chief Complaint:     Chief Complaint   Patient presents with    Fatigue    Shortness of Breath         History of Present Illness:      Eric Walsh is a 67 y.o.  female who presents with Fatigue and Shortness of Breath  This is a 58-year-old female has been admitted via emergency room, patient came to the ER with a complaint of having problem with her dialysis catheter, patient stated that she went to the dialysis and they were unable to access her port, for this reason patient was sent to the emergency room, patient is not dialysis on Wednesday due to transportation issues so she has missed 2 dialysis, further patient is noted to be fluid overloaded and complaining of shortness of breath, admitted for further management    I have personally reviewed the past medical history, past surgical history, medications, social history, and family history, and summarized in the note.     Review of Systems:     All 10 point system is reviewed and negative otherwise mentioned in HPI. Past Medical History:     Past Medical History:   Diagnosis Date    Anxiety and depression     Arthritis     Rheumatoid     Cancer ([facility] [address]     left breast cancer    Cerebral artery occlusion with cerebral infarction [facility]) jan 2020 & March 2020    poor balance since strokes    Chronic kidney disease, stage 5 (HCC)     hemodialysis Mon-Wed-Fri    Diabetes mellitus ([facility] [facility]     GERD (gastroesophageal reflux disease)     Hearing loss     bilateral. Does not have hearing aides    Hemodialysis patient ([facility] [address]     M-W-F started in 2020    History of blood transfusion     50 plus yrs ago with pregnancy    Hyperlipidemia     Hypertension     Migraines     MRSA (methicillin resistant staph aureus) culture positive 2015    back    Neuropathy     PAT (obstructive sleep apnea)     had UPPP  \"have not used machine in years\"    Prolonged emergence from general anesthesia     [facility]     SOB (shortness of breath)     \"long distance\"    Tinnitus         Past Surgical History:     Past Surgical History:   Procedure Laterality Date    APPENDECTOMY      BACK SURGERY      I and D lower back MRSA    BREAST REDUCTION SURGERY Left 1999    CENTRAL LINE  1/29/2022         COLONOSCOPY      HYSTERECTOMY      INSERTABLE CARDIAC MONITOR      IR TUNNELED CATHETER PLACEMENT GREATER THAN 5 YEARS  2/1/2022    IR TUNNELED CATHETER PLACEMENT GREATER THAN 5 YEARS 2/1/2022 [facility]    MASTECTOMY Left     lymph nodes removed    MASTECTOMY Left 05/14/2018    axillary mastectomy    [place] EXC SKIN Nathaly Lam <5MM TRUNK,ARM,LEG Right 5/14/2018    RIGHT AXILLARY MASTECTOMY performed by Marlon Jesus DO at [address] [place]          Medications Prior to Admission:       Prior to Admission medications    Medication Sig Start Date End Date Taking?  Authorizing Provider   spironolactone (ALDACTONE) 50 MG tablet Take 50 mg by mouth daily    Historical Provider, MD   sodium bicarbonate 650 MG tablet Take 650 mg by mouth 2 times daily    Historical Provider, MD   sevelamer (RENVELA) 800 MG tablet Take 1 tablet by mouth 3 times daily (with meals) 11/10/21   Yan Brown MD   clopidogrel (PLAVIX) 75 MG tablet Take 75 mg by mouth daily    Historical Provider, MD   enalapril (VASOTEC) 5 MG tablet Take 5 mg by mouth daily    Historical Provider, MD   metoprolol (LOPRESSOR) 100 MG tablet Take 100 mg by mouth daily    Historical Provider, MD   NIFEdipine (PROCARDIA XL) 60 MG extended release tablet Take 60 mg by mouth daily    Historical Provider, MD   pantoprazole (PROTONIX) 40 MG tablet Take 40 mg by mouth daily    Historical Provider, MD   cloNIDine (CATAPRES) 0.1 MG/24HR PTWK Place 1 patch onto the skin once a week Indications: Sundays Sundays    Historical Provider, MD   rosuvastatin (CRESTOR) 5 MG tablet Take 5 mg by mouth daily    Historical Provider, MD   citalopram (CELEXA) 20 MG tablet Take 40 mg by mouth daily Takes 2 tabs (=40mg) daily    Historical Provider, MD        Allergies:       Patient has no known allergies. Social History:     Tobacco:    reports that she quit smoking about 32 years ago. She has never used smokeless tobacco.  Alcohol:      reports current alcohol use. Drug Use:  reports no history of drug use. Family History:     History reviewed. No pertinent family history.       Physical Exam:     Vitals:  /70   Pulse 86   Temp 97.3 °F (36.3 °C) (Oral)   Resp 19   Ht 5' 3\" (1.6 m)   Wt 160 lb (72.6 kg)   SpO2 99%   BMI 28.34 kg/m²   Temp (24hrs), Av.3 °F (36.3 °C), Min:97.3 °F (36.3 °C), Max:97.3 °F (36.3 °C)          General appearance - alert, well appearing, and in no acute distress  Mental status - oriented to person, place, and time with normal affect  Head - normocephalic and atraumatic  Eyes - pupils equal and reactive, extraocular eye movements intact, conjunctiva clear  Ears - hearing appears to be intact  Nose - no drainage noted  Mouth - mucous membranes moist  Neck - supple, no carotid bruits, thyroid not palpable  Chest -basal crackles to auscultation, normal effort  Heart - normal rate, regular rhythm, no murmur  Abdomen - soft, nontender, nondistended, bowel sounds present all four quadrants, no masses, hepatomegaly or splenomegaly  Neurological - normal speech, no focal findings or movement disorder noted, cranial nerves II through XII grossly intact  Extremities - peripheral pulses palpable, no pedal edema or calf pain with palpation  Skin - no gross lesions, rashes, or induration noted        Data:     Labs:    Hematology:  Recent Labs     04/06/22  1409   WBC 9.5   RBC 3.57*   HGB 10.4*   HCT 34.5*   MCV 96.6   MCH 29.1   MCHC 30.1   RDW 15.5*      MPV 11.9     Chemistry:  Recent Labs     04/06/22  1409      K 5.0      CO2 16*   GLUCOSE 192*   BUN 57*   CREATININE 7.56*   ANIONGAP 20*   LABGLOM 5*   GFRAA 6*   CALCIUM 9.8   PROBNP 6,179*   TROPHS 97*     Recent Labs     04/06/22  1409   PROT 8.3   LABALBU 4.4   AST 21   ALT 9   ALKPHOS 139*   BILITOT 0.42       Lab Results   Component Value Date    INR 1.1 03/18/2022    INR 1.0 01/28/2022    INR 1.1 11/08/2021    PROTIME 13.8 03/18/2022    PROTIME 10.5 01/28/2022    PROTIME 14.2 11/08/2021       Lab Results   Component Value Date/Time    SPECIAL NOT REPORTED 01/28/2022 10:14 PM     Lab Results   Component Value Date/Time    CULTURE NO GROWTH 01/28/2022 10:14 PM       Lab Results   Component Value Date    FIO2 UNKNOWN 01/28/2022       Radiology:    XR CHEST PORTABLE    Result Date: 4/6/2022  Interval improvement patchy COVID-19 related airspace abnormalities with some residual opacities left mid and both lung bases and probable bibasilar atelectasis or scarring. Cardiomegaly but no radiographic CHF. Additional findings, as above.          All radiological studies reviewed                Code Status:  Prior    Electronically signed by Leighton Monroe MD on 4/6/2022 at 4:38 PM     Copy sent to Dr. Mansi Carlson MD    This note was created with the assistance of a speech-recognition program.  Although the intention is to generate a document that actually reflects the content of the visit, no guarantees can be provided that every mistake has been identified and corrected by editing. Note was updated later by me after  physical examination and  completion of the assessment.

## 2022-04-07 VITALS
TEMPERATURE: 99 F | HEART RATE: 94 BPM | SYSTOLIC BLOOD PRESSURE: 145 MMHG | OXYGEN SATURATION: 97 % | RESPIRATION RATE: 18 BRPM | WEIGHT: 163.3 LBS | HEIGHT: 63 IN | DIASTOLIC BLOOD PRESSURE: 79 MMHG | BODY MASS INDEX: 28.93 KG/M2

## 2022-04-07 LAB
ABSOLUTE EOS #: 0.4 K/UL (ref 0–0.4)
ABSOLUTE IMMATURE GRANULOCYTE: 0.1 K/UL (ref 0–0.3)
ABSOLUTE LYMPH #: 2.22 K/UL (ref 1–4.8)
ABSOLUTE MONO #: 1.52 K/UL (ref 0.2–0.8)
ANION GAP SERPL CALCULATED.3IONS-SCNC: 14 MMOL/L (ref 9–17)
BASOPHILS # BLD: 0 %
BASOPHILS ABSOLUTE: 0 K/UL (ref 0–0.2)
BUN BLDV-MCNC: 25 MG/DL (ref 8–23)
BUN/CREAT BLD: 5 (ref 9–20)
CALCIUM SERPL-MCNC: 9.2 MG/DL (ref 8.6–10.4)
CHLORIDE BLD-SCNC: 101 MMOL/L (ref 98–107)
CO2: 21 MMOL/L (ref 20–31)
CREAT SERPL-MCNC: 4.84 MG/DL (ref 0.5–0.9)
EKG ATRIAL RATE: 86 BPM
EKG P AXIS: 40 DEGREES
EKG P-R INTERVAL: 176 MS
EKG Q-T INTERVAL: 422 MS
EKG QRS DURATION: 76 MS
EKG QTC CALCULATION (BAZETT): 504 MS
EKG R AXIS: -36 DEGREES
EKG T AXIS: -14 DEGREES
EKG VENTRICULAR RATE: 86 BPM
EOSINOPHILS RELATIVE PERCENT: 4 % (ref 1–4)
ESTIMATED AVERAGE GLUCOSE: 111 MG/DL
GFR AFRICAN AMERICAN: 11 ML/MIN
GFR NON-AFRICAN AMERICAN: 9 ML/MIN
GFR SERPL CREATININE-BSD FRML MDRD: ABNORMAL ML/MIN/{1.73_M2}
GLUCOSE BLD-MCNC: 130 MG/DL (ref 65–105)
GLUCOSE BLD-MCNC: 93 MG/DL (ref 65–105)
GLUCOSE BLD-MCNC: 96 MG/DL (ref 70–99)
HBA1C MFR BLD: 5.5 % (ref 4–6)
HCT VFR BLD CALC: 31.1 % (ref 36.3–47.1)
HEMOGLOBIN: 9.7 G/DL (ref 11.9–15.1)
IMMATURE GRANULOCYTES: 1 %
INR BLD: 1
LYMPHOCYTES # BLD: 22 % (ref 24–44)
MCH RBC QN AUTO: 28.6 PG (ref 25.2–33.5)
MCHC RBC AUTO-ENTMCNC: 31.2 G/DL (ref 28.4–34.8)
MCV RBC AUTO: 91.7 FL (ref 82.6–102.9)
MONOCYTES # BLD: 15 % (ref 1–7)
NRBC AUTOMATED: 0 PER 100 WBC
PDW BLD-RTO: 15.2 % (ref 11.8–14.4)
PLATELET # BLD: 188 K/UL (ref 138–453)
PMV BLD AUTO: 10.8 FL (ref 8.1–13.5)
POTASSIUM SERPL-SCNC: 3.8 MMOL/L (ref 3.7–5.3)
PROTHROMBIN TIME: 13.6 SEC (ref 11.5–14.2)
RBC # BLD: 3.39 M/UL (ref 3.95–5.11)
SEG NEUTROPHILS: 58 % (ref 36–66)
SEGMENTED NEUTROPHILS ABSOLUTE COUNT: 5.86 K/UL (ref 1.8–7.7)
SODIUM BLD-SCNC: 136 MMOL/L (ref 135–144)
WBC # BLD: 10.1 K/UL (ref 3.5–11.3)

## 2022-04-07 PROCEDURE — 82947 ASSAY GLUCOSE BLOOD QUANT: CPT

## 2022-04-07 PROCEDURE — 85610 PROTHROMBIN TIME: CPT

## 2022-04-07 PROCEDURE — 97530 THERAPEUTIC ACTIVITIES: CPT

## 2022-04-07 PROCEDURE — 97162 PT EVAL MOD COMPLEX 30 MIN: CPT

## 2022-04-07 PROCEDURE — 36415 COLL VENOUS BLD VENIPUNCTURE: CPT

## 2022-04-07 PROCEDURE — 80048 BASIC METABOLIC PNL TOTAL CA: CPT

## 2022-04-07 PROCEDURE — 97535 SELF CARE MNGMENT TRAINING: CPT

## 2022-04-07 PROCEDURE — 97116 GAIT TRAINING THERAPY: CPT

## 2022-04-07 PROCEDURE — 2580000003 HC RX 258: Performed by: FAMILY MEDICINE

## 2022-04-07 PROCEDURE — 97166 OT EVAL MOD COMPLEX 45 MIN: CPT

## 2022-04-07 PROCEDURE — 85025 COMPLETE CBC W/AUTO DIFF WBC: CPT

## 2022-04-07 PROCEDURE — 6370000000 HC RX 637 (ALT 250 FOR IP): Performed by: FAMILY MEDICINE

## 2022-04-07 PROCEDURE — 93010 ELECTROCARDIOGRAM REPORT: CPT | Performed by: INTERNAL MEDICINE

## 2022-04-07 RX ADMIN — SEVELAMER CARBONATE 800 MG: 800 TABLET, FILM COATED ORAL at 17:18

## 2022-04-07 RX ADMIN — SODIUM CHLORIDE, PRESERVATIVE FREE 10 ML: 5 INJECTION INTRAVENOUS at 09:00

## 2022-04-07 ASSESSMENT — PAIN SCALES - GENERAL: PAINLEVEL_OUTOF10: 0

## 2022-04-07 NOTE — PROGRESS NOTES
Occupational Therapy   Occupational Therapy Initial Assessment  Date: 2022   Patient Name: Kush Dia  MRN: [id number]     : 1945    Date of Service: 2022    Discharge Recommendations:  Patient would benefit from continued therapy after discharge   Due to recent hospitalization and medical condition, pt would benefit from additional therapy at time of discharge to ensure safety. Please refer to the AM-PAC score for current functional status. RN reports patient is medically stable for therapy treatment this date. Chart reviewed prior to treatment and patient is agreeable for therapy. All lines intact and patient positioned comfortably at end of treatment. All patient needs addressed prior to ending therapy session. Assessment   Performance deficits / Impairments: Decreased functional mobility ; Decreased safe awareness;Decreased balance;Decreased cognition;Decreased ADL status; Decreased endurance;Decreased strength;Decreased sensation  Assessment: Skilled OT is indicated to increase overall IND and safety with self-care and functional tasks to return home when appropriate  Prognosis: Good  Decision Making: Medium Complexity  OT Education: OT Role;Transfer Training;Equipment;Plan of Care;Energy Conservation;Home Exercise Program;ADL Adaptive Strategies; Family Education  Patient Education: OT POC, recommendation for continued therapy, 4WW safety/mgmt, safety in function, call light use, benefits of OOB activity  REQUIRES OT FOLLOW UP: Yes  Activity Tolerance  Activity Tolerance: Patient Tolerated treatment well  Safety Devices  Safety Devices in place: Yes  Type of devices: All fall risk precautions in place;Nurse notified;Call light within reach; Chair alarm in place; Left in chair;Patient at risk for falls;Gait belt           Patient Diagnosis(es): The encounter diagnosis was Dyspnea, unspecified type.      has a past medical history of Anxiety and depression, Arthritis, Cancer [facility]), Cerebral artery occlusion with cerebral infarction ([facility] [address] Chronic kidney disease, stage 5 ([facility] [address] Diabetes mellitus ([facility] [address] GERD (gastroesophageal reflux disease), Hearing loss, Hemodialysis patient ([facility] [address] History of blood transfusion, Hyperlipidemia, Hypertension, Migraines, MRSA (methicillin resistant staph aureus) culture positive, Neuropathy, PAT (obstructive sleep apnea), Prolonged emergence from general anesthesia, PVD (peripheral vascular disease) ([facility] [address] SOB (shortness of breath), and Tinnitus. has a past surgical history that includes Uvulopalatopharygoplasty; Mastectomy (Left); Hysterectomy; Appendectomy; back surgery; Mastectomy (Left, 05/14/2018); pr exc skin benig <5mm trunk,arm,leg (Right, 5/14/2018); Breast reduction surgery (Left, 1999); Colonoscopy; Insertable Cardiac Monitor; central line (1/29/2022); and IR TUNNELED CVC PLACE WO SQ PORT/PUMP > 5 YEARS (2/1/2022). Per H&P: Lakshmi Olmedo is a 67 y.o.  female who presents with Fatigue and Shortness of Breath  This is a 76-year-old female has been admitted via emergency room, patient came to the ER with a complaint of having problem with her dialysis catheter, patient stated that she went to the dialysis and they were unable to access her port, for this reason patient was sent to the emergency room, patient is not dialysis on Wednesday due to transportation issues so she has missed 2 dialysis, further patient is noted to be fluid overloaded and complaining of shortness of breath, admitted for further management     I have personally reviewed the past medical history, past surgical history, medications, social history, and family history, and summarized in the note. Restrictions  Restrictions/Precautions  Restrictions/Precautions: General Precautions,Fall Risk  Position Activity Restriction  Other position/activity restrictions:  Up with assist, telemetry, contact isol for MRSA, dialysis port R chest    Subjective   General  Chart Reviewed: Yes  Patient assessed for rehabilitation services?: Yes  Family / Caregiver Present: No  Subjective  Subjective: \"I feel lousy\"  General Comment  Comments: pt agreeable to OT shankar     Social/Functional History  Social/Functional History  Lives With: Son (& 4 y/o granddaughter)  Type of Home: Apartment  Home Layout: One level  Home Access: Level entry (& has lift to enter her floor of her apt)  Bathroom Shower/Tub: Tub/Shower unit  Bathroom Toilet: Standard (Pt has vanity support near toilet she can hold if needed)  Bathroom Equipment: Shower chair  Home Equipment: 4 wheeled [place] Help From: Barbra 69 (Son is supportive, HHA comes twice a week but says she talks on phone more than she helps her & is ready to get \"rid of her\")  ADL Assistance: Independent  Homemaking Assistance: Needs assistance (Son completyes I ADL's)  Homemaking Responsibilities: No  Ambulation Assistance: Independent (uses 6BE)  Transfer Assistance: Independent  Active : No  Patient's  Info: son drives, medical transport to HD  Occupation: Retired  Type of occupation: seamstress  Leisure & Hobbies: dog, TV  Additional Comments: Pt reports 2 falls in past 3 months which she related to dizziness       Objective   Vision: Impaired  Vision Exceptions:  (Pt denies use of glasses but does admit to some blurry vision at times)  Hearing: Exceptions to [facility]  Hearing Exceptions: Hard of hearing/hearing concerns; No hearing aid    Orientation  Overall Orientation Status: Within Functional Limits  Observation/Palpation  Posture: Good  Observation: Pt resting in bed, agreeable to OT shankar    Balance  Sitting Balance: Stand by assistance  Standing Balance: Contact guard assistance  Standing Balance  Time: ~2-3 min  Activity: functional mob    Functional Mobility  Functional - Mobility Device: 4-Wheeled Walker  Activity: Other (bed to door, door to recliner)  Assist Level: Minimal assistance (1x-x2)  Functional Mobility Comments: Pt required Min VCs for 4WW safety/mgmt, pacing, pursed lip breathing, and assist with lines. ADL  Feeding: Modified independent   Grooming: Stand by assistance (seated)  UE Bathing: Stand by assistance  LE Bathing: Minimal assistance  UE Dressing: Minimal assistance (to doff/eda hosp gown)  LE Dressing: Minimal assistance  Toileting: Minimal assistance  Additional Comments: Pt limited d/t dec standing diogo    Tone RUE  RUE Tone: Normotonic  Tone LUE  LUE Tone: Normotonic  Coordination  Movements Are Fluid And Coordinated: Yes     Bed mobility  Supine to Sit: Minimal assistance;2 Person assistance  Sit to Supine: Unable to assess (pt in recliner at end of session)  Scooting: Minimal assistance  Comment: Pt required increased time and effort to complete bed mob tasks. Min VCs for proper bed mob tech, use of bed rails, assist with lines, and use of BUE to scoot to EOB. Pt required inc motivation for OOB activity and education on benefits of sitting in recliner. Transfers  Sit to stand: Minimal assistance;2 Person assistance  Stand to sit: Minimal assistance;2 Person assistance  Transfer Comments: Pt required Mod VC/tactile cues for proper hand placement on stable surface, controlled sit<>stand, 4WW safety/mgmt, pacing, and pursed lip breathing all to inc safety/reduce fall risk       Cognition  Overall Cognitive Status: Exceptions  Arousal/Alertness: Appropriate responses to stimuli  Following Commands: Follows multistep commands with increased time; Follows multistep commands with repitition  Attention Span: Attends with cues to redirect  Memory: Appears intact  Safety Judgement: Decreased awareness of need for assistance;Decreased awareness of need for safety  Problem Solving: Decreased awareness of errors;Assistance required to identify errors made;Assistance required to correct errors made  Insights: Decreased awareness of deficits  Initiation: Requires cues for some  Sequencing: Does not require cues        Sensation  Overall Sensation Status: Impaired (Pt reports paresthesias in her feet all the time, denies for hands)        LUE AROM (degrees)  LUE AROM : WFL  Left Hand AROM (degrees)  Left Hand AROM: WFL  RUE AROM (degrees)  RUE AROM : WFL  Right Hand AROM (degrees)  Right Hand AROM: WFL  LUE Strength  LUE Strength Comment: ~4/5  RUE Strength  RUE Strength Comment: ~4/5                   Plan   Plan  Times per week: 4-5x per week, 1-2x per day  Current Treatment Recommendations: Strengthening,Endurance Training,Patient/Caregiver Education & Training,Self-Care / ADL,Balance Training,Functional Mobility Training,Safety Education & Training,Positioning,Pain Management,Equipment Evaluation, Education, & procurement      AM-PAC Score        AM-PAC Inpatient Daily Activity Raw Score: 19 (04/07/22 1542)  AM-PAC Inpatient ADL T-Scale Score : 40.22 (04/07/22 1542)  ADL Inpatient CMS 0-100% Score: 42.8 (04/07/22 1542)  ADL Inpatient CMS G-Code Modifier : CK (04/07/22 1542)    Goals  Short term goals  Time Frame for Short term goals: by discharge, pt to demo  Short term goal 1: I/MI for bed mob tasks without bed rails  Short term goal 2: I/MI for total body ADLs with AE as needed and Good safety  Short term goal 3: I/MI for ADL transfers and functional mob with AD and Good safety  Short term goal 4: increase BUE strength by 1/2 grade to inc IND with self-care and be IND with HEP  Short term goal 5: Pt/family to be IND with EC/WS, fall prevention tech, pressure relief education, healthy lifestyle education, as well as DME/AE recommendations with use of handouts as needed  Patient Goals   Patient goals : To go home       Therapy Time   Individual Concurrent Group Co-treatment   Time In 1046         Time Out 1129 (+10 chart review)         Minutes 53          tx time: 43 min     Upon writer exit, call light within reach, pt retired to chair.  All lines intact and patient positioned comfortably. All patient needs addressed prior to ending therapy session. Chart reviewed prior to treatment and patient is agreeable for therapy. RN reports patient is medically stable for therapy treatment this date. Co-treatment with PT warranted secondary to decreased safety and independence requiring 2 skilled therapy professionals to address individual discipline's goals. OT addressing preparation for ADL transfer, sitting balance for increased ADL performance, sitting/activity tolerance, functional reaching, environmental safety/scanning, fall prevention, functional mobility for ADL transfers, ability to sequence and follow directions and functional UE strength.       Sylvia Elliott OTR/L

## 2022-04-07 NOTE — PROGRESS NOTES
Dr Maci See in with pt; Dr Maci See spoke on the phone with pt son to clarify which home medications the patient is taking.

## 2022-04-07 NOTE — PROGRESS NOTES
Physical Therapy    Facility/Department: [facility]  Initial Assessment    NAME: Slim Escobedo  :   MRN: [id number]    Date of Service: 2022    Discharge Recommendations:  Patient would benefit from continued therapy after discharge     Pt presented to ED on 22 for fluid overload & problem with dialysis catheter    Pt admitted for further medical management. RN reports patient is medically stable for therapy treatment this date. Chart reviewed prior to treatment and patient is agreeable for therapy. Assessment   Body structures, Functions, Activity limitations: Decreased functional mobility ; Decreased ADL status; Decreased strength;Decreased endurance;Decreased balance  Assessment: Pt with deficits of bed mobility, transfers, ambulation, balance, safety awareness and endurance this session,  & is decline compared to prior level of function. With current deficits, Pt requires continued PT to maximize independence with functional mobility, balance, safety awareness & activity tolerance to improve overall tolerance of I ADL's. Due to recent hospitalization and medical condition, pt would benefit from additional therapy at time of discharge to ensure safety. Please refer to the AM-PAC score for current functional status. Prognosis: Good  Decision Making: Medium Complexity  Exam: ROM, MMT, functional mobility, activity tolerance, Balance, & [address] [facility] [address] [address] AM-PAC 6 Clicks Basic Mobility  Clinical Presentation: evolving  PT Education: Goals;PT Role;Plan of Care; Functional Mobility Training;Transfer Training;Pressure Relief;Energy Conservation;Gait Training;General Safety;Precautions  Patient Education: Ed pt on functional mobility, safety awareness, importance of being up & OOB to regain strength, & prevention of sedentary complications, circulation ex's,  pressure relief & optimal breathing techniques  REQUIRES PT FOLLOW UP: Yes  Activity Tolerance  Activity Tolerance: Patient limited by fatigue;Patient limited by endurance       Patient Diagnosis(es): The encounter diagnosis was Dyspnea, unspecified type. has a past medical history of Anxiety and depression, Arthritis, Cancer ([facility] [address] Cerebral artery occlusion with cerebral infarction ([facility] [address] Chronic kidney disease, stage 5 ([facility] [address] Diabetes mellitus ([facility] [address] GERD (gastroesophageal reflux disease), Hearing loss, Hemodialysis patient ([facility] [address] History of blood transfusion, Hyperlipidemia, Hypertension, Migraines, MRSA (methicillin resistant staph aureus) culture positive, Neuropathy, PAT (obstructive sleep apnea), Prolonged emergence from general anesthesia, PVD (peripheral vascular disease) ([facility] [address] SOB (shortness of breath), and Tinnitus. has a past surgical history that includes Uvulopalatopharygoplasty; Mastectomy (Left); Hysterectomy; Appendectomy; back surgery; Mastectomy (Left, 05/14/2018); pr exc skin benig <5mm trunk,arm,leg (Right, 5/14/2018); Breast reduction surgery (Left, 1999); Colonoscopy; Insertable Cardiac Monitor; central line (1/29/2022); and IR TUNNELED CVC PLACE WO SQ PORT/PUMP > 5 YEARS (2/1/2022). Restrictions  Restrictions/Precautions  Restrictions/Precautions: General Precautions,Fall Risk  Position Activity Restriction  Other position/activity restrictions: Up with assist, telemetry, contact isol for MRSA, dialysis port R chest, LUE IV, ALARMS  Vision/Hearing  Vision: Impaired  Vision Exceptions:  (Pt denies use of glasses but does admit to some blurry vision at times)  Hearing: Exceptions to [facility]  Hearing Exceptions: Hard of hearing/hearing concerns; No hearing aid     Subjective  General  Chart Reviewed: Yes  Patient assessed for rehabilitation services?: Yes  Additional Pertinent Hx: CKD on HD, DM, HTN, CVA x 2  General Comment  Comments: RN okays PT  Subjective  Subjective: Pt agreeable to PT          Orientation  Orientation  Overall Orientation Status: Within Functional Limits  Social/Functional History  Social/Functional History  Lives With: Son (& 6 y/o granddaughter)  Type of Home: Apartment  Home Layout: One level  Home Access: Level entry (& has lift to enter her floor of her apt)  Bathroom Shower/Tub: Tub/Shower unit  Bathroom Toilet: Standard (Pt has vanity support near toilet she can hold if needed)  Bathroom Equipment: Shower chair  Home Equipment: 4 wheeled [place] Help From: Barbra [address] (Son is supportive, HHA comes twice a week but says she talks on phone more than she helps her & is ready to get \"rid of her\")  ADL Assistance: Independent  Homemaking Assistance: Needs assistance (Son completyes I ADL's)  Homemaking Responsibilities: No  Ambulation Assistance: Independent (uses 6XL)  Transfer Assistance: Independent  Active : No  Patient's  Info: son drives, medical transport to HD  Occupation: Retired  Type of occupation: seamstress  Leisure & Hobbies: dog, TV  Additional Comments: Pt reports 2 falls in past 3 months which she related to dizziness  Cognition   Cognition  Overall Cognitive Status: Exceptions  Arousal/Alertness: Appropriate responses to stimuli  Following Commands: Follows multistep commands with increased time; Follows multistep commands with repitition  Attention Span: Attends with cues to redirect  Memory: Appears intact  Safety Judgement: Decreased awareness of need for assistance;Decreased awareness of need for safety  Problem Solving: Decreased awareness of errors;Assistance required to identify errors made;Assistance required to correct errors made  Insights: Decreased awareness of deficits  Initiation: Requires cues for some  Sequencing: Does not require cues    Objective     Observation/Palpation  Posture: Good  Observation: Pt resting in bed, reports feeling fatiqued  Edema: none    AROM RLE (degrees)  RLE AROM: WFL  AROM LLE (degrees)  LLE AROM : WFL  AROM RUE (degrees)  RUE General AROM: See OT assessment  AROM LUE (degrees)  LUE General AROM: See OT assessment  Strength RLE  Comment:  hip  Strength LLE  Comment:  hip  Strength RUE  Comment: See OT assessment  Strength LUE  Comment: See OT assessment  Tone RLE  RLE Tone: Normotonic  Tone LLE  LLE Tone: Normotonic  Motor Control  Gross Motor?: WFL  Sensation  Overall Sensation Status: Impaired (Pt reports paresthesias in her feet all the time, denies for hands)  Bed mobility  Supine to Sit: Minimal assistance;2 Person assistance  Sit to Supine: Unable to assess (pt in recliner at end of session)  Scooting: Minimal assistance  Comment: MIN cues for hand placement on bed rail as needed, pacing & pursed lip breathing tech, and use of BUE's to scoot fully out to EOB as well as awareness/assist with lines to increase safety & inc time/effort to complete.   Transfers  Sit to Stand: Minimal Assistance;2 Person Assistance  Stand to sit: Minimal Assistance;2 Person Assistance  Bed to Chair: Minimal assistance;2 Person Assistance  Stand Pivot Transfers: Minimal Assistance;2 Person Assistance  Lateral Transfers: Minimal Assistance;2 Person Assistance  Comment: MIN VC + tactile assist on correct use of upper body for safe sit/stand + to back all way back to surface with walker until she feels touch behind legs & to ensure she reaches with UB support to arms of chair  Ambulation  Ambulation?: Yes  Ambulation 1  Surface: level tile  Device: Rollator  Assistance: Minimal assistance;2 Person assistance  Quality of Gait: step to pattern, MIN cues to keep walker close at all times & to amb inside base of walker & to slow pace for inc safety  Gait Deviations: Decreased step length;Decreased step height;Decreased head and trunk rotation  Distance: 20ftx2     Balance  Posture: Good  Sitting - Static: Good  Sitting - Dynamic: Good  Standing - Static: Fair;+ (rollator)  Standing - Dynamic: Fair (rollator)  Single Leg Stance R Le (at rollator)  Single Leg Stance L Le (at rollator)  Exercises  Comments: circulation ex's, pursed lip breathing techniques, pressure relief, & ex's to move what moves to maintain strength & joint congruency     Plan   Plan  Times per week: 1-2x/D, 5-6D/week  Current Treatment Recommendations: Strengthening,Balance Training,Functional Mobility Training,Transfer Training,ADL/Self-care Training,Endurance Training,Gait Training,Home Exercise Program,Safety Education & Training,Patient/Caregiver Education & Training  Safety Devices  Type of devices: Call light within reach,Chair alarm in place,Gait belt,Patient at risk for falls,Left in chair,Nurse notified    G-Code       OutComes Score                                                  AM-PAC Score  AM-PAC Inpatient Mobility Raw Score : 17 (04/07/22 1120)  AM-PAC Inpatient T-Scale Score : 42.13 (04/07/22 1120)  Mobility Inpatient CMS 0-100% Score: 50.57 (04/07/22 1120)  Mobility Inpatient CMS G-Code Modifier : CK (04/07/22 1120)          Goals  Short term goals  Time Frame for Short term goals: 12 visits  Short term goal 1: Inc bed-mobility & transfers to independent to enable pt to safely get in/OOB & chair  Short term goal 2: Inc gait to amb 200ft or > indep w/ Rollator to enable pt to return to previous level of independence & able to demonstrate indep/ safe use of device in functional activities including bending/ reaching, approaching counters and turning to sit. Short term goal 3:  Inc strength to [address] standing balance to good with device to facilitate pt independence for performance of ADL's & functional mobility, & reduce fall risk  Short term goal 4: Pt able to tolerate 30-40 min of activity to include 15-20 reps of ex, NMR & functional mobility with device to facilitate activity tolerance to [facility]  Short term goal 5: Ed pt on home ex's, safety & energy principles, fall prevention, & issue written home program       Therapy Time   Individual Concurrent Group Co-treatment   Time In 1032         Time Out 1120         Minutes 48              Additional 10 minutes for chart review      Treatment time: 43 minutes      [address], PT

## 2022-04-07 NOTE — CARE COORDINATION
Case Management Initial Discharge Plan  Emi Thorne,         Readmission Risk              Risk of Unplanned Readmission:  25             Met with:patient to discuss discharge plans. Information verified: address, contacts, phone number, , insurance Yes  PCP: Marty Wu MD  Date of last visit: few months ago    Insurance Provider: [employer]    Discharge Planning  Current Residence:     Living Arrangements:  Family Members   Home has 2 stories/lives on first floor lift to home stairs to climb  Support Systems:  Children,[employer],Friends/Neighbors  Current Services PTA:    Supplier: home PT unsure of name of agency  Patient able to perform ADL's:Assisted  DME used to aid ambulation prior to admission: walker/wheelchair/during admissionwalker    Potential Assistance Needed:  N/A    Pharmacy: [facility]   Potential Assistance Purchasing Medications:  No  Does patient want to participate in local refill/ meds to beds program?  No    Patient agreeable to home care: Yes  Freedom of choice provided:  yes      Type of Home Care Services:  None  Patient expects to be discharged to:       Prior SNF/Rehab Placement and Facility: yes, [place] to SNF/Rehab: No  [place] of choice provided: n/a   Evaluation: n/a    Expected Discharge date:  22  Follow Up Appointment: Best Day/ Time: Thursday PM    Transportation provider: Family  Transportation arrangements needed for discharge: No    Discharge Plan: Met with patient. She lives with son in 2 level home. She has a rollater, shower bench, cane, wheelchair. She receives dialysis at [facility] Her plan is to return home with son. She states that she receives home therapy, but is unsure of agency. Unable to reach son to find out name of agency.  [address]        Electronically signed by ОЛЬГА Morley on 22 at 2:35 PM EDT

## 2022-04-07 NOTE — PROGRESS NOTES
Dialysis Safety Checks:    Patient ID Verified (Y/N) Y     -Hepatitis Test                   Date      Result  Hepatitis B Surface Antigen   3/28/22 Negative     Hepatitis B Surface Antibody 3/28/22 Negative     Hepatitis B Core Antibody  3/28/22 Negative     -Treatment Initiation  Blood Vol Processed Goal (Liters)  Pump Speed x Treatment Hours x 60 Minutes    Target Fluid Removal 1500 ml     * Intra-treatment documented Safety Checks include the followin) Access and face visible at all times. 2) All connections and blood lines are secure with no kinks. 3) NVL alarm engaged. 4) [place] device applied (if applicable). 5) No collapse of Arterial or Venous blood chambers. 6) All blood lines / pump segments in the air detectors.   --------------------------------------------------------------------------------  Report received from Dipak Robins

## 2022-04-07 NOTE — CONSULTS
Nephrology Consult Note    Reason for Consult: End-stage kidney disease for fluid and electrolyte management  Requesting Physician: Dr. Jeanette Turner    Chief Complaint: Altered mental status  History Obtained From:  patient    History of Present Illness: This is a 67 y.o. female who presents with fatigue, shortness of breath and altered mental status at her presentation to the dialysis unit. The patient is known to have end-stage kidney disease who was originally on peritoneal dialysis until later she developed Klebsiella peritonitis and she was switched to hemodialysis Mondays Wednesdays and Fridays at [facility] via a tunneled catheter. The patient was getting ready to go to her hemodialysis treatment on Monday and then she felt extremely weak so she could not go, when she presented to hemodialysis treatment on Wednesday, according to the patient she did not look well with some confusion and shortness of breath so she was transferred to our hospital.  The patient received hemodialysis treatment yesterday, her blood pressure is a stable, she is afebrile, the patient is telling me that she is feeling dizzy now but she just had physical therapy.   The patient denies any nausea vomiting or diarrhea, no fever or chills, no chest pain    Past Medical History:        Diagnosis Date    Anxiety and depression     Arthritis     Rheumatoid     Cancer ([facility]     left breast cancer    Cerebral artery occlusion with cerebral infarction [facility]) jan 2020 & March 2020    poor balance since strokes    Chronic kidney disease, stage 5 (HCC)     hemodialysis Mon-Wed-Fri    Diabetes mellitus ([facility] [address]     GERD (gastroesophageal reflux disease)     Hearing loss     bilateral. Does not have hearing aides    [facility]     M-W-F started in 2020    History of blood transfusion     50 plus yrs ago with pregnancy    Hyperlipidemia     Hypertension     Migraines     MRSA (methicillin resistant staph aureus) culture positive 2015    back    Neuropathy     PAT (obstructive sleep apnea)     had UPPP  \"have not used machine in years\"    Prolonged emergence from general anesthesia     PVD (peripheral vascular disease) ([address]     SOB (shortness of breath)     \"long distance\"    Tinnitus        Past Surgical History:        Procedure Laterality Date    APPENDECTOMY      BACK SURGERY      I and D lower back MRSA    BREAST REDUCTION SURGERY Left 1999    CENTRAL LINE  1/29/2022         COLONOSCOPY      HYSTERECTOMY      INSERTABLE CARDIAC MONITOR      IR TUNNELED CATHETER PLACEMENT GREATER THAN 5 YEARS  2/1/2022    IR TUNNELED CATHETER PLACEMENT GREATER THAN 5 YEARS 2/1/2022 [facility]    MASTECTOMY Left     lymph nodes removed    MASTECTOMY Left 05/14/2018    axillary mastectomy    OK EXC SKIN BENIG <5MM TRUNK,ARM,LEG Right 5/14/2018    RIGHT AXILLARY MASTECTOMY performed by Dea Hendricks DO at [address] [place] [address]         Current Medications:    citalopram (CELEXA) tablet 40 mg, Daily  cloNIDine (CATAPRES) 0.1 MG/24HR 1 patch, Weekly  clopidogrel (PLAVIX) tablet 75 mg, Daily  enalapril (VASOTEC) tablet 5 mg, Daily  metoprolol (LOPRESSOR) tablet 100 mg, Daily  NIFEdipine (PROCARDIA XL) extended release tablet 60 mg, Daily  rosuvastatin (CRESTOR) tablet 5 mg, Daily  sevelamer (RENVELA) tablet 800 mg, TID WC  sodium bicarbonate tablet 650 mg, BID  sodium chloride flush 0.9 % injection 5-40 mL, 2 times per day  sodium chloride flush 0.9 % injection 10 mL, PRN  0.9 % sodium chloride infusion, PRN  heparin (porcine) injection 5,000 Units, 3 times per day  ondansetron (ZOFRAN-ODT) disintegrating tablet 4 mg, Q8H PRN   Or  ondansetron (ZOFRAN) injection 4 mg, Q6H PRN  magnesium hydroxide (MILK OF MAGNESIA) 400 MG/5ML suspension 30 mL, Daily PRN  acetaminophen (TYLENOL) tablet 650 mg, Q6H PRN   Or  acetaminophen (TYLENOL) suppository 650 mg, Q6H PRN  insulin lispro (HUMALOG) injection vial 0-18 Units, TID WC  insulin lispro (HUMALOG) injection vial 0-9 Units, Nightly  glucagon (rDNA) injection 1 mg, PRN  dextrose 5 % solution, PRN  dextrose bolus (hypoglycemia) 10% 125 mL, PRN   Or  dextrose bolus (hypoglycemia) 10% 250 mL, PRN  glucose chewable tablet 4 each, PRN  sodium citrate 4 % injection 1.9 mL, PRN  sodium citrate 4 % injection 1.9 mL, PRN        Allergies:  Patient has no known allergies. Social History:   Social History     Socioeconomic History    Marital status:      Spouse name: Not on file    Number of children: Not on file    Years of education: Not on file    Highest education level: Not on file   Occupational History    Not on file   Tobacco Use    Smoking status: Former Smoker     Quit date:      Years since quittin.2    Smokeless tobacco: Never Used   Vaping Use    Vaping Use: Never used   Substance and Sexual Activity    Alcohol use: Yes     Comment: occasionally  \"couple a month\"    Drug use: No    Sexual activity: Not on file   Other Topics Concern    Not on file   Social History Narrative    Not on file     Social Determinants of Health     Financial Resource Strain:     Difficulty of Paying Living Expenses: Not on file   Food Insecurity:     Worried About [address] [employer] in the Last Year: Not on file    [address] in the Last Year: Not on file   Transportation Needs:     Lack of Transportation (Medical): Not on file    Lack of Transportation (Non-Medical):  Not on file   Physical Activity:     Days of Exercise per Week: Not on file    Minutes of Exercise per Session: Not on file   Stress:     Feeling of Stress : Not on file   Social Connections:     Frequency of Communication with Friends and Family: Not on file    Frequency of Social Gatherings with Friends and Family: Not on file    Attends Worship Services: Not on file    Active Member of Clubs or Organizations: Not on file    Attends Club or Organization Meetings: Not on file    Marital Status: Not on file   Intimate Partner Violence:     Fear of Current or Ex-Partner: Not on file    Emotionally Abused: Not on file    Physically Abused: Not on file    Sexually Abused: Not on file   Housing Stability:     Unable to Pay for Housing in the Last Year: Not on file    Number of [place] in the Last Year: Not on file    Unstable Housing in the Last Year: Not on file       Family History:   History reviewed. No pertinent family history. Review of Systems:    Pertinent positives stated above in HPI. All other systems were reviewed and were negative.     Objective:  CURRENT TEMPERATURE:  Temp: 97.9 °F (36.6 °C)  MAXIMUM TEMPERATURE OVER 24HRS:  Temp (24hrs), Av °F (36.7 °C), Min:97.3 °F (36.3 °C), Max:98.9 °F (37.2 °C)    CURRENT RESPIRATORY RATE:  Resp: 16  CURRENT PULSE:  Pulse: 93  CURRENT BLOOD PRESSURE:  BP: 139/73  24HR BLOOD PRESSURE RANGE:  Systolic (06PHN), CZK:068 , Min:118 , KWC:384   ; Diastolic (41NZN), HKH:06, Min:61, Max:82    24HR INTAKE/OUTPUT:  No intake or output data in the 24 hours ending 22 0839    Physical Exam:  Awake, alert, in no acute distress  Skin: warm and dry, no rash or erythema  Eyes: conjunctivae normal and sclera anicteric  ENT: no thrush no pharyngeal congestion  orodental hygiene good  Neck: {GENERAL NECK OKWS:66744[de-identified] carotids without bruits bilaterally JVD: None Lymphadenopathty: None  Pulmonary: clear to auscultation bilaterally- no wheezes, rales or rhonchi, normal air movement, no respiratory distress  Cardiovascular: normal rate and normal S1 and S2  Abdomen: soft nontender, bowel sounds present, no organomegaly,  no ascites  Extremities: no cyanosis, clubbing or edema    CBC:  Recent Labs     22  1409 22  0650   WBC 9.5 10.1   RBC 3.57* 3.39*   HGB 10.4* 9.7*   HCT 34.5* 31.1*   MCV 96.6 91.7   MCH 29.1 28.6   MCHC 30.1 31.2   RDW 15.5* 15.2*    188   MPV 11.9 10.8     Chemistry:  Recent Labs 04/06/22  1409 04/07/22  0650    136   K 5.0 3.8    101   CO2 16* 21   LABALBU 4.4  --    GLUCOSE 192* 96   BUN 57* 25*   CREATININE 7.56* 4.84*   ANIONGAP 20* 14   LABGLOM 5* 9*   GFRAA 6* 11*   CALCIUM 9.8 9.2     Recent Labs     04/06/22  1409   PROT 8.3   LABALBU 4.4   AST 21   ALT 9   ALKPHOS 139*   BILITOT 0.42     FERRITIN:    Lab Results   Component Value Date    FERRITIN 724 01/31/2022     Phosphorus:    Lab Results   Component Value Date    PHOS 3.9 03/18/2022     Magnesium:   Lab Results   Component Value Date    MG 2.0 03/18/2022     Albumin:   Lab Results   Component Value Date    LABALBU 4.4 04/06/2022       Radiology:  XR CHEST PORTABLE    Result Date: 4/6/2022  EXAMINATION: ONE XRAY VIEW OF THE CHEST 4/6/2022 2:20 pm COMPARISON: AP chest from 01/29/2022 HISTORY: ORDERING SYSTEM PROVIDED HISTORY: sob TECHNOLOGIST PROVIDED HISTORY: sob Reason for Exam: Fatigue, SOB. AP UPRIGHT PORTABLE Previous COVID-19 affection. FINDINGS: Right IJ Port-A-Cath and PermCath, both in satisfactory and/or unchanged position. Cine loop recorder. Overlying ECG monitor leads and necklace. Clips left axilla. Enlarged but stable appearing cardiac silhouette. Mediastinal structures midline unchanged. Interval clearing scattered infiltrates with some hazy opacity left lateral and lower lung and minimal opacity right costophrenic sulcus with some basilar atelectasis and perhaps trace pleural effusions. No pneumothorax. Bones stable. Interval improvement patchy COVID-19 related airspace abnormalities with some residual opacities left mid and both lung bases and probable bibasilar atelectasis or scarring. Cardiomegaly but no radiographic CHF. Additional findings, as above. Assessment:    1.  ESKD secondary to diabetic nephropathy, she was on APD switched to HD on Mondays Wednesdays and Fridays via a tunneled catheter at [facility] dialysis unit, the patient missed her treatment on Monday and she presented with volume overload and altered mental status on Wednesday  2. History of CAPD associated peritonitis s/p removal of PD catheter/ klebsiella   3. DM2  4. HTN  5. H/O CVA  6. S/p left radical mastectomy         Plan:  1. Next hemodialysis per schedule tomorrow  2. No objection for discharge    Thank you for the consultation. Please do not hesitate to call with questions.     Electronically signed by Mert Schaefer MD on 4/7/2022 at 8:39 AM

## 2022-04-07 NOTE — FLOWSHEET NOTE
04/06/22 2318   Vital Signs   BP (!) 149/82   Pulse 91   Resp 18   Post-Hemodialysis Assessment   Post-Treatment Procedures Blood returned;Catheter capped, clamped with Citrate x 2 ports   Machine Disinfection Process Acid/Vinegar Clean;Heat Disinfect; Exterior Machine Disinfection   Total Liters Processed (l/min) 61.5 l/min   Dialyzer Clearance Moderately streaked   Duration of Treatment (minutes) 150 minutes   NET Removed (ml) 1500 ml   Tolerated Treatment Good   Patient Response to Treatment Patient completed HD treatment without complication, HD CVC maintained good blood flow the entire duration of treatment, vitals stable, HD CVC sodium citrate locked post treatment.     Bilateral Breath Sounds Clear

## 2022-04-07 NOTE — PLAN OF CARE
Problem: Falls - Risk of:  Goal: Will remain free from falls  Description: Will remain free from falls  Outcome: Ongoing  Note: Patient will be free from falls this shift and is aware of personal limits. Goal: Absence of physical injury  Description: Absence of physical injury  Outcome: Ongoing  Note: Patient is free from injury this shift.
specified parameters  Description: Urine creatinine clearance will be within specified parameters  Outcome: Ongoing

## 2022-04-07 NOTE — PROGRESS NOTES
Patient went to Dialysis this shift had 1.5 liters taken off during procedure and was able to tolerate a full session.

## 2022-04-07 NOTE — PROGRESS NOTES
Patient arrived to the unit from ER with family at bed side. Patient has not had dialysis for 2 sessions today being one for increased SOB. Patient is to have Dialysis during this stay due to need. Patient is alert and oriented given call light for needs and to activate TV.

## 2022-04-07 NOTE — PROGRESS NOTES
Pt discharged, son present to drive and was updated that the pt needs to take all scheduled medication once home tonight. All home medication had to be verified with Dr David Bennett and Dr Melinda Shaikh, once verified the pt wanted to take medication once she arrived home.

## 2022-04-15 NOTE — PROGRESS NOTES
Physician Progress Note      PATIENT:               Gwendolyn John  CSN #:                  [id number]  :                       1945  ADMIT DATE:       2022 12:06 PM  [address] DATE:        2022 7:29 PM  RESPONDING  PROVIDER #:        Pilar Cruz MD          QUERY TEXT:    Patient admitted with ESRD and missed HD. Noted documentation of complication   of hemodialysis catheter in H&P   . In order to support the diagnosis of   complication of catheter, please include additional clinical indicators in   your documentation. Or please document if the diagnosis of catheter   complication was ruled out after further study. The medical record reflects the following:  Risk Factors: ESRD missed sessions  Clinical Indicators:  ED notes: ESRD on dialysis M/W/F, last dialysis was   last Friday, missed dialysis 2 days ago because she was feeling unwell, sent   from dialysis center after she reported feeling short of breath.  patient   stated that she went to the dialysis and they were unable to access her port,   for this reason patient was sent to the emergency room  Treatment: patient has successful HD treatment in hospital and was DC to home. Options provided:  -- complication of hemodialysis catheter present as evidenced by, Please   document evidence. -- complication of hemodialysis catheter was ruled out  -- Other - I will add my own diagnosis  -- Disagree - Not applicable / Not valid  -- Disagree - Clinically unable to determine / Unknown  -- Refer to Clinical Documentation Reviewer    PROVIDER RESPONSE TEXT:    complication of hemodialysis catheter was ruled out after study.     Query created by: Jessica Courtney on 2022 1:15 PM      Electronically signed by:  Pilar Cruz MD 4/15/2022 12:59 PM

## 2022-04-20 ENCOUNTER — HOSPITAL ENCOUNTER (EMERGENCY)
Age: 77
Discharge: HOME | End: 2022-04-20
Attending: EMERGENCY MEDICINE
Payer: MEDICARE

## 2022-04-20 ENCOUNTER — APPOINTMENT (OUTPATIENT)
Dept: GENERAL RADIOLOGY | Age: 77
End: 2022-04-20
Payer: MEDICARE

## 2022-04-20 VITALS
HEART RATE: 83 BPM | RESPIRATION RATE: 18 BRPM | HEIGHT: 63 IN | DIASTOLIC BLOOD PRESSURE: 80 MMHG | BODY MASS INDEX: 26.58 KG/M2 | SYSTOLIC BLOOD PRESSURE: 162 MMHG | TEMPERATURE: 98.2 F | OXYGEN SATURATION: 99 % | WEIGHT: 150 LBS

## 2022-04-20 DIAGNOSIS — R53.83 OTHER FATIGUE: Primary | ICD-10-CM

## 2022-04-20 LAB
ABSOLUTE EOS #: 0 K/UL (ref 0–0.44)
ABSOLUTE IMMATURE GRANULOCYTE: 0.17 K/UL (ref 0–0.3)
ABSOLUTE LYMPH #: 1.52 K/UL (ref 1.1–3.7)
ABSOLUTE MONO #: 2.03 K/UL (ref 0.1–1.2)
ALBUMIN SERPL-MCNC: 3.9 G/DL (ref 3.5–5.2)
ALP BLD-CCNC: 116 U/L (ref 35–104)
ALT SERPL-CCNC: 9 U/L (ref 5–33)
ANION GAP SERPL CALCULATED.3IONS-SCNC: 15 MMOL/L (ref 9–17)
AST SERPL-CCNC: 13 U/L
BASOPHILS # BLD: 0 % (ref 0–2)
BASOPHILS ABSOLUTE: 0 K/UL (ref 0–0.2)
BILIRUB SERPL-MCNC: 0.99 MG/DL (ref 0.3–1.2)
BUN BLDV-MCNC: 43 MG/DL (ref 8–23)
BUN/CREAT BLD: 7 (ref 9–20)
CALCIUM SERPL-MCNC: 8.8 MG/DL (ref 8.6–10.4)
CHLORIDE BLD-SCNC: 98 MMOL/L (ref 98–107)
CO2: 23 MMOL/L (ref 20–31)
CREAT SERPL-MCNC: 5.92 MG/DL (ref 0.5–0.9)
EOSINOPHILS RELATIVE PERCENT: 0 % (ref 1–4)
GFR AFRICAN AMERICAN: 8 ML/MIN
GFR NON-AFRICAN AMERICAN: 7 ML/MIN
GFR SERPL CREATININE-BSD FRML MDRD: ABNORMAL ML/MIN/{1.73_M2}
GLUCOSE BLD-MCNC: 120 MG/DL (ref 70–99)
HCT VFR BLD CALC: 32.7 % (ref 36.3–47.1)
HEMOGLOBIN: 10.4 G/DL (ref 11.9–15.1)
IMMATURE GRANULOCYTES: 1 %
LYMPHOCYTES # BLD: 9 % (ref 24–43)
MCH RBC QN AUTO: 29.5 PG (ref 25.2–33.5)
MCHC RBC AUTO-ENTMCNC: 31.8 G/DL (ref 28.4–34.8)
MCV RBC AUTO: 92.6 FL (ref 82.6–102.9)
MONOCYTES # BLD: 12 % (ref 3–12)
NRBC AUTOMATED: 0 PER 100 WBC
PDW BLD-RTO: 16 % (ref 11.8–14.4)
PLATELET # BLD: 146 K/UL (ref 138–453)
PMV BLD AUTO: 11.1 FL (ref 8.1–13.5)
POTASSIUM SERPL-SCNC: 3.7 MMOL/L (ref 3.7–5.3)
PRO-BNP: ABNORMAL PG/ML
RBC # BLD: 3.53 M/UL (ref 3.95–5.11)
SEG NEUTROPHILS: 78 % (ref 36–65)
SEGMENTED NEUTROPHILS ABSOLUTE COUNT: 13.18 K/UL (ref 1.5–8.1)
SODIUM BLD-SCNC: 136 MMOL/L (ref 135–144)
TOTAL PROTEIN: 7.7 G/DL (ref 6.4–8.3)
TROPONIN, HIGH SENSITIVITY: 100 NG/L (ref 0–14)
WBC # BLD: 16.9 K/UL (ref 3.5–11.3)

## 2022-04-20 PROCEDURE — 93005 ELECTROCARDIOGRAM TRACING: CPT | Performed by: EMERGENCY MEDICINE

## 2022-04-20 PROCEDURE — 84484 ASSAY OF TROPONIN QUANT: CPT

## 2022-04-20 PROCEDURE — 85025 COMPLETE CBC W/AUTO DIFF WBC: CPT

## 2022-04-20 PROCEDURE — 80053 COMPREHEN METABOLIC PANEL: CPT

## 2022-04-20 PROCEDURE — 99285 EMERGENCY DEPT VISIT HI MDM: CPT

## 2022-04-20 PROCEDURE — 36415 COLL VENOUS BLD VENIPUNCTURE: CPT

## 2022-04-20 PROCEDURE — 71045 X-RAY EXAM CHEST 1 VIEW: CPT

## 2022-04-20 PROCEDURE — 6360000002 HC RX W HCPCS: Performed by: EMERGENCY MEDICINE

## 2022-04-20 PROCEDURE — 83880 ASSAY OF NATRIURETIC PEPTIDE: CPT

## 2022-04-20 RX ORDER — HEPARIN SODIUM (PORCINE) LOCK FLUSH IV SOLN 100 UNIT/ML 100 UNIT/ML
500 SOLUTION INTRAVENOUS ONCE
Status: COMPLETED | OUTPATIENT
Start: 2022-04-20 | End: 2022-04-20

## 2022-04-20 RX ORDER — CARVEDILOL 12.5 MG/1
12.5 TABLET ORAL 2 TIMES DAILY WITH MEALS
COMMUNITY

## 2022-04-20 RX ADMIN — Medication 500 UNITS: at 16:01

## 2022-04-20 NOTE — ED PROVIDER NOTES
EMERGENCY DEPARTMENT ENCOUNTER    Pt Name: Rolando Dennison  MRN: [id number]  [place] 1945  Date of evaluation: 4/20/22  CHIEF COMPLAINT       Chief Complaint   Patient presents with    Shortness of Breath     HISTORY OF PRESENT ILLNESS   Patient is a 79-year-old female with PMH of diabetes, ESRD on dialysis M/W/F, last dialysis was on Monday, 2 days ago, who was sent from dialysis today for evaluation of fatigue and general malaise. Patient has no specific complaints at this time. No fevers, cough, shortness of breath, chest pain, abdominal pain, nausea, vomiting, changes in stool. Patient states she did not sleep well last night and felt nauseated this morning. Dialysis decided not to treat with dialysis morning, unclear reasons why.     REVIEW OF SYSTEMS     Review of Systems  PASTMEDICAL HISTORY     Past Medical History:   Diagnosis Date    Anxiety and depression     Arthritis     Rheumatoid     Cancer ([facility] [address]     left breast cancer    Cerebral artery occlusion with cerebral infarction [facility]) jan 2020 & March 2020    poor balance since strokes    Chronic kidney disease, stage 5 (HCC)     hemodialysis Mon-Wed-Fri    Diabetes mellitus ([address]     GERD (gastroesophageal reflux disease)     Hearing loss     bilateral. Does not have hearing aides    Hemodialysis patient ([facility] [address]     M-W-F started in 2020    History of blood transfusion     50 plus yrs ago with pregnancy    Hyperlipidemia     Hypertension     Migraines     MRSA (methicillin resistant staph aureus) culture positive 2015    back    Neuropathy     PAT (obstructive sleep apnea)     had UPPP  \"have not used machine in years\"    Prolonged emergence from general anesthesia     PVD (peripheral vascular disease) ([facility] [address] [facility]     SOB (shortness of breath)     \"long distance\"    Tinnitus      SURGICAL HISTORY       Past Surgical History:   Procedure Laterality Date    APPENDECTOMY      BACK SURGERY      I and D lower back MRSA    BREAST REDUCTION SURGERY Left     CENTRAL LINE  2022         COLONOSCOPY      HYSTERECTOMY      INSERTABLE CARDIAC MONITOR      IR TUNNELED CATHETER PLACEMENT GREATER THAN 5 YEARS  2022    IR TUNNELED CATHETER PLACEMENT GREATER THAN 5 YEARS 2022 [facility]    MASTECTOMY Left     lymph nodes removed    MASTECTOMY Left 2018    axillary mastectomy    MS EXC SKIN BENIG <5MM TRUNK,ARM,LEG Right 2018    RIGHT AXILLARY MASTECTOMY performed by Jimi Calles DO at  [address]       Discharge Medication List as of 2022  3:21 PM      CONTINUE these medications which have NOT CHANGED    Details   carvedilol (COREG) 12.5 MG tablet Take 12.5 mg by mouth 2 times daily (with meals)Historical Med      sodium bicarbonate 650 MG tablet Take 650 mg by mouth 2 times dailyHistorical Med      sevelamer (RENVELA) 800 MG tablet Take 1 tablet by mouth 3 times daily (with meals), Disp-90 tablet, R-3Normal      clopidogrel (PLAVIX) 75 MG tablet Take 75 mg by mouth dailyHistorical Med      enalapril (VASOTEC) 5 MG tablet Take 5 mg by mouth dailyHistorical Med      NIFEdipine (PROCARDIA XL) 60 MG extended release tablet Take 60 mg by mouth dailyHistorical Med      pantoprazole (PROTONIX) 40 MG tablet Take 40 mg by mouth dailyHistorical Med      cloNIDine (CATAPRES) 0.1 MG/24HR PTWK Place 1 patch onto the skin once a week Indications: Sundays SundaysHistorical Med      rosuvastatin (CRESTOR) 5 MG tablet Take 5 mg by mouth dailyHistorical Med      citalopram (CELEXA) 20 MG tablet Take 40 mg by mouth daily Takes 2 tabs (=40mg) dailyHistorical Med           ALLERGIES     has No Known Allergies. FAMILY HISTORY     has no family status information on file.       SOCIAL HISTORY       Social History     Tobacco Use    Smoking status: Former Smoker     Quit date:      Years since quittin.3    Smokeless tobacco: Never Used   Vaping Use    Vaping Use: Never used   Substance Use Topics    Alcohol use: Yes     Comment: occasionally  \"couple a month\"    Drug use: No     PHYSICAL EXAM     INITIAL VITALS: BP (!) 162/80   Pulse 83   Temp 98.2 °F (36.8 °C) (Oral)   Resp 18   Ht 5' 3\" (1.6 m)   Wt 150 lb (68 kg)   SpO2 99% Comment: Pt put on 2L for comfort at her request.  BMI 26.57 kg/m²    Physical Exam  HENT:      Head: Normocephalic. Right Ear: External ear normal.      Left Ear: External ear normal.      Nose: Nose normal.   Eyes:      Conjunctiva/sclera: Conjunctivae normal.   Cardiovascular:      Rate and Rhythm: Normal rate. Heart sounds: Normal heart sounds. Pulmonary:      Effort: Pulmonary effort is normal.      Breath sounds: Normal breath sounds. Abdominal:      General: Abdomen is flat. Palpations: Abdomen is soft. Skin:     General: Skin is dry. Comments: Dialysis catheter right upper chest wall. Appears clean, dry, intact. Neurological:      Mental Status: She is alert. Mental status is at baseline. Psychiatric:         Mood and Affect: Mood normal.         Behavior: Behavior normal.         MEDICAL DECISION MAKING:   The patient is hemodynamically stable, afebrile, nontoxic-appearing. Physical exam unremarkable. ED plan for basic labs, chest x-ray, reassess. DIAGNOSTIC RESULTS   EKG:All EKG's are interpreted by the Emergency Department Physician who either signs or Co-signs this chart in the absence of a cardiologist.        RADIOLOGY:All plain film, CT, MRI, and formal ultrasound images (except ED bedside ultrasound) are read by the radiologist, see reports below, unless otherwisenoted in MDM or here. XR CHEST PORTABLE   Final Result   1. Mild stable cardiomegaly without evidence of CHF. 2.  Mild left basilar opacities favored to represent atelectasis. LABS: All lab results were reviewed by myself, and all abnormals are listed below.   Labs Reviewed   CBC WITH AUTO DIFFERENTIAL - Abnormal; Notable for the following components:       Result Value    WBC 16.9 (*)     RBC 3.53 (*)     Hemoglobin 10.4 (*)     Hematocrit 32.7 (*)     RDW 16.0 (*)     Seg Neutrophils 78 (*)     Lymphocytes 9 (*)     Eosinophils % 0 (*)     Immature Granulocytes 1 (*)     Segs Absolute 13.18 (*)     Absolute Mono # 2.03 (*)     All other components within normal limits   COMPREHENSIVE METABOLIC PANEL W/ REFLEX TO MG FOR LOW K - Abnormal; Notable for the following components:    Glucose 120 (*)     BUN 43 (*)     CREATININE 5.92 (*)     Bun/Cre Ratio 7 (*)     Alkaline Phosphatase 116 (*)     GFR Non- 7 (*)     GFR  8 (*)     All other components within normal limits   TROPONIN - Abnormal; Notable for the following components:    Troponin, High Sensitivity 100 (*)     All other components within normal limits   BRAIN NATRIURETIC PEPTIDE - Abnormal; Notable for the following components:    Pro-BNP 21,082 (*)     All other components within normal limits       EMERGENCY DEPARTMENTCOURSE:   Patient main stable in the ED. Labs:  Potassium 3.7  Creatinine 5.92  Glucose 120  BNP 20,082  Troponin 100  Glucose 120  WBC 16.9  CHF shows stable cardiomegaly, no evidence of infiltrate. No indications for emergent dialysis. Discussed case with Dr. Sebastian Hatch, who agrees with plan for discharge and dialysis this Friday. No further work-up indicated at this time. Nursing notes reviewed. At this time this is what I find, the patient appears well and does not appear sick or toxic. I gave my usual and customary discussion of the risks and benefits of discharge versus admission. I answered the patient's questions. I gave the patient strict return precautions. Patient expressed understanding of the discharge instructions. The care is provided during an unprecedented national emergency due to the novel coronavirus, COVID-19. Dictated but not reviewed.       Vitals:    Vitals: 04/20/22 1216   BP: (!) 162/80   Pulse: 83   Resp: 18   Temp: 98.2 °F (36.8 °C)   TempSrc: Oral   SpO2: 99%   Weight: 150 lb (68 kg)   Height: 5' 3\" (1.6 m)       The patient was given the following medications while in the emergency department:  Orders Placed This Encounter   Medications    heparin flush 100 UNIT/ML injection 500 Units     CONSULTS:  None    FINAL IMPRESSION      1.  Other fatigue          DISPOSITION/PLAN   DISPOSITION Decision To Discharge 04/20/2022 03:20:47 PM      PATIENT REFERRED TO:  Mansi Carlson MD  [address] [place] [address]  [phone]    In 2 days      DISCHARGE MEDICATIONS:  Discharge Medication List as of 4/20/2022  3:21 PM        Otilio Gutierrez MD  Attending Emergency Physician                    Scar Albarran MD  04/20/22 9638

## 2022-04-20 NOTE — ED NOTES
Pt was at hemodialysis today and was having SOB before her treatment. The treatment center called EMS and had her brought to the ED. Pt states this has been going on since Monday. Pt was 99% on RA when she arrived but requested to be put on 2L NC for comfort.       Rosa Maria Quintero RN  04/20/22 2671

## 2022-04-21 LAB
EKG ATRIAL RATE: 84 BPM
EKG P AXIS: 3 DEGREES
EKG P-R INTERVAL: 180 MS
EKG Q-T INTERVAL: 444 MS
EKG QRS DURATION: 84 MS
EKG QTC CALCULATION (BAZETT): 524 MS
EKG R AXIS: -35 DEGREES
EKG T AXIS: -164 DEGREES
EKG VENTRICULAR RATE: 84 BPM

## 2022-05-06 ENCOUNTER — HOSPITAL ENCOUNTER (EMERGENCY)
Age: 77
Discharge: HOME | End: 2022-05-06
Attending: EMERGENCY MEDICINE
Payer: MEDICARE

## 2022-05-06 ENCOUNTER — APPOINTMENT (OUTPATIENT)
Dept: DIALYSIS | Age: 77
End: 2022-05-06
Payer: MEDICARE

## 2022-05-06 ENCOUNTER — APPOINTMENT (OUTPATIENT)
Dept: GENERAL RADIOLOGY | Age: 77
End: 2022-05-06
Payer: MEDICARE

## 2022-05-06 ENCOUNTER — APPOINTMENT (OUTPATIENT)
Dept: CT IMAGING | Age: 77
End: 2022-05-06
Payer: MEDICARE

## 2022-05-06 VITALS
BODY MASS INDEX: 26.36 KG/M2 | HEART RATE: 70 BPM | SYSTOLIC BLOOD PRESSURE: 170 MMHG | WEIGHT: 148.81 LBS | TEMPERATURE: 97.7 F | DIASTOLIC BLOOD PRESSURE: 101 MMHG | OXYGEN SATURATION: 99 % | RESPIRATION RATE: 15 BRPM

## 2022-05-06 DIAGNOSIS — Z99.2 ENCOUNTER FOR DIALYSIS (HCC): ICD-10-CM

## 2022-05-06 DIAGNOSIS — E86.0 DEHYDRATION: Primary | ICD-10-CM

## 2022-05-06 LAB
ABSOLUTE EOS #: 0.34 K/UL (ref 0–0.44)
ABSOLUTE IMMATURE GRANULOCYTE: 0.1 K/UL (ref 0–0.3)
ABSOLUTE LYMPH #: 2.85 K/UL (ref 1.1–3.7)
ABSOLUTE MONO #: 1.32 K/UL (ref 0.1–1.2)
ANION GAP SERPL CALCULATED.3IONS-SCNC: 11 MMOL/L (ref 9–17)
BASOPHILS # BLD: 1 % (ref 0–2)
BASOPHILS ABSOLUTE: 0.06 K/UL (ref 0–0.2)
BUN BLDV-MCNC: 29 MG/DL (ref 8–23)
CALCIUM SERPL-MCNC: 9 MG/DL (ref 8.6–10.4)
CHLORIDE BLD-SCNC: 105 MMOL/L (ref 98–107)
CO2: 23 MMOL/L (ref 20–31)
CREAT SERPL-MCNC: 4.61 MG/DL (ref 0.5–0.9)
D-DIMER QUANTITATIVE: 6 MG/L FEU
EOSINOPHILS RELATIVE PERCENT: 3 % (ref 1–4)
GFR AFRICAN AMERICAN: 11 ML/MIN
GFR NON-AFRICAN AMERICAN: 9 ML/MIN
GFR SERPL CREATININE-BSD FRML MDRD: ABNORMAL ML/MIN/{1.73_M2}
GLUCOSE BLD-MCNC: 150 MG/DL (ref 70–99)
HCT VFR BLD CALC: 36.2 % (ref 36.3–47.1)
HEMOGLOBIN: 10.9 G/DL (ref 11.9–15.1)
IMMATURE GRANULOCYTES: 1 %
LYMPHOCYTES # BLD: 26 % (ref 24–43)
MCH RBC QN AUTO: 28.6 PG (ref 25.2–33.5)
MCHC RBC AUTO-ENTMCNC: 30.1 G/DL (ref 28.4–34.8)
MCV RBC AUTO: 95 FL (ref 82.6–102.9)
MONOCYTES # BLD: 12 % (ref 3–12)
NRBC AUTOMATED: 0 PER 100 WBC
PDW BLD-RTO: 16.8 % (ref 11.8–14.4)
PLATELET # BLD: 237 K/UL (ref 138–453)
PMV BLD AUTO: 11.6 FL (ref 8.1–13.5)
POTASSIUM SERPL-SCNC: 4.6 MMOL/L (ref 3.7–5.3)
PRO-BNP: 6949 PG/ML
RBC # BLD: 3.81 M/UL (ref 3.95–5.11)
RBC # BLD: ABNORMAL 10*6/UL
SEG NEUTROPHILS: 57 % (ref 36–65)
SEGMENTED NEUTROPHILS ABSOLUTE COUNT: 6.41 K/UL (ref 1.5–8.1)
SODIUM BLD-SCNC: 139 MMOL/L (ref 135–144)
TROPONIN, HIGH SENSITIVITY: 96 NG/L (ref 0–14)
WBC # BLD: 11.1 K/UL (ref 3.5–11.3)

## 2022-05-06 PROCEDURE — 85379 FIBRIN DEGRADATION QUANT: CPT

## 2022-05-06 PROCEDURE — 83880 ASSAY OF NATRIURETIC PEPTIDE: CPT

## 2022-05-06 PROCEDURE — 93005 ELECTROCARDIOGRAM TRACING: CPT | Performed by: STUDENT IN AN ORGANIZED HEALTH CARE EDUCATION/TRAINING PROGRAM

## 2022-05-06 PROCEDURE — 80048 BASIC METABOLIC PNL TOTAL CA: CPT

## 2022-05-06 PROCEDURE — 99285 EMERGENCY DEPT VISIT HI MDM: CPT

## 2022-05-06 PROCEDURE — 84484 ASSAY OF TROPONIN QUANT: CPT

## 2022-05-06 PROCEDURE — 71046 X-RAY EXAM CHEST 2 VIEWS: CPT

## 2022-05-06 PROCEDURE — 71260 CT THORAX DX C+: CPT

## 2022-05-06 PROCEDURE — 85025 COMPLETE CBC W/AUTO DIFF WBC: CPT

## 2022-05-06 PROCEDURE — 6360000004 HC RX CONTRAST MEDICATION: Performed by: STUDENT IN AN ORGANIZED HEALTH CARE EDUCATION/TRAINING PROGRAM

## 2022-05-06 PROCEDURE — 6360000002 HC RX W HCPCS: Performed by: INTERNAL MEDICINE

## 2022-05-06 PROCEDURE — 96374 THER/PROPH/DIAG INJ IV PUSH: CPT

## 2022-05-06 PROCEDURE — 90935 HEMODIALYSIS ONE EVALUATION: CPT

## 2022-05-06 PROCEDURE — 96375 TX/PRO/DX INJ NEW DRUG ADDON: CPT

## 2022-05-06 RX ORDER — HEPARIN SODIUM 1000 [USP'U]/ML
1600 INJECTION, SOLUTION INTRAVENOUS; SUBCUTANEOUS ONCE
Status: COMPLETED | OUTPATIENT
Start: 2022-05-06 | End: 2022-05-06

## 2022-05-06 RX ORDER — HEPARIN SODIUM 1000 [USP'U]/ML
2000 INJECTION, SOLUTION INTRAVENOUS; SUBCUTANEOUS ONCE
Status: DISCONTINUED | OUTPATIENT
Start: 2022-05-06 | End: 2022-05-06

## 2022-05-06 RX ORDER — HEPARIN SODIUM 1000 [USP'U]/ML
2100 INJECTION, SOLUTION INTRAVENOUS; SUBCUTANEOUS ONCE
Status: DISCONTINUED | OUTPATIENT
Start: 2022-05-06 | End: 2022-05-06

## 2022-05-06 RX ORDER — HEPARIN SODIUM 1000 [USP'U]/ML
2100 INJECTION, SOLUTION INTRAVENOUS; SUBCUTANEOUS ONCE
Status: COMPLETED | OUTPATIENT
Start: 2022-05-06 | End: 2022-05-06

## 2022-05-06 RX ORDER — HEPARIN SODIUM 1000 [USP'U]/ML
2000 INJECTION, SOLUTION INTRAVENOUS; SUBCUTANEOUS ONCE
Status: COMPLETED | OUTPATIENT
Start: 2022-05-06 | End: 2022-05-06

## 2022-05-06 RX ORDER — DOXERCALCIFEROL 2 UG/ML
2 INJECTION, SOLUTION INTRAVENOUS
Status: COMPLETED | OUTPATIENT
Start: 2022-05-06 | End: 2022-05-06

## 2022-05-06 RX ORDER — DOXERCALCIFEROL 2 UG/ML
INJECTION, SOLUTION INTRAVENOUS
Status: DISCONTINUED
Start: 2022-05-06 | End: 2022-05-06 | Stop reason: HOSPADM

## 2022-05-06 RX ADMIN — HEPARIN SODIUM 2100 UNITS: 1000 INJECTION INTRAVENOUS; SUBCUTANEOUS at 18:50

## 2022-05-06 RX ADMIN — HEPARIN SODIUM 1600 UNITS: 1000 INJECTION INTRAVENOUS; SUBCUTANEOUS at 18:50

## 2022-05-06 RX ADMIN — DOXERCALCIFEROL 2 MCG: 4 INJECTION, SOLUTION INTRAVENOUS at 18:30

## 2022-05-06 RX ADMIN — IOPAMIDOL 75 ML: 755 INJECTION, SOLUTION INTRAVENOUS at 13:46

## 2022-05-06 RX ADMIN — HEPARIN SODIUM 2000 UNITS: 1000 INJECTION INTRAVENOUS; SUBCUTANEOUS at 18:50

## 2022-05-06 NOTE — ED NOTES
Pt placed on continuous cardiac monitoring, BP, Pulse ox. EKG obtained. IV established and labs drawn. Patient states that she has missed dialysis for about 1 week. Patient had dialysis yesterday. EMS states that the patient was on the floor with her blood pressure in the 90s, they stated that they gave a 200 ml bolus and the patient is normotensive at this time.        Tameka Sanders RN  05/06/22 1134       Tameka Sanders RN  05/06/22 1143

## 2022-05-06 NOTE — PROGRESS NOTES
Dialysis Post Treatment Note  Vitals:    05/06/22 1855   BP: (!) 170/101   Pulse: 70   Resp: 15   Temp: 97.7 °F (36.5 °C)   SpO2: 99%     Pre-Weight = 69.1 KG  Post-weight = Weight: 148 lb 13 oz (67.5 kg)  Total Liters Processed = Total Liters Processed (l/min): 70 l/min  Rinseback Volume (mL) = Rinseback Volume (ml): 300 ml  Net Removal (mL) = NET Removed (ml): 2300 ml  Patient's dry weight=   Type of access used= Perm Cath  Length of treatment= 3.5 hours    Pt had HD tx, pt hypotension x1, symptomatic,  ml given w good effect

## 2022-05-06 NOTE — ED NOTES
Portable xray at bedside. Tolerated well.  Will continue to monitor     Haile Leal RN  05/06/22 9056

## 2022-05-06 NOTE — ED PROVIDER NOTES
[facility]  Emergency Department  Emergency Medicine Resident Sign-out     Care of Osei Johnson was assumed from Dr. Suresh Juarez and is being seen for Fatigue (missed several dialysis treatments )   . The patient's initial evaluation and plan have been discussed with the prior provider who initially evaluated the patient.      EMERGENCY DEPARTMENT COURSE / MEDICAL DECISION MAKING:       MEDICATIONS GIVEN:  Orders Placed This Encounter   Medications    iopamidol (ISOVUE-370) 76 % injection 75 mL    DISCONTD: doxercalciferol (HECTOROL) injection 2 mcg    DISCONTD: heparin (porcine) injection 2,000 Units    DISCONTD: heparin (porcine) injection 2,100 Units    heparin (porcine) injection 1,600 Units    heparin (porcine) injection 1,600 Units    doxercalciferol (HECTOROL) injection 2 mcg    DISCONTD: doxercalciferol (HECTOROL) injection 2 mcg    doxercalciferol (HECTOROL) 4 MCG/2ML injection     Emelia Coles: cabinet override    heparin (porcine) injection 2,000 Units    heparin (porcine) injection 2,100 Units       LABS / RADIOLOGY:     Labs Reviewed   CBC WITH AUTO DIFFERENTIAL - Abnormal; Notable for the following components:       Result Value    RBC 3.81 (*)     Hemoglobin 10.9 (*)     Hematocrit 36.2 (*)     RDW 16.8 (*)     Immature Granulocytes 1 (*)     Absolute Mono # 1.32 (*)     All other components within normal limits   BASIC METABOLIC PANEL W/ REFLEX TO MG FOR LOW K - Abnormal; Notable for the following components:    Glucose 150 (*)     BUN 29 (*)     CREATININE 4.61 (*)     GFR Non- 9 (*)     GFR  11 (*)     All other components within normal limits   BRAIN NATRIURETIC PEPTIDE - Abnormal; Notable for the following components:    Pro-BNP 6,949 (*)     All other components within normal limits   TROPONIN - Abnormal; Notable for the following components:    Troponin, High Sensitivity 96 (*)     All other components within normal limits D-DIMER, QUANTITATIVE       CT CHEST PULMONARY EMBOLISM W CONTRAST   Final Result   1. No evidence of pulmonary embolism. 2.  No acute pulmonary process. Previously seen patchy ground-glass   opacities in the lungs (CT chest 01/29/2022) have significantly improved with   only minimal residual patchy ground-glass attenuation. 3.  Chronic fibrotic changes at the lung bases. 4.  Tiny hiatal hernia. XR CHEST (2 VW)   Final Result   Small left basilar pneumonia suspected. This finding appears new compared to   04/20/2022             RECENT VITALS:     Temp: 97.7 °F (36.5 °C),  Pulse: 70, Resp: 15, BP: (!) 170/101, SpO2: 99 %    This patient is a 67 y.o. Female with need for dialysis and near syncopal event with borderline hypotension. Patient received small amounts of fluid prior to going to dialysis. Labs were taken. Patient was sent to dialysis, and was brought back after dialysis, once patient was back in room reevaluated she states she feels much better has no medical complaints she is no longer hypotensive would like to go home. We will plan for discharge educated patient on importance of following up with her primary care provider and keeping scheduled dialysis appointment strict return precautions discussed. ED Course as of 05/06/22 1951   Fri May 06, 2022   1949 She was brought back from dialysis, patient states that she has no medical complaints, is not sure why she was brought back would like to go home. We will plan for discharge educated patient on importance of keeping dialysis appointments, strict return precautions were discussed. [WG]      ED Course User Index  [WG] Hubert Villalta DO       OUTSTANDING TASKS / RECOMMENDATIONS:      1.  Reevaluate after dialysis     FINAL IMPRESSION:     1. Dehydration        DISPOSITION:       DISPOSITION:  [x]  Discharge   []  Transfer -    []  Admission -     []  Against Medical Advice   []  Eloped   FOLLOW-UP: Stacie Urias, MD  [address] [place] [address]  [phone]    Schedule an appointment as soon as possible for a visit in 2 days       DISCHARGE MEDICATIONS: New Prescriptions    No medications on file          Albert Flower DO  Emergency Medicine Resident  [address], [place]  Resident  05/06/22 2047

## 2022-05-06 NOTE — ED NOTES
Verified orders with hemodialysis that patient will going to hospital in house dialysis in 15-30 minutes     Giovana Rogers RN  05/06/22 1538

## 2022-05-06 NOTE — PROGRESS NOTES
Dialysis Time Out  To be done by RN and tech or 2 RNs  Staff Names MERLE Chairez, Vinceo PATRICIA Dumont RN    [x]  Identity of the patient using 2 patient identifiers  [x]  Consent for treatment  [x]  Equipment-proper machine and dialyzer  [x]  B-Hep B status (per out-pt facility)  [x]  Orders- to include bath, blood flow, dialyzer, time and fluid removal  [x]  Access-Correct site and in working order  [x]  Time for patient to ask questions.

## 2022-05-06 NOTE — DISCHARGE INSTR - COC
Continuity of Care Form    Patient Name: Flora Malave   :    MRN:  [id number]    Admit date:  2022  Discharge date:  ***    Code Status Order: Prior   Advance Directives:      Admitting Physician:  No admitting provider for patient encounter. PCP: Shirley Yoo MD    Discharging Nurse: [facility] Care Unit/Room#:   Discharging Unit Phone Number: ***    Emergency Contact:   Extended Emergency Contact Information  Primary Emergency Contact: Shannan Shadow of [place]  Phone: [phone]  Relation: Child  Secondary Emergency Contact: Derian garcia  [place] Phone: [phone]  Relation: Child   needed?  No    Past Surgical History:  Past Surgical History:   Procedure Laterality Date    APPENDECTOMY      BACK SURGERY      I and D lower back MRSA    BREAST REDUCTION SURGERY Left     CENTRAL LINE  2022         COLONOSCOPY      HYSTERECTOMY      INSERTABLE CARDIAC MONITOR      IR TUNNELED CATHETER PLACEMENT GREATER THAN 5 YEARS  2022    IR TUNNELED CATHETER PLACEMENT GREATER THAN 5 YEARS 2022 [facility]    MASTECTOMY Left     lymph nodes removed    MASTECTOMY Left 2018    axillary mastectomy    IA EXC SKIN BENIG <5MM TRUNK,ARM,LEG Right 2018    RIGHT AXILLARY MASTECTOMY performed by Елена Suarez DO at [address]         Immunization History:   Immunization History   Administered Date(s) Administered    KRISHNAID-19, Lorena Yoder, Primary or Immunocompromised, PF, 100mcg/0.5mL 2021, 2021       Active Problems:  Patient Active Problem List   Diagnosis Code    AMS (altered mental status) R41.82    Diabetes ([facility] [address] E11.9    Admission for dialysis [facility]) Z99.2    Hypoglycemia E16.2    Encephalopathy B55.72    Metabolic encephalopathy X36.15    End stage renal failure on dialysis ([facility] [address] N18.6, Z99.2    COVID-19 virus infection U07.1    Hypertensive urgency I16.0    Sepsis (HCC) A41.9    Acute respiratory failure with hypoxia ([facility]) J96.01    Morbid obesity ([facility]) E66.01    Hypertension, essential I10    Hypokalemia G84.4    Metabolic acidosis Y83.1    Elevated procalcitonin R79.89    Elevated C-reactive protein (CRP) R79.82    ESRD (end stage renal disease) ([facility]) N18.6       Isolation/Infection:   Isolation            No Isolation          Patient Infection Status       Infection Onset Added Last Indicated Last Indicated By Review Planned Expiration Resolved Resolved By    MRSA  18 Carli Shelley RN        1 negative nasal swab - 2018 (needs 2nd nasal swab)  Back -     Resolved    COVID-19 22 COVID-19, Rapid   22     COVID-19 (Rule Out) 22 COVID-19, Rapid (Ordered)   22 Rule-Out Test Resulted            Nurse Assessment:  Last Vital Signs: There were no vitals taken for this visit. Last documented pain score (0-10 scale):    Last Weight:   Wt Readings from Last 1 Encounters:   22 150 lb (68 kg)     Mental Status:  {IP PT MENTAL STATUS:39390}    IV Access:  { RED IV ACCESS:205592608}    Nursing Mobility/ADLs:  Walking   {CHP DME BOKH:064320115}  Transfer  {CHP DME SCPP:356704262}  Bathing  {CHP DME PFAD:587737281}  Dressing  {CHP DME ZDYP:150294671}  Toileting  {CHP DME AGR}  Feeding  {CHP DME RIYO:741497109}  Med Admin  {CHP DME UVVZ:064996427}  Med Delivery   { RED MED Delivery:290170719}    Wound Care Documentation and Therapy:  Wound 21 Buttocks Right (Active)   Number of days: 178        Elimination:  Continence: Bowel: {YES / ZY:40182}  Bladder: {YES / UY:76104}  Urinary Catheter: {Urinary Catheter:004374192}   Colostomy/Ileostomy/Ileal Conduit: {YES / HC:50215}       Date of Last BM: ***  No intake or output data in the 24 hours ending 22 1103  No intake/output data recorded.     Safety Concerns:     [address] RED Safety Concerns:276379963}    Impairments/Disabilities:      [address] MOON Impairments/Disabilities:402018016}    Nutrition Therapy:  Current Nutrition Therapy:   [address] Pak RED Diet List:764907965}    Routes of Feeding: {CHP DME Other Feedings:738330978}  Liquids: {Slp liquid thickness:55446}  Daily Fluid Restriction: {CHP DME Yes amt example:329486063}  Last Modified Barium Swallow with Video (Video Swallowing Test): {Done Not Done NAR}    Treatments at the Time of Hospital Discharge:   Respiratory Treatments: ***  Oxygen Therapy:  {Therapy; copd oxygen:19950}  Ventilator:    { CC Vent EXNM:611054217}    Rehab Therapies: {THERAPEUTIC INTERVENTION:7704016579}  Weight Bearing Status/Restrictions: { CC Weight Bearin}  Other Medical Equipment (for information only, NOT a DME order):  {EQUIPMENT:606665848}  Other Treatments: ***    Patient's personal belongings (please select all that are sent with patient):  {[facility] DME Belongings:917595345}    RN SIGNATURE:  {Esignature:608945541}    CASE MANAGEMENT/SOCIAL WORK SECTION    Inpatient Status Date: ***    Readmission Risk Assessment Score:  Readmission Risk              Risk of Unplanned Readmission:  0           Discharging to Facility/ Agency   Name:   Address:  Phone:  Fax:    Dialysis Facility (if applicable)   Name:  Address:  Dialysis Schedule:  Phone:  Fax:    / signature: {Esignature:714708014}    PHYSICIAN SECTION    Prognosis: {Prognosis:2526857645}    Condition at Discharge: [address] Pak Patient Condition:135630956}    Rehab Potential (if transferring to Rehab): {Prognosis:7132452136}    Recommended Labs or Other Treatments After Discharge: ***    Physician Certification: I certify the above information and transfer of Ania Gandhi  is necessary for the continuing treatment of the diagnosis listed and that she requires {Admit to Appropriate Level of Care:29245} for {GREATER/LESS:777324120} 30 days.      Update Admission H&P: {CHP DME Changes in PRNOR:967475223}    PHYSICIAN SIGNATURE: {[facility]:736560946}

## 2022-05-06 NOTE — ED PROVIDER NOTES
[facility]     Emergency Department     Faculty Attestation        I performed a history and physical examination of the patient and discussed management with the resident. I reviewed the residents note and agree with the documented findings and plan of care. Any areas of disagreement are noted on the chart. I was personally present for the key portions of any procedures. I have documented in the chart those procedures where I was not present during the key portions. I have reviewed the emergency nurses triage note. I agree with the chief complaint, past medical history, past surgical history, allergies, medications, social and family history as documented unless otherwise noted below. For Physician Assistant/ Nurse Practitioner cases/documentation I have personally evaluated this patient and have completed at least one if not all key elements of the E/M (history, physical exam, and MDM). Additional findings are as noted. Vital Signs: BP: (!) 140/75  Pulse: 72  Resp: 19  Temp: 97 °F (36.1 °C) SpO2: 97 %  PCP:  Ruben Fernández MD    Pertinent Comments:     Patient is a 59-year-old female with history of hypertension as well as diabetes and end-stage renal disease on hemodialysis. Patient had missed several sessions of dialysis but was given a session yesterday and was supposed to have repeat session today. Called EMS secondary to near syncopal symptoms and borderline hypotension with systolic of 90 mmHg. After small mount of fluids is doing much better and feeling better. Denies any fevers or chills or systemic symptoms and no chest pain. There is some mild shortness of breath associated as well, that was only present during this episode and is now resolved. Physical Examination:  Clear to auscultation bilaterally, regular rate and rhythm, and   abdomen soft/nontender/nondistended/normal bowel sounds/no pulsatile masses palpated. There is no calf swelling noted and no TTP, with strong DP pulses palpated bilaterally. Assessment/Plan: We will obtain cardiac work-up as well as basic electrolytes. Reevaluate after but will likely need repeat dialysis as mentioned earlier    Patient's initial work-up negative with normal potassium. D-dimer resulted positive and CT chest was ordered. CT chest done and no obvious PE per radiologist and while there is some patchy groundglass opacities appears much improved when compared to previous CT in January by radiology read. Nephrology wishes to dialyze today. They will reevaluate her after dialysis to see if able to be discharged home or not. Possible return to ER after dialysis        EKG Interpretation    Interpreted by emergency department physician    Rhythm: normal sinus   Rate: normal at 70 bpm  Axis: LAD  Conduction: normal  ST Segments: no acute change  T Waves: Lateral T wave inversions  Q Waves: no acute change    Clinical Impression:  nonspecific EKG and appears relatively unchanged from previous EKG on 4/20/2022 when compared        Critical Care  None    This patient was evaluated in the Emergency Department for symptoms described in the history of present illness. He/she was evaluated in the context of the global COVID-19 pandemic, which necessitated consideration that the patient might be at risk for infection with the SARS-CoV-2 virus that causes COVID-19. Institutional protocols and algorithms that pertain to the evaluation of patients at risk for COVID-19 are in a state of rapid change based on information released by regulatory bodies including the CDC and federal and state organizations. These policies and algorithms were followed during the patient's care in the ED. (Please note that portions of this note were completed with a voice recognition program. Efforts were made to edit the dictations but occasionally words are mis-transcribed.  Whenever words are used in this note in any gender, they shall be construed as though they were used in the gender appropriate to the circumstances; and whenever words are used in this note in the singular or plural form, they shall be construed as though they were used in the form appropriate to the circumstances.)    MD Kezia Jerez  Attending Emergency Medicine Physician           Ignacia North MD  05/06/22 1114       Ignacia North MD  05/06/22 1127       Ignacia North MD  05/06/22 1558

## 2022-05-06 NOTE — ED PROVIDER NOTES
[facility] ED  Emergency Department Encounter  EmergencyMedicine Resident     Pt Espinoza Preston  MRN: [id number]  [place] 1945  Date of evaluation: 5/6/22  PCP:  Shirley Yoo MD    [address] [place] [address]       Chief Complaint   Patient presents with    Fatigue     missed several dialysis treatments        HISTORY OF PRESENT ILLNESS  (Location/Symptom, Timing/Onset, Context/Setting, Quality, Duration, Modifying Factors, Severity.)      Flora Malave is a 67 y.o. female who presents with EMS for concerns of hypotension and orthostatic dizziness. Patient had SBP 90s on EMS arrival given 400 mL IV fluids, patient responded well blood pressures normalized symptoms resolved. Patient is ESRD Monday Wednesday Friday however missed approximately 1 week at dialysis, had a full dialysis session yesterday on Thursday. Plan was to have dialysis again today but patient is here instead. She is having some shortness of breath no chest pain. Reports some mild orthostatic lightheadedness when abruptly standing and sometimes after dialysis sessions. This has been going on for approximately 1 year. She is denying sick contacts headache dizziness nausea vomiting fevers diarrhea    PAST MEDICAL / SURGICAL / SOCIAL / FAMILY HISTORY      has a past medical history of Anxiety and depression, Arthritis, Cancer ([address] Cerebral artery occlusion with cerebral infarction ([address] Chronic kidney disease, stage 5 ([address] Diabetes mellitus ([address] GERD (gastroesophageal reflux disease), Hearing loss, Hemodialysis patient ([address] History of blood transfusion, Hyperlipidemia, Hypertension, Migraines, MRSA (methicillin resistant staph aureus) culture positive, Neuropathy, PAT (obstructive sleep apnea), Prolonged emergence from general anesthesia, PVD (peripheral vascular disease) ([address] SOB (shortness of breath), and Tinnitus. has a past surgical history that includes Uvulopalatopharygoplasty;  Mastectomy (Left); Hysterectomy; Appendectomy; back surgery; Mastectomy (Left, 2018); pr exc skin benig <5mm trunk,arm,leg (Right, 2018); Breast reduction surgery (Left, ); Colonoscopy; Insertable Cardiac Monitor; central line (2022); and IR TUNNELED CVC PLACE WO SQ PORT/PUMP > 5 YEARS (2022). Social History     Socioeconomic History    Marital status:      Spouse name: Not on file    Number of children: Not on file    Years of education: Not on file    Highest education level: Not on file   Occupational History    Not on file   Tobacco Use    Smoking status: Former Smoker     Quit date:      Years since quittin.3    Smokeless tobacco: Never Used   Vaping Use    Vaping Use: Never used   Substance and Sexual Activity    Alcohol use: Yes     Comment: occasionally  \"couple a month\"    Drug use: No    Sexual activity: Not on file   Other Topics Concern    Not on file   Social History Narrative    Not on file     Social Determinants of Health     Financial Resource Strain:     Difficulty of Paying Living Expenses: Not on file   Food Insecurity:     Worried About [email or web address] in the Last Year: Not on file    [address] in the Last Year: Not on file   Transportation Needs:     Lack of Transportation (Medical): Not on file    Lack of Transportation (Non-Medical):  Not on file   Physical Activity:     Days of Exercise per Week: Not on file    Minutes of Exercise per Session: Not on file   Stress:     Feeling of Stress : Not on file   Social Connections:     Frequency of Communication with Friends and Family: Not on file    Frequency of Social Gatherings with Friends and Family: Not on file    Attends Temple Services: Not on file    Active Member of Clubs or Organizations: Not on file    Attends Club or Organization Meetings: Not on file    Marital Status: Not on file   Intimate Partner Violence:     Fear of Current or Ex-Partner: Not on file   [employer] Emotionally Abused: Not on file    Physically Abused: Not on file    Sexually Abused: Not on file   Housing Stability:     Unable to Pay for Housing in the Last Year: Not on file    Number of Places Lived in the Last Year: Not on file    Unstable Housing in the Last Year: Not on file       No family history on file. Allergies:  Patient has no known allergies. Home Medications:  Prior to Admission medications    Medication Sig Start Date End Date Taking? Authorizing Provider   carvedilol (COREG) 12.5 MG tablet Take 12.5 mg by mouth 2 times daily (with meals)    Historical Provider, MD   sodium bicarbonate 650 MG tablet Take 650 mg by mouth 2 times daily    Historical Provider, MD   sevelamer (RENVELA) 800 MG tablet Take 1 tablet by mouth 3 times daily (with meals) 11/10/21   Yan Brown MD   clopidogrel (PLAVIX) 75 MG tablet Take 75 mg by mouth daily    Historical Provider, MD   enalapril (VASOTEC) 5 MG tablet Take 5 mg by mouth daily    Historical Provider, MD   NIFEdipine (PROCARDIA XL) 60 MG extended release tablet Take 60 mg by mouth daily    Historical Provider, MD   pantoprazole (PROTONIX) 40 MG tablet Take 40 mg by mouth daily    Historical Provider, MD   cloNIDine (CATAPRES) 0.1 MG/24HR PTWK Place 1 patch onto the skin once a week Indications: Sundays Sundays    Historical Provider, MD   rosuvastatin (CRESTOR) 5 MG tablet Take 5 mg by mouth daily    Historical Provider, MD   citalopram (CELEXA) 20 MG tablet Take 40 mg by mouth daily Takes 2 tabs (=40mg) daily    Historical Provider, MD       REVIEW OF SYSTEMS    (2-9 systems for level 4, 10 or more for level 5)      Review of Systems   Constitutional: Negative for fever. HENT: Negative for congestion. Eyes: Negative for photophobia. Respiratory: Positive for shortness of breath. Cardiovascular: Negative for chest pain. Gastrointestinal: Negative for abdominal pain and vomiting. Endocrine: Negative for polyuria.    Genitourinary: Negative for dysuria. Musculoskeletal: Negative for arthralgias. Skin: Negative for color change. Allergic/Immunologic: Negative for immunocompromised state. Neurological: Positive for lightheadedness. Hematological: Does not bruise/bleed easily. Psychiatric/Behavioral: Negative for agitation. PHYSICAL EXAM   (up to 7 for level 4, 8 or more for level 5)      INITIAL VITALS:   BP (!) 170/101   Pulse 70   Temp 97.7 °F (36.5 °C)   Resp 15   Wt 148 lb 13 oz (67.5 kg)   SpO2 99%   BMI 26.36 kg/m²     Physical Exam  Constitutional:       General: Not in acute distress. Appearance: Normal appearance. Normal weight. Not toxic-appearing. HENT:      Head: Normocephalic and atraumatic. Nose: Nose normal.      Mouth/Throat: Mucous membranes are moist.  Uvula midline no oropharyngeal edema. Pharynx: Oropharynx is clear. Eyes:      Extraocular Movements: Extraocular movements intact. Conjunctiva/sclera: Conjunctivae normal.      Pupils: Pupils are equal, round, and reactive to light. Neck:      Musculoskeletal: Normal range of motion and neck supple. No neck rigidity. Cardiovascular:      Rate and Rhythm: Normal rate and regular rhythm. Pulses: Normal pulses. Heart sounds: Normal heart sounds. No murmur. Pulmonary:      Effort: Pulmonary effort is normal.      Breath sounds: Normal breath sounds. No wheezing. Abdominal:      General: Abdomen is flat. Bowel sounds are normal.      Tenderness: There is no abdominal tenderness. Musculoskeletal:     Normal range of motion. General: No swelling or tenderness. No LE edema    Skin:     General: Skin is warm. Capillary Refill: Capillary refill takes less than 2 seconds. Coloration: Skin is not jaundiced. Neurological:      General: No focal deficit present. Mental Status: Alert and oriented to person, place, and time. Mental status is at baseline. Motor: No weakness. DIFFERENTIAL  DIAGNOSIS     PLAN (LABS / IMAGING / EKG):  Orders Placed This Encounter   Procedures    XR CHEST (2 VW)    CT CHEST PULMONARY EMBOLISM W CONTRAST    CBC with Auto Differential    Basic Metabolic Panel w/ Reflex to MG    Brain Natriuretic Peptide    Troponin    D-Dimer, Quantitative    Inpatient consult to Nephrology    EKG 12 Lead    Insert peripheral IV       MEDICATIONS ORDERED:  Orders Placed This Encounter   Medications    iopamidol (ISOVUE-370) 76 % injection 75 mL    DISCONTD: doxercalciferol (HECTOROL) injection 2 mcg    DISCONTD: heparin (porcine) injection 2,000 Units    DISCONTD: heparin (porcine) injection 2,100 Units    heparin (porcine) injection 1,600 Units    heparin (porcine) injection 1,600 Units    doxercalciferol (HECTOROL) injection 2 mcg    DISCONTD: doxercalciferol (HECTOROL) injection 2 mcg    DISCONTD: doxercalciferol (HECTOROL) 4 MCG/2ML injection     Emelia Coles: cabinet override    heparin (porcine) injection 2,000 Units    heparin (porcine) injection 2,100 Units           DIAGNOSTIC RESULTS / EMERGENCY DEPARTMENT COURSE / MDM     LABS:  Results for orders placed or performed during the hospital encounter of 05/06/22   CBC with Auto Differential   Result Value Ref Range    WBC 11.1 3.5 - 11.3 k/uL    RBC 3.81 (L) 3.95 - 5.11 m/uL    Hemoglobin 10.9 (L) 11.9 - 15.1 g/dL    Hematocrit 36.2 (L) 36.3 - 47.1 %    MCV 95.0 82.6 - 102.9 fL    MCH 28.6 25.2 - 33.5 pg    MCHC 30.1 28.4 - 34.8 g/dL    RDW 16.8 (H) 11.8 - 14.4 %    Platelets 334 913 - 355 k/uL    MPV 11.6 8.1 - 13.5 fL    NRBC Automated 0.0 0.0 per 100 WBC    Seg Neutrophils 57 36 - 65 %    Lymphocytes 26 24 - 43 %    Monocytes 12 3 - 12 %    Eosinophils % 3 1 - 4 %    Basophils 1 0 - 2 %    Immature Granulocytes 1 (H) 0 %    Segs Absolute 6.41 1.50 - 8.10 k/uL    Absolute Lymph # 2.85 1.10 - 3.70 k/uL    Absolute Mono # 1.32 (H) 0.10 - 1.20 k/uL    Absolute Eos # 0.34 0.00 - 0.44 k/uL Basophils Absolute 0.06 0.00 - 0.20 k/uL    Absolute Immature Granulocyte 0.10 0.00 - 0.30 k/uL    RBC Morphology ANISOCYTOSIS PRESENT    Basic Metabolic Panel w/ Reflex to MG   Result Value Ref Range    Glucose 150 (H) 70 - 99 mg/dL    BUN 29 (H) 8 - 23 mg/dL    CREATININE 4.61 (H) 0.50 - 0.90 mg/dL    Calcium 9.0 8.6 - 10.4 mg/dL    Sodium 139 135 - 144 mmol/L    Potassium 4.6 3.7 - 5.3 mmol/L    Chloride 105 98 - 107 mmol/L    CO2 23 20 - 31 mmol/L    Anion Gap 11 9 - 17 mmol/L    GFR Non-African American 9 (L) >60 mL/min    GFR  11 (L) >60 mL/min    GFR Comment         Brain Natriuretic Peptide   Result Value Ref Range    Pro-BNP 6,949 (H) <300 pg/mL   Troponin   Result Value Ref Range    Troponin, High Sensitivity 96 (HH) 0 - 14 ng/L   D-Dimer, Quantitative   Result Value Ref Range    D-Dimer, Quant 6.00 mg/L FEU   EKG 12 Lead   Result Value Ref Range    Ventricular Rate 70 BPM    Atrial Rate 70 BPM    P-R Interval 206 ms    QRS Duration 76 ms    Q-T Interval 456 ms    QTc Calculation (Bazett) 492 ms    P Axis 21 degrees    R Axis -36 degrees    T Axis -130 degrees         RADIOLOGY:  XR CHEST (2 VW)    Result Date: 5/6/2022  EXAMINATION: TWO XRAY VIEWS OF THE CHEST 5/6/2022 11:51 am COMPARISON: None. HISTORY: ORDERING SYSTEM PROVIDED HISTORY: sob TECHNOLOGIST PROVIDED HISTORY: sob FINDINGS: Right-sided venous lines terminate in the SVC and atrium. There is a small left basilar infiltrate suspected otherwise the lungs are clear. Cardiac silhouette and osseous structures intact. Small left basilar pneumonia suspected. This finding appears new compared to 04/20/2022     CT CHEST PULMONARY EMBOLISM W CONTRAST    Result Date: 5/6/2022  EXAMINATION: CTA OF THE CHEST 5/6/2022 1:19 pm TECHNIQUE: CTA of the chest was performed after the administration of intravenous contrast.  Multiplanar reformatted images are provided for review. MIP images are provided for review.  Dose modulation, iterative reconstruction, and/or weight based adjustment of the mA/kV was utilized to reduce the radiation dose to as low as reasonably achievable. COMPARISON: Chest x-ray dated 05/06/2022. CT chest dated 01/29/2022. HISTORY: ORDERING SYSTEM PROVIDED HISTORY: sob TECHNOLOGIST PROVIDED HISTORY: sob Decision Support Exception - unselect if not a suspected or confirmed emergency medical condition->Emergency Medical Condition (MA) Reason for Exam: SOB Additional signs and symptoms: D-Dimer= 6.00 FINDINGS: Pulmonary Arteries: Pulmonary arteries are adequately opacified for evaluation. No evidence of intraluminal filling defect to suggest pulmonary embolism. Main pulmonary artery is normal in caliber. Mediastinum: No evidence of mediastinal lymphadenopathy. The heart and pericardium demonstrate no acute abnormality. There is no acute abnormality of the thoracic aorta. Right chest port and right IJ dialysis catheter noted. There is a tiny hiatal hernia. Lungs/pleura: Previously seen patchy ground-glass opacities in the lungs have significantly improved. There is minimal residual patchy ground-glass attenuation in the lungs. Bronchiectasis and fibrosis at the lung bases is unchanged. There is no new airspace consolidation, pleural effusion, or pneumothorax. Upper Abdomen: Limited images of the upper abdomen are unremarkable. Soft Tissues/Bones: No acute bone or soft tissue abnormality. Chronic changes in the mid thoracic spine. There is evidence of previous left mastectomy and reconstruction. Loop recorder device noted in the medial left breast.  No axillary lymphadenopathy. 1.  No evidence of pulmonary embolism. 2.  No acute pulmonary process. Previously seen patchy ground-glass opacities in the lungs (CT chest 01/29/2022) have significantly improved with only minimal residual patchy ground-glass attenuation. 3.  Chronic fibrotic changes at the lung bases. 4.  Tiny hiatal hernia.        EKG  EKG Interpretation    Interpreted by me    Rhythm: normal sinus   Rate: normal  Axis: normal  Ectopy: none  Conduction: normal  ST Segments: no acute change  T Waves: no acute change  Q Waves: none    Clinical Impression: no acute changes and normal EKG    All EKG's are interpreted by the Emergency Department Physician who either signs or Co-signs this chart in the absence of a cardiologist.    EMERGENCY DEPARTMENT COURSE:  Patient breathing quietly and unlabored on room air. Speech is normal and speaking in full sentences without requiring to pause to take a breath. No adventitious heart or lung sounds no sign of fluid overload reports of SBP 90 prehospital however normotensive now SBP is 130 overall patient appears well but would like to rule out cardiac process due to orthostatic dizziness and PE due to her shortness of breath. Patient anuric    Troponin below patient's baseline, D-dimer elevated but unsurprising due to ESRD status. Will get CT PE rule out. Will consult Dr. Vero Lester for contrast load    No PE patient has significant fibrosis in the bases no pneumonia. Spoke with  patient will go to dialysis        PROCEDURES:  None    CONSULTS:  IP CONSULT TO NEPHROLOGY    CRITICAL CARE:  None    FINAL IMPRESSION      1. Dehydration    2.  Encounter for dialysis [facility])          DISPOSITION / PLAN     DISPOSITION        PATIENT REFERRED TO:  Yanet Montoya MD  . [address]  [phone]    Schedule an appointment as soon as possible for a visit in 2 days      [facility] ED  [address] [address]  [phone]    As needed, If symptoms worsen      DISCHARGE MEDICATIONS:  Discharge Medication List as of 5/6/2022  7:52 PM          Jocelyn Martinez MD  Emergency Medicine Resident    (Please note that portions of thisnote were completed with a voice recognition program.  Efforts were made to edit the dictations but occasionally words are mis-transcribed.) Farzana Lake MD  Resident  05/09/22 1589

## 2022-05-06 NOTE — ED PROVIDER NOTES
[facility]  FACULTY HANDOFF       Handoff taken on the following patient from prior Attending Physician:  Pt Name: Henri Greene  PCP:  Yanet Montoya MD    Attestation  I was available and discussed any additional care issues that arose and coordinated the management plans with the resident(s) caring for the patient during my duty period. Any areas of disagreement with resident's documentation of care or procedures are noted on the chart. I was personally present for the key portions of any/all procedures during my duty period. I have documented in the chart those procedures where I was not present during the key portions.          [facility]       Chief Complaint   Patient presents with    Fatigue     missed several dialysis treatments          CURRENT MEDICATIONS     Previous Medications  Previous Medications    CARVEDILOL (COREG) 12.5 MG TABLET    Take 12.5 mg by mouth 2 times daily (with meals)    CITALOPRAM (CELEXA) 20 MG TABLET    Take 40 mg by mouth daily Takes 2 tabs (=40mg) daily    CLONIDINE (CATAPRES) 0.1 MG/24HR PTWK    Place 1 patch onto the skin once a week Indications: Sundays Sundays    CLOPIDOGREL (PLAVIX) 75 MG TABLET    Take 75 mg by mouth daily    ENALAPRIL (VASOTEC) 5 MG TABLET    Take 5 mg by mouth daily    NIFEDIPINE (PROCARDIA XL) 60 MG EXTENDED RELEASE TABLET    Take 60 mg by mouth daily    PANTOPRAZOLE (PROTONIX) 40 MG TABLET    Take 40 mg by mouth daily    ROSUVASTATIN (CRESTOR) 5 MG TABLET    Take 5 mg by mouth daily    SEVELAMER (RENVELA) 800 MG TABLET    Take 1 tablet by mouth 3 times daily (with meals)    SODIUM BICARBONATE 650 MG TABLET    Take 650 mg by mouth 2 times daily       Encounter Medications  Orders Placed This Encounter   Medications    iopamidol (ISOVUE-370) 76 % injection 75 mL    DISCONTD: doxercalciferol (HECTOROL) injection 2 mcg    DISCONTD: heparin (porcine) injection 2,000 Units    DISCONTD: heparin (porcine) injection 2,100 Units    heparin (porcine) injection 1,600 Units    heparin (porcine) injection 1,600 Units    doxercalciferol (HECTOROL) injection 2 mcg    DISCONTD: doxercalciferol (HECTOROL) injection 2 mcg    doxercalciferol (HECTOROL) 4 MCG/2ML injection     Emelia Coles: cabinet override    heparin (porcine) injection 2,000 Units    heparin (porcine) injection 2,100 Units       ALLERGIES     has No Known Allergies. RECENT VITALS:   Temp: 97.7 °F (36.5 °C),  Pulse: 70, Resp: 15, BP: (!) 170/101    RADIOLOGY:   CT CHEST PULMONARY EMBOLISM W CONTRAST   Final Result   1. No evidence of pulmonary embolism. 2.  No acute pulmonary process. Previously seen patchy ground-glass   opacities in the lungs (CT chest 01/29/2022) have significantly improved with   only minimal residual patchy ground-glass attenuation. 3.  Chronic fibrotic changes at the lung bases. 4.  Tiny hiatal hernia. XR CHEST (2 VW)   Final Result   Small left basilar pneumonia suspected.   This finding appears new compared to   04/20/2022             LABS:  Labs Reviewed   CBC WITH AUTO DIFFERENTIAL - Abnormal; Notable for the following components:       Result Value    RBC 3.81 (*)     Hemoglobin 10.9 (*)     Hematocrit 36.2 (*)     RDW 16.8 (*)     Immature Granulocytes 1 (*)     Absolute Mono # 1.32 (*)     All other components within normal limits   BASIC METABOLIC PANEL W/ REFLEX TO MG FOR LOW K - Abnormal; Notable for the following components:    Glucose 150 (*)     BUN 29 (*)     CREATININE 4.61 (*)     GFR Non- 9 (*)     GFR  11 (*)     All other components within normal limits   BRAIN NATRIURETIC PEPTIDE - Abnormal; Notable for the following components:    Pro-BNP 6,949 (*)     All other components within normal limits   TROPONIN - Abnormal; Notable for the following components:    Troponin, High Sensitivity 96 (*)     All other components within normal limits   D-DIMER, QUANTITATIVE PLAN/ TASKS OUTSTANDING     This patient is a 67 y.o. Female. Patient was seen earlier in the ED, was at dialysis at time of signout. Patient brought back to the ER, has no complaints now is asking for discharge.     (Please note that portions of this note were completed with a voice recognition program.  Efforts were made to edit the dictations but occasionally words are mis-transcribed.)    Gilson Samson MD,, MD  Attending Emergency Physician         Gilson Samson MD  05/06/22 2016

## 2022-05-06 NOTE — CARE COORDINATION
KIM notified of HD patient. KIM contacted [place] [address] for Dialysis information. Spoke with Leia Wilkins RN who will fax records. Patient normally MWF but received treatment yesterday as clinic did not have water on 5/4/22. Charge RN notified.

## 2022-05-07 LAB
EKG ATRIAL RATE: 70 BPM
EKG P AXIS: 21 DEGREES
EKG P-R INTERVAL: 206 MS
EKG Q-T INTERVAL: 456 MS
EKG QRS DURATION: 76 MS
EKG QTC CALCULATION (BAZETT): 492 MS
EKG R AXIS: -36 DEGREES
EKG T AXIS: -130 DEGREES
EKG VENTRICULAR RATE: 70 BPM

## 2022-05-07 PROCEDURE — 93010 ELECTROCARDIOGRAM REPORT: CPT | Performed by: INTERNAL MEDICINE

## 2022-05-09 ENCOUNTER — APPOINTMENT (OUTPATIENT)
Dept: GENERAL RADIOLOGY | Age: 77
End: 2022-05-09
Payer: MEDICARE

## 2022-05-09 ENCOUNTER — HOSPITAL ENCOUNTER (EMERGENCY)
Age: 77
Discharge: HOME | End: 2022-05-09
Attending: EMERGENCY MEDICINE
Payer: MEDICARE

## 2022-05-09 VITALS
HEART RATE: 75 BPM | OXYGEN SATURATION: 100 % | HEIGHT: 63 IN | RESPIRATION RATE: 18 BRPM | WEIGHT: 153 LBS | DIASTOLIC BLOOD PRESSURE: 79 MMHG | TEMPERATURE: 98.9 F | SYSTOLIC BLOOD PRESSURE: 150 MMHG | BODY MASS INDEX: 27.11 KG/M2

## 2022-05-09 DIAGNOSIS — R42 DIZZINESS: Primary | ICD-10-CM

## 2022-05-09 LAB
ABSOLUTE EOS #: 0.49 K/UL (ref 0–0.44)
ABSOLUTE IMMATURE GRANULOCYTE: 0.27 K/UL (ref 0–0.3)
ABSOLUTE LYMPH #: 2.57 K/UL (ref 1.1–3.7)
ABSOLUTE MONO #: 0.89 K/UL (ref 0.1–1.2)
ANION GAP SERPL CALCULATED.3IONS-SCNC: 14 MMOL/L (ref 9–17)
BASOPHILS # BLD: 1 % (ref 0–2)
BASOPHILS ABSOLUTE: 0.05 K/UL (ref 0–0.2)
BUN BLDV-MCNC: 22 MG/DL (ref 8–23)
BUN/CREAT BLD: 6 (ref 9–20)
CALCIUM SERPL-MCNC: 8.9 MG/DL (ref 8.6–10.4)
CHLORIDE BLD-SCNC: 95 MMOL/L (ref 98–107)
CO2: 25 MMOL/L (ref 20–31)
CREAT SERPL-MCNC: 3.65 MG/DL (ref 0.5–0.9)
EOSINOPHILS RELATIVE PERCENT: 5 % (ref 1–4)
GFR AFRICAN AMERICAN: 15 ML/MIN
GFR NON-AFRICAN AMERICAN: 12 ML/MIN
GFR SERPL CREATININE-BSD FRML MDRD: ABNORMAL ML/MIN/{1.73_M2}
GLUCOSE BLD-MCNC: 114 MG/DL (ref 70–99)
HCT VFR BLD CALC: 35.2 % (ref 36.3–47.1)
HEMOGLOBIN: 10.9 G/DL (ref 11.9–15.1)
IMMATURE GRANULOCYTES: 3 %
LYMPHOCYTES # BLD: 26 % (ref 24–43)
MCH RBC QN AUTO: 28.8 PG (ref 25.2–33.5)
MCHC RBC AUTO-ENTMCNC: 31 G/DL (ref 28.4–34.8)
MCV RBC AUTO: 93.1 FL (ref 82.6–102.9)
MONOCYTES # BLD: 9 % (ref 3–12)
NRBC AUTOMATED: 0 PER 100 WBC
PDW BLD-RTO: 16.4 % (ref 11.8–14.4)
PLATELET # BLD: 186 K/UL (ref 138–453)
PMV BLD AUTO: 11.6 FL (ref 8.1–13.5)
POTASSIUM SERPL-SCNC: 3.6 MMOL/L (ref 3.7–5.3)
RBC # BLD: 3.78 M/UL (ref 3.95–5.11)
RBC # BLD: ABNORMAL 10*6/UL
SEG NEUTROPHILS: 56 % (ref 36–65)
SEGMENTED NEUTROPHILS ABSOLUTE COUNT: 5.56 K/UL (ref 1.5–8.1)
SODIUM BLD-SCNC: 134 MMOL/L (ref 135–144)
WBC # BLD: 9.8 K/UL (ref 3.5–11.3)

## 2022-05-09 PROCEDURE — 71045 X-RAY EXAM CHEST 1 VIEW: CPT

## 2022-05-09 PROCEDURE — 85025 COMPLETE CBC W/AUTO DIFF WBC: CPT

## 2022-05-09 PROCEDURE — 80048 BASIC METABOLIC PNL TOTAL CA: CPT

## 2022-05-09 PROCEDURE — 99285 EMERGENCY DEPT VISIT HI MDM: CPT

## 2022-05-09 PROCEDURE — 93005 ELECTROCARDIOGRAM TRACING: CPT | Performed by: EMERGENCY MEDICINE

## 2022-05-09 ASSESSMENT — ENCOUNTER SYMPTOMS
EYE DISCHARGE: 0
DIARRHEA: 0
FACIAL SWELLING: 0
COLOR CHANGE: 0
EYE REDNESS: 0
VOMITING: 0
COUGH: 0
ABDOMINAL PAIN: 0
SHORTNESS OF BREATH: 0
CONSTIPATION: 0

## 2022-05-09 NOTE — ED PROVIDER NOTES
[address] [place] [address] ED  EMERGENCY DEPARTMENT ENCOUNTER      Pt Name: Jacqualine Councilman  MRN: [id number]  [place] 1945  Date of evaluation: 5/9/2022  Provider: García Taylor MD    CHIEF COMPLAINT       Chief Complaint   Patient presents with    Fatigue     Started today at dialysis         HISTORY OF PRESENT ILLNESS  (Location/Symptom, Timing/Onset, Context/Setting, Quality, Duration, Modifying Factors, Severity.)   Jacqualine Councilman is a 67 y.o. female who presents to the emergency department for dizziness. She was at dialysis and was 2 hours into her treatment and she became dizzy. The patient does not think that she passed out. She feels back to normal now and wants to go home. She does not complain of headache or chest pain or shortness of breath. No vomiting or abdominal pain. This happened just before coming into the emergency department. Nursing Notes were reviewed. ALLERGIES     Patient has no known allergies.     CURRENT MEDICATIONS       Previous Medications    CARVEDILOL (COREG) 12.5 MG TABLET    Take 12.5 mg by mouth 2 times daily (with meals)    CITALOPRAM (CELEXA) 20 MG TABLET    Take 40 mg by mouth daily Takes 2 tabs (=40mg) daily    CLONIDINE (CATAPRES) 0.1 MG/24HR PTWK    Place 1 patch onto the skin once a week Indications: Sundays Sundays    CLOPIDOGREL (PLAVIX) 75 MG TABLET    Take 75 mg by mouth daily    ENALAPRIL (VASOTEC) 5 MG TABLET    Take 5 mg by mouth daily    NIFEDIPINE (PROCARDIA XL) 60 MG EXTENDED RELEASE TABLET    Take 60 mg by mouth daily    PANTOPRAZOLE (PROTONIX) 40 MG TABLET    Take 40 mg by mouth daily    ROSUVASTATIN (CRESTOR) 5 MG TABLET    Take 5 mg by mouth daily    SEVELAMER (RENVELA) 800 MG TABLET    Take 1 tablet by mouth 3 times daily (with meals)    SODIUM BICARBONATE 650 MG TABLET    Take 650 mg by mouth 2 times daily       PAST MEDICAL HISTORY         Diagnosis Date    Anxiety and depression     Arthritis     Rheumatoid     Cancer ([facility] [address]     left breast cancer    Cerebral artery occlusion with cerebral infarction [facility]) jan 2020 & March 2020    poor balance since strokes    Chronic kidney disease, stage 5 (HCC)     hemodialysis Mon-Wed-Fri    Diabetes mellitus ([facility] [address]     GERD (gastroesophageal reflux disease)     Hearing loss     bilateral. Does not have hearing aides    Hemodialysis patient ([facility] [address]     M-W-F started in 2020    History of blood transfusion     50 plus yrs ago with pregnancy    Hyperlipidemia     Hypertension     Migraines     MRSA (methicillin resistant staph aureus) culture positive 2015    back    Neuropathy     PAT (obstructive sleep apnea)     had UPPP  \"have not used machine in years\"    Prolonged emergence from general anesthesia     PVD (peripheral vascular disease) ([facility] [address]     SOB (shortness of breath)     \"long distance\"    Tinnitus        SURGICAL HISTORY           Procedure Laterality Date    APPENDECTOMY      BACK SURGERY      I and D lower back MRSA    BREAST REDUCTION SURGERY Left 1999    CENTRAL LINE  1/29/2022         COLONOSCOPY      HYSTERECTOMY      INSERTABLE CARDIAC MONITOR      IR TUNNELED CATHETER PLACEMENT GREATER THAN 5 YEARS  2/1/2022    IR TUNNELED CATHETER PLACEMENT GREATER THAN 5 YEARS 2/1/2022 [facility]    MASTECTOMY Left     lymph nodes removed    MASTECTOMY Left 05/14/2018    axillary mastectomy    [place] EXC SKIN Nimesh Bryant <5MM TRUNK,ARM,LEG Right 5/14/2018    RIGHT AXILLARY MASTECTOMY performed by Juan Luis Martins DO at [address] [place] [address]     No family history on file. No family status information on file. SOCIAL HISTORY      reports that she quit smoking about 32 years ago. She has never used smokeless tobacco. She reports current alcohol use. She reports that she does not use drugs.     REVIEW OF SYSTEMS    (2-9 systems for level 4, 10 or more for level 5)     Review of Systems   Constitutional: Negative for chills, fatigue and fever.   HENT: Negative for congestion, ear discharge and facial swelling. Eyes: Negative for discharge and redness. Respiratory: Negative for cough and shortness of breath. Cardiovascular: Negative for chest pain. Gastrointestinal: Negative for abdominal pain, constipation, diarrhea and vomiting. Genitourinary: Negative for dysuria and hematuria. Musculoskeletal: Negative for arthralgias. Skin: Negative for color change and rash. Neurological: Positive for dizziness. Negative for syncope, numbness and headaches. Hematological: Negative for adenopathy. Psychiatric/Behavioral: Negative for confusion. The patient is not nervous/anxious. Except as noted above the remainder of the review of systems was reviewed and negative. PHYSICAL EXAM    (up to 7 for level 4, 8 or more for level 5)     Vitals:    05/09/22 1506   BP: (!) 162/82   Pulse: 74   Resp: 22   Temp: 98.9 °F (37.2 °C)   TempSrc: Oral   SpO2: 100%   Weight: 153 lb (69.4 kg)   Height: 5' 3\" (1.6 m)       Physical Exam  Vitals reviewed. Constitutional:       General: She is not in acute distress. Appearance: She is well-developed. She is not diaphoretic. HENT:      Head: Normocephalic and atraumatic. Eyes:      General: No scleral icterus. Right eye: No discharge. Left eye: No discharge. Cardiovascular:      Rate and Rhythm: Normal rate and regular rhythm. Pulmonary:      Effort: Pulmonary effort is normal. No respiratory distress. Breath sounds: Normal breath sounds. No stridor. No wheezing or rales. Abdominal:      General: There is no distension. Palpations: Abdomen is soft. Tenderness: There is no abdominal tenderness. Musculoskeletal:         General: Normal range of motion. Cervical back: Neck supple. Lymphadenopathy:      Cervical: No cervical adenopathy. Skin:     General: Skin is warm and dry. Findings: No erythema or rash.    Neurological:      Mental Status: She is alert and oriented to person, place, and time. Psychiatric:         Behavior: Behavior normal.             DIAGNOSTIC RESULTS     EKG: All EKG's are interpreted by the Emergency Department Physician who either signs or Co-signs this chart in the absence of a cardiologist.    EKG on my interpretation shows sinus rhythm with a rate of 75 and no acute changes. RADIOLOGY:   Non-plain film images such as CT, Ultrasound and MRI are read by the radiologist. Plain radiographic images are visualized and preliminarily interpreted by the emergency physician with the below findings:    Interpretation per the Radiologist below, if available at the time of this note:    XR CHEST PORTABLE    Result Date: 5/9/2022  EXAMINATION: [address] 5/9/2022 3:19 pm COMPARISON: PA and lateral chest May 6, 2022. CT of the chest May 6, 2022. HISTORY: ORDERING SYSTEM PROVIDED HISTORY: dizzy, dialysis patient TECHNOLOGIST PROVIDED HISTORY: dizzy, dialysis patient Reason for Exam: fatigue Additional signs and symptoms: fatigue x 3 days FINDINGS: Considering mild hypoinflation, the heart is at the upper limits of normal in size. Calcification is present within the aorta. Loop recorder is apparent. Port-A-Cath enters from the right with the tip in the region of the superior vena cava. Large-bore catheter entering from the right, apparently dialysis catheter, is located near the caval atrial junction. No evidence of pneumothorax, pleural effusion, infiltrate, or abnormal lung mass. Some degenerative changes are present in the spine. Support devices, as described. No acute process is identified.        LABS:  Labs Reviewed   CBC WITH AUTO DIFFERENTIAL - Abnormal; Notable for the following components:       Result Value    RBC 3.78 (*)     Hemoglobin 10.9 (*)     Hematocrit 35.2 (*)     RDW 16.4 (*)     Eosinophils % 5 (*)     Immature Granulocytes 3 (*)     Absolute Eos # 0.49 (*)     All other components within normal limits   BASIC METABOLIC PANEL - Abnormal; Notable for the following components:    Glucose 114 (*)     CREATININE 3.65 (*)     Bun/Cre Ratio 6 (*)     Sodium 134 (*)     Potassium 3.6 (*)     Chloride 95 (*)     GFR Non- 12 (*)     GFR  15 (*)     All other components within normal limits       All other labs were within normal range or not returned as of this dictation. EMERGENCY DEPARTMENT COURSE and DIFFERENTIAL DIAGNOSIS/MDM:   Vitals:    Vitals:    05/09/22 1506   BP: (!) 162/82   Pulse: 74   Resp: 22   Temp: 98.9 °F (37.2 °C)   TempSrc: Oral   SpO2: 100%   Weight: 153 lb (69.4 kg)   Height: 5' 3\" (1.6 m)       No orders of the defined types were placed in this encounter. Medical Decision Making: Work-up is negative and she feels back to normal and is able to be discharged. Findings were discussed with the patient. CONSULTS:  None    PROCEDURES:  None    FINAL IMPRESSION      1. Dizziness          DISPOSITION/PLAN   DISPOSITION Decision To Discharge 05/09/2022 04:10:53 PM      PATIENT REFERRED TO:   Salina Shaikh MD  [address] [place] [address]  [phone]      As needed    [facility] ED  [address] [facility]  [phone]    If symptoms worsen      DISCHARGE MEDICATIONS:     New Prescriptions    No medications on file       The care is provided during an unprecedented national emergency due to the novel coronavirus, COVID-19.     (Please note that portions of this note were completed with a voice recognition program.  Efforts were made to edit the dictations but occasionally words are mis-transcribed.)    Randy Banerjee MD  Attending Emergency Physician           Randy Banerjee MD  05/09/22 0974

## 2022-05-09 NOTE — ED NOTES
Pt presents to ED from dialysis treatment via EMS. EMS reports 2 hours into dialysis pt began to feel weak and lethargic. Pt has c/o weakness and feels tired. Pt was seen at ED on 5/6/22 for similar complication.       Wilbert Parks RN  05/09/22 7534

## 2022-05-10 LAB
EKG ATRIAL RATE: 75 BPM
EKG P AXIS: 1 DEGREES
EKG P-R INTERVAL: 190 MS
EKG Q-T INTERVAL: 440 MS
EKG QRS DURATION: 82 MS
EKG QTC CALCULATION (BAZETT): 491 MS
EKG R AXIS: -35 DEGREES
EKG T AXIS: 155 DEGREES
EKG VENTRICULAR RATE: 75 BPM

## 2022-05-10 PROCEDURE — 93010 ELECTROCARDIOGRAM REPORT: CPT | Performed by: INTERNAL MEDICINE

## 2022-12-15 ENCOUNTER — HOSPITAL ENCOUNTER (OUTPATIENT)
Age: 77
Setting detail: SPECIMEN
Discharge: HOME | End: 2022-12-15
Payer: COMMERCIAL

## 2022-12-15 LAB
ANION GAP SERPL CALCULATED.3IONS-SCNC: 22 MMOL/L (ref 9–17)
BUN BLDV-MCNC: 32 MG/DL (ref 8–23)
BUN/CREAT BLD: 4 (ref 9–20)
CALCIUM SERPL-MCNC: 9.9 MG/DL (ref 8.6–10.4)
CHLORIDE BLD-SCNC: 92 MMOL/L (ref 98–107)
CO2: 19 MMOL/L (ref 20–31)
CREAT SERPL-MCNC: 7.38 MG/DL (ref 0.5–0.9)
GFR SERPL CREATININE-BSD FRML MDRD: 5 ML/MIN/1.73M2
GLUCOSE BLD-MCNC: 110 MG/DL (ref 70–99)
HCT VFR BLD CALC: 34.7 % (ref 36.3–47.1)
HEMOGLOBIN: 10.8 G/DL (ref 11.9–15.1)
MCH RBC QN AUTO: 29.8 PG (ref 25.2–33.5)
MCHC RBC AUTO-ENTMCNC: 31.1 G/DL (ref 28.4–34.8)
MCV RBC AUTO: 95.6 FL (ref 82.6–102.9)
NRBC AUTOMATED: 0 PER 100 WBC
PDW BLD-RTO: 13 % (ref 11.8–14.4)
PLATELET # BLD: 135 K/UL (ref 138–453)
PMV BLD AUTO: 12.1 FL (ref 8.1–13.5)
POTASSIUM SERPL-SCNC: 4.6 MMOL/L (ref 3.7–5.3)
RBC # BLD: 3.63 M/UL (ref 3.95–5.11)
SODIUM BLD-SCNC: 133 MMOL/L (ref 135–144)
WBC # BLD: 9.5 K/UL (ref 3.5–11.3)

## 2022-12-15 PROCEDURE — 85027 COMPLETE CBC AUTOMATED: CPT

## 2022-12-15 PROCEDURE — 36415 COLL VENOUS BLD VENIPUNCTURE: CPT

## 2022-12-15 PROCEDURE — P9603 ONE-WAY ALLOW PRORATED MILES: HCPCS

## 2022-12-15 PROCEDURE — 80048 BASIC METABOLIC PNL TOTAL CA: CPT

## 2023-03-24 ENCOUNTER — HOSPITAL ENCOUNTER (OUTPATIENT)
Age: 78
Setting detail: SPECIMEN
Discharge: HOME | End: 2023-03-24

## 2023-03-24 LAB
ANION GAP SERPL CALCULATED.3IONS-SCNC: 16 MMOL/L (ref 9–17)
BUN SERPL-MCNC: 42 MG/DL (ref 8–23)
BUN/CREAT BLD: 6 (ref 9–20)
CALCIUM SERPL-MCNC: 9.6 MG/DL (ref 8.6–10.4)
CHLORIDE SERPL-SCNC: 102 MMOL/L (ref 98–107)
CO2 SERPL-SCNC: 23 MMOL/L (ref 20–31)
CREAT SERPL-MCNC: 7.1 MG/DL (ref 0.5–0.9)
GFR SERPL CREATININE-BSD FRML MDRD: 5 ML/MIN/1.73M2
GLUCOSE SERPL-MCNC: 124 MG/DL (ref 70–99)
HCT VFR BLD AUTO: 36.5 % (ref 36.3–47.1)
HGB BLD-MCNC: 11 G/DL (ref 11.9–15.1)
MCH RBC QN AUTO: 31.4 PG (ref 25.2–33.5)
MCHC RBC AUTO-ENTMCNC: 30.1 G/DL (ref 28.4–34.8)
MCV RBC AUTO: 104.3 FL (ref 82.6–102.9)
NRBC AUTOMATED: 0 PER 100 WBC
PDW BLD-RTO: 16.7 % (ref 11.8–14.4)
PLATELET # BLD AUTO: 172 K/UL (ref 138–453)
PMV BLD AUTO: 11.3 FL (ref 8.1–13.5)
POTASSIUM SERPL-SCNC: 4.9 MMOL/L (ref 3.7–5.3)
RBC # BLD: 3.5 M/UL (ref 3.95–5.11)
SODIUM SERPL-SCNC: 141 MMOL/L (ref 135–144)
WBC # BLD AUTO: 7.8 K/UL (ref 3.5–11.3)

## 2023-03-24 PROCEDURE — 85027 COMPLETE CBC AUTOMATED: CPT

## 2023-03-24 PROCEDURE — 36415 COLL VENOUS BLD VENIPUNCTURE: CPT

## 2023-03-24 PROCEDURE — P9603 ONE-WAY ALLOW PRORATED MILES: HCPCS

## 2023-03-24 PROCEDURE — 80048 BASIC METABOLIC PNL TOTAL CA: CPT

## 2023-03-29 ENCOUNTER — HOSPITAL ENCOUNTER (OUTPATIENT)
Age: 78
Setting detail: SPECIMEN
Discharge: HOME | End: 2023-03-29
Payer: MEDICARE

## 2023-03-29 LAB
ANION GAP SERPL CALCULATED.3IONS-SCNC: 16 MMOL/L (ref 9–17)
BUN SERPL-MCNC: 43 MG/DL (ref 8–23)
BUN/CREAT BLD: 6 (ref 9–20)
CALCIUM SERPL-MCNC: 9.3 MG/DL (ref 8.6–10.4)
CHLORIDE SERPL-SCNC: 102 MMOL/L (ref 98–107)
CO2 SERPL-SCNC: 23 MMOL/L (ref 20–31)
CREAT SERPL-MCNC: 7.68 MG/DL (ref 0.5–0.9)
GFR SERPL CREATININE-BSD FRML MDRD: 5 ML/MIN/1.73M2
GLUCOSE SERPL-MCNC: 107 MG/DL (ref 70–99)
HCT VFR BLD AUTO: 35 % (ref 36.3–47.1)
HGB BLD-MCNC: 10.7 G/DL (ref 11.9–15.1)
MCH RBC QN AUTO: 31.7 PG (ref 25.2–33.5)
MCHC RBC AUTO-ENTMCNC: 30.6 G/DL (ref 28.4–34.8)
MCV RBC AUTO: 103.6 FL (ref 82.6–102.9)
NRBC AUTOMATED: 0 PER 100 WBC
PDW BLD-RTO: 17.3 % (ref 11.8–14.4)
PLATELET # BLD AUTO: 184 K/UL (ref 138–453)
PMV BLD AUTO: 11.5 FL (ref 8.1–13.5)
POTASSIUM SERPL-SCNC: 5.1 MMOL/L (ref 3.7–5.3)
RBC # BLD: 3.38 M/UL (ref 3.95–5.11)
SODIUM SERPL-SCNC: 141 MMOL/L (ref 135–144)
WBC # BLD AUTO: 7.5 K/UL (ref 3.5–11.3)

## 2023-03-29 PROCEDURE — P9603 ONE-WAY ALLOW PRORATED MILES: HCPCS

## 2023-03-29 PROCEDURE — 85027 COMPLETE CBC AUTOMATED: CPT

## 2023-03-29 PROCEDURE — 36415 COLL VENOUS BLD VENIPUNCTURE: CPT

## 2023-03-29 PROCEDURE — 80048 BASIC METABOLIC PNL TOTAL CA: CPT

## 2023-04-06 ENCOUNTER — HOSPITAL ENCOUNTER (OUTPATIENT)
Age: 78
Setting detail: SPECIMEN
Discharge: HOME | End: 2023-04-06

## 2023-04-06 LAB
ANION GAP SERPL CALCULATED.3IONS-SCNC: 13 MMOL/L (ref 9–17)
BUN SERPL-MCNC: 22 MG/DL (ref 8–23)
CALCIUM SERPL-MCNC: 8.9 MG/DL (ref 8.6–10.4)
CHLORIDE SERPL-SCNC: 100 MMOL/L (ref 98–107)
CO2 SERPL-SCNC: 25 MMOL/L (ref 20–31)
CREAT SERPL-MCNC: 4.56 MG/DL (ref 0.5–0.9)
GFR SERPL CREATININE-BSD FRML MDRD: 9 ML/MIN/1.73M2
GLUCOSE SERPL-MCNC: 75 MG/DL (ref 70–99)
HCT VFR BLD AUTO: 33.1 % (ref 36.3–47.1)
HGB BLD-MCNC: 10.4 G/DL (ref 11.9–15.1)
MCH RBC QN AUTO: 32 PG (ref 25.2–33.5)
MCHC RBC AUTO-ENTMCNC: 31.4 G/DL (ref 28.4–34.8)
MCV RBC AUTO: 101.8 FL (ref 82.6–102.9)
NRBC AUTOMATED: 0 PER 100 WBC
PDW BLD-RTO: 15.9 % (ref 11.8–14.4)
PLATELET # BLD AUTO: 157 K/UL (ref 138–453)
PMV BLD AUTO: 12.3 FL (ref 8.1–13.5)
POTASSIUM SERPL-SCNC: 4.3 MMOL/L (ref 3.7–5.3)
RBC # BLD: 3.25 M/UL (ref 3.95–5.11)
SODIUM SERPL-SCNC: 138 MMOL/L (ref 135–144)
WBC # BLD AUTO: 6.3 K/UL (ref 3.5–11.3)

## 2023-04-06 PROCEDURE — P9603 ONE-WAY ALLOW PRORATED MILES: HCPCS

## 2023-04-06 PROCEDURE — 85027 COMPLETE CBC AUTOMATED: CPT

## 2023-04-06 PROCEDURE — 80048 BASIC METABOLIC PNL TOTAL CA: CPT

## 2023-04-06 PROCEDURE — 36415 COLL VENOUS BLD VENIPUNCTURE: CPT

## 2023-04-17 ENCOUNTER — HOSPITAL ENCOUNTER (OUTPATIENT)
Age: 78
Setting detail: SPECIMEN
Discharge: HOME | End: 2023-04-17

## 2023-04-17 LAB
ALBUMIN SERPL-MCNC: 3.8 G/DL (ref 3.5–5.2)
ALP SERPL-CCNC: 96 U/L (ref 35–104)
ALT SERPL-CCNC: 12 U/L (ref 5–33)
ANION GAP SERPL CALCULATED.3IONS-SCNC: 14 MMOL/L (ref 9–17)
AST SERPL-CCNC: 17 U/L
BILIRUB SERPL-MCNC: 0.3 MG/DL (ref 0.3–1.2)
BUN SERPL-MCNC: 46 MG/DL (ref 8–23)
BUN/CREAT BLD: 6 (ref 9–20)
CALCIUM SERPL-MCNC: 9 MG/DL (ref 8.6–10.4)
CHLORIDE SERPL-SCNC: 104 MMOL/L (ref 98–107)
CHOLEST SERPL-MCNC: 145 MG/DL
CHOLESTEROL/HDL RATIO: 3.2
CO2 SERPL-SCNC: 24 MMOL/L (ref 20–31)
CREAT SERPL-MCNC: 7.35 MG/DL (ref 0.5–0.9)
EST. AVERAGE GLUCOSE BLD GHB EST-MCNC: 108 MG/DL
GFR SERPL CREATININE-BSD FRML MDRD: 5 ML/MIN/1.73M2
GLUCOSE SERPL-MCNC: 83 MG/DL (ref 70–99)
HBA1C MFR BLD: 5.4 % (ref 4–6)
HCT VFR BLD AUTO: 34 % (ref 36.3–47.1)
HDLC SERPL-MCNC: 46 MG/DL
HGB BLD-MCNC: 10.4 G/DL (ref 11.9–15.1)
LDLC SERPL CALC-MCNC: 81 MG/DL (ref 0–130)
MCH RBC QN AUTO: 31.5 PG (ref 25.2–33.5)
MCHC RBC AUTO-ENTMCNC: 30.6 G/DL (ref 28.4–34.8)
MCV RBC AUTO: 103 FL (ref 82.6–102.9)
NRBC AUTOMATED: 0.4 PER 100 WBC
PDW BLD-RTO: 16.5 % (ref 11.8–14.4)
PLATELET # BLD AUTO: 209 K/UL (ref 138–453)
PMV BLD AUTO: 11.8 FL (ref 8.1–13.5)
POTASSIUM SERPL-SCNC: 4.7 MMOL/L (ref 3.7–5.3)
PROT SERPL-MCNC: 6.7 G/DL (ref 6.4–8.3)
RBC # BLD: 3.3 M/UL (ref 3.95–5.11)
SODIUM SERPL-SCNC: 142 MMOL/L (ref 135–144)
TRIGL SERPL-MCNC: 91 MG/DL
WBC # BLD AUTO: 8.2 K/UL (ref 3.5–11.3)

## 2023-04-17 PROCEDURE — P9603 ONE-WAY ALLOW PRORATED MILES: HCPCS

## 2023-04-17 PROCEDURE — 85027 COMPLETE CBC AUTOMATED: CPT

## 2023-04-17 PROCEDURE — 83036 HEMOGLOBIN GLYCOSYLATED A1C: CPT

## 2023-04-17 PROCEDURE — 36415 COLL VENOUS BLD VENIPUNCTURE: CPT

## 2023-04-17 PROCEDURE — 80061 LIPID PANEL: CPT

## 2023-04-17 PROCEDURE — 80053 COMPREHEN METABOLIC PANEL: CPT

## 2023-04-18 NOTE — CONSULTS
Addended by: ROHINI PAULA on: 4/18/2023 12:49 PM     Modules accepted: Orders     General Surgery:    Consult Note        PATIENT NAME: Yumiko Hernandez   YOB: 1945    ADMISSION DATE: 5/12/2021 12:02 PM     Admitting Provider: Leon Aleman Physician: Dr. Bill Richter DATE: 5/13/2021     Consult Regarding:  PD cath malfunction     HISTORY OF PRESENT ILLNESS:  The patient is a 67 y.o. female who presented to the hospital with findings of altered mental status and hypoglycemia. Patient was found of a blood sugar of 49. Patient was treated for her hyperglycemia. Per chart review and discussing with the patient's daughter she had her peritoneal dialysis catheter placed back in January 2021. Patient reports her catheter has been functioning great at home. She reports that her dialysis catheter ran smoothly on Monday and Tuesday night earlier this week. Patient reports she usually does not have any difficulties with running her PD catheter infusion. Patient denies any falls or damage to her catheter. Patient denies any fever, chills, nausea, vomiting, chest pain, abdominal pain, or shortness of breath.     Past Medical History:        Diagnosis Date    Anxiety and depression     Arthritis     Cancer ([facility] [address]     left breast cancer    Diabetes mellitus ([facility] [address]     Type 1    History of blood transfusion     50 plus yrs ago with pregnancy    Hyperlipidemia     Hypertension     MRSA (methicillin resistant staph aureus) culture positive 2015    back    PAT (obstructive sleep apnea)     had UPPP  \"have not used machine in years\"    Prolonged emergence from general anesthesia     SOB (shortness of breath)     \"long distance\"       Past Surgical History:        Procedure Laterality Date    APPENDECTOMY      BACK SURGERY      I and D lower back MRSA    BREAST REDUCTION SURGERY      HYSTERECTOMY      MASTECTOMY Left     lymph nodes removed    MASTECTOMY Left 05/14/2018    axillary mastectomy    OR EXC SKIN BENIG <5MM TRUNK,ARM,LEG Right 5/14/2018    RIGHT AXILLARY MASTECTOMY performed by Cherrie Monroy DO at [address] [place] [address]         Medications Prior to Admission:   Medications Prior to Admission: docusate sodium (COLACE) 100 MG capsule, Take 1 capsule by mouth 2 times daily as needed for Constipation  ondansetron (ZOFRAN) 4 MG tablet, Take 1 tablet by mouth every 6 hours as needed for Nausea or Vomiting  nebivolol (BYSTOLIC) 10 MG tablet, Take 20 mg by mouth daily  rosuvastatin (CRESTOR) 5 MG tablet, Take 5 mg by mouth daily  chlorthalidone (HYGROTON) 25 MG tablet, Take 25 mg by mouth daily  citalopram (CELEXA) 20 MG tablet, Take 40 mg by mouth daily  simvastatin (ZOCOR) 10 MG tablet, Take 10 mg by mouth nightly  gabapentin (NEURONTIN) 300 MG capsule, Take 300 mg by mouth 3 times daily. .  fenofibrate micronized (LOFIBRA) 134 MG capsule, Take 134 mg by mouth nightly  SITagliptin-metFORMIN (JANUMET XR)  MG TB24 per extended release tablet, Take 1 tablet by mouth daily  aspirin 81 MG tablet, Take 81 mg by mouth daily  ibuprofen (ADVIL;MOTRIN) 800 MG tablet, Take 800 mg by mouth 3 times daily as needed for Pain  insulin aspart (NOVOLOG) 100 UNIT/ML injection vial, Inject 85 Units into the skin 3 times daily (before meals)    Allergies:  Patient has no known allergies.     Social History:   Social History     Socioeconomic History    Marital status:      Spouse name: Not on file    Number of children: Not on file    Years of education: Not on file    Highest education level: Not on file   Occupational History    Not on file   Social Needs    Financial resource strain: Not on file    Food insecurity     Worry: Not on file     Inability: Not on file   [employer] needs     Medical: Not on file     Non-medical: Not on file   Tobacco Use    Smoking status: Former Smoker    Smokeless tobacco: Never Used   Substance and Sexual Activity    Alcohol use: Yes     Comment: occasionally  \"couple a month\"    Drug use: No    Sexual activity: Not on file   Lifestyle    Physical activity     Days per week: Not on file     Minutes per session: Not on file    Stress: Not on file   Relationships    Social connections     Talks on phone: Not on file     Gets together: Not on file     Attends Muslim service: Not on file     Active member of club or organization: Not on file     Attends meetings of clubs or organizations: Not on file     Relationship status: Not on file    Intimate partner violence     Fear of current or ex partner: Not on file     Emotionally abused: Not on file     Physically abused: Not on file     Forced sexual activity: Not on file   Other Topics Concern    Not on file   Social History Narrative    Not on file       Family History:   No family history on file. REVIEW OF SYSTEMS:    CONSTITUTIONAL:  negative for  fevers, chills and fatigue  No recent weight gain/loss. Energy level normal for pt. HEENT:  No nasal congestion or drainage. CARDIOVASCULAR:  No chest pain  GASTROINTESTINAL:  No abdominal pain, nausea, or vomiting. No constipation/diarrhea. No rectal bleeding  GENITOURINARY:  No dysuria  HEMATOLOGIC/LYMPHATIC:  No easy bruising. No history of cancer  ENDOCRINE: + DM  Review of systems negative unless listed above.     PHYSICAL EXAM:    VITALS:  /62   Pulse 72   Temp 98 °F (36.7 °C) (Oral)   Resp 21   Ht 5' 3\" (1.6 m)   Wt 190 lb 14.4 oz (86.6 kg) Comment: with one blanket on bed  SpO2 100%   BMI 33.82 kg/m²   INTAKE/OUTPUT:     Intake/Output Summary (Last 24 hours) at 5/13/2021 1510  Last data filed at 5/13/2021 2654  Gross per 24 hour   Intake 2000 ml   Output 1700 ml   Net 300 ml       CONSTITUTIONAL:  awake, alert, not distressed and morbidly obese  ENT:  normocephalic/atraumatic, without obvious abnormality  NECK:  supple, symmetrical, trachea midline   LUNGS:  CTA bilaterally  CARDIOVASCULAR:  regular rate and rhythm  ABDOMEN: Soft, morbidly obese, nondistended, no nontender to palpation, no peritoneal signs, no rebound tenderness, right lower abdomen with peritoneal dialysis catheter in place, no sign of fibrin obstruction noted in the lumen, no surrounding erythema, nontender to palpation  SKIN: No rashes or skin lesions noted  MUSCULOSKELETAL:  Range of motion normal in hips, knees, shoulders, and spine. , there is not obvious somatic dysfunction  NEUROLOGIC:  Mental Status Exam:  Level of Alertness:   alert  Orientation:   oriented to person, place, and time      CBC with Differential:    Lab Results   Component Value Date    WBC 10.7 05/13/2021    RBC 3.78 05/13/2021    HGB 11.2 05/13/2021    HCT 36.1 05/13/2021     05/13/2021    MCV 95.5 05/13/2021    MCH 29.6 05/13/2021    MCHC 31.0 05/13/2021    RDW 15.0 05/13/2021    LYMPHOPCT 37 05/08/2018    MONOPCT 11 05/08/2018    BASOPCT 1 05/08/2018    MONOSABS 0.80 05/08/2018    LYMPHSABS 2.60 05/08/2018    EOSABS 0.30 05/08/2018    BASOSABS 0.00 05/08/2018    DIFFTYPE NOT REPORTED 05/08/2018     BMP:    Lab Results   Component Value Date     05/13/2021    K 4.0 05/13/2021     05/13/2021    CO2 24 05/13/2021    BUN 61 05/13/2021    LABALBU 3.5 05/12/2021    CREATININE 7.34 05/13/2021    CALCIUM 8.3 05/13/2021    GFRAA 7 05/13/2021    LABGLOM 5 05/13/2021    GLUCOSE 76 05/13/2021       Pertinent Radiology:   Xr Abdomen (kub) (single Ap View)    Result Date: 5/13/2021  EXAMINATION: ONE SUPINE XRAY VIEW(S) OF THE ABDOMEN 5/13/2021 2:04 pm COMPARISON: None. HISTORY: ORDERING SYSTEM PROVIDED HISTORY: PD CATH POSITION TECHNOLOGIST PROVIDED HISTORY: PD CATH POSITION FINDINGS: PD catheter terminates in the right pelvis. Visualized bowel loops appear normal in caliber. No signs of hepatosplenomegaly. No acute osseous abnormality identified. Ovoid calcification in the projection of the left gluteal region presumably related to subcutaneous injection. Otherwise, no abnormal calcifications of concern.      Peritoneal dialysis catheter terminates in the right pelvis. Ct Head Wo Contrast    Result Date: 5/12/2021  EXAMINATION: CT OF THE HEAD WITHOUT CONTRAST  5/12/2021 1:23 pm TECHNIQUE: CT of the head was performed without the administration of intravenous contrast. Dose modulation, iterative reconstruction, and/or weight based adjustment of the mA/kV was utilized to reduce the radiation dose to as low as reasonably achievable. COMPARISON: None. HISTORY: ORDERING SYSTEM PROVIDED HISTORY: AMS, ?facial droop TECHNOLOGIST PROVIDED HISTORY: AMS, ?facial droop Decision Support Exception - unselect if not a suspected or confirmed emergency medical condition->Emergency Medical Condition (MA) Reason for Exam: ams, facial droop Acuity: Unknown Type of Exam: Unknown FINDINGS: BRAIN/VENTRICLES: Mild cerebral atrophy. No midline shift. Basal cisterns normally outlined. Small old lacunar infarct in periventricular left basal ganglia. No acute infarct. No acute intracranial hemorrhage. ORBITS: The visualized portion of the orbits demonstrate no acute abnormality. SINUSES: The visualized paranasal sinuses and mastoid air cells demonstrate no acute abnormality. SOFT TISSUES/SKULL:  No acute abnormality of the visualized skull or soft tissues. No acute intracranial abnormality. Cerebral atrophy and small old lacunar infarct left basal ganglia. Xr Chest Portable    Result Date: 5/12/2021  EXAMINATION: ONE XRAY VIEW OF THE CHEST 5/12/2021 12:55 pm COMPARISON: Two-view chest from 01/26/2013 HISTORY: ORDERING SYSTEM PROVIDED HISTORY: ams TECHNOLOGIST PROVIDED HISTORY: ams Acuity: Unknown Type of Exam: Unknown History of hypertension, diabetes, cancer and obstructive sleep apnea. FINDINGS: Right IJ chemo port with tip position cavoatrial junction. Overlying ECG monitor leads/gown snaps. Loop recorder overlying the left medial chest. Limited visualization left base.  Hypoventilation accentuating enlarged but stable appearing cardiomediastinal

## 2023-05-04 NOTE — DISCHARGE SUMMARY
[facility]  Office: [address], DO, Lidiarhonda Amaro, DO, Erlin Morocho, DO, Holli Gerard, DO, Isa Tracey MD, Medardo Thapa MD, Jesús Lees MD, Manpreet Villalta MD, Malu Fowler MD, Mike Chapman MD, Narciso Estrada MD, Neo Castillo, DO, Asia Martinez, DO, Karen Scott MD,  Violeta Mccarthy, DO, Jessica Trivedi MD, Josafat Khanna MD, Emi Moore MD, Екатерина Dempsey MD, Ollie Nicole MD, Matthew Lr, [facility], [facility], CNP, Joyce Reynolds, CNP, Mary Kate Kelley, CNS, Shayna Mariscal, CNP, Frederick Titus, CNP, Kim Chen, CNP, Eloy Augustin, CNP, Josafat Mosqueda, CNP, Hernan Puri PA-C, Rose Mary Hansen, [facility], Allie Godoy, [facility], Fabrice Lovell, CNP, Shiela Lozada, CNP, Vida Martin, CNP, Reyna Avitia, CNP, Marina Mandujano, [facility], Moises Soto, [facility]    Discharge Summary     Patient ID: Sreekanth Rivers  :     MRN: [id number]     ACCOUNT:  [de-identified]   Patient's PCP: Lemuel Ricci MD  Admit Date: 2022   Discharge Date: 2022     Length of Stay: 7  Code Status:  Full Code  Admitting Physician: Manpreet Villalta MD  Discharge Physician: Manpreet Villalta MD     Active Discharge Diagnoses:     Hospital Problem Lists:  Principal Problem:    COVID-19 virus infection  Active Problems:    Diabetes [facility])    Admission for dialysis [facility])    Metabolic encephalopathy    End stage renal failure on dialysis [facility])    Hypertensive urgency    Sepsis ([facility] [address]    Acute respiratory failure with hypoxia ([facility]    Morbid obesity ([facility] [address]    Hypertension, essential    Hypokalemia    Metabolic acidosis    Elevated procalcitonin    Elevated C-reactive protein (CRP)  Resolved Problems:    * No resolved hospital problems.  *      Admission Condition:  poor     Discharged Condition: fair    Hospital Stay:     HPI:     Leila Ivet herrera 67 y.o. Non- / non  female who presents with Hypertension (htn no dialysis x2 treatments, port bad per pt)  Patient presents to the emergency room with the concern of generalized fatigue and  near syncope.  Patient states that for the past 1-2 days she has not been feeling well and in addition her HD port is non-functional and she has not received  for 2 sessions.  Typical HD days are MWF  Upon arrival to the emergency room the patient was noted to be febrile with a temp of 103, hypertensive with systolic blood pressures in the 200s and saturating well on room air.  Nephrology was consulted while in the emergency room however did not feel patient warranted HD at this time.   Patient received Actemra, started on Decadron.  Maintain on 3 L nasal cannula. Garfield Copping catheter was placed due to issues with her tunneled catheter clotting off.  Cathflo and heparin were attempted.  after exchange she tolerated dialysis. Given elevated inflammatory markers and infiltrate  she was treated for bacterial PNA      During the admission patient was managed as follow      S/P tunneled cath placement 2/1  S/P  removal L sided Andrea    Abx per ID , plan to continue for 2 weeks with her HD , ending 2/12   BP and glycemic control  Discussed with patient and nurse   DC planning pending completion of arrangements     All orders are in      Discussed with the patient and the nurse , discussed also with the patient's granddaughter on the phone     Discussed with the pharmacist, on medication check and reconciled with patient pharmacy, not on hydralazine rest of meds seem to be accurate.   Patient did not fill her medication since September 2021    Significant therapeutic interventions:     Significant Diagnostic Studies:   Labs / Micro:  CBC:   Lab Results   Component Value Date    WBC 13.2 01/31/2022    RBC 3.67 01/31/2022    HGB 10.6 01/31/2022    HCT 33.0 01/31/2022    MCV 89.9 01/31/2022    MCH 28.9 01/31/2022    MCHC 32.1 01/31/2022    RDW 13.2 01/31/2022    PLT See Reflexed IPF Result 01/31/2022     BMP:    Lab Results   Component Value Date    GLUCOSE 113 02/04/2022     02/04/2022    K 4.0 02/04/2022    CL 98 02/04/2022    CO2 22 02/04/2022    ANIONGAP 16 02/04/2022    BUN 48 02/04/2022    CREATININE 4.95 02/04/2022    BUNCRER NOT REPORTED 02/04/2022    CALCIUM 8.1 02/04/2022    LABGLOM 8 02/04/2022    GFRAA 10 02/04/2022    GFR      02/04/2022    GFR NOT REPORTED 02/04/2022     HFP:    Lab Results   Component Value Date    PROT 7.3 01/29/2022     CMP:    Lab Results   Component Value Date    GLUCOSE 113 02/04/2022     02/04/2022    K 4.0 02/04/2022    CL 98 02/04/2022    CO2 22 02/04/2022    BUN 48 02/04/2022    CREATININE 4.95 02/04/2022    ANIONGAP 16 02/04/2022    ALKPHOS 114 01/29/2022    ALT 14 01/29/2022    AST 19 01/29/2022    BILITOT 0.39 01/29/2022    LABALBU 3.5 01/29/2022    ALBUMIN 0.9 01/29/2022    LABGLOM 8 02/04/2022    GFRAA 10 02/04/2022    GFR      02/04/2022    GFR NOT REPORTED 02/04/2022    PROT 7.3 01/29/2022    CALCIUM 8.1 02/04/2022     PT/INR:    Lab Results   Component Value Date    PROTIME 10.5 01/28/2022    INR 1.0 01/28/2022     PTT:   Lab Results   Component Value Date    APTT 23.7 01/28/2022     FLP:  No results found for: CHOL, TRIG, HDL  U/A:    Lab Results   Component Value Date    COLORU Yellow 01/28/2022    TURBIDITY Clear 01/28/2022    SPECGRAV 1.016 01/28/2022    HGBUR SMALL 01/28/2022    PHUR 6.5 01/28/2022    PROTEINU 3+ 01/28/2022    GLUCOSEU NEGATIVE 01/28/2022    KETUA NEGATIVE 01/28/2022    BILIRUBINUR NEGATIVE 01/28/2022    UROBILINOGEN Normal 01/28/2022    NITRU NEGATIVE 01/28/2022    LEUKOCYTESUR TRACE 01/28/2022     TSH:    Lab Results   Component Value Date    TSH 1.21 05/12/2021        Radiology:  CT CHEST WO CONTRAST    Result Date: 2/3/2022  1.  Patchy bilateral mixed ground-glass and consolidative opacities with interlobular septal thickening which can be seen with atypical/viral pneumonia to include COVID-19. 2. Small right and trace left pleural effusions. 3. Pulmonary fibrosis in the bilateral lung bases. 4. Tiny hiatal hernia. US GALLBLADDER RUQ    Result Date: 1/31/2022  Gallbladder is normal in appearance with no evidence of acute cholecystitis. Atrophic right kidney measuring 8.3 cm     XR CHEST PORTABLE    Result Date: 1/29/2022  Cardiomegaly with trace effusions and scattered infiltrates. Support tubes as described above. XR CHEST PORTABLE    Result Date: 1/28/2022  Prominent interstitium and bilateral pulmonary opacities, concerning for edema or pneumonia. IR TUNNELED CVC PLACE WO SQ PORT/PUMP > 5 YEARS    Result Date: 2/1/2022  Successful fluoroscopy guided tunneled catheter exchange . Okay to use. Consultations:    Consults:     Final Specialist Recommendations/Findings:   IP CONSULT TO NEPHROLOGY  IP CONSULT TO HOSPITALIST  IP CONSULT TO INFECTIOUS DISEASES  IP CONSULT TO NEPHROLOGY  PHARMACY TO DOSE VANCOMYCIN  IP CONSULT TO HOME CARE NEEDS      The patient was seen and examined on day of discharge and this discharge summary is in conjunction with any daily progress note from day of discharge. Discharge plan:     Disposition: [facility]    Physician Follow Up:     [facility]  [address]    Tuesday Thursday and Saturday at 4pm during covid isolation period. Charlotte Ferreira MD  [address] [place] [address] [address], [address]    Schedule an appointment as soon as possible for a visit in 2 weeks      [address]  [address]  [address] [facility].  [facility]  [phone]           Requiring Further Evaluation/Follow Up POST HOSPITALIZATION/Incidental Findings:     Diet: renal diet    Activity: As tolerated    Instructions to Patient:     Discharge Medications:      Medication List      START taking these medications    cefepime infusion  Commonly known as: MAXIPIME  Infuse 2,000 mg intravenously three times a week for 10 days Compound per protocol     * vancomycin  infusion  Commonly known as: VANCOCIN  Infuse 1,000 mg intravenously three times a week for 10 days Compound per protocol. * vancomycin  infusion  Commonly known as: VANCOCIN  Infuse 750 mg intravenously three times a week for 8 days Compound per protocol. * This list has 2 medication(s) that are the same as other medications prescribed for you. Read the directions carefully, and ask your doctor or other care provider to review them with you. CONTINUE taking these medications    citalopram 20 MG tablet  Commonly known as: CELEXA     cloNIDine 0.1 MG/24HR Ptwk  Commonly known as: CATAPRES     clopidogrel 75 MG tablet  Commonly known as: PLAVIX     Crestor 5 MG tablet  Generic drug: rosuvastatin     enalapril 5 MG tablet  Commonly known as: VASOTEC     metoprolol 100 MG tablet  Commonly known as: LOPRESSOR     NIFEdipine 60 MG extended release tablet  Commonly known as: PROCARDIA XL     pantoprazole 40 MG tablet  Commonly known as: PROTONIX     sevelamer 800 MG tablet  Commonly known as: RENVELA  Take 1 tablet by mouth 3 times daily (with meals)     sodium bicarbonate 650 MG tablet     spironolactone 50 MG tablet  Commonly known as: ALDACTONE        STOP taking these medications    calcitRIOL 0.25 MCG capsule  Commonly known as: ROCALTROL     hydrALAZINE 100 MG tablet  Commonly known as: APRESOLINE     ondansetron 4 MG tablet  Commonly known as: Zofran           Where to Get Your Medications      You can get these medications from any pharmacy    Bring a paper prescription for each of these medications  cefepime  infusion  vancomycin  infusion  vancomycin  infusion         Discharge Procedure Orders   CBC Auto Differential   Standing Status: Standing Number of Occurrences: 2 Standing Exp.  Date: 02/02/23     Basic Metabolic Panel   Standing Status: Standing Number of Occurrences: 2 Standing Exp. Date: 02/02/23       Time Spent on discharge is  36 mins in patient examination, evaluation, counseling as well as medication reconciliation, prescriptions for required medications, discharge plan and follow up. Electronically signed by   Mable Trujillo MD  2/4/2022  7:20 PM      Thank you Dr. Sherri Alonzo MD for the opportunity to be involved in this patient's care. right

## 2023-07-10 ENCOUNTER — HOSPITAL ENCOUNTER (EMERGENCY)
Age: 78
Discharge: HOME | End: 2023-07-10
Attending: EMERGENCY MEDICINE
Payer: MEDICARE

## 2023-07-10 ENCOUNTER — APPOINTMENT (OUTPATIENT)
Dept: CT IMAGING | Age: 78
End: 2023-07-10
Payer: MEDICARE

## 2023-07-10 ENCOUNTER — APPOINTMENT (OUTPATIENT)
Dept: GENERAL RADIOLOGY | Age: 78
End: 2023-07-10
Payer: MEDICARE

## 2023-07-10 VITALS
TEMPERATURE: 98.6 F | DIASTOLIC BLOOD PRESSURE: 92 MMHG | WEIGHT: 154 LBS | HEIGHT: 63 IN | BODY MASS INDEX: 27.29 KG/M2 | RESPIRATION RATE: 16 BRPM | OXYGEN SATURATION: 99 % | SYSTOLIC BLOOD PRESSURE: 166 MMHG | HEART RATE: 74 BPM

## 2023-07-10 DIAGNOSIS — R53.83 FATIGUE, UNSPECIFIED TYPE: Primary | ICD-10-CM

## 2023-07-10 LAB
ANION GAP SERPL CALCULATED.3IONS-SCNC: 15 MMOL/L (ref 9–17)
BASOPHILS # BLD: 0.03 K/UL (ref 0–0.2)
BASOPHILS NFR BLD: 0 % (ref 0–2)
BNP SERPL-MCNC: 4999 PG/ML
BUN SERPL-MCNC: 55 MG/DL (ref 8–23)
BUN/CREAT SERPL: 8 (ref 9–20)
CALCIUM SERPL-MCNC: 8.4 MG/DL (ref 8.6–10.4)
CHLORIDE SERPL-SCNC: 100 MMOL/L (ref 98–107)
CHP ED QC CHECK: YES
CK SERPL-CCNC: 43 U/L (ref 26–192)
CO2 SERPL-SCNC: 24 MMOL/L (ref 20–31)
CREAT SERPL-MCNC: 7.1 MG/DL (ref 0.5–0.9)
EKG ATRIAL RATE: 73 BPM
EKG P AXIS: 28 DEGREES
EKG P-R INTERVAL: 232 MS
EKG Q-T INTERVAL: 416 MS
EKG QRS DURATION: 84 MS
EKG QTC CALCULATION (BAZETT): 458 MS
EKG R AXIS: -36 DEGREES
EKG T AXIS: -41 DEGREES
EKG VENTRICULAR RATE: 73 BPM
EOSINOPHIL # BLD: 0.33 K/UL (ref 0–0.44)
EOSINOPHILS RELATIVE PERCENT: 4 % (ref 1–4)
ERYTHROCYTE [DISTWIDTH] IN BLOOD BY AUTOMATED COUNT: 16.6 % (ref 11.8–14.4)
GFR SERPL CREATININE-BSD FRML MDRD: 5 ML/MIN/1.73M2
GLUCOSE BLD-MCNC: 120 MG/DL
GLUCOSE BLD-MCNC: 120 MG/DL (ref 65–105)
GLUCOSE SERPL-MCNC: 111 MG/DL (ref 70–99)
HCT VFR BLD AUTO: 32.7 % (ref 36.3–47.1)
HGB BLD-MCNC: 10.5 G/DL (ref 11.9–15.1)
IMM GRANULOCYTES # BLD AUTO: 0.12 K/UL (ref 0–0.3)
IMM GRANULOCYTES NFR BLD: 1 %
LACTATE BLDV-SCNC: 0.9 MMOL/L (ref 0.5–2.2)
LYMPHOCYTES # BLD: 20 % (ref 24–43)
LYMPHOCYTES NFR BLD: 1.9 K/UL (ref 1.1–3.7)
MAGNESIUM SERPL-MCNC: 2 MG/DL (ref 1.6–2.6)
MCH RBC QN AUTO: 31.4 PG (ref 25.2–33.5)
MCHC RBC AUTO-ENTMCNC: 32.1 G/DL (ref 28.4–34.8)
MCV RBC AUTO: 97.9 FL (ref 82.6–102.9)
MONOCYTES NFR BLD: 0.86 K/UL (ref 0.1–1.2)
MONOCYTES NFR BLD: 9 % (ref 3–12)
NEUTROPHILS NFR BLD: 66 % (ref 36–65)
NEUTS SEG NFR BLD: 6.09 K/UL (ref 1.5–8.1)
NRBC BLD-RTO: 0.3 PER 100 WBC
PLATELET # BLD AUTO: 201 K/UL (ref 138–453)
PMV BLD AUTO: 11.1 FL (ref 8.1–13.5)
POTASSIUM SERPL-SCNC: 4.3 MMOL/L (ref 3.7–5.3)
RBC # BLD AUTO: 3.34 M/UL (ref 3.95–5.11)
RBC # BLD: ABNORMAL 10*6/UL
SODIUM SERPL-SCNC: 139 MMOL/L (ref 135–144)
TROPONIN I SERPL HS-MCNC: 105 NG/L (ref 0–14)
WBC OTHER # BLD: 9.3 K/UL (ref 3.5–11.3)

## 2023-07-10 PROCEDURE — 99285 EMERGENCY DEPT VISIT HI MDM: CPT

## 2023-07-10 PROCEDURE — 96375 TX/PRO/DX INJ NEW DRUG ADDON: CPT

## 2023-07-10 PROCEDURE — 82550 ASSAY OF CK (CPK): CPT

## 2023-07-10 PROCEDURE — 93005 ELECTROCARDIOGRAM TRACING: CPT | Performed by: EMERGENCY MEDICINE

## 2023-07-10 PROCEDURE — 70450 CT HEAD/BRAIN W/O DYE: CPT

## 2023-07-10 PROCEDURE — 80048 BASIC METABOLIC PNL TOTAL CA: CPT

## 2023-07-10 PROCEDURE — 6360000002 HC RX W HCPCS: Performed by: EMERGENCY MEDICINE

## 2023-07-10 PROCEDURE — 93010 ELECTROCARDIOGRAM REPORT: CPT | Performed by: INTERNAL MEDICINE

## 2023-07-10 PROCEDURE — 83880 ASSAY OF NATRIURETIC PEPTIDE: CPT

## 2023-07-10 PROCEDURE — 82947 ASSAY GLUCOSE BLOOD QUANT: CPT

## 2023-07-10 PROCEDURE — 71045 X-RAY EXAM CHEST 1 VIEW: CPT

## 2023-07-10 PROCEDURE — 83735 ASSAY OF MAGNESIUM: CPT

## 2023-07-10 PROCEDURE — 84484 ASSAY OF TROPONIN QUANT: CPT

## 2023-07-10 PROCEDURE — 96374 THER/PROPH/DIAG INJ IV PUSH: CPT

## 2023-07-10 PROCEDURE — 83605 ASSAY OF LACTIC ACID: CPT

## 2023-07-10 PROCEDURE — 85027 COMPLETE CBC AUTOMATED: CPT

## 2023-07-10 RX ORDER — ONDANSETRON 2 MG/ML
4 INJECTION INTRAMUSCULAR; INTRAVENOUS ONCE
Status: COMPLETED | OUTPATIENT
Start: 2023-07-10 | End: 2023-07-10

## 2023-07-10 RX ORDER — NALOXONE HYDROCHLORIDE 1 MG/ML
1 INJECTION INTRAMUSCULAR; INTRAVENOUS; SUBCUTANEOUS ONCE
Status: COMPLETED | OUTPATIENT
Start: 2023-07-10 | End: 2023-07-10

## 2023-07-10 RX ADMIN — NALOXONE HYDROCHLORIDE 1 MG: 1 INJECTION PARENTERAL at 13:45

## 2023-07-10 RX ADMIN — ONDANSETRON 4 MG: 2 INJECTION INTRAMUSCULAR; INTRAVENOUS at 13:44

## 2023-07-10 ASSESSMENT — ENCOUNTER SYMPTOMS
CHEST TIGHTNESS: 0
EYE DISCHARGE: 0
SHORTNESS OF BREATH: 0
BACK PAIN: 0
ABDOMINAL PAIN: 0
ABDOMINAL DISTENTION: 0
FACIAL SWELLING: 0
EYE PAIN: 0

## 2023-07-10 NOTE — ED PROVIDER NOTES
EMERGENCY DEPARTMENT ENCOUNTER    Pt Name: Jason Robert  MRN: [id number]  [address] [facility] 1945  Date of evaluation: 7/10/23  CHIEF COMPLAINT       Chief Complaint   Patient presents with    Fatigue     Falling asleep at dialysis, abnormal     HISTORY OF PRESENT ILLNESS   HPI   Patient is a 59-year-old female with a history of end-stage renal disease who presented to the emergency department by EMS from dialysis secondary to falling asleep during dialysis. Patient was an hour into her session but kept falling asleep and EMS was subsequently called. Patient stated she is tired and she just wants to go to sleep. She goes to dialysis Monday Wednesday and Friday she went Friday completed her full session. Patient denies recent illness. She denies falling or hitting her head. She still makes urine. Patient denied abdominal pain. She denies chest pain, shortness of breath, nausea, vomiting, fevers or chills. REVIEW OF SYSTEMS     Review of Systems   Constitutional:  Negative for chills, diaphoresis and fever. HENT:  Negative for congestion, ear pain and facial swelling. Eyes:  Negative for pain, discharge and visual disturbance. Respiratory:  Negative for chest tightness and shortness of breath. Cardiovascular:  Negative for chest pain and palpitations. Gastrointestinal:  Negative for abdominal distention and abdominal pain. Genitourinary:  Negative for difficulty urinating and flank pain. Musculoskeletal:  Negative for back pain. Skin:  Negative for wound. Neurological:  Positive for weakness. Negative for dizziness, light-headedness and headaches.    PASTMEDICAL HISTORY     Past Medical History:   Diagnosis Date    Anxiety and depression     Arthritis     Rheumatoid     Cancer ([address])     left breast cancer    Cerebral artery occlusion with cerebral infarction [facility]) jan 2020 & March 2020    poor balance since strokes    Chronic kidney disease, stage 5 (HCC)     hemodialysis Mon-Wed-Fri

## 2023-07-31 ENCOUNTER — HOSPITAL ENCOUNTER (OUTPATIENT)
Age: 78
Setting detail: SPECIMEN
Discharge: HOME | End: 2023-07-31
Payer: MEDICARE

## 2023-07-31 PROCEDURE — 81001 URINALYSIS AUTO W/SCOPE: CPT

## 2023-07-31 PROCEDURE — 87086 URINE CULTURE/COLONY COUNT: CPT

## 2023-07-31 PROCEDURE — 87186 SC STD MICRODIL/AGAR DIL: CPT

## 2023-07-31 PROCEDURE — 87077 CULTURE AEROBIC IDENTIFY: CPT

## 2023-08-01 LAB
BACTERIA URNS QL MICRO: ABNORMAL
BILIRUB UR QL STRIP: NEGATIVE
CLARITY UR: ABNORMAL
COLOR UR: YELLOW
EPI CELLS #/AREA URNS HPF: ABNORMAL /HPF (ref 0–5)
GLUCOSE UR STRIP-MCNC: NEGATIVE MG/DL
HGB UR QL STRIP.AUTO: ABNORMAL
KETONES UR STRIP-MCNC: NEGATIVE MG/DL
LEUKOCYTE ESTERASE UR QL STRIP: ABNORMAL
MUCOUS THREADS URNS QL MICRO: ABNORMAL
NITRITE UR QL STRIP: NEGATIVE
PH UR STRIP: 6 [PH] (ref 5–8)
PROT UR STRIP-MCNC: ABNORMAL MG/DL
RBC #/AREA URNS HPF: ABNORMAL /HPF (ref 0–2)
SP GR UR STRIP: 1.01 (ref 1–1.03)
UROBILINOGEN UR STRIP-ACNC: NORMAL EU/DL (ref 0–1)
WBC #/AREA URNS HPF: ABNORMAL /HPF (ref 0–5)

## 2023-08-04 LAB
MICROORGANISM SPEC CULT: ABNORMAL
SERVICE CMNT-IMP: ABNORMAL
SPECIMEN DESCRIPTION: ABNORMAL

## 2023-08-09 ENCOUNTER — APPOINTMENT (OUTPATIENT)
Dept: CT IMAGING | Age: 78
DRG: 689 | End: 2023-08-09
Payer: MEDICARE

## 2023-08-09 ENCOUNTER — HOSPITAL ENCOUNTER (INPATIENT)
Age: 78
LOS: 6 days | Discharge: NURSING, REHAB OR LONG-TERM CARE | DRG: 689 | End: 2023-08-15
Attending: EMERGENCY MEDICINE | Admitting: HOSPITALIST
Payer: MEDICARE

## 2023-08-09 DIAGNOSIS — R77.8 ELEVATED TROPONIN: ICD-10-CM

## 2023-08-09 DIAGNOSIS — G93.41 METABOLIC ENCEPHALOPATHY: Primary | ICD-10-CM

## 2023-08-09 DIAGNOSIS — N30.00 ACUTE CYSTITIS WITHOUT HEMATURIA: ICD-10-CM

## 2023-08-09 PROBLEM — R41.82 ALTERED MENTAL STATUS: Status: ACTIVE | Noted: 2023-08-09

## 2023-08-09 LAB
ALBUMIN SERPL-MCNC: 4.1 G/DL (ref 3.5–5.2)
ALP SERPL-CCNC: 118 U/L (ref 35–104)
ALT SERPL-CCNC: 19 U/L (ref 5–33)
ANION GAP SERPL CALCULATED.3IONS-SCNC: 12 MMOL/L (ref 9–17)
APAP SERPL-MCNC: <10 UG/ML (ref 10–30)
AST SERPL-CCNC: 18 U/L
BACTERIA URNS QL MICRO: ABNORMAL
BASOPHILS # BLD: 0.03 K/UL (ref 0–0.2)
BASOPHILS NFR BLD: 0 % (ref 0–2)
BILIRUB DIRECT SERPL-MCNC: 0.1 MG/DL
BILIRUB INDIRECT SERPL-MCNC: 0.2 MG/DL (ref 0–1)
BILIRUB SERPL-MCNC: 0.3 MG/DL (ref 0.3–1.2)
BILIRUB UR QL STRIP: NEGATIVE
BUN SERPL-MCNC: 41 MG/DL (ref 8–23)
BUN/CREAT SERPL: 6 (ref 9–20)
CALCIUM SERPL-MCNC: 8.7 MG/DL (ref 8.6–10.4)
CHLORIDE SERPL-SCNC: 100 MMOL/L (ref 98–107)
CLARITY UR: ABNORMAL
CO2 SERPL-SCNC: 24 MMOL/L (ref 20–31)
COLOR UR: YELLOW
CREAT SERPL-MCNC: 6.6 MG/DL (ref 0.5–0.9)
EOSINOPHIL # BLD: 0.31 K/UL (ref 0–0.44)
EOSINOPHILS RELATIVE PERCENT: 4 % (ref 1–4)
EPI CELLS #/AREA URNS HPF: ABNORMAL /HPF (ref 0–5)
ERYTHROCYTE [DISTWIDTH] IN BLOOD BY AUTOMATED COUNT: 17.1 % (ref 11.8–14.4)
ETHANOL PERCENT: <0.01 %
ETHANOLAMINE SERPL-MCNC: <10 MG/DL
GFR SERPL CREATININE-BSD FRML MDRD: 6 ML/MIN/1.73M2
GLUCOSE SERPL-MCNC: 89 MG/DL (ref 70–99)
GLUCOSE UR STRIP-MCNC: NEGATIVE MG/DL
HCO3 VENOUS: 24.4 MMOL/L (ref 22–29)
HCT VFR BLD AUTO: 33.3 % (ref 36.3–47.1)
HGB BLD-MCNC: 10.3 G/DL (ref 11.9–15.1)
HGB UR QL STRIP.AUTO: ABNORMAL
IMM GRANULOCYTES # BLD AUTO: 0.04 K/UL (ref 0–0.3)
IMM GRANULOCYTES NFR BLD: 1 %
KETONES UR STRIP-MCNC: NEGATIVE MG/DL
LACTATE BLDV-SCNC: 1 MMOL/L (ref 0.5–1.9)
LACTATE BLDV-SCNC: 1.1 MMOL/L (ref 0.5–1.9)
LACTATE BLDV-SCNC: 1.3 MMOL/L (ref 0.5–1.9)
LACTATE BLDV-SCNC: 1.5 MMOL/L (ref 0.5–1.9)
LEUKOCYTE ESTERASE UR QL STRIP: ABNORMAL
LYMPHOCYTES NFR BLD: 1.96 K/UL (ref 1.1–3.7)
LYMPHOCYTES RELATIVE PERCENT: 25 % (ref 24–43)
MAGNESIUM SERPL-MCNC: 2.1 MG/DL (ref 1.6–2.6)
MCH RBC QN AUTO: 31.5 PG (ref 25.2–33.5)
MCHC RBC AUTO-ENTMCNC: 30.9 G/DL (ref 28.4–34.8)
MCV RBC AUTO: 101.8 FL (ref 82.6–102.9)
METER GLUCOSE: 145 MG/DL (ref 65–105)
MONOCYTES NFR BLD: 0.78 K/UL (ref 0.1–1.2)
MONOCYTES NFR BLD: 10 % (ref 3–12)
NEUTROPHILS NFR BLD: 60 % (ref 36–65)
NEUTS SEG NFR BLD: 4.66 K/UL (ref 1.5–8.1)
NITRITE UR QL STRIP: NEGATIVE
NRBC BLD-RTO: 0.6 PER 100 WBC
O2 SAT, VEN: 99.8 % (ref 60–85)
PCO2, VEN: 37.1 MM HG (ref 41–51)
PH UR STRIP: 7.5 [PH] (ref 5–8)
PH VENOUS: 7.43 (ref 7.32–7.43)
PHOSPHATE SERPL-MCNC: 3.4 MG/DL (ref 2.6–4.5)
PLATELET # BLD AUTO: 141 K/UL (ref 138–453)
PMV BLD AUTO: 11.5 FL (ref 8.1–13.5)
PO2, VEN: 224.3 MM HG (ref 30–50)
POSITIVE BASE EXCESS, VEN: 0.2 MMOL/L (ref 0–3)
POTASSIUM SERPL-SCNC: 4.9 MMOL/L (ref 3.7–5.3)
PROT SERPL-MCNC: 7.5 G/DL (ref 6.4–8.3)
PROT UR STRIP-MCNC: ABNORMAL MG/DL
RBC # BLD AUTO: 3.27 M/UL (ref 3.95–5.11)
RBC # BLD: ABNORMAL 10*6/UL
RBC #/AREA URNS HPF: ABNORMAL /HPF (ref 0–2)
SALICYLATES SERPL-MCNC: <1 MG/DL (ref 3–10)
SODIUM SERPL-SCNC: 136 MMOL/L (ref 135–144)
SP GR UR STRIP: 1.01 (ref 1–1.03)
TOXIC TRICYCLIC SC,BLOOD: NEGATIVE
TROPONIN I SERPL HS-MCNC: 93 NG/L (ref 0–14)
TROPONIN I SERPL HS-MCNC: 97 NG/L (ref 0–14)
UROBILINOGEN UR STRIP-ACNC: NORMAL EU/DL (ref 0–1)
WBC #/AREA URNS HPF: ABNORMAL /HPF (ref 0–5)
WBC OTHER # BLD: 7.8 K/UL (ref 3.5–11.3)

## 2023-08-09 PROCEDURE — 70450 CT HEAD/BRAIN W/O DYE: CPT

## 2023-08-09 PROCEDURE — 80143 DRUG ASSAY ACETAMINOPHEN: CPT

## 2023-08-09 PROCEDURE — 36415 COLL VENOUS BLD VENIPUNCTURE: CPT

## 2023-08-09 PROCEDURE — 80179 DRUG ASSAY SALICYLATE: CPT

## 2023-08-09 PROCEDURE — 93005 ELECTROCARDIOGRAM TRACING: CPT | Performed by: EMERGENCY MEDICINE

## 2023-08-09 PROCEDURE — G0480 DRUG TEST DEF 1-7 CLASSES: HCPCS

## 2023-08-09 PROCEDURE — 99285 EMERGENCY DEPT VISIT HI MDM: CPT

## 2023-08-09 PROCEDURE — 87088 URINE BACTERIA CULTURE: CPT

## 2023-08-09 PROCEDURE — 87040 BLOOD CULTURE FOR BACTERIA: CPT

## 2023-08-09 PROCEDURE — 6360000002 HC RX W HCPCS: Performed by: EMERGENCY MEDICINE

## 2023-08-09 PROCEDURE — 80307 DRUG TEST PRSMV CHEM ANLYZR: CPT

## 2023-08-09 PROCEDURE — 2580000003 HC RX 258: Performed by: EMERGENCY MEDICINE

## 2023-08-09 PROCEDURE — 80076 HEPATIC FUNCTION PANEL: CPT

## 2023-08-09 PROCEDURE — 96365 THER/PROPH/DIAG IV INF INIT: CPT

## 2023-08-09 PROCEDURE — 83605 ASSAY OF LACTIC ACID: CPT

## 2023-08-09 PROCEDURE — 85025 COMPLETE CBC W/AUTO DIFF WBC: CPT

## 2023-08-09 PROCEDURE — 80048 BASIC METABOLIC PNL TOTAL CA: CPT

## 2023-08-09 PROCEDURE — 6360000002 HC RX W HCPCS: Performed by: HOSPITALIST

## 2023-08-09 PROCEDURE — 87186 SC STD MICRODIL/AGAR DIL: CPT

## 2023-08-09 PROCEDURE — 6370000000 HC RX 637 (ALT 250 FOR IP): Performed by: HOSPITALIST

## 2023-08-09 PROCEDURE — 83735 ASSAY OF MAGNESIUM: CPT

## 2023-08-09 PROCEDURE — 1200000000 HC SEMI PRIVATE

## 2023-08-09 PROCEDURE — 87086 URINE CULTURE/COLONY COUNT: CPT

## 2023-08-09 PROCEDURE — 82803 BLOOD GASES ANY COMBINATION: CPT

## 2023-08-09 PROCEDURE — 84100 ASSAY OF PHOSPHORUS: CPT

## 2023-08-09 PROCEDURE — 82947 ASSAY GLUCOSE BLOOD QUANT: CPT

## 2023-08-09 PROCEDURE — 81001 URINALYSIS AUTO W/SCOPE: CPT

## 2023-08-09 PROCEDURE — 84145 PROCALCITONIN (PCT): CPT

## 2023-08-09 PROCEDURE — 84484 ASSAY OF TROPONIN QUANT: CPT

## 2023-08-09 RX ORDER — ENALAPRIL MALEATE 5 MG/1
5 TABLET ORAL DAILY
Status: DISCONTINUED | OUTPATIENT
Start: 2023-08-09 | End: 2023-08-10

## 2023-08-09 RX ORDER — SODIUM CHLORIDE 0.9 % (FLUSH) 0.9 %
10 SYRINGE (ML) INJECTION PRN
Status: DISCONTINUED | OUTPATIENT
Start: 2023-08-09 | End: 2023-08-15 | Stop reason: HOSPADM

## 2023-08-09 RX ORDER — MAGNESIUM SULFATE 1 G/100ML
1000 INJECTION INTRAVENOUS PRN
Status: DISCONTINUED | OUTPATIENT
Start: 2023-08-09 | End: 2023-08-09

## 2023-08-09 RX ORDER — ONDANSETRON 2 MG/ML
4 INJECTION INTRAMUSCULAR; INTRAVENOUS EVERY 6 HOURS PRN
Status: DISCONTINUED | OUTPATIENT
Start: 2023-08-09 | End: 2023-08-15 | Stop reason: HOSPADM

## 2023-08-09 RX ORDER — CLONIDINE 0.1 MG/24H
1 PATCH, EXTENDED RELEASE TRANSDERMAL WEEKLY
Status: DISCONTINUED | OUTPATIENT
Start: 2023-08-09 | End: 2023-08-10

## 2023-08-09 RX ORDER — SULFAMETHOXAZOLE AND TRIMETHOPRIM 800; 160 MG/1; MG/1
1 TABLET ORAL 2 TIMES DAILY
Status: ON HOLD | COMMUNITY
Start: 2023-08-04 | End: 2023-08-11 | Stop reason: HOSPADM

## 2023-08-09 RX ORDER — PROMETHAZINE HYDROCHLORIDE 25 MG/1
25 TABLET ORAL EVERY 8 HOURS PRN
COMMUNITY

## 2023-08-09 RX ORDER — IPRATROPIUM BROMIDE AND ALBUTEROL SULFATE 2.5; .5 MG/3ML; MG/3ML
1 SOLUTION RESPIRATORY (INHALATION) EVERY 6 HOURS PRN
COMMUNITY

## 2023-08-09 RX ORDER — CITALOPRAM 20 MG/1
40 TABLET ORAL DAILY
Status: DISCONTINUED | OUTPATIENT
Start: 2023-08-09 | End: 2023-08-10

## 2023-08-09 RX ORDER — CARVEDILOL 12.5 MG/1
12.5 TABLET ORAL 2 TIMES DAILY WITH MEALS
Status: DISCONTINUED | OUTPATIENT
Start: 2023-08-09 | End: 2023-08-15 | Stop reason: HOSPADM

## 2023-08-09 RX ORDER — ACETAMINOPHEN 325 MG/1
650 TABLET ORAL EVERY 6 HOURS PRN
Status: ON HOLD | COMMUNITY
End: 2023-08-13 | Stop reason: HOSPADM

## 2023-08-09 RX ORDER — CLOPIDOGREL BISULFATE 75 MG/1
75 TABLET ORAL DAILY
Status: DISCONTINUED | OUTPATIENT
Start: 2023-08-10 | End: 2023-08-15 | Stop reason: HOSPADM

## 2023-08-09 RX ORDER — BUSPIRONE HYDROCHLORIDE 10 MG/1
10 TABLET ORAL 3 TIMES DAILY PRN
COMMUNITY

## 2023-08-09 RX ORDER — ACETAMINOPHEN 325 MG/1
650 TABLET ORAL EVERY 6 HOURS PRN
Status: DISCONTINUED | OUTPATIENT
Start: 2023-08-09 | End: 2023-08-15 | Stop reason: HOSPADM

## 2023-08-09 RX ORDER — SENNOSIDES A AND B 8.6 MG/1
1 TABLET, FILM COATED ORAL 2 TIMES DAILY PRN
Status: DISCONTINUED | OUTPATIENT
Start: 2023-08-09 | End: 2023-08-15 | Stop reason: HOSPADM

## 2023-08-09 RX ORDER — VENLAFAXINE 37.5 MG/1
37.5 TABLET ORAL DAILY
COMMUNITY

## 2023-08-09 RX ORDER — SODIUM CHLORIDE 9 MG/ML
INJECTION, SOLUTION INTRAVENOUS PRN
Status: DISCONTINUED | OUTPATIENT
Start: 2023-08-09 | End: 2023-08-15 | Stop reason: HOSPADM

## 2023-08-09 RX ORDER — SODIUM CHLORIDE 0.9 % (FLUSH) 0.9 %
10 SYRINGE (ML) INJECTION EVERY 12 HOURS SCHEDULED
Status: DISCONTINUED | OUTPATIENT
Start: 2023-08-09 | End: 2023-08-15 | Stop reason: HOSPADM

## 2023-08-09 RX ORDER — POTASSIUM CHLORIDE 7.45 MG/ML
10 INJECTION INTRAVENOUS PRN
Status: DISCONTINUED | OUTPATIENT
Start: 2023-08-09 | End: 2023-08-09

## 2023-08-09 RX ORDER — ONDANSETRON 4 MG/1
4 TABLET, ORALLY DISINTEGRATING ORAL EVERY 8 HOURS PRN
Status: DISCONTINUED | OUTPATIENT
Start: 2023-08-09 | End: 2023-08-15 | Stop reason: HOSPADM

## 2023-08-09 RX ORDER — SEVELAMER CARBONATE 800 MG/1
800 TABLET, FILM COATED ORAL
Status: DISCONTINUED | OUTPATIENT
Start: 2023-08-09 | End: 2023-08-10

## 2023-08-09 RX ORDER — ACETAMINOPHEN 650 MG/1
650 SUPPOSITORY RECTAL EVERY 6 HOURS PRN
Status: DISCONTINUED | OUTPATIENT
Start: 2023-08-09 | End: 2023-08-15 | Stop reason: HOSPADM

## 2023-08-09 RX ORDER — GABAPENTIN 100 MG/1
100 CAPSULE ORAL 3 TIMES DAILY
COMMUNITY

## 2023-08-09 RX ORDER — ATORVASTATIN CALCIUM 10 MG/1
10 TABLET, FILM COATED ORAL DAILY
COMMUNITY

## 2023-08-09 RX ORDER — VENLAFAXINE 37.5 MG/1
37.5 TABLET ORAL DAILY
Status: ON HOLD | COMMUNITY
End: 2023-08-10

## 2023-08-09 RX ORDER — NIFEDIPINE 60 MG/1
60 TABLET, EXTENDED RELEASE ORAL DAILY
Status: DISCONTINUED | OUTPATIENT
Start: 2023-08-09 | End: 2023-08-10

## 2023-08-09 RX ORDER — OMEPRAZOLE 20 MG/1
20 CAPSULE, DELAYED RELEASE ORAL DAILY
COMMUNITY

## 2023-08-09 RX ORDER — HEPARIN SODIUM 5000 [USP'U]/ML
5000 INJECTION, SOLUTION INTRAVENOUS; SUBCUTANEOUS 2 TIMES DAILY
Status: DISCONTINUED | OUTPATIENT
Start: 2023-08-09 | End: 2023-08-15 | Stop reason: HOSPADM

## 2023-08-09 RX ORDER — SODIUM BICARBONATE 650 MG/1
650 TABLET ORAL 2 TIMES DAILY
Status: DISCONTINUED | OUTPATIENT
Start: 2023-08-09 | End: 2023-08-10

## 2023-08-09 RX ORDER — POTASSIUM CHLORIDE 20 MEQ/1
40 TABLET, EXTENDED RELEASE ORAL PRN
Status: DISCONTINUED | OUTPATIENT
Start: 2023-08-09 | End: 2023-08-09

## 2023-08-09 RX ORDER — ROSUVASTATIN CALCIUM 10 MG/1
5 TABLET, COATED ORAL DAILY
Status: DISCONTINUED | OUTPATIENT
Start: 2023-08-09 | End: 2023-08-10 | Stop reason: ALTCHOICE

## 2023-08-09 RX ORDER — MIDODRINE HYDROCHLORIDE 10 MG/1
10 TABLET ORAL
COMMUNITY

## 2023-08-09 RX ORDER — CALCIUM ACETATE 667 MG/1
2 CAPSULE ORAL
COMMUNITY

## 2023-08-09 RX ADMIN — ACETAMINOPHEN 650 MG: 325 TABLET ORAL at 20:32

## 2023-08-09 RX ADMIN — HEPARIN SODIUM 5000 UNITS: 5000 INJECTION INTRAVENOUS; SUBCUTANEOUS at 20:32

## 2023-08-09 RX ADMIN — CEFTRIAXONE SODIUM 1000 MG: 1 INJECTION, POWDER, FOR SOLUTION INTRAMUSCULAR; INTRAVENOUS at 16:43

## 2023-08-09 ASSESSMENT — PAIN DESCRIPTION - LOCATION: LOCATION: HEAD

## 2023-08-09 ASSESSMENT — ENCOUNTER SYMPTOMS
NAUSEA: 0
SORE THROAT: 0
EYE DISCHARGE: 0
SHORTNESS OF BREATH: 0
RHINORRHEA: 0
COUGH: 0
VOMITING: 0
DIARRHEA: 0
EYE REDNESS: 0
COLOR CHANGE: 0

## 2023-08-09 ASSESSMENT — PAIN SCALES - GENERAL
PAINLEVEL_OUTOF10: 0
PAINLEVEL_OUTOF10: 3

## 2023-08-09 ASSESSMENT — PAIN DESCRIPTION - DESCRIPTORS: DESCRIPTORS: ACHING

## 2023-08-09 ASSESSMENT — PAIN - FUNCTIONAL ASSESSMENT: PAIN_FUNCTIONAL_ASSESSMENT: 0-10

## 2023-08-09 NOTE — ED PROVIDER NOTES
EMERGENCY DEPARTMENT ENCOUNTER    Pt Name: Bronwyn Sever  MRN: [id number]  [address] 1945  Date of evaluation: 8/9/23  CHIEF COMPLAINT       Chief Complaint   Patient presents with    Fatigue     HISTORY OF PRESENT ILLNESS   This 74-year-old female who presents with some complaints of lethargy and confusion. Patient had a similar presentation earlier this month where she became fatigued towards the end of dialysis and EMS was called. The patient denies any chest pain or shortness of breath. She denies any nausea or vomiting, no stool changes. REVIEW OF SYSTEMS     Review of Systems   Constitutional:  Positive for fatigue. Negative for chills and fever. HENT:  Negative for rhinorrhea and sore throat. Eyes:  Negative for discharge, redness and visual disturbance. Respiratory:  Negative for cough and shortness of breath. Cardiovascular:  Negative for chest pain, palpitations and leg swelling. Gastrointestinal:  Negative for diarrhea, nausea and vomiting. Genitourinary:  Negative for dysuria and hematuria. Musculoskeletal:  Negative for arthralgias, myalgias and neck pain. Skin:  Negative for color change and rash. Neurological:  Positive for weakness. Negative for seizures and headaches. Psychiatric/Behavioral:  Negative for hallucinations, self-injury and suicidal ideas.     PASTMEDICAL HISTORY     Past Medical History:   Diagnosis Date    Anxiety and depression     Arthritis     Rheumatoid     Cancer ([address] )     left breast cancer    Cerebral artery occlusion with cerebral infarction [facility]) jan 2020 & March 2020    poor balance since strokes    Chronic kidney disease, stage 5 (HCC)     hemodialysis Mon-Wed-Fri    Diabetes mellitus (HCC)     GERD (gastroesophageal reflux disease)     Hearing loss     bilateral. Does not have hearing aides    Hemodialysis patient ([address])     M-W-F started in 2020    History of blood transfusion     50 plus yrs ago with pregnancy    Hyperlipidemia

## 2023-08-09 NOTE — ED NOTES
Attempted blood draws x3, 2x with US. Lab called for attempt.       Corrine Mcpherson RN  08/09/23 5748

## 2023-08-09 NOTE — ED NOTES
Spoke with Dr. Frank Vargas, [address] [place] [address] with 1 blood culture, aware that rocephin has been started in ED     Reina Chavez RN  08/09/23 7534

## 2023-08-09 NOTE — H&P
History & Physical  [facility]    Adult Hospitalist      Name: Tello River  MRN: [id number]     Acct: [de-identified]  Room: [facility]20/20    Admit Date: 8/9/2023 12:15 PM  PCP: Seema Javed MD    Primary Problem  Principal Problem:    Altered mental status  Resolved Problems:    * No resolved hospital problems. *        Assesment:     Acute metabolic encephalopathy  UTI  Elevated troponin  End-stage renal disease on hemodialysis  Anemia of chronic disease  Hypertension  Hyperlipidemia  Diabetes type 2  Morbid obesity        Plan:     Admitted to [address] telemetry  O2 to maintain oxygen saturation greater than 92%    Serial troponin  Echocardiogram  Cardiology consult    Lactate and procalcitonin  Blood culture  Urine culture  Empiric IV ceftriaxone      Continue to monitor/telemetry/CBC with differential daily/BMP daily  DVT and GI prophylaxis. Continue medications as below      Scheduled Meds:   [START ON 8/10/2023] cefTRIAXone (ROCEPHIN) IV  1,000 mg IntraVENous Once     Continuous Infusions:    PRN Meds:       Chief Complaint:     Chief Complaint   Patient presents with    Fatigue         History of Present Illness:      Tello River is a 65 y.o.  female who presents with Fatigue      74-year-old lady with past medical history of hypertension, hyperlipidemia, diabetes, hemodialysis presented to ER due to altered mental status. Reportedly patient was getting her dialysis which was stopped due to patient become more confused/lethargic/falling asleep during dialysis. EMS was called. The patient did mention that she is very tired. Patient denies any chest pain, fever, chills, changes in urination, bowel habit or rash. On presentation creatinine was 6.6. High-sensitivity troponin was 93. Hemoglobin was 10.3. UA was significant for large leukocyte esterase, many bacteria, 2+ hemoglobin. Patient had a VBG which showed 7.4/37.1/224.3/24.4. Blood pressure was 187/81.     I have personally

## 2023-08-10 ENCOUNTER — APPOINTMENT (OUTPATIENT)
Age: 78
DRG: 689 | End: 2023-08-10
Attending: HOSPITALIST
Payer: MEDICARE

## 2023-08-10 LAB
ANION GAP SERPL CALCULATED.3IONS-SCNC: 15 MMOL/L (ref 9–17)
BASOPHILS # BLD: 0.03 K/UL (ref 0–0.2)
BASOPHILS NFR BLD: 0 % (ref 0–2)
BUN SERPL-MCNC: 55 MG/DL (ref 8–23)
BUN/CREAT SERPL: 7 (ref 9–20)
CALCIUM SERPL-MCNC: 8.6 MG/DL (ref 8.6–10.4)
CHLORIDE SERPL-SCNC: 105 MMOL/L (ref 98–107)
CO2 SERPL-SCNC: 23 MMOL/L (ref 20–31)
CREAT SERPL-MCNC: 7.9 MG/DL (ref 0.5–0.9)
EKG ATRIAL RATE: 70 BPM
EKG P AXIS: 25 DEGREES
EKG P-R INTERVAL: 246 MS
EKG Q-T INTERVAL: 434 MS
EKG QRS DURATION: 94 MS
EKG QTC CALCULATION (BAZETT): 468 MS
EKG R AXIS: -32 DEGREES
EKG T AXIS: 9 DEGREES
EKG VENTRICULAR RATE: 70 BPM
EOSINOPHIL # BLD: 0.41 K/UL (ref 0–0.44)
EOSINOPHILS RELATIVE PERCENT: 6 % (ref 1–4)
ERYTHROCYTE [DISTWIDTH] IN BLOOD BY AUTOMATED COUNT: 17.2 % (ref 11.8–14.4)
GFR SERPL CREATININE-BSD FRML MDRD: 5 ML/MIN/1.73M2
GLUCOSE BLD-MCNC: 106 MG/DL (ref 65–105)
GLUCOSE BLD-MCNC: 129 MG/DL (ref 65–105)
GLUCOSE BLD-MCNC: 82 MG/DL (ref 65–105)
GLUCOSE SERPL-MCNC: 90 MG/DL (ref 70–99)
HBV CORE AB SER QL: NONREACTIVE
HBV SURFACE AB SERPL IA-ACNC: 17.78 MIU/ML
HBV SURFACE AG SERPL QL IA: NONREACTIVE
HCT VFR BLD AUTO: 30.3 % (ref 36.3–47.1)
HGB BLD-MCNC: 9.3 G/DL (ref 11.9–15.1)
IMM GRANULOCYTES # BLD AUTO: 0.02 K/UL (ref 0–0.3)
IMM GRANULOCYTES NFR BLD: 0 %
LYMPHOCYTES NFR BLD: 2.29 K/UL (ref 1.1–3.7)
LYMPHOCYTES RELATIVE PERCENT: 34 % (ref 24–43)
MCH RBC QN AUTO: 31.3 PG (ref 25.2–33.5)
MCHC RBC AUTO-ENTMCNC: 30.7 G/DL (ref 28.4–34.8)
MCV RBC AUTO: 102 FL (ref 82.6–102.9)
METER GLUCOSE: 94 MG/DL (ref 65–105)
MONOCYTES NFR BLD: 0.68 K/UL (ref 0.1–1.2)
MONOCYTES NFR BLD: 10 % (ref 3–12)
NEUTROPHILS NFR BLD: 50 % (ref 36–65)
NEUTS SEG NFR BLD: 3.34 K/UL (ref 1.5–8.1)
NRBC BLD-RTO: 0.3 PER 100 WBC
PLATELET # BLD AUTO: 143 K/UL (ref 138–453)
PMV BLD AUTO: 11.5 FL (ref 8.1–13.5)
POTASSIUM SERPL-SCNC: 5.6 MMOL/L (ref 3.7–5.3)
PROCALCITONIN SERPL-MCNC: 0.53 NG/ML
RBC # BLD AUTO: 2.97 M/UL (ref 3.95–5.11)
RBC # BLD: ABNORMAL 10*6/UL
SODIUM SERPL-SCNC: 143 MMOL/L (ref 135–144)
TROPONIN I SERPL HS-MCNC: 101 NG/L (ref 0–14)
TROPONIN I SERPL HS-MCNC: 94 NG/L (ref 0–14)
TROPONIN I SERPL HS-MCNC: 95 NG/L (ref 0–14)
WBC OTHER # BLD: 6.8 K/UL (ref 3.5–11.3)

## 2023-08-10 PROCEDURE — 2500000003 HC RX 250 WO HCPCS: Performed by: NURSE PRACTITIONER

## 2023-08-10 PROCEDURE — 84484 ASSAY OF TROPONIN QUANT: CPT

## 2023-08-10 PROCEDURE — 90935 HEMODIALYSIS ONE EVALUATION: CPT

## 2023-08-10 PROCEDURE — 6360000002 HC RX W HCPCS: Performed by: NURSE PRACTITIONER

## 2023-08-10 PROCEDURE — 2500000003 HC RX 250 WO HCPCS: Performed by: INTERNAL MEDICINE

## 2023-08-10 PROCEDURE — 6360000002 HC RX W HCPCS: Performed by: HOSPITALIST

## 2023-08-10 PROCEDURE — 36415 COLL VENOUS BLD VENIPUNCTURE: CPT

## 2023-08-10 PROCEDURE — 6370000000 HC RX 637 (ALT 250 FOR IP): Performed by: NURSE PRACTITIONER

## 2023-08-10 PROCEDURE — 87340 HEPATITIS B SURFACE AG IA: CPT

## 2023-08-10 PROCEDURE — 93306 TTE W/DOPPLER COMPLETE: CPT

## 2023-08-10 PROCEDURE — 2580000003 HC RX 258: Performed by: NURSE PRACTITIONER

## 2023-08-10 PROCEDURE — 86704 HEP B CORE ANTIBODY TOTAL: CPT

## 2023-08-10 PROCEDURE — 6370000000 HC RX 637 (ALT 250 FOR IP): Performed by: HOSPITALIST

## 2023-08-10 PROCEDURE — 1200000000 HC SEMI PRIVATE

## 2023-08-10 PROCEDURE — 82947 ASSAY GLUCOSE BLOOD QUANT: CPT

## 2023-08-10 PROCEDURE — 2580000003 HC RX 258: Performed by: HOSPITALIST

## 2023-08-10 PROCEDURE — 86317 IMMUNOASSAY INFECTIOUS AGENT: CPT

## 2023-08-10 PROCEDURE — 85025 COMPLETE CBC W/AUTO DIFF WBC: CPT

## 2023-08-10 PROCEDURE — 80048 BASIC METABOLIC PNL TOTAL CA: CPT

## 2023-08-10 RX ORDER — VENLAFAXINE 37.5 MG/1
37.5 TABLET ORAL DAILY
Status: DISCONTINUED | OUTPATIENT
Start: 2023-08-10 | End: 2023-08-10

## 2023-08-10 RX ORDER — MIDODRINE HYDROCHLORIDE 10 MG/1
10 TABLET ORAL
Status: DISCONTINUED | OUTPATIENT
Start: 2023-08-11 | End: 2023-08-15 | Stop reason: HOSPADM

## 2023-08-10 RX ORDER — VENLAFAXINE 37.5 MG/1
37.5 TABLET ORAL NIGHTLY
Status: DISCONTINUED | OUTPATIENT
Start: 2023-08-10 | End: 2023-08-10

## 2023-08-10 RX ORDER — ATORVASTATIN CALCIUM 10 MG/1
10 TABLET, FILM COATED ORAL DAILY
Status: DISCONTINUED | OUTPATIENT
Start: 2023-08-10 | End: 2023-08-15 | Stop reason: HOSPADM

## 2023-08-10 RX ORDER — VENLAFAXINE 37.5 MG/1
37.5 TABLET ORAL DAILY
Status: DISCONTINUED | OUTPATIENT
Start: 2023-08-10 | End: 2023-08-15 | Stop reason: HOSPADM

## 2023-08-10 RX ORDER — CALCIUM ACETATE 667 MG/1
2 CAPSULE ORAL
Status: DISCONTINUED | OUTPATIENT
Start: 2023-08-10 | End: 2023-08-15 | Stop reason: HOSPADM

## 2023-08-10 RX ORDER — ALBUMIN (HUMAN) 12.5 G/50ML
25 SOLUTION INTRAVENOUS EVERY 30 MIN PRN
Status: DISCONTINUED | OUTPATIENT
Start: 2023-08-10 | End: 2023-08-15 | Stop reason: HOSPADM

## 2023-08-10 RX ORDER — GABAPENTIN 100 MG/1
100 CAPSULE ORAL 3 TIMES DAILY
Status: DISCONTINUED | OUTPATIENT
Start: 2023-08-10 | End: 2023-08-15 | Stop reason: HOSPADM

## 2023-08-10 RX ORDER — BUSPIRONE HYDROCHLORIDE 10 MG/1
10 TABLET ORAL 3 TIMES DAILY PRN
Status: DISCONTINUED | OUTPATIENT
Start: 2023-08-10 | End: 2023-08-15 | Stop reason: HOSPADM

## 2023-08-10 RX ORDER — LIDOCAINE 50 MG/G
OINTMENT TOPICAL 2 TIMES DAILY PRN
Status: DISCONTINUED | OUTPATIENT
Start: 2023-08-10 | End: 2023-08-15 | Stop reason: HOSPADM

## 2023-08-10 RX ADMIN — CARVEDILOL 12.5 MG: 12.5 TABLET, FILM COATED ORAL at 01:12

## 2023-08-10 RX ADMIN — CALCIUM ACETATE 1334 MG: 667 CAPSULE ORAL at 16:22

## 2023-08-10 RX ADMIN — CARVEDILOL 12.5 MG: 12.5 TABLET, FILM COATED ORAL at 08:44

## 2023-08-10 RX ADMIN — GABAPENTIN 100 MG: 100 CAPSULE ORAL at 20:46

## 2023-08-10 RX ADMIN — CALCIUM ACETATE 1334 MG: 667 CAPSULE ORAL at 08:44

## 2023-08-10 RX ADMIN — Medication 1.9 ML: at 15:17

## 2023-08-10 RX ADMIN — GABAPENTIN 100 MG: 100 CAPSULE ORAL at 16:22

## 2023-08-10 RX ADMIN — HEPARIN SODIUM 5000 UNITS: 5000 INJECTION INTRAVENOUS; SUBCUTANEOUS at 20:47

## 2023-08-10 RX ADMIN — SODIUM CHLORIDE, PRESERVATIVE FREE 10 ML: 5 INJECTION INTRAVENOUS at 08:45

## 2023-08-10 RX ADMIN — SODIUM CHLORIDE 25 ML: 9 INJECTION, SOLUTION INTRAVENOUS at 16:52

## 2023-08-10 RX ADMIN — ATORVASTATIN CALCIUM 10 MG: 10 TABLET, FILM COATED ORAL at 08:44

## 2023-08-10 RX ADMIN — CARVEDILOL 12.5 MG: 12.5 TABLET, FILM COATED ORAL at 16:22

## 2023-08-10 RX ADMIN — CLOPIDOGREL BISULFATE 75 MG: 75 TABLET ORAL at 08:44

## 2023-08-10 RX ADMIN — SODIUM CHLORIDE, PRESERVATIVE FREE 10 ML: 5 INJECTION INTRAVENOUS at 20:47

## 2023-08-10 RX ADMIN — GABAPENTIN 100 MG: 100 CAPSULE ORAL at 08:44

## 2023-08-10 RX ADMIN — ACETAMINOPHEN 650 MG: 325 TABLET ORAL at 08:55

## 2023-08-10 RX ADMIN — Medication 1.9 ML: at 15:18

## 2023-08-10 RX ADMIN — VENLAFAXINE HYDROCHLORIDE 37.5 MG: 37.5 TABLET ORAL at 05:41

## 2023-08-10 RX ADMIN — CEFTRIAXONE SODIUM 1000 MG: 1 INJECTION, POWDER, FOR SOLUTION INTRAMUSCULAR; INTRAVENOUS at 16:53

## 2023-08-10 RX ADMIN — HEPARIN SODIUM 5000 UNITS: 5000 INJECTION INTRAVENOUS; SUBCUTANEOUS at 08:43

## 2023-08-10 ASSESSMENT — PAIN DESCRIPTION - FREQUENCY: FREQUENCY: INTERMITTENT

## 2023-08-10 ASSESSMENT — PAIN - FUNCTIONAL ASSESSMENT: PAIN_FUNCTIONAL_ASSESSMENT: ACTIVITIES ARE NOT PREVENTED

## 2023-08-10 ASSESSMENT — PAIN DESCRIPTION - PAIN TYPE: TYPE: ACUTE PAIN

## 2023-08-10 ASSESSMENT — PAIN DESCRIPTION - ONSET: ONSET: GRADUAL

## 2023-08-10 ASSESSMENT — PAIN DESCRIPTION - LOCATION: LOCATION: HEAD

## 2023-08-10 ASSESSMENT — PAIN DESCRIPTION - DESCRIPTORS: DESCRIPTORS: ACHING

## 2023-08-10 ASSESSMENT — PAIN SCALES - GENERAL: PAINLEVEL_OUTOF10: 3

## 2023-08-10 NOTE — DISCHARGE INSTR - COC
Continuity of Care Form    Patient Name: Edis Dennis   :  [birth year]  MRN:  [id number]    Admit date:  2023  Discharge date:  2023    Code Status Order: Full Code   Advance Directives:     Admitting Physician:  Lemuel Zapata MD  PCP: Mark Anthony Mike MD    Discharging Nurse: [facility] Unit/Room#: 2107/2107-01  Discharging Unit Phone Number: [phone]    Emergency Contact:   Extended Emergency Contact Information  Primary Emergency Contact: Renee Nick [facility] [place] [facility] [address] Phone: [phone]  Relation: Child  Secondary Emergency Contact: Megan Fonseca  [place] Phone: [phone]  Relation: Child   needed?  No    Past Surgical History:  Past Surgical History:   Procedure Laterality Date    APPENDECTOMY      BACK SURGERY      I and D lower back MRSA    BREAST REDUCTION SURGERY Left     CENTRAL LINE  2022         COLONOSCOPY      HYSTERECTOMY (CERVIX STATUS UNKNOWN)      INSERTABLE CARDIAC MONITOR      IR TUNNELED CATHETER PLACEMENT GREATER THAN 5 YEARS  2022    IR TUNNELED CATHETER PLACEMENT GREATER THAN 5 YEARS 2022 [facility]    MASTECTOMY Left     lymph nodes removed    MASTECTOMY Left 2018    axillary mastectomy    NY EXC B9 LESION MRGN XCP SK TG T/A/L 0.5 CM/< Right 2018    RIGHT AXILLARY MASTECTOMY performed by Deonna Alonzo DO at [facility]         Immunization History:   Immunization History   Administered Date(s) Administered    COVID-19, MODERNA BLUE border, Primary or Immunocompromised, (age 12y+), IM, 100 mcg/0.5mL 2021, 2021       Active Problems:  Patient Active Problem List   Diagnosis Code    AMS (altered mental status) R41.82    Diabetes ([address]) E11.9    Admission for dialysis ([address]) Z99.2    Hypoglycemia E16.2    Encephalopathy S53.56    Metabolic encephalopathy A44.85    End stage renal failure on dialysis ([address]) N18.6, Z99.2    COVID-19 virus infection U07.1

## 2023-08-11 ENCOUNTER — APPOINTMENT (OUTPATIENT)
Dept: INTERVENTIONAL RADIOLOGY/VASCULAR | Age: 78
DRG: 689 | End: 2023-08-11
Payer: MEDICARE

## 2023-08-11 LAB
ANION GAP SERPL CALCULATED.3IONS-SCNC: 14 MMOL/L (ref 9–17)
BASOPHILS # BLD: 0.04 K/UL (ref 0–0.2)
BASOPHILS NFR BLD: 1 % (ref 0–2)
BUN SERPL-MCNC: 44 MG/DL (ref 8–23)
BUN/CREAT SERPL: 7 (ref 9–20)
CALCIUM SERPL-MCNC: 8.7 MG/DL (ref 8.6–10.4)
CHLORIDE SERPL-SCNC: 99 MMOL/L (ref 98–107)
CO2 SERPL-SCNC: 23 MMOL/L (ref 20–31)
CREAT SERPL-MCNC: 6.7 MG/DL (ref 0.5–0.9)
ECHO AO ROOT DIAM: 2.7 CM
ECHO AO ROOT INDEX: 1.5 CM/M2
ECHO AV AREA PEAK VELOCITY: 1.2 CM2
ECHO AV AREA VTI: 1.2 CM2
ECHO AV AREA/BSA PEAK VELOCITY: 0.7 CM2/M2
ECHO AV AREA/BSA VTI: 0.7 CM2/M2
ECHO AV MEAN GRADIENT: 8 MMHG
ECHO AV MEAN VELOCITY: 1.3 M/S
ECHO AV PEAK GRADIENT: 16 MMHG
ECHO AV PEAK VELOCITY: 2 M/S
ECHO AV VELOCITY RATIO: 0.45
ECHO AV VTI: 50.6 CM
ECHO EST RA PRESSURE: 3 MMHG
ECHO LA AREA 2C: 19.9 CM2
ECHO LA AREA 4C: 22.8 CM2
ECHO LA DIAMETER INDEX: 2.17 CM/M2
ECHO LA DIAMETER: 3.9 CM
ECHO LA MAJOR AXIS: 6 CM
ECHO LA MINOR AXIS: 5.4 CM
ECHO LA TO AORTIC ROOT RATIO: 1.44
ECHO LA VOL 2C: 60 ML (ref 22–52)
ECHO LA VOL 4C: 73 ML (ref 22–52)
ECHO LA VOL BP: 70 ML (ref 22–52)
ECHO LA VOL/BSA BIPLANE: 39 ML/M2 (ref 16–34)
ECHO LA VOLUME INDEX A2C: 33 ML/M2 (ref 16–34)
ECHO LA VOLUME INDEX A4C: 41 ML/M2 (ref 16–34)
ECHO LV E' LATERAL VELOCITY: 6 CM/S
ECHO LV E' SEPTAL VELOCITY: 4 CM/S
ECHO LV FRACTIONAL SHORTENING: 35 % (ref 28–44)
ECHO LV INTERNAL DIMENSION DIASTOLE INDEX: 2.06 CM/M2
ECHO LV INTERNAL DIMENSION DIASTOLIC: 3.7 CM (ref 3.9–5.3)
ECHO LV INTERNAL DIMENSION SYSTOLIC INDEX: 1.33 CM/M2
ECHO LV INTERNAL DIMENSION SYSTOLIC: 2.4 CM
ECHO LV IVSD: 1.4 CM (ref 0.6–0.9)
ECHO LV MASS 2D: 156.7 G (ref 67–162)
ECHO LV MASS INDEX 2D: 87.1 G/M2 (ref 43–95)
ECHO LV POSTERIOR WALL DIASTOLIC: 1.1 CM (ref 0.6–0.9)
ECHO LV RELATIVE WALL THICKNESS RATIO: 0.59
ECHO LVOT AREA: 2.8 CM2
ECHO LVOT AV VTI INDEX: 0.42
ECHO LVOT DIAM: 1.9 CM
ECHO LVOT MEAN GRADIENT: 1 MMHG
ECHO LVOT PEAK GRADIENT: 3 MMHG
ECHO LVOT PEAK VELOCITY: 0.9 M/S
ECHO LVOT STROKE VOLUME INDEX: 33.8 ML/M2
ECHO LVOT SV: 60.9 ML
ECHO LVOT VTI: 21.5 CM
ECHO MV A VELOCITY: 1.83 M/S
ECHO MV AREA VTI: 1.3 CM2
ECHO MV E DECELERATION TIME (DT): 303 MS
ECHO MV E VELOCITY: 1.5 M/S
ECHO MV E/A RATIO: 0.82
ECHO MV E/E' LATERAL: 25
ECHO MV E/E' RATIO (AVERAGED): 31.25
ECHO MV E/E' SEPTAL: 37.5
ECHO MV LVOT VTI INDEX: 2.16
ECHO MV MAX VELOCITY: 2 M/S
ECHO MV MEAN GRADIENT: 8 MMHG
ECHO MV MEAN VELOCITY: 1.3 M/S
ECHO MV PEAK GRADIENT: 16 MMHG
ECHO MV VTI: 46.5 CM
ECHO RIGHT VENTRICULAR SYSTOLIC PRESSURE (RVSP): 29 MMHG
ECHO TV REGURGITANT MAX VELOCITY: 2.55 M/S
ECHO TV REGURGITANT PEAK GRADIENT: 26 MMHG
EOSINOPHIL # BLD: 0.44 K/UL (ref 0–0.44)
EOSINOPHILS RELATIVE PERCENT: 6 % (ref 1–4)
ERYTHROCYTE [DISTWIDTH] IN BLOOD BY AUTOMATED COUNT: 17.2 % (ref 11.8–14.4)
GFR SERPL CREATININE-BSD FRML MDRD: 6 ML/MIN/1.73M2
GLUCOSE BLD-MCNC: 161 MG/DL (ref 65–105)
GLUCOSE BLD-MCNC: 86 MG/DL (ref 65–105)
GLUCOSE SERPL-MCNC: 79 MG/DL (ref 70–99)
HCT VFR BLD AUTO: 29.6 % (ref 36.3–47.1)
HGB BLD-MCNC: 9.2 G/DL (ref 11.9–15.1)
IMM GRANULOCYTES # BLD AUTO: 0.04 K/UL (ref 0–0.3)
IMM GRANULOCYTES NFR BLD: 1 %
LYMPHOCYTES NFR BLD: 2.79 K/UL (ref 1.1–3.7)
LYMPHOCYTES RELATIVE PERCENT: 38 % (ref 24–43)
MCH RBC QN AUTO: 31.3 PG (ref 25.2–33.5)
MCHC RBC AUTO-ENTMCNC: 31.1 G/DL (ref 28.4–34.8)
MCV RBC AUTO: 100.7 FL (ref 82.6–102.9)
MONOCYTES NFR BLD: 0.87 K/UL (ref 0.1–1.2)
MONOCYTES NFR BLD: 12 % (ref 3–12)
NEUTROPHILS NFR BLD: 42 % (ref 36–65)
NEUTS SEG NFR BLD: 3.2 K/UL (ref 1.5–8.1)
NRBC BLD-RTO: 0.3 PER 100 WBC
PLATELET # BLD AUTO: 137 K/UL (ref 138–453)
PMV BLD AUTO: 11.5 FL (ref 8.1–13.5)
POTASSIUM SERPL-SCNC: 5.3 MMOL/L (ref 3.7–5.3)
RBC # BLD AUTO: 2.94 M/UL (ref 3.95–5.11)
RBC # BLD: ABNORMAL 10*6/UL
SODIUM SERPL-SCNC: 136 MMOL/L (ref 135–144)
WBC OTHER # BLD: 7.4 K/UL (ref 3.5–11.3)

## 2023-08-11 PROCEDURE — 05HM33Z INSERTION OF INFUSION DEVICE INTO RIGHT INTERNAL JUGULAR VEIN, PERCUTANEOUS APPROACH: ICD-10-PCS | Performed by: RADIOLOGY

## 2023-08-11 PROCEDURE — 2500000003 HC RX 250 WO HCPCS: Performed by: NURSE PRACTITIONER

## 2023-08-11 PROCEDURE — 6360000002 HC RX W HCPCS: Performed by: NURSE PRACTITIONER

## 2023-08-11 PROCEDURE — 0J2TXYZ CHANGE OTHER DEVICE IN TRUNK SUBCUTANEOUS TISSUE AND FASCIA, EXTERNAL APPROACH: ICD-10-PCS | Performed by: RADIOLOGY

## 2023-08-11 PROCEDURE — 6360000002 HC RX W HCPCS: Performed by: RADIOLOGY

## 2023-08-11 PROCEDURE — 2580000003 HC RX 258: Performed by: HOSPITALIST

## 2023-08-11 PROCEDURE — 6370000000 HC RX 637 (ALT 250 FOR IP): Performed by: HOSPITALIST

## 2023-08-11 PROCEDURE — 82947 ASSAY GLUCOSE BLOOD QUANT: CPT

## 2023-08-11 PROCEDURE — 77001 FLUOROGUIDE FOR VEIN DEVICE: CPT

## 2023-08-11 PROCEDURE — 97530 THERAPEUTIC ACTIVITIES: CPT

## 2023-08-11 PROCEDURE — 1200000000 HC SEMI PRIVATE

## 2023-08-11 PROCEDURE — 5A1D70Z PERFORMANCE OF URINARY FILTRATION, INTERMITTENT, LESS THAN 6 HOURS PER DAY: ICD-10-PCS | Performed by: RADIOLOGY

## 2023-08-11 PROCEDURE — 6370000000 HC RX 637 (ALT 250 FOR IP): Performed by: NURSE PRACTITIONER

## 2023-08-11 PROCEDURE — 97162 PT EVAL MOD COMPLEX 30 MIN: CPT

## 2023-08-11 PROCEDURE — 97166 OT EVAL MOD COMPLEX 45 MIN: CPT

## 2023-08-11 PROCEDURE — 80048 BASIC METABOLIC PNL TOTAL CA: CPT

## 2023-08-11 PROCEDURE — 85025 COMPLETE CBC W/AUTO DIFF WBC: CPT

## 2023-08-11 PROCEDURE — 36581 REPLACE TUNNELED CV CATH: CPT

## 2023-08-11 PROCEDURE — 97535 SELF CARE MNGMENT TRAINING: CPT

## 2023-08-11 PROCEDURE — 97116 GAIT TRAINING THERAPY: CPT

## 2023-08-11 PROCEDURE — 36415 COLL VENOUS BLD VENIPUNCTURE: CPT

## 2023-08-11 PROCEDURE — 2580000003 HC RX 258: Performed by: NURSE PRACTITIONER

## 2023-08-11 PROCEDURE — 97112 NEUROMUSCULAR REEDUCATION: CPT

## 2023-08-11 PROCEDURE — 6360000002 HC RX W HCPCS: Performed by: HOSPITALIST

## 2023-08-11 PROCEDURE — C1769 GUIDE WIRE: HCPCS

## 2023-08-11 RX ORDER — HEPARIN SODIUM 1000 [USP'U]/ML
INJECTION, SOLUTION INTRAVENOUS; SUBCUTANEOUS PRN
Status: COMPLETED | OUTPATIENT
Start: 2023-08-11 | End: 2023-08-11

## 2023-08-11 RX ADMIN — GABAPENTIN 100 MG: 100 CAPSULE ORAL at 09:52

## 2023-08-11 RX ADMIN — ATORVASTATIN CALCIUM 10 MG: 10 TABLET, FILM COATED ORAL at 09:52

## 2023-08-11 RX ADMIN — HEPARIN SODIUM 1600 UNITS: 1000 INJECTION INTRAVENOUS; SUBCUTANEOUS at 16:22

## 2023-08-11 RX ADMIN — CALCIUM ACETATE 1334 MG: 667 CAPSULE ORAL at 12:38

## 2023-08-11 RX ADMIN — HEPARIN SODIUM 5000 UNITS: 5000 INJECTION INTRAVENOUS; SUBCUTANEOUS at 19:59

## 2023-08-11 RX ADMIN — CALCIUM ACETATE 1334 MG: 667 CAPSULE ORAL at 09:52

## 2023-08-11 RX ADMIN — SODIUM CHLORIDE, PRESERVATIVE FREE 10 ML: 5 INJECTION INTRAVENOUS at 21:59

## 2023-08-11 RX ADMIN — CEFTRIAXONE SODIUM 1000 MG: 1 INJECTION, POWDER, FOR SOLUTION INTRAMUSCULAR; INTRAVENOUS at 18:21

## 2023-08-11 RX ADMIN — MIDODRINE HYDROCHLORIDE 10 MG: 10 TABLET ORAL at 21:59

## 2023-08-11 RX ADMIN — CARVEDILOL 12.5 MG: 12.5 TABLET, FILM COATED ORAL at 09:52

## 2023-08-11 RX ADMIN — CALCIUM ACETATE 1334 MG: 667 CAPSULE ORAL at 18:17

## 2023-08-11 RX ADMIN — CLOPIDOGREL BISULFATE 75 MG: 75 TABLET ORAL at 09:52

## 2023-08-11 RX ADMIN — ACETAMINOPHEN 650 MG: 325 TABLET ORAL at 18:24

## 2023-08-11 RX ADMIN — CARVEDILOL 12.5 MG: 12.5 TABLET, FILM COATED ORAL at 18:17

## 2023-08-11 RX ADMIN — HEPARIN SODIUM 5000 UNITS: 5000 INJECTION INTRAVENOUS; SUBCUTANEOUS at 09:52

## 2023-08-11 RX ADMIN — GABAPENTIN 100 MG: 100 CAPSULE ORAL at 19:58

## 2023-08-11 RX ADMIN — VENLAFAXINE HYDROCHLORIDE 37.5 MG: 37.5 TABLET ORAL at 05:27

## 2023-08-11 ASSESSMENT — PAIN SCALES - GENERAL: PAINLEVEL_OUTOF10: 8

## 2023-08-11 ASSESSMENT — PAIN DESCRIPTION - LOCATION: LOCATION: HEAD

## 2023-08-11 ASSESSMENT — PAIN DESCRIPTION - DESCRIPTORS: DESCRIPTORS: ACHING

## 2023-08-11 NOTE — BRIEF OP NOTE
Brief Postoperative Note    Gunner Acuna  YOB: 1945  [id number]    Pre-operative Diagnosis: Dialysis catheter dysfunction    Post-operative Diagnosis: Same    Procedure: Tunneled dialysis catheter exchange    Anesthesia: Local    Surgeons/Assistants: Linnette Cadena MD     Estimated Blood Loss: minimal    Complications: none immediate    Specimens: were not obtained    Findings: New 14.5 Fr x 19 cm Tunneled dialysis catheter placed. It Flushed and aspirated well, was locked with heparin, and is ready for use.       Electronically signed by Linnette Cadena MD on 8/11/2023 at 7:18 PM

## 2023-08-12 LAB
ANION GAP SERPL CALCULATED.3IONS-SCNC: 15 MMOL/L (ref 9–17)
BASOPHILS # BLD: 0.06 K/UL (ref 0–0.2)
BASOPHILS NFR BLD: 1 % (ref 0–2)
BUN SERPL-MCNC: 58 MG/DL (ref 8–23)
BUN/CREAT SERPL: 7 (ref 9–20)
CALCIUM SERPL-MCNC: 8.5 MG/DL (ref 8.6–10.4)
CHLORIDE SERPL-SCNC: 103 MMOL/L (ref 98–107)
CO2 SERPL-SCNC: 20 MMOL/L (ref 20–31)
CREAT SERPL-MCNC: 8.7 MG/DL (ref 0.5–0.9)
EOSINOPHIL # BLD: 0.41 K/UL (ref 0–0.44)
EOSINOPHILS RELATIVE PERCENT: 5 % (ref 1–4)
ERYTHROCYTE [DISTWIDTH] IN BLOOD BY AUTOMATED COUNT: 17.1 % (ref 11.8–14.4)
GFR SERPL CREATININE-BSD FRML MDRD: 4 ML/MIN/1.73M2
GLUCOSE BLD-MCNC: 107 MG/DL (ref 65–105)
GLUCOSE BLD-MCNC: 112 MG/DL (ref 65–105)
GLUCOSE BLD-MCNC: 123 MG/DL (ref 65–105)
GLUCOSE BLD-MCNC: 136 MG/DL (ref 65–105)
GLUCOSE SERPL-MCNC: 97 MG/DL (ref 70–99)
HCT VFR BLD AUTO: 29.6 % (ref 36.3–47.1)
HGB BLD-MCNC: 9.3 G/DL (ref 11.9–15.1)
IMM GRANULOCYTES # BLD AUTO: 0.07 K/UL (ref 0–0.3)
IMM GRANULOCYTES NFR BLD: 1 %
LYMPHOCYTES NFR BLD: 3.15 K/UL (ref 1.1–3.7)
LYMPHOCYTES RELATIVE PERCENT: 35 % (ref 24–43)
MCH RBC QN AUTO: 31.6 PG (ref 25.2–33.5)
MCHC RBC AUTO-ENTMCNC: 31.4 G/DL (ref 28.4–34.8)
MCV RBC AUTO: 100.7 FL (ref 82.6–102.9)
MICROORGANISM SPEC CULT: ABNORMAL
MONOCYTES NFR BLD: 1.23 K/UL (ref 0.1–1.2)
MONOCYTES NFR BLD: 14 % (ref 3–12)
NEUTROPHILS NFR BLD: 44 % (ref 36–65)
NEUTS SEG NFR BLD: 4.15 K/UL (ref 1.5–8.1)
NRBC BLD-RTO: 0 PER 100 WBC
PLATELET # BLD AUTO: 141 K/UL (ref 138–453)
PMV BLD AUTO: 11.5 FL (ref 8.1–13.5)
POTASSIUM SERPL-SCNC: 5.4 MMOL/L (ref 3.7–5.3)
RBC # BLD AUTO: 2.94 M/UL (ref 3.95–5.11)
RBC # BLD: ABNORMAL 10*6/UL
SODIUM SERPL-SCNC: 138 MMOL/L (ref 135–144)
SPECIMEN DESCRIPTION: ABNORMAL
WBC OTHER # BLD: 9.1 K/UL (ref 3.5–11.3)

## 2023-08-12 PROCEDURE — 90935 HEMODIALYSIS ONE EVALUATION: CPT

## 2023-08-12 PROCEDURE — 6370000000 HC RX 637 (ALT 250 FOR IP): Performed by: NURSE PRACTITIONER

## 2023-08-12 PROCEDURE — 97110 THERAPEUTIC EXERCISES: CPT

## 2023-08-12 PROCEDURE — 97535 SELF CARE MNGMENT TRAINING: CPT

## 2023-08-12 PROCEDURE — 85025 COMPLETE CBC W/AUTO DIFF WBC: CPT

## 2023-08-12 PROCEDURE — 80048 BASIC METABOLIC PNL TOTAL CA: CPT

## 2023-08-12 PROCEDURE — 36415 COLL VENOUS BLD VENIPUNCTURE: CPT

## 2023-08-12 PROCEDURE — 6360000002 HC RX W HCPCS: Performed by: HOSPITALIST

## 2023-08-12 PROCEDURE — 6370000000 HC RX 637 (ALT 250 FOR IP): Performed by: HOSPITALIST

## 2023-08-12 PROCEDURE — 6370000000 HC RX 637 (ALT 250 FOR IP): Performed by: INTERNAL MEDICINE

## 2023-08-12 PROCEDURE — 82947 ASSAY GLUCOSE BLOOD QUANT: CPT

## 2023-08-12 PROCEDURE — 2500000003 HC RX 250 WO HCPCS: Performed by: INTERNAL MEDICINE

## 2023-08-12 PROCEDURE — 1200000000 HC SEMI PRIVATE

## 2023-08-12 PROCEDURE — 2580000003 HC RX 258: Performed by: HOSPITALIST

## 2023-08-12 PROCEDURE — 99222 1ST HOSP IP/OBS MODERATE 55: CPT | Performed by: INTERNAL MEDICINE

## 2023-08-12 PROCEDURE — 2500000003 HC RX 250 WO HCPCS: Performed by: NURSE PRACTITIONER

## 2023-08-12 RX ORDER — SULFAMETHOXAZOLE AND TRIMETHOPRIM 800; 160 MG/1; MG/1
1 TABLET ORAL EVERY 12 HOURS SCHEDULED
Status: DISCONTINUED | OUTPATIENT
Start: 2023-08-12 | End: 2023-08-15

## 2023-08-12 RX ORDER — TRAMADOL HYDROCHLORIDE 50 MG/1
50 TABLET ORAL EVERY 12 HOURS PRN
Status: DISCONTINUED | OUTPATIENT
Start: 2023-08-12 | End: 2023-08-15 | Stop reason: HOSPADM

## 2023-08-12 RX ADMIN — VENLAFAXINE HYDROCHLORIDE 37.5 MG: 37.5 TABLET ORAL at 05:31

## 2023-08-12 RX ADMIN — ONDANSETRON 4 MG: 2 INJECTION INTRAMUSCULAR; INTRAVENOUS at 17:26

## 2023-08-12 RX ADMIN — TRAMADOL HYDROCHLORIDE 50 MG: 50 TABLET ORAL at 17:54

## 2023-08-12 RX ADMIN — GABAPENTIN 100 MG: 100 CAPSULE ORAL at 08:27

## 2023-08-12 RX ADMIN — GABAPENTIN 100 MG: 100 CAPSULE ORAL at 21:14

## 2023-08-12 RX ADMIN — CALCIUM ACETATE 1334 MG: 667 CAPSULE ORAL at 17:13

## 2023-08-12 RX ADMIN — CALCIUM ACETATE 1334 MG: 667 CAPSULE ORAL at 08:27

## 2023-08-12 RX ADMIN — ATORVASTATIN CALCIUM 10 MG: 10 TABLET, FILM COATED ORAL at 08:27

## 2023-08-12 RX ADMIN — HEPARIN SODIUM 5000 UNITS: 5000 INJECTION INTRAVENOUS; SUBCUTANEOUS at 21:14

## 2023-08-12 RX ADMIN — CLOPIDOGREL BISULFATE 75 MG: 75 TABLET ORAL at 08:27

## 2023-08-12 RX ADMIN — GABAPENTIN 100 MG: 100 CAPSULE ORAL at 14:06

## 2023-08-12 RX ADMIN — SULFAMETHOXAZOLE AND TRIMETHOPRIM 1 TABLET: 800; 160 TABLET ORAL at 21:14

## 2023-08-12 RX ADMIN — ACETAMINOPHEN 650 MG: 325 TABLET ORAL at 14:05

## 2023-08-12 RX ADMIN — SODIUM CHLORIDE, PRESERVATIVE FREE 10 ML: 5 INJECTION INTRAVENOUS at 22:01

## 2023-08-12 RX ADMIN — CARVEDILOL 12.5 MG: 12.5 TABLET, FILM COATED ORAL at 06:10

## 2023-08-12 RX ADMIN — Medication 1.9 ML: at 12:04

## 2023-08-12 RX ADMIN — CARVEDILOL 12.5 MG: 12.5 TABLET, FILM COATED ORAL at 17:13

## 2023-08-12 RX ADMIN — SODIUM CHLORIDE, PRESERVATIVE FREE 10 ML: 5 INJECTION INTRAVENOUS at 17:27

## 2023-08-12 RX ADMIN — HEPARIN SODIUM 5000 UNITS: 5000 INJECTION INTRAVENOUS; SUBCUTANEOUS at 08:27

## 2023-08-12 RX ADMIN — Medication 1.9 ML: at 12:05

## 2023-08-12 RX ADMIN — CALCIUM ACETATE 1334 MG: 667 CAPSULE ORAL at 14:05

## 2023-08-12 ASSESSMENT — PAIN DESCRIPTION - DESCRIPTORS
DESCRIPTORS: ACHING
DESCRIPTORS: THROBBING

## 2023-08-12 ASSESSMENT — PAIN DESCRIPTION - LOCATION
LOCATION: HEAD
LOCATION: HEAD

## 2023-08-12 ASSESSMENT — PAIN DESCRIPTION - ORIENTATION: ORIENTATION: POSTERIOR

## 2023-08-12 ASSESSMENT — PAIN SCALES - GENERAL: PAINLEVEL_OUTOF10: 8

## 2023-08-13 PROBLEM — I21.4 NSTEMI (NON-ST ELEVATED MYOCARDIAL INFARCTION) (HCC): Status: ACTIVE | Noted: 2023-08-13

## 2023-08-13 PROBLEM — N39.0 UTI DUE TO EXTENDED-SPECTRUM BETA LACTAMASE (ESBL) PRODUCING ESCHERICHIA COLI: Status: ACTIVE | Noted: 2023-08-13

## 2023-08-13 PROBLEM — B96.29 UTI DUE TO EXTENDED-SPECTRUM BETA LACTAMASE (ESBL) PRODUCING ESCHERICHIA COLI: Status: ACTIVE | Noted: 2023-08-13

## 2023-08-13 PROBLEM — I15.0 RENOVASCULAR HYPERTENSION: Status: ACTIVE | Noted: 2023-08-13

## 2023-08-13 PROBLEM — E78.00 PURE HYPERCHOLESTEROLEMIA: Status: ACTIVE | Noted: 2023-08-13

## 2023-08-13 PROBLEM — R77.8 ELEVATED TROPONIN: Status: ACTIVE | Noted: 2023-08-13

## 2023-08-13 PROBLEM — Z16.12 UTI DUE TO EXTENDED-SPECTRUM BETA LACTAMASE (ESBL) PRODUCING ESCHERICHIA COLI: Status: ACTIVE | Noted: 2023-08-13

## 2023-08-13 PROBLEM — R79.89 ELEVATED TROPONIN: Status: ACTIVE | Noted: 2023-08-13

## 2023-08-13 LAB
ANION GAP SERPL CALCULATED.3IONS-SCNC: 13 MMOL/L (ref 9–17)
BASOPHILS # BLD: 0.04 K/UL (ref 0–0.2)
BASOPHILS NFR BLD: 1 % (ref 0–2)
BUN SERPL-MCNC: 38 MG/DL (ref 8–23)
BUN/CREAT SERPL: 6 (ref 9–20)
CALCIUM SERPL-MCNC: 8.9 MG/DL (ref 8.6–10.4)
CHLORIDE SERPL-SCNC: 103 MMOL/L (ref 98–107)
CO2 SERPL-SCNC: 22 MMOL/L (ref 20–31)
CREAT SERPL-MCNC: 6.5 MG/DL (ref 0.5–0.9)
EOSINOPHIL # BLD: 0.56 K/UL (ref 0–0.44)
EOSINOPHILS RELATIVE PERCENT: 6 % (ref 1–4)
ERYTHROCYTE [DISTWIDTH] IN BLOOD BY AUTOMATED COUNT: 17.2 % (ref 11.8–14.4)
GFR SERPL CREATININE-BSD FRML MDRD: 6 ML/MIN/1.73M2
GLUCOSE BLD-MCNC: 110 MG/DL (ref 65–105)
GLUCOSE BLD-MCNC: 142 MG/DL (ref 65–105)
GLUCOSE BLD-MCNC: 148 MG/DL (ref 65–105)
GLUCOSE BLD-MCNC: 150 MG/DL (ref 65–105)
GLUCOSE SERPL-MCNC: 130 MG/DL (ref 70–99)
HCT VFR BLD AUTO: 30.8 % (ref 36.3–47.1)
HGB BLD-MCNC: 9.5 G/DL (ref 11.9–15.1)
IMM GRANULOCYTES # BLD AUTO: 0.09 K/UL (ref 0–0.3)
IMM GRANULOCYTES NFR BLD: 1 %
LYMPHOCYTES NFR BLD: 2.34 K/UL (ref 1.1–3.7)
LYMPHOCYTES RELATIVE PERCENT: 27 % (ref 24–43)
MCH RBC QN AUTO: 31.4 PG (ref 25.2–33.5)
MCHC RBC AUTO-ENTMCNC: 30.8 G/DL (ref 28.4–34.8)
MCV RBC AUTO: 101.7 FL (ref 82.6–102.9)
MONOCYTES NFR BLD: 1.05 K/UL (ref 0.1–1.2)
MONOCYTES NFR BLD: 12 % (ref 3–12)
NEUTROPHILS NFR BLD: 53 % (ref 36–65)
NEUTS SEG NFR BLD: 4.69 K/UL (ref 1.5–8.1)
NRBC BLD-RTO: 0 PER 100 WBC
PLATELET # BLD AUTO: 121 K/UL (ref 138–453)
PMV BLD AUTO: 11.4 FL (ref 8.1–13.5)
POTASSIUM SERPL-SCNC: 5.3 MMOL/L (ref 3.7–5.3)
RBC # BLD AUTO: 3.03 M/UL (ref 3.95–5.11)
RBC # BLD: ABNORMAL 10*6/UL
SODIUM SERPL-SCNC: 138 MMOL/L (ref 135–144)
WBC OTHER # BLD: 8.8 K/UL (ref 3.5–11.3)

## 2023-08-13 PROCEDURE — 6360000002 HC RX W HCPCS: Performed by: HOSPITALIST

## 2023-08-13 PROCEDURE — 6370000000 HC RX 637 (ALT 250 FOR IP): Performed by: INTERNAL MEDICINE

## 2023-08-13 PROCEDURE — 80048 BASIC METABOLIC PNL TOTAL CA: CPT

## 2023-08-13 PROCEDURE — 82947 ASSAY GLUCOSE BLOOD QUANT: CPT

## 2023-08-13 PROCEDURE — 99223 1ST HOSP IP/OBS HIGH 75: CPT | Performed by: INTERNAL MEDICINE

## 2023-08-13 PROCEDURE — 99232 SBSQ HOSP IP/OBS MODERATE 35: CPT | Performed by: INTERNAL MEDICINE

## 2023-08-13 PROCEDURE — 85025 COMPLETE CBC W/AUTO DIFF WBC: CPT

## 2023-08-13 PROCEDURE — 1200000000 HC SEMI PRIVATE

## 2023-08-13 PROCEDURE — 6370000000 HC RX 637 (ALT 250 FOR IP): Performed by: HOSPITALIST

## 2023-08-13 PROCEDURE — 2580000003 HC RX 258: Performed by: HOSPITALIST

## 2023-08-13 PROCEDURE — 6370000000 HC RX 637 (ALT 250 FOR IP): Performed by: NURSE PRACTITIONER

## 2023-08-13 PROCEDURE — 2500000003 HC RX 250 WO HCPCS: Performed by: NURSE PRACTITIONER

## 2023-08-13 PROCEDURE — 36415 COLL VENOUS BLD VENIPUNCTURE: CPT

## 2023-08-13 RX ADMIN — CALCIUM ACETATE 1334 MG: 667 CAPSULE ORAL at 08:41

## 2023-08-13 RX ADMIN — HEPARIN SODIUM 5000 UNITS: 5000 INJECTION INTRAVENOUS; SUBCUTANEOUS at 20:38

## 2023-08-13 RX ADMIN — GABAPENTIN 100 MG: 100 CAPSULE ORAL at 20:38

## 2023-08-13 RX ADMIN — GABAPENTIN 100 MG: 100 CAPSULE ORAL at 08:42

## 2023-08-13 RX ADMIN — CARVEDILOL 12.5 MG: 12.5 TABLET, FILM COATED ORAL at 08:42

## 2023-08-13 RX ADMIN — CARVEDILOL 12.5 MG: 12.5 TABLET, FILM COATED ORAL at 17:45

## 2023-08-13 RX ADMIN — CALCIUM ACETATE 1334 MG: 667 CAPSULE ORAL at 12:31

## 2023-08-13 RX ADMIN — GABAPENTIN 100 MG: 100 CAPSULE ORAL at 14:52

## 2023-08-13 RX ADMIN — ATORVASTATIN CALCIUM 10 MG: 10 TABLET, FILM COATED ORAL at 08:42

## 2023-08-13 RX ADMIN — SODIUM CHLORIDE, PRESERVATIVE FREE 10 ML: 5 INJECTION INTRAVENOUS at 08:48

## 2023-08-13 RX ADMIN — SULFAMETHOXAZOLE AND TRIMETHOPRIM 1 TABLET: 800; 160 TABLET ORAL at 08:42

## 2023-08-13 RX ADMIN — SODIUM CHLORIDE, PRESERVATIVE FREE 10 ML: 5 INJECTION INTRAVENOUS at 20:38

## 2023-08-13 RX ADMIN — CLOPIDOGREL BISULFATE 75 MG: 75 TABLET ORAL at 08:42

## 2023-08-13 RX ADMIN — HEPARIN SODIUM 5000 UNITS: 5000 INJECTION INTRAVENOUS; SUBCUTANEOUS at 08:42

## 2023-08-13 RX ADMIN — SULFAMETHOXAZOLE AND TRIMETHOPRIM 1 TABLET: 800; 160 TABLET ORAL at 20:38

## 2023-08-13 RX ADMIN — VENLAFAXINE HYDROCHLORIDE 37.5 MG: 37.5 TABLET ORAL at 05:42

## 2023-08-13 RX ADMIN — CALCIUM ACETATE 1334 MG: 667 CAPSULE ORAL at 17:45

## 2023-08-13 RX ADMIN — ONDANSETRON 4 MG: 2 INJECTION INTRAMUSCULAR; INTRAVENOUS at 08:48

## 2023-08-13 ASSESSMENT — PAIN SCALES - GENERAL: PAINLEVEL_OUTOF10: 7

## 2023-08-13 ASSESSMENT — PAIN DESCRIPTION - DESCRIPTORS: DESCRIPTORS: ACHING

## 2023-08-13 ASSESSMENT — PAIN DESCRIPTION - LOCATION: LOCATION: FOOT

## 2023-08-13 ASSESSMENT — PAIN - FUNCTIONAL ASSESSMENT: PAIN_FUNCTIONAL_ASSESSMENT: PREVENTS OR INTERFERES SOME ACTIVE ACTIVITIES AND ADLS

## 2023-08-13 ASSESSMENT — ENCOUNTER SYMPTOMS
GASTROINTESTINAL NEGATIVE: 1
RESPIRATORY NEGATIVE: 1

## 2023-08-13 ASSESSMENT — PAIN DESCRIPTION - FREQUENCY: FREQUENCY: CONTINUOUS

## 2023-08-13 ASSESSMENT — PAIN DESCRIPTION - PAIN TYPE: TYPE: CHRONIC PAIN

## 2023-08-13 ASSESSMENT — PAIN DESCRIPTION - ORIENTATION: ORIENTATION: RIGHT;LEFT

## 2023-08-13 ASSESSMENT — PAIN DESCRIPTION - ONSET: ONSET: ON-GOING

## 2023-08-13 NOTE — CONSULTS
[facility]   Consult Note         Today's Date: 8/13/2023  Patient Name: Louisa Ramirez  Date of admission: 8/9/2023 12:15 PM  Patient's age: 65 y.o., 1945  Admission Dx: Metabolic encephalopathy [R18.11]  Altered mental status [R41.82]  Elevated troponin [R77.8]  Acute cystitis without hematuria [N30.00]    Reason for Consult:  Cardiac evaluation    Requesting Physician: Korin Hernández MD    REASON FOR CONSULT:  nstemi    History Obtained From:  Patient, chart, staff, records    HISTORY OF PRESENT ILLNESS:      The patient is a 65 y.o. female who is admitted to the hospital for SOB, altered mental status, NSTEMI, ESRD. Past Medical History:   has a past medical history of Anxiety and depression, Arthritis, Cancer ([address]), Cerebral artery occlusion with cerebral infarction ([address]), Chronic kidney disease, stage 5 ([address]), Diabetes mellitus ([address]), GERD (gastroesophageal reflux disease), Hearing loss, Hemodialysis patient ([address]), History of blood transfusion, Hyperlipidemia, Hypertension, Migraines, MRSA (methicillin resistant staph aureus) culture positive, Neuropathy, PAT (obstructive sleep apnea), Prolonged emergence from general anesthesia, PVD (peripheral vascular disease) ([address]), SOB (shortness of breath), and Tinnitus. Past Surgical History:   has a past surgical history that includes Uvulopalatopharygoplasty; Mastectomy (Left); Hysterectomy; Appendectomy; back surgery; Mastectomy (Left, 05/14/2018); pr exc b9 lesion mrgn xcp sk tg t/a/l 0.5 cm/< (Right, 5/14/2018); Breast reduction surgery (Left, 1999); Colonoscopy; Insertable Cardiac Monitor; central line (1/29/2022); and IR TUNNELED CVC PLACE WO SQ PORT/PUMP > 5 YEARS (2/1/2022). Home Medications:    Prior to Admission medications    Medication Sig Start Date End Date Taking?  Authorizing Provider   atorvastatin (LIPITOR) 10 MG tablet Take 1 tablet by mouth daily   Yes Historical Provider, MD   busPIRone (BUSPAR) 10 MG tablet
interval not displayed. Hepatic Function Panel:   Recent Labs     08/09/23  1428   PROT 7.5   LABALBU 4.1   BILIDIR 0.1   IBILI 0.2   BILITOT 0.3   ALKPHOS 118*   ALT 19   AST 18       Lab Results   Component Value Date/Time    PROCAL 0.53 08/09/2023 02:28 PM    PROCAL 2.54 02/02/2022 04:11 AM    PROCAL 4.61 01/31/2022 01:16 AM       Lab Results   Component Value Date/Time    CRP 48.4 02/01/2022 03:02 PM    .1 01/31/2022 06:02 AM    .1 01/30/2022 07:20 AM     Lab Results   Component Value Date/Time    FERRITIN 724 01/31/2022 02:22 PM    FERRITIN 412 01/29/2022 01:22 AM     Lab Results   Component Value Date/Time    FIBRINOGEN 413 01/29/2022 06:52 AM     Lab Results   Component Value Date/Time    DDIMER 6.00 05/06/2022 11:09 AM    DDIMER 7.87 01/30/2022 07:20 AM    DDIMER 5.54 01/29/2022 06:52 AM    DDIMER 5.94 01/29/2022 01:22 AM     Lab Results   Component Value Date/Time     01/29/2022 01:22 AM       Lab Results   Component Value Date    SEDRATE 12 11/15/2013       Lab Results   Component Value Date/Time    COVID19 DETECTED 01/28/2022 11:18 PM     No results found for requested labs within last 30 days. Imaging Studies:   CT HEAD WO CONTRAST    Result Date: 8/9/2023  EXAMINATION: CT OF THE HEAD WITHOUT CONTRAST  8/9/2023 12:51 pm TECHNIQUE: CT of the head was performed without the administration of intravenous contrast. Automated exposure control, iterative reconstruction, and/or weight based adjustment of the mA/kV was utilized to reduce the radiation dose to as low as reasonably achievable.  COMPARISON: 07/10/2023 HISTORY: ORDERING SYSTEM PROVIDED HISTORY: Mental Status Changes TECHNOLOGIST PROVIDED HISTORY: Mental Status Changes Decision Support Exception - unselect if not a suspected or confirmed emergency medical condition->Emergency Medical Condition (MA) Reason for Exam: AMS, fatigue from dialysis FINDINGS: BRAIN/VENTRICLES: There is no acute intracranial hemorrhage, mass

## 2023-08-14 LAB
ANION GAP SERPL CALCULATED.3IONS-SCNC: 16 MMOL/L (ref 9–17)
BASOPHILS # BLD: 0.05 K/UL (ref 0–0.2)
BASOPHILS NFR BLD: 1 % (ref 0–2)
BUN SERPL-MCNC: 50 MG/DL (ref 8–23)
BUN/CREAT SERPL: 6 (ref 9–20)
CALCIUM SERPL-MCNC: 9.1 MG/DL (ref 8.6–10.4)
CHLORIDE SERPL-SCNC: 102 MMOL/L (ref 98–107)
CO2 SERPL-SCNC: 20 MMOL/L (ref 20–31)
CREAT SERPL-MCNC: 8.4 MG/DL (ref 0.5–0.9)
EOSINOPHIL # BLD: 0.59 K/UL (ref 0–0.44)
EOSINOPHILS RELATIVE PERCENT: 6 % (ref 1–4)
ERYTHROCYTE [DISTWIDTH] IN BLOOD BY AUTOMATED COUNT: 17.4 % (ref 11.8–14.4)
GFR SERPL CREATININE-BSD FRML MDRD: 4 ML/MIN/1.73M2
GLUCOSE BLD-MCNC: 101 MG/DL (ref 65–105)
GLUCOSE BLD-MCNC: 103 MG/DL (ref 65–105)
GLUCOSE BLD-MCNC: 98 MG/DL (ref 65–105)
GLUCOSE SERPL-MCNC: 95 MG/DL (ref 70–99)
HCT VFR BLD AUTO: 30.7 % (ref 36.3–47.1)
HGB BLD-MCNC: 9.5 G/DL (ref 11.9–15.1)
IMM GRANULOCYTES # BLD AUTO: 0.04 K/UL (ref 0–0.3)
IMM GRANULOCYTES NFR BLD: 0 %
LYMPHOCYTES NFR BLD: 2.66 K/UL (ref 1.1–3.7)
LYMPHOCYTES RELATIVE PERCENT: 25 % (ref 24–43)
MCH RBC QN AUTO: 31.7 PG (ref 25.2–33.5)
MCHC RBC AUTO-ENTMCNC: 30.9 G/DL (ref 28.4–34.8)
MCV RBC AUTO: 102.3 FL (ref 82.6–102.9)
MICROORGANISM SPEC CULT: NORMAL
MICROORGANISM SPEC CULT: NORMAL
MONOCYTES NFR BLD: 1.34 K/UL (ref 0.1–1.2)
MONOCYTES NFR BLD: 13 % (ref 3–12)
NEUTROPHILS NFR BLD: 55 % (ref 36–65)
NEUTS SEG NFR BLD: 5.86 K/UL (ref 1.5–8.1)
NRBC BLD-RTO: 0 PER 100 WBC
PLATELET # BLD AUTO: 126 K/UL (ref 138–453)
PMV BLD AUTO: 11.8 FL (ref 8.1–13.5)
POTASSIUM SERPL-SCNC: 5.5 MMOL/L (ref 3.7–5.3)
RBC # BLD AUTO: 3 M/UL (ref 3.95–5.11)
RBC # BLD: ABNORMAL 10*6/UL
SERVICE CMNT-IMP: NORMAL
SERVICE CMNT-IMP: NORMAL
SODIUM SERPL-SCNC: 138 MMOL/L (ref 135–144)
SPECIMEN DESCRIPTION: NORMAL
SPECIMEN DESCRIPTION: NORMAL
WBC OTHER # BLD: 10.5 K/UL (ref 3.5–11.3)

## 2023-08-14 PROCEDURE — 90935 HEMODIALYSIS ONE EVALUATION: CPT

## 2023-08-14 PROCEDURE — 6370000000 HC RX 637 (ALT 250 FOR IP): Performed by: HOSPITALIST

## 2023-08-14 PROCEDURE — 82947 ASSAY GLUCOSE BLOOD QUANT: CPT

## 2023-08-14 PROCEDURE — 1200000000 HC SEMI PRIVATE

## 2023-08-14 PROCEDURE — 6360000002 HC RX W HCPCS: Performed by: HOSPITALIST

## 2023-08-14 PROCEDURE — 6370000000 HC RX 637 (ALT 250 FOR IP): Performed by: NURSE PRACTITIONER

## 2023-08-14 PROCEDURE — 2500000003 HC RX 250 WO HCPCS: Performed by: INTERNAL MEDICINE

## 2023-08-14 PROCEDURE — 2580000003 HC RX 258: Performed by: HOSPITALIST

## 2023-08-14 PROCEDURE — 80048 BASIC METABOLIC PNL TOTAL CA: CPT

## 2023-08-14 PROCEDURE — 99232 SBSQ HOSP IP/OBS MODERATE 35: CPT | Performed by: INTERNAL MEDICINE

## 2023-08-14 PROCEDURE — 6370000000 HC RX 637 (ALT 250 FOR IP): Performed by: INTERNAL MEDICINE

## 2023-08-14 PROCEDURE — 6360000002 HC RX W HCPCS: Performed by: INTERNAL MEDICINE

## 2023-08-14 PROCEDURE — 36415 COLL VENOUS BLD VENIPUNCTURE: CPT

## 2023-08-14 PROCEDURE — 97535 SELF CARE MNGMENT TRAINING: CPT

## 2023-08-14 PROCEDURE — 2500000003 HC RX 250 WO HCPCS: Performed by: NURSE PRACTITIONER

## 2023-08-14 PROCEDURE — 85025 COMPLETE CBC W/AUTO DIFF WBC: CPT

## 2023-08-14 RX ORDER — PROCHLORPERAZINE EDISYLATE 5 MG/ML
10 INJECTION INTRAMUSCULAR; INTRAVENOUS ONCE
Status: COMPLETED | OUTPATIENT
Start: 2023-08-14 | End: 2023-08-14

## 2023-08-14 RX ADMIN — GABAPENTIN 100 MG: 100 CAPSULE ORAL at 09:39

## 2023-08-14 RX ADMIN — ATORVASTATIN CALCIUM 10 MG: 10 TABLET, FILM COATED ORAL at 09:39

## 2023-08-14 RX ADMIN — Medication 1.9 ML: at 12:57

## 2023-08-14 RX ADMIN — SODIUM CHLORIDE, PRESERVATIVE FREE 10 ML: 5 INJECTION INTRAVENOUS at 12:06

## 2023-08-14 RX ADMIN — SULFAMETHOXAZOLE AND TRIMETHOPRIM 1 TABLET: 800; 160 TABLET ORAL at 22:29

## 2023-08-14 RX ADMIN — GABAPENTIN 100 MG: 100 CAPSULE ORAL at 22:29

## 2023-08-14 RX ADMIN — HEPARIN SODIUM 5000 UNITS: 5000 INJECTION INTRAVENOUS; SUBCUTANEOUS at 22:30

## 2023-08-14 RX ADMIN — SODIUM CHLORIDE, PRESERVATIVE FREE 10 ML: 5 INJECTION INTRAVENOUS at 14:33

## 2023-08-14 RX ADMIN — PROCHLORPERAZINE EDISYLATE 10 MG: 5 INJECTION, SOLUTION INTRAMUSCULAR; INTRAVENOUS at 14:33

## 2023-08-14 RX ADMIN — SULFAMETHOXAZOLE AND TRIMETHOPRIM 1 TABLET: 800; 160 TABLET ORAL at 09:39

## 2023-08-14 RX ADMIN — CALCIUM ACETATE 1334 MG: 667 CAPSULE ORAL at 13:19

## 2023-08-14 RX ADMIN — GABAPENTIN 100 MG: 100 CAPSULE ORAL at 13:19

## 2023-08-14 RX ADMIN — CARVEDILOL 12.5 MG: 12.5 TABLET, FILM COATED ORAL at 09:47

## 2023-08-14 RX ADMIN — CARVEDILOL 12.5 MG: 12.5 TABLET, FILM COATED ORAL at 17:15

## 2023-08-14 RX ADMIN — HEPARIN SODIUM 5000 UNITS: 5000 INJECTION INTRAVENOUS; SUBCUTANEOUS at 09:39

## 2023-08-14 RX ADMIN — MIDODRINE HYDROCHLORIDE 10 MG: 10 TABLET ORAL at 22:29

## 2023-08-14 RX ADMIN — CALCIUM ACETATE 1334 MG: 667 CAPSULE ORAL at 17:16

## 2023-08-14 RX ADMIN — CLOPIDOGREL BISULFATE 75 MG: 75 TABLET ORAL at 09:39

## 2023-08-14 RX ADMIN — CALCIUM ACETATE 1334 MG: 667 CAPSULE ORAL at 09:39

## 2023-08-14 RX ADMIN — ACETAMINOPHEN 650 MG: 325 TABLET ORAL at 09:44

## 2023-08-14 RX ADMIN — Medication 1.9 ML: at 12:58

## 2023-08-14 RX ADMIN — SODIUM CHLORIDE, PRESERVATIVE FREE 10 ML: 5 INJECTION INTRAVENOUS at 22:30

## 2023-08-14 RX ADMIN — VENLAFAXINE HYDROCHLORIDE 37.5 MG: 37.5 TABLET ORAL at 09:45

## 2023-08-14 RX ADMIN — SODIUM CHLORIDE, PRESERVATIVE FREE 10 ML: 5 INJECTION INTRAVENOUS at 09:39

## 2023-08-14 RX ADMIN — ONDANSETRON 4 MG: 2 INJECTION INTRAMUSCULAR; INTRAVENOUS at 12:06

## 2023-08-14 ASSESSMENT — ENCOUNTER SYMPTOMS
RESPIRATORY NEGATIVE: 1
GASTROINTESTINAL NEGATIVE: 1

## 2023-08-14 ASSESSMENT — PAIN SCALES - GENERAL
PAINLEVEL_OUTOF10: 3
PAINLEVEL_OUTOF10: 0

## 2023-08-15 VITALS
WEIGHT: 164.02 LBS | HEART RATE: 69 BPM | TEMPERATURE: 97.8 F | BODY MASS INDEX: 29.06 KG/M2 | SYSTOLIC BLOOD PRESSURE: 184 MMHG | DIASTOLIC BLOOD PRESSURE: 79 MMHG | RESPIRATION RATE: 17 BRPM | OXYGEN SATURATION: 98 % | HEIGHT: 63 IN

## 2023-08-15 LAB
ANION GAP SERPL CALCULATED.3IONS-SCNC: 11 MMOL/L (ref 9–17)
BASOPHILS # BLD: 0.03 K/UL (ref 0–0.2)
BASOPHILS NFR BLD: 0 % (ref 0–2)
BUN SERPL-MCNC: 33 MG/DL (ref 8–23)
BUN/CREAT SERPL: 5 (ref 9–20)
CALCIUM SERPL-MCNC: 9.1 MG/DL (ref 8.6–10.4)
CHLORIDE SERPL-SCNC: 99 MMOL/L (ref 98–107)
CO2 SERPL-SCNC: 25 MMOL/L (ref 20–31)
CREAT SERPL-MCNC: 6.9 MG/DL (ref 0.5–0.9)
EOSINOPHIL # BLD: 0.52 K/UL (ref 0–0.44)
EOSINOPHILS RELATIVE PERCENT: 6 % (ref 1–4)
ERYTHROCYTE [DISTWIDTH] IN BLOOD BY AUTOMATED COUNT: 17.2 % (ref 11.8–14.4)
GFR SERPL CREATININE-BSD FRML MDRD: 6 ML/MIN/1.73M2
GLUCOSE BLD-MCNC: 154 MG/DL (ref 65–105)
GLUCOSE BLD-MCNC: 86 MG/DL (ref 65–105)
GLUCOSE BLD-MCNC: 95 MG/DL (ref 65–105)
GLUCOSE SERPL-MCNC: 79 MG/DL (ref 70–99)
HCT VFR BLD AUTO: 30 % (ref 36.3–47.1)
HGB BLD-MCNC: 9.4 G/DL (ref 11.9–15.1)
IMM GRANULOCYTES # BLD AUTO: 0.06 K/UL (ref 0–0.3)
IMM GRANULOCYTES NFR BLD: 1 %
LYMPHOCYTES NFR BLD: 2.54 K/UL (ref 1.1–3.7)
LYMPHOCYTES RELATIVE PERCENT: 27 % (ref 24–43)
MCH RBC QN AUTO: 31.6 PG (ref 25.2–33.5)
MCHC RBC AUTO-ENTMCNC: 31.3 G/DL (ref 28.4–34.8)
MCV RBC AUTO: 101 FL (ref 82.6–102.9)
MONOCYTES NFR BLD: 1.17 K/UL (ref 0.1–1.2)
MONOCYTES NFR BLD: 12 % (ref 3–12)
NEUTROPHILS NFR BLD: 54 % (ref 36–65)
NEUTS SEG NFR BLD: 5.12 K/UL (ref 1.5–8.1)
NRBC BLD-RTO: 0 PER 100 WBC
PLATELET # BLD AUTO: 131 K/UL (ref 138–453)
PMV BLD AUTO: 11.6 FL (ref 8.1–13.5)
POTASSIUM SERPL-SCNC: 5.8 MMOL/L (ref 3.7–5.3)
RBC # BLD AUTO: 2.97 M/UL (ref 3.95–5.11)
RBC # BLD: ABNORMAL 10*6/UL
SODIUM SERPL-SCNC: 135 MMOL/L (ref 135–144)
WBC OTHER # BLD: 9.4 K/UL (ref 3.5–11.3)

## 2023-08-15 PROCEDURE — 36415 COLL VENOUS BLD VENIPUNCTURE: CPT

## 2023-08-15 PROCEDURE — 2580000003 HC RX 258: Performed by: HOSPITALIST

## 2023-08-15 PROCEDURE — 2500000003 HC RX 250 WO HCPCS: Performed by: NURSE PRACTITIONER

## 2023-08-15 PROCEDURE — 90935 HEMODIALYSIS ONE EVALUATION: CPT

## 2023-08-15 PROCEDURE — 85025 COMPLETE CBC W/AUTO DIFF WBC: CPT

## 2023-08-15 PROCEDURE — 6370000000 HC RX 637 (ALT 250 FOR IP): Performed by: INTERNAL MEDICINE

## 2023-08-15 PROCEDURE — 6360000002 HC RX W HCPCS: Performed by: HOSPITALIST

## 2023-08-15 PROCEDURE — 80048 BASIC METABOLIC PNL TOTAL CA: CPT

## 2023-08-15 PROCEDURE — 6370000000 HC RX 637 (ALT 250 FOR IP): Performed by: NURSE PRACTITIONER

## 2023-08-15 PROCEDURE — 2500000003 HC RX 250 WO HCPCS: Performed by: INTERNAL MEDICINE

## 2023-08-15 RX ORDER — SULFAMETHOXAZOLE AND TRIMETHOPRIM 800; 160 MG/1; MG/1
0.5 TABLET ORAL EVERY 12 HOURS SCHEDULED
Status: DISCONTINUED | OUTPATIENT
Start: 2023-08-15 | End: 2023-08-15 | Stop reason: HOSPADM

## 2023-08-15 RX ADMIN — HEPARIN SODIUM 5000 UNITS: 5000 INJECTION INTRAVENOUS; SUBCUTANEOUS at 09:23

## 2023-08-15 RX ADMIN — GABAPENTIN 100 MG: 100 CAPSULE ORAL at 12:44

## 2023-08-15 RX ADMIN — VENLAFAXINE HYDROCHLORIDE 37.5 MG: 37.5 TABLET ORAL at 06:39

## 2023-08-15 RX ADMIN — Medication 1.9 ML: at 17:59

## 2023-08-15 RX ADMIN — Medication 1.9 ML: at 17:58

## 2023-08-15 RX ADMIN — CALCIUM ACETATE 1334 MG: 667 CAPSULE ORAL at 12:41

## 2023-08-15 RX ADMIN — ONDANSETRON 4 MG: 2 INJECTION INTRAMUSCULAR; INTRAVENOUS at 09:28

## 2023-08-15 RX ADMIN — SODIUM CHLORIDE, PRESERVATIVE FREE 10 ML: 5 INJECTION INTRAVENOUS at 12:41

## 2023-08-15 RX ADMIN — SULFAMETHOXAZOLE AND TRIMETHOPRIM 1 TABLET: 800; 160 TABLET ORAL at 12:41

## 2023-08-15 ASSESSMENT — PAIN SCALES - GENERAL: PAINLEVEL_OUTOF10: 0

## 2023-08-15 NOTE — CARE COORDINATION
IMM letter provided to patient. Patient offered four hours to make informed decision regarding appeal process; patient agreeable to discharge.
Social Work-DC is on hold. Rescheduled transportation for 130 tomorrow.
Social Work-Pt will dc to [facility]. [facility] will transport at 730. Orders faxed. Nurse to call  report to [phone]. DPt and son are agreeable with dc plans.  Jessica Preston
Social work: tried to reach [facility] x2 voicemail and also text messaged weekend cell phone. No response. Unsure if they would  be able to work out [facility]"" at 10 am as they cannot be reached tonight either. Therefore will tentatively set up transportation and return to [facility] for tomorrow. Will place ambulance one will call as time for dialysis here tomorrow is unknown. Phone for report Connie Sabiha  [phone]  Fax [phone]     [facility] dialysis phone [phone]  Fax [phone]  No hens needed as pt is a return. No new precer required per admissions last week.   Opal garcia
dialogue that supports the patient's individualized plan of care/goals and shares the quality data associated with the providers was provided to: Patient Representative   Patient Representative Name: Ron Quiroz     The Patient and/or Patient Representative Agree with the Discharge Plan?  Yes    Lesia Johnson, [facility]  Case Management Department  Ph: [phone] Fax:

## 2023-08-16 RX ORDER — SULFAMETHOXAZOLE AND TRIMETHOPRIM 400; 80 MG/1; MG/1
1 TABLET ORAL 2 TIMES DAILY
Qty: 14 TABLET | Refills: 0 | Status: SHIPPED | OUTPATIENT
Start: 2023-08-16 | End: 2023-08-23

## 2023-08-16 NOTE — DISCHARGE SUMMARY
clopidogrel 75 MG tablet  Commonly known as: PLAVIX     DIALYVITE PO     gabapentin 100 MG capsule  Commonly known as: NEURONTIN     ipratropium 0.5 mg-albuterol 2.5 mg 0.5-2.5 (3) MG/3ML Soln nebulizer solution  Commonly known as: DUONEB     Lidocaine 4 % Gel     midodrine 10 MG tablet  Commonly known as: PROAMATINE     omeprazole 20 MG delayed release capsule  Commonly known as: PRILOSEC     promethazine 25 MG tablet  Commonly known as: PHENERGAN     venlafaxine 37.5 MG tablet  Commonly known as: EFFEXOR            STOP taking these medications      acetaminophen 325 MG tablet  Commonly known as: TYLENOL     sulfamethoxazole-trimethoprim 800-160 MG per tablet  Commonly known as: BACTRIM DS;SEPTRA DS              Code Status:  Prior    Time Spent on discharge is  35 mins in patient examination, evaluation, counseling as well as medication reconciliation, prescriptions for required medications, discharge plan and follow up. Electronically signed by Rome Paget, MD on 8/16/2023 at 9:34 AM     Thank you Dr. Abbi Peña MD for the opportunity to be involved in this patient's care. This note was created with the assistance of a speech-recognition program.  Although the intention is to generate a document that actually reflects the content of the visit, no guarantees can be provided that every mistake has been identified and corrected by editing. Note was updated later by me after  physical examination and  completion of the assessment.

## 2023-08-26 NOTE — PROGRESS NOTES
PAT Progress Note    Pt Name: Danielle Mednia  MRN: [id number]  YOB: 1945  Date of evaluation: 1/13/2022      [x] Called to [facility]. I spoke to the patient, Danielle Medina, a 67 y.o. female, who is scheduled for an upcoming laparoscopic peritoneal dialysis cathter insertion by Dr. Bill Babin for renal failure on 1/18/2022 at 1430. [x] I reviewed the hard copy general surgery progress note by Dr. Bill Babin dated 12/20/2021 for an Interval History and Physical Note the day of surgery. History of chronic kidney disease requiring dialysis, dibetes, hyperlipidemia, hypertension, obstructive sleep apnea, CVA, loop recorder. Denies chest pain, palpitations. Patient has shortness of breath at rest and with exertion and occasional dizziness. Blood sugars run \"118-120. \" Patient has been on dialysis for a year and currently has treatments Pyozmh-Pmpcqvral-Akksre at [facility] Patient previously had peritoneal dialysis catheter but she was hospitalized in October 2021 for peritonitis. Patient was admitted 10/4/2021 with abdominal pain, nausea, vomiting, and diarrhea. Patient had infected peritoneal fluid which was purulent. Patient was treated with IV antibiotics and had catheter removed on 10/7/2021. Patient was discharged on 10/10/2021. Patient was admitted to [facility] on 11/8/2021 for bleeding after her port became disconnected during her hemodialysis treatment. Patient had elevated troponins and was initially placed on heparin drip. Heparin drip was discontinued and patient had 2D echo (see result below). Patient was evaluated by Dr. Kevin Gasca and per note in epic, \"if limited echo low risk, no further inpatient cardiac workup is planned, can follow up in 2 weeks for consideration for stress testing for further risk stratification. \" Patient was discharged on 11/10/2021. Patient states she did not have any follow up with cardiology.      Functional Capacity per pt:  1) Pt is not able to walk 2 city blocks on level ground without SOB. 2) Pt is not able to climb 2 flights of stairs without SOB. 3) Pt is not able to walk up a hill for 1-2 city blocks without SOB. Echocardiogram Limited 2D 11/9/2021:  Summary  Limited echo  Left ventricle cavity is small and underfilled  Mild to moderate left ventricular hypertrophy. Global left ventricular systolic function is normal with an estimated ejection fraction of 60 % . No obvious wall motion abnormality seen. Normal right ventricular size and function. Trivial pericardial effusion posterior to left ventricle    Physical Exam:     General Appearance:  Alert, well appearing, and in no acute distress. Mental status: Oriented to person, place, and time. Lungs: Bilateral equal air entry, clear to auscultation, no wheezing, rales or rhonchi, and normal effort. Right chest hemodialysis catheter with dry dressing intact. Cardiovascular: Grade II-III/VI systolic murmur. Normal rate, regular rhythm, no gallop or rub. Abdomen: Soft, nontender, nondistended, and active bowel sounds. Extremities: Left ankle 3+ non-pitting edema. Right ankle 1+ non-pitting edema.    Neurologic: There are no new focal motor or sensory deficits, normal muscle tone and bulk, no abnormal sensation, normal speech, cranial nerves II through XII grossly intact    Vital signs: BP (!) 169/83   Pulse 80   Temp 98.2 °F (36.8 °C) (Oral)   Resp 16   Ht 5' 3\" (1.6 m)   Wt 174 lb 2.6 oz (79 kg)   SpO2 99%   BMI 30.85 kg/m²      Investigations:      Laboratory Testing:  Recent Results (from the past 24 hour(s))   Basic Metabolic Panel    Collection Time: 01/13/22 12:20 PM   Result Value Ref Range    Glucose 138 (H) 70 - 99 mg/dL    BUN 48 (H) 8 - 23 mg/dL    CREATININE 5.13 (HH) 0.50 - 0.90 mg/dL    Bun/Cre Ratio 9 9 - 20    Calcium 9.0 8.6 - 10.4 mg/dL    Sodium 141 135 - 144 mmol/L    Potassium 3.9 3.7 - 5.3 mmol/L    Chloride 104 98 - 107 mmol/L    CO2 21 20 - 31 mmol/L    Anion Gap 16 9 - 17 mmol/L    GFR Non- 8 (L) >60 mL/min    GFR  10 (L) >60 mL/min    GFR Comment          GFR Staging NOT REPORTED    CBC    Collection Time: 01/13/22 12:20 PM   Result Value Ref Range    WBC 7.5 3.5 - 11.3 k/uL    RBC 3.60 (L) 3.95 - 5.11 m/uL    Hemoglobin 10.3 (L) 11.9 - 15.1 g/dL    Hematocrit 33.7 (L) 36.3 - 47.1 %    MCV 93.6 82.6 - 102.9 fL    MCH 28.6 25.2 - 33.5 pg    MCHC 30.6 28.4 - 34.8 g/dL    RDW 14.1 11.8 - 14.4 %    Platelets 887 576 - 506 k/uL    MPV 11.9 8.1 - 13.5 fL    NRBC Automated 0.0 0.0 per 100 WBC       Recent Labs     01/13/22  1220   HGB 10.3*   HCT 33.7*   WBC 7.5   MCV 93.6      K 3.9      CO2 21   BUN 48*   CREATININE 5.13*   GLUCOSE 138*       No results for input(s): COVID19 in the last 720 hours.     LEYDI eMtz CNP    Electronically signed 1/13/2022 at 1:03 PM Name band;

## 2023-09-12 PROBLEM — R79.89 ELEVATED TROPONIN: Status: RESOLVED | Noted: 2023-08-13 | Resolved: 2023-09-12

## 2023-09-29 ENCOUNTER — APPOINTMENT (OUTPATIENT)
Dept: GENERAL RADIOLOGY | Age: 78
End: 2023-09-29
Payer: MEDICARE

## 2023-09-29 ENCOUNTER — HOSPITAL ENCOUNTER (INPATIENT)
Age: 78
LOS: 6 days | Discharge: OTHER INSTITUTION | End: 2023-10-05
Attending: EMERGENCY MEDICINE | Admitting: FAMILY MEDICINE
Payer: MEDICARE

## 2023-09-29 ENCOUNTER — APPOINTMENT (OUTPATIENT)
Dept: CT IMAGING | Age: 78
End: 2023-09-29
Payer: MEDICARE

## 2023-09-29 DIAGNOSIS — R07.9 CHEST PAIN, UNSPECIFIED TYPE: ICD-10-CM

## 2023-09-29 DIAGNOSIS — R55 SYNCOPE AND COLLAPSE: Primary | ICD-10-CM

## 2023-09-29 LAB
ANION GAP SERPL CALCULATED.3IONS-SCNC: 13 MMOL/L (ref 9–17)
BASOPHILS # BLD: 0.03 K/UL (ref 0–0.2)
BASOPHILS NFR BLD: 0 % (ref 0–2)
BUN SERPL-MCNC: 28 MG/DL (ref 8–23)
BUN/CREAT SERPL: 5 (ref 9–20)
CALCIUM SERPL-MCNC: 9.4 MG/DL (ref 8.6–10.4)
CHLORIDE SERPL-SCNC: 94 MMOL/L (ref 98–107)
CK SERPL-CCNC: 54 U/L (ref 26–192)
CO2 SERPL-SCNC: 30 MMOL/L (ref 20–31)
CREAT SERPL-MCNC: 5.1 MG/DL (ref 0.5–0.9)
EKG ATRIAL RATE: 68 BPM
EKG P AXIS: 1 DEGREES
EKG P-R INTERVAL: 248 MS
EKG Q-T INTERVAL: 430 MS
EKG QRS DURATION: 86 MS
EKG QTC CALCULATION (BAZETT): 457 MS
EKG R AXIS: -32 DEGREES
EKG T AXIS: -8 DEGREES
EKG VENTRICULAR RATE: 68 BPM
EOSINOPHIL # BLD: 0.32 K/UL (ref 0–0.44)
EOSINOPHILS RELATIVE PERCENT: 4 % (ref 1–4)
ERYTHROCYTE [DISTWIDTH] IN BLOOD BY AUTOMATED COUNT: 16.7 % (ref 11.8–14.4)
GFR SERPL CREATININE-BSD FRML MDRD: 8 ML/MIN/1.73M2
GLUCOSE BLD-MCNC: 116 MG/DL (ref 65–105)
GLUCOSE SERPL-MCNC: 133 MG/DL (ref 70–99)
HCT VFR BLD AUTO: 37.4 % (ref 36.3–47.1)
HGB BLD-MCNC: 11.5 G/DL (ref 11.9–15.1)
IMM GRANULOCYTES # BLD AUTO: 0.04 K/UL (ref 0–0.3)
IMM GRANULOCYTES NFR BLD: 1 %
LACTATE BLDV-SCNC: 1.6 MMOL/L (ref 0.5–2.2)
LYMPHOCYTES NFR BLD: 1.84 K/UL (ref 1.1–3.7)
LYMPHOCYTES RELATIVE PERCENT: 24 % (ref 24–43)
MAGNESIUM SERPL-MCNC: 2.1 MG/DL (ref 1.6–2.6)
MCH RBC QN AUTO: 32.2 PG (ref 25.2–33.5)
MCHC RBC AUTO-ENTMCNC: 30.7 G/DL (ref 28.4–34.8)
MCV RBC AUTO: 104.8 FL (ref 82.6–102.9)
MONOCYTES NFR BLD: 0.93 K/UL (ref 0.1–1.2)
MONOCYTES NFR BLD: 12 % (ref 3–12)
NEUTROPHILS NFR BLD: 59 % (ref 36–65)
NEUTS SEG NFR BLD: 4.66 K/UL (ref 1.5–8.1)
NRBC BLD-RTO: 0.4 PER 100 WBC
PLATELET # BLD AUTO: 208 K/UL (ref 138–453)
PMV BLD AUTO: 11.1 FL (ref 8.1–13.5)
POTASSIUM SERPL-SCNC: 4.4 MMOL/L (ref 3.7–5.3)
RBC # BLD AUTO: 3.57 M/UL (ref 3.95–5.11)
RBC # BLD: ABNORMAL 10*6/UL
RBC # BLD: ABNORMAL 10*6/UL
SODIUM SERPL-SCNC: 137 MMOL/L (ref 135–144)
TROPONIN I SERPL HS-MCNC: 110 NG/L (ref 0–14)
TROPONIN I SERPL HS-MCNC: 113 NG/L (ref 0–14)
WBC OTHER # BLD: 7.8 K/UL (ref 3.5–11.3)

## 2023-09-29 PROCEDURE — 83735 ASSAY OF MAGNESIUM: CPT

## 2023-09-29 PROCEDURE — 82947 ASSAY GLUCOSE BLOOD QUANT: CPT

## 2023-09-29 PROCEDURE — 83605 ASSAY OF LACTIC ACID: CPT

## 2023-09-29 PROCEDURE — 80048 BASIC METABOLIC PNL TOTAL CA: CPT

## 2023-09-29 PROCEDURE — 84484 ASSAY OF TROPONIN QUANT: CPT

## 2023-09-29 PROCEDURE — 82550 ASSAY OF CK (CPK): CPT

## 2023-09-29 PROCEDURE — 85025 COMPLETE CBC W/AUTO DIFF WBC: CPT

## 2023-09-29 PROCEDURE — 1200000000 HC SEMI PRIVATE

## 2023-09-29 PROCEDURE — 99285 EMERGENCY DEPT VISIT HI MDM: CPT

## 2023-09-29 PROCEDURE — 70450 CT HEAD/BRAIN W/O DYE: CPT

## 2023-09-29 PROCEDURE — 71045 X-RAY EXAM CHEST 1 VIEW: CPT

## 2023-09-29 PROCEDURE — 93005 ELECTROCARDIOGRAM TRACING: CPT | Performed by: EMERGENCY MEDICINE

## 2023-09-29 RX ORDER — IPRATROPIUM BROMIDE AND ALBUTEROL SULFATE 2.5; .5 MG/3ML; MG/3ML
1 SOLUTION RESPIRATORY (INHALATION) EVERY 6 HOURS PRN
Status: DISCONTINUED | OUTPATIENT
Start: 2023-09-29 | End: 2023-09-29

## 2023-09-29 RX ORDER — PANTOPRAZOLE SODIUM 20 MG/1
20 TABLET, DELAYED RELEASE ORAL
Status: DISCONTINUED | OUTPATIENT
Start: 2023-09-30 | End: 2023-10-05 | Stop reason: HOSPADM

## 2023-09-29 RX ORDER — CARVEDILOL 12.5 MG/1
12.5 TABLET ORAL 2 TIMES DAILY WITH MEALS
Status: DISCONTINUED | OUTPATIENT
Start: 2023-09-30 | End: 2023-09-29

## 2023-09-29 RX ORDER — ONDANSETRON 4 MG/1
4 TABLET, ORALLY DISINTEGRATING ORAL EVERY 8 HOURS PRN
Status: DISCONTINUED | OUTPATIENT
Start: 2023-09-29 | End: 2023-10-05 | Stop reason: HOSPADM

## 2023-09-29 RX ORDER — GABAPENTIN 100 MG/1
100 CAPSULE ORAL 3 TIMES DAILY
Status: DISCONTINUED | OUTPATIENT
Start: 2023-09-29 | End: 2023-10-04

## 2023-09-29 RX ORDER — VENLAFAXINE 37.5 MG/1
37.5 TABLET ORAL DAILY
Status: DISCONTINUED | OUTPATIENT
Start: 2023-09-30 | End: 2023-09-30

## 2023-09-29 RX ORDER — MIDODRINE HYDROCHLORIDE 10 MG/1
10 TABLET ORAL
Status: DISCONTINUED | OUTPATIENT
Start: 2023-09-29 | End: 2023-10-05 | Stop reason: HOSPADM

## 2023-09-29 RX ORDER — VENLAFAXINE 75 MG/1
75 TABLET ORAL DAILY
Status: DISCONTINUED | OUTPATIENT
Start: 2023-09-30 | End: 2023-09-29

## 2023-09-29 RX ORDER — CLOPIDOGREL BISULFATE 75 MG/1
75 TABLET ORAL DAILY
Status: DISCONTINUED | OUTPATIENT
Start: 2023-09-30 | End: 2023-10-05 | Stop reason: HOSPADM

## 2023-09-29 RX ORDER — CALCIUM ACETATE 667 MG/1
2 CAPSULE ORAL
Status: DISCONTINUED | OUTPATIENT
Start: 2023-09-30 | End: 2023-10-05 | Stop reason: HOSPADM

## 2023-09-29 RX ORDER — ATORVASTATIN CALCIUM 10 MG/1
10 TABLET, FILM COATED ORAL DAILY
Status: DISCONTINUED | OUTPATIENT
Start: 2023-09-30 | End: 2023-09-29

## 2023-09-29 RX ORDER — ACETAMINOPHEN 500 MG
1000 TABLET ORAL 3 TIMES DAILY
Status: ON HOLD | COMMUNITY
End: 2023-10-05 | Stop reason: HOSPADM

## 2023-09-29 RX ORDER — SODIUM CHLORIDE 0.9 % (FLUSH) 0.9 %
5-40 SYRINGE (ML) INJECTION PRN
Status: DISCONTINUED | OUTPATIENT
Start: 2023-09-29 | End: 2023-10-05 | Stop reason: HOSPADM

## 2023-09-29 RX ORDER — ATORVASTATIN CALCIUM 10 MG/1
10 TABLET, FILM COATED ORAL DAILY
Status: DISCONTINUED | OUTPATIENT
Start: 2023-09-30 | End: 2023-10-03

## 2023-09-29 RX ORDER — ACETAMINOPHEN 325 MG/1
650 TABLET ORAL EVERY 4 HOURS PRN
Status: DISCONTINUED | OUTPATIENT
Start: 2023-09-29 | End: 2023-10-05 | Stop reason: HOSPADM

## 2023-09-29 RX ORDER — INSULIN ASPART 100 [IU]/ML
INJECTION, SOLUTION INTRAVENOUS; SUBCUTANEOUS 3 TIMES DAILY
Status: ON HOLD | COMMUNITY
End: 2023-09-29

## 2023-09-29 RX ORDER — ALBUTEROL SULFATE 90 UG/1
2 AEROSOL, METERED RESPIRATORY (INHALATION) EVERY 6 HOURS PRN
Status: DISCONTINUED | OUTPATIENT
Start: 2023-09-29 | End: 2023-10-05 | Stop reason: HOSPADM

## 2023-09-29 RX ORDER — VENLAFAXINE 37.5 MG/1
56.25 TABLET ORAL
COMMUNITY

## 2023-09-29 RX ORDER — ONDANSETRON 2 MG/ML
4 INJECTION INTRAMUSCULAR; INTRAVENOUS EVERY 6 HOURS PRN
Status: DISCONTINUED | OUTPATIENT
Start: 2023-09-29 | End: 2023-10-05 | Stop reason: HOSPADM

## 2023-09-29 RX ORDER — SODIUM CHLORIDE 9 MG/ML
INJECTION, SOLUTION INTRAVENOUS PRN
Status: DISCONTINUED | OUTPATIENT
Start: 2023-09-29 | End: 2023-10-05 | Stop reason: HOSPADM

## 2023-09-29 RX ORDER — CARVEDILOL 12.5 MG/1
12.5 TABLET ORAL 2 TIMES DAILY WITH MEALS
Status: DISCONTINUED | OUTPATIENT
Start: 2023-09-30 | End: 2023-10-05 | Stop reason: HOSPADM

## 2023-09-29 RX ORDER — HEPARIN SODIUM 5000 [USP'U]/ML
5000 INJECTION, SOLUTION INTRAVENOUS; SUBCUTANEOUS EVERY 8 HOURS SCHEDULED
Status: DISCONTINUED | OUTPATIENT
Start: 2023-09-29 | End: 2023-10-05 | Stop reason: HOSPADM

## 2023-09-29 RX ORDER — SODIUM CHLORIDE 0.9 % (FLUSH) 0.9 %
5-40 SYRINGE (ML) INJECTION EVERY 12 HOURS SCHEDULED
Status: DISCONTINUED | OUTPATIENT
Start: 2023-09-29 | End: 2023-10-05 | Stop reason: HOSPADM

## 2023-09-29 ASSESSMENT — ENCOUNTER SYMPTOMS
EYE PAIN: 0
FACIAL SWELLING: 0
EYE DISCHARGE: 0
BACK PAIN: 0
ABDOMINAL PAIN: 0
SHORTNESS OF BREATH: 0
CHEST TIGHTNESS: 0
ABDOMINAL DISTENTION: 0

## 2023-09-29 ASSESSMENT — PAIN SCALES - GENERAL: PAINLEVEL_OUTOF10: 0

## 2023-09-29 NOTE — ED NOTES
Px updated on status of care. Px expresses no questions or concerns.       Kirk Hay RN  09/29/23 0650

## 2023-09-29 NOTE — ED PROVIDER NOTES
GERD (gastroesophageal reflux disease)     Hearing loss     bilateral. Does not have hearing aides    Hemodialysis patient [facility])     M-W-F started in 2020    History of blood transfusion     50 plus yrs ago with pregnancy    Hyperlipidemia     Hypertension     Migraines     MRSA (methicillin resistant staph aureus) culture positive 2015    back    Neuropathy     PAT (obstructive sleep apnea)     had UPPP  \"have not used machine in years\"    Prolonged emergence from general anesthesia     PVD (peripheral vascular disease) ([address])     SOB (shortness of breath)     \"long distance\"    Tinnitus      Past Problem List  Patient Active Problem List   Diagnosis Code    AMS (altered mental status) R41.82    Diabetes ([address]) E11.9    Admission for dialysis ([address]) Z99.2    Hypoglycemia E16.2    Encephalopathy D76.06    Metabolic encephalopathy D10.93    End stage renal failure on dialysis ([address]) N18.6, Z99.2    COVID-19 virus infection U07.1    Hypertensive urgency I16.0    Sepsis ([address]) A41.9    Acute respiratory failure with hypoxia ([facility]) J96.01    Morbid obesity ([facility]) E66.01    Hypertension, essential I10    Hypokalemia M24.2    Metabolic acidosis O61.12    Elevated procalcitonin R79.89    Elevated C-reactive protein (CRP) R79.82    ESRD (end stage renal disease) ([facility]) N18.6    Altered mental status R41.82    NSTEMI (non-ST elevated myocardial infarction) ([facility]) I21.4    Renovascular hypertension I15.0    Pure hypercholesterolemia E78.00    UTI due to extended-spectrum beta lactamase (ESBL) producing Escherichia coli N39.0, B96.29, Z16.12    Syncope and collapse R55     SURGICAL HISTORY       Past Surgical History:   Procedure Laterality Date    APPENDECTOMY      BACK SURGERY      I and D lower back MRSA    BREAST REDUCTION SURGERY Left 1999    CENTRAL LINE  1/29/2022         COLONOSCOPY      HYSTERECTOMY (CERVIX STATUS UNKNOWN)      INSERTABLE CARDIAC MONITOR      IR TUNNELED CATHETER PLACEMENT GREATER THAN 5 YEARS  2/1/2022    IR TUNNELED CATHETER PLACEMENT GREATER THAN 5 YEARS 2/1/2022 [facility] SPECIAL PROCEDURES    MASTECTOMY Left     lymph nodes removed    MASTECTOMY Left 05/14/2018    axillary mastectomy    DE EXC B9 LESION MRGN XCP SK TG T/A/L 0.5 CM/< Right 5/14/2018    RIGHT AXILLARY MASTECTOMY performed by Cm Stiles DO at [address]       Previous Medications    ATORVASTATIN (LIPITOR) 10 MG TABLET    Take 1 tablet by mouth daily    B COMPLEX-C-FOLIC ACID (DIALYVITE PO)    Take 1 tablet by mouth daily    BUSPIRONE (BUSPAR) 10 MG TABLET    Take 1 tablet by mouth 3 times daily as needed Indications: Feeling Anxious    CALCIUM ACETATE (PHOSLO) 667 MG CAPS CAPSULE    Take 2 capsules by mouth 3 times daily (with meals)    CARVEDILOL (COREG) 12.5 MG TABLET    Take 1 tablet by mouth 2 times daily (with meals)    CLOPIDOGREL (PLAVIX) 75 MG TABLET    Take 1 tablet by mouth daily    GABAPENTIN (NEURONTIN) 100 MG CAPSULE    Take 1 capsule by mouth 3 times daily. IPRATROPIUM 0.5 MG-ALBUTEROL 2.5 MG (DUONEB) 0.5-2.5 (3) MG/3ML SOLN NEBULIZER SOLUTION    Inhale 3 mLs into the lungs every 6 hours as needed for Shortness of Breath For wheezing/SOB    LIDOCAINE 4 % GEL    Apply topically 2 times daily Apply to both feet/legs topically for neuropathy    MIDODRINE (PROAMATINE) 10 MG TABLET    Take 1 tablet by mouth three times a week Only for Dialysis: send with patient to dialysis Mon, Wed, Fri For Hypotension    OMEPRAZOLE (PRILOSEC) 20 MG DELAYED RELEASE CAPSULE    Take 1 capsule by mouth daily    PROMETHAZINE (PHENERGAN) 25 MG TABLET    Take 1 tablet by mouth every 8 hours as needed for Nausea    VENLAFAXINE (EFFEXOR) 37.5 MG TABLET    Take 2 tablets by mouth Daily     ALLERGIES     has No Known Allergies. FAMILY HISTORY     has no family status information on file.       SOCIAL HISTORY       Social History     Tobacco Use    Smoking status: Former     Types: Cigarettes     Quit date: 1990 CT, MRI, and formal ultrasound images (except ED bedside ultrasound) are read by the radiologist, see reports below, unless otherwise noted in MDM or here. Reports below are reviewed by myself. XR CHEST PORTABLE   Final Result   Left basilar airspace opacities that are predominantly linear. Atelectasis   is favored over pneumonia. Elsewhere, there are no acute findings. CT Head W/O Contrast   Final Result   1. No acute intracranial findings. 2. Volume loss, chronic microvascular ischemic changes and a lacunar infarct   left caudate head, as before. LABS: Lab orders shown below, the results are reviewed by myself, and all abnormals are listed below.   Labs Reviewed   BASIC METABOLIC PANEL - Abnormal; Notable for the following components:       Result Value    Chloride 94 (*)     Glucose 133 (*)     BUN 28 (*)     Creatinine 5.1 (*)     Est, Glom Filt Rate 8 (*)     Bun/Cre Ratio 5 (*)     All other components within normal limits   CBC WITH AUTO DIFFERENTIAL - Abnormal; Notable for the following components:    RBC 3.57 (*)     Hemoglobin 11.5 (*)     .8 (*)     RDW 16.7 (*)     NRBC Automated 0.4 (*)     Immature Granulocytes 1 (*)     All other components within normal limits   TROPONIN - Abnormal; Notable for the following components:    Troponin, High Sensitivity 113 (*)     All other components within normal limits   TROPONIN - Abnormal; Notable for the following components:    Troponin, High Sensitivity 110 (*)     All other components within normal limits   LACTIC ACID   CK   MAGNESIUM   POCT GLUCOSE       Vitals Reviewed:    Vitals:    09/29/23 1343 09/29/23 1513 09/29/23 1600 09/29/23 1702   BP: (!) 164/74  (!) 171/79 (!) 166/75   Pulse: 68 71 77 72   Resp: 15 14 12 15   Temp:       TempSrc:       SpO2: 100% 99% 100% 98%   Weight:       Height:         MEDICATIONS GIVEN TO PATIENT THIS ENCOUNTER:  Orders Placed This Encounter   Medications    sodium chloride flush 0.9 %

## 2023-09-29 NOTE — ED NOTES
Radha Calhoun RN attempted report. RN unavailable at this time. Will attempt again.      Zeferino Segura RN  09/29/23 1932

## 2023-09-29 NOTE — ED NOTES
Px updated on status of care. Px expresses no questions or concerns.       Hanny Killian RN  09/29/23 2805

## 2023-09-29 NOTE — ED NOTES
Px arrives via EMS    EMS reports state that px was 2 hours and 50 min into 3 hour 30 min dialysis treatment when she LOC   EMS states that syncope for approx 1 min,   EMS states px felt chest pain upon awaking   Px was given nitro, pain has been resolved since according to px   Px appears tired and free of distress.  Px claims this is her typical state of fatigue after dialysis      Kareen Ackerman, PATRICIA  09/29/23 2163

## 2023-09-29 NOTE — ED NOTES
Px updated on status of care. Px expresses no questions or concerns.       Kenn Cooper RN  09/29/23 6060

## 2023-09-29 NOTE — ED NOTES
Dr. Granda Gains aware of pt's trending blood pressures during ED visit today. No orders received.       Pipe Grant RN  09/29/23 4375

## 2023-09-30 PROBLEM — N28.89 HYPERTENSION SECONDARY TO OTHER RENAL DISORDERS: Status: ACTIVE | Noted: 2023-08-13

## 2023-09-30 PROBLEM — I15.1 HYPERTENSION SECONDARY TO OTHER RENAL DISORDERS: Status: ACTIVE | Noted: 2023-08-13

## 2023-09-30 PROBLEM — R07.9 CHEST PAIN: Status: ACTIVE | Noted: 2023-09-30

## 2023-09-30 LAB
ALBUMIN SERPL-MCNC: 3.9 G/DL (ref 3.5–5.2)
ALP SERPL-CCNC: 118 U/L (ref 35–104)
ALT SERPL-CCNC: 14 U/L (ref 5–33)
AMMONIA PLAS-SCNC: 27 UMOL/L (ref 11–51)
ANION GAP SERPL CALCULATED.3IONS-SCNC: 14 MMOL/L (ref 9–17)
AST SERPL-CCNC: 14 U/L
BASOPHILS # BLD: 0.05 K/UL (ref 0–0.2)
BASOPHILS NFR BLD: 1 % (ref 0–2)
BILIRUB SERPL-MCNC: 0.4 MG/DL (ref 0.3–1.2)
BUN SERPL-MCNC: 43 MG/DL (ref 8–23)
BUN/CREAT SERPL: 6 (ref 9–20)
CALCIUM SERPL-MCNC: 8.9 MG/DL (ref 8.6–10.4)
CHLORIDE SERPL-SCNC: 98 MMOL/L (ref 98–107)
CO2 SERPL-SCNC: 27 MMOL/L (ref 20–31)
CREAT SERPL-MCNC: 7.2 MG/DL (ref 0.5–0.9)
EOSINOPHIL # BLD: 0.38 K/UL (ref 0–0.44)
EOSINOPHILS RELATIVE PERCENT: 5 % (ref 1–4)
ERYTHROCYTE [DISTWIDTH] IN BLOOD BY AUTOMATED COUNT: 16.8 % (ref 11.8–14.4)
GFR SERPL CREATININE-BSD FRML MDRD: 5 ML/MIN/1.73M2
GLUCOSE BLD-MCNC: 113 MG/DL (ref 65–105)
GLUCOSE BLD-MCNC: 159 MG/DL (ref 65–105)
GLUCOSE BLD-MCNC: 162 MG/DL (ref 65–105)
GLUCOSE BLD-MCNC: 189 MG/DL (ref 65–105)
GLUCOSE SERPL-MCNC: 156 MG/DL (ref 70–99)
HCT VFR BLD AUTO: 33.1 % (ref 36.3–47.1)
HGB BLD-MCNC: 10.3 G/DL (ref 11.9–15.1)
IMM GRANULOCYTES # BLD AUTO: 0.03 K/UL (ref 0–0.3)
IMM GRANULOCYTES NFR BLD: 0 %
LYMPHOCYTES NFR BLD: 2.53 K/UL (ref 1.1–3.7)
LYMPHOCYTES RELATIVE PERCENT: 34 % (ref 24–43)
MCH RBC QN AUTO: 32.6 PG (ref 25.2–33.5)
MCHC RBC AUTO-ENTMCNC: 31.1 G/DL (ref 28.4–34.8)
MCV RBC AUTO: 104.7 FL (ref 82.6–102.9)
MONOCYTES NFR BLD: 0.91 K/UL (ref 0.1–1.2)
MONOCYTES NFR BLD: 12 % (ref 3–12)
NEUTROPHILS NFR BLD: 48 % (ref 36–65)
NEUTS SEG NFR BLD: 3.63 K/UL (ref 1.5–8.1)
NRBC BLD-RTO: 0.4 PER 100 WBC
PLATELET # BLD AUTO: 204 K/UL (ref 138–453)
PMV BLD AUTO: 11.1 FL (ref 8.1–13.5)
POTASSIUM SERPL-SCNC: 4.7 MMOL/L (ref 3.7–5.3)
PROT SERPL-MCNC: 7.1 G/DL (ref 6.4–8.3)
RBC # BLD AUTO: 3.16 M/UL (ref 3.95–5.11)
RBC # BLD: ABNORMAL 10*6/UL
RBC # BLD: ABNORMAL 10*6/UL
SODIUM SERPL-SCNC: 139 MMOL/L (ref 135–144)
WBC OTHER # BLD: 7.5 K/UL (ref 3.5–11.3)

## 2023-09-30 PROCEDURE — 36415 COLL VENOUS BLD VENIPUNCTURE: CPT

## 2023-09-30 PROCEDURE — 82140 ASSAY OF AMMONIA: CPT

## 2023-09-30 PROCEDURE — 85025 COMPLETE CBC W/AUTO DIFF WBC: CPT

## 2023-09-30 PROCEDURE — 6360000002 HC RX W HCPCS: Performed by: FAMILY MEDICINE

## 2023-09-30 PROCEDURE — 2580000003 HC RX 258: Performed by: FAMILY MEDICINE

## 2023-09-30 PROCEDURE — 6370000000 HC RX 637 (ALT 250 FOR IP): Performed by: REGISTERED NURSE

## 2023-09-30 PROCEDURE — 1200000000 HC SEMI PRIVATE

## 2023-09-30 PROCEDURE — 99223 1ST HOSP IP/OBS HIGH 75: CPT | Performed by: INTERNAL MEDICINE

## 2023-09-30 PROCEDURE — 80053 COMPREHEN METABOLIC PANEL: CPT

## 2023-09-30 PROCEDURE — 2500000003 HC RX 250 WO HCPCS: Performed by: FAMILY MEDICINE

## 2023-09-30 PROCEDURE — 6370000000 HC RX 637 (ALT 250 FOR IP): Performed by: FAMILY MEDICINE

## 2023-09-30 PROCEDURE — 82947 ASSAY GLUCOSE BLOOD QUANT: CPT

## 2023-09-30 RX ORDER — INSULIN LISPRO 100 [IU]/ML
0-4 INJECTION, SOLUTION INTRAVENOUS; SUBCUTANEOUS NIGHTLY
Status: DISCONTINUED | OUTPATIENT
Start: 2023-09-30 | End: 2023-10-05 | Stop reason: HOSPADM

## 2023-09-30 RX ORDER — VENLAFAXINE 37.5 MG/1
37.5 TABLET ORAL NIGHTLY
Status: DISCONTINUED | OUTPATIENT
Start: 2023-09-30 | End: 2023-10-05 | Stop reason: HOSPADM

## 2023-09-30 RX ORDER — VENLAFAXINE 37.5 MG/1
56.25 TABLET ORAL
Status: DISCONTINUED | OUTPATIENT
Start: 2023-09-30 | End: 2023-10-05 | Stop reason: HOSPADM

## 2023-09-30 RX ORDER — LIDOCAINE 40 MG/G
CREAM TOPICAL PRN
Status: DISPENSED | OUTPATIENT
Start: 2023-09-30 | End: 2023-10-02

## 2023-09-30 RX ORDER — INSULIN LISPRO 100 [IU]/ML
0-16 INJECTION, SOLUTION INTRAVENOUS; SUBCUTANEOUS
Status: DISCONTINUED | OUTPATIENT
Start: 2023-09-30 | End: 2023-10-05 | Stop reason: HOSPADM

## 2023-09-30 RX ORDER — DEXTROSE MONOHYDRATE 100 MG/ML
INJECTION, SOLUTION INTRAVENOUS CONTINUOUS PRN
Status: DISCONTINUED | OUTPATIENT
Start: 2023-09-30 | End: 2023-10-05 | Stop reason: HOSPADM

## 2023-09-30 RX ADMIN — HEPARIN SODIUM 5000 UNITS: 5000 INJECTION INTRAVENOUS; SUBCUTANEOUS at 08:24

## 2023-09-30 RX ADMIN — HEPARIN SODIUM 5000 UNITS: 5000 INJECTION INTRAVENOUS; SUBCUTANEOUS at 22:30

## 2023-09-30 RX ADMIN — VENLAFAXINE HYDROCHLORIDE 37.5 MG: 37.5 TABLET ORAL at 01:17

## 2023-09-30 RX ADMIN — ATORVASTATIN CALCIUM 10 MG: 10 TABLET, FILM COATED ORAL at 19:45

## 2023-09-30 RX ADMIN — ATORVASTATIN CALCIUM 10 MG: 10 TABLET, FILM COATED ORAL at 01:17

## 2023-09-30 RX ADMIN — HEPARIN SODIUM 5000 UNITS: 5000 INJECTION INTRAVENOUS; SUBCUTANEOUS at 17:41

## 2023-09-30 RX ADMIN — VENLAFAXINE HYDROCHLORIDE 56.25 MG: 37.5 TABLET ORAL at 08:24

## 2023-09-30 RX ADMIN — GABAPENTIN 100 MG: 100 CAPSULE ORAL at 15:40

## 2023-09-30 RX ADMIN — CARVEDILOL 12.5 MG: 12.5 TABLET, FILM COATED ORAL at 17:41

## 2023-09-30 RX ADMIN — VENLAFAXINE HYDROCHLORIDE 37.5 MG: 37.5 TABLET ORAL at 19:46

## 2023-09-30 RX ADMIN — CALCIUM ACETATE 1334 MG: 667 CAPSULE ORAL at 08:24

## 2023-09-30 RX ADMIN — HEPARIN SODIUM 5000 UNITS: 5000 INJECTION INTRAVENOUS; SUBCUTANEOUS at 01:21

## 2023-09-30 RX ADMIN — SODIUM CHLORIDE, PRESERVATIVE FREE 10 ML: 5 INJECTION INTRAVENOUS at 08:25

## 2023-09-30 RX ADMIN — CALCIUM ACETATE 1334 MG: 667 CAPSULE ORAL at 13:58

## 2023-09-30 RX ADMIN — GABAPENTIN 100 MG: 100 CAPSULE ORAL at 19:45

## 2023-09-30 RX ADMIN — CALCIUM ACETATE 1334 MG: 667 CAPSULE ORAL at 19:45

## 2023-09-30 RX ADMIN — GABAPENTIN 100 MG: 100 CAPSULE ORAL at 01:17

## 2023-09-30 RX ADMIN — GABAPENTIN 100 MG: 100 CAPSULE ORAL at 08:23

## 2023-09-30 RX ADMIN — CLOPIDOGREL BISULFATE 75 MG: 75 TABLET ORAL at 08:24

## 2023-09-30 RX ADMIN — CARVEDILOL 12.5 MG: 12.5 TABLET, FILM COATED ORAL at 08:24

## 2023-09-30 RX ADMIN — LIDOCAINE 4%: 4 CREAM TOPICAL at 15:40

## 2023-09-30 RX ADMIN — PANTOPRAZOLE SODIUM 20 MG: 20 TABLET, DELAYED RELEASE ORAL at 05:31

## 2023-09-30 NOTE — PLAN OF CARE
Problem: Safety - Adult  Goal: Free from fall injury  Outcome: Progressing  Flowsheets (Taken 9/30/2023 0618)  Free From Fall Injury:   Instruct family/caregiver on patient safety   Based on caregiver fall risk screen, instruct family/caregiver to ask for assistance with transferring infant if caregiver noted to have fall risk factors

## 2023-09-30 NOTE — H&P
History & Physical  [facility]    Adult Hospitalist      Name: Solomon Whitten  MRN: [id number]     Acct: [de-identified]  Room: 2009/2009-02    Admit Date: 9/29/2023  1:09 PM  PCP: Cathy Lu MD    Primary Problem  Principal Problem:    Syncope and collapse  Resolved Problems:    * No resolved hospital problems. *        Assesment:     Syncope  End-stage renal disease with hemodialysis  Mixed hyperlipidemia  Anxiety disorder  Essential hypertension with episodes of hypotension  Diabetes mellitus type 2  Diabetic neuropathy  Gastroesophageal reflux disease without esophagitis  Major depressive disorder        Plan:     Admit to MedSurg  Monitor vitals closely  Keep SPO2 above 90%  I's and O's  IV  Pain control  Antiemetics as needed  Neurochecks every 4 hours  Orthostatic blood pressure  Pro Amatine  Hold gabapentin, carvedilol  Serial cardiac enzymes  Twelve-lead EKG  Consult cardiology  CBC, BMP  Incentive spirometry  Consult nephrology for hemodialysis  DVT and GI prophylaxis. Chief Complaint:     Chief Complaint   Patient presents with    Loss of Consciousness     2 hours 50 min into Dialysis. Lasted for about 1 min         History of Present Illness:      Solomon Whitten is a 77 y.o.  female who presents with Loss of Consciousness (2 hours 50 min into Dialysis. Lasted for about 1 min)    Patient admitted to the emergency room where she was brought in via EMS. Patient was undergoing hemodialysis when 2 hours and 15 minutes into dialysis of 3-hour 30-minute dialysis when she suddenly lost consciousness. This lasted approximately 1 minute. Hemodialysis staff were monitoring the patient and noted patient slouch. They shook her and pt remembers her name being called. She says once she woke up and a few minutes after that she has been her normal self. Pain around but did not remember the event. According to staff patient had complained of chest pain when she woke up.   According to the --    BUN 28*  --    CREATININE 5.1*  --    MG 2.1  --    ANIONGAP 13  --    LABGLOM 8*  --    CALCIUM 9.4  --    TROPHS 113* 110*   CKTOTAL 54  --      Recent Labs     09/29/23 2038   POCGLU 116*       Lab Results   Component Value Date    INR 1.0 04/07/2022    INR 1.1 03/18/2022    INR 1.0 01/28/2022    PROTIME 13.6 04/07/2022    PROTIME 13.8 03/18/2022    PROTIME 10.5 01/28/2022       Lab Results   Component Value Date/Time    SPECIAL LAC 20ML 08/09/2023 08:19 PM     Lab Results   Component Value Date/Time    CULTURE NO GROWTH 5 DAYS 08/09/2023 08:19 PM       Lab Results   Component Value Date/Time    FIO2 UNKNOWN 01/28/2022 09:10 PM       Radiology:    XR CHEST PORTABLE    Result Date: 9/29/2023  Left basilar airspace opacities that are predominantly linear. Atelectasis is favored over pneumonia. Elsewhere, there are no acute findings. CT Head W/O Contrast    Result Date: 9/29/2023  1. No acute intracranial findings. 2. Volume loss, chronic microvascular ischemic changes and a lacunar infarct left caudate head, as before. All radiological studies reviewed                Code Status:  Full Code    Electronically signed by Victoria Espinosa MD on 9/29/2023 at 8:48 PM     Copy sent to Dr. Maureen Nieves MD    This note was created with the assistance of a speech-recognition program.  Although the intention is to generate a document that actually reflects the content of the visit, no guarantees can be provided that every mistake has been identified and corrected by editing. Note was updated later by me after  physical examination and  completion of the assessment.

## 2023-09-30 NOTE — PROGRESS NOTES
Pt admitted to room 2009 from ER. Oriented to room and call light/tv controls. Bed in lowest position, wheels locked, 2/4 side rails up  Call light in reach, room free of clutter, adequate lighting provided. RN called Lindsey Moore multiple times. Finally able to obtain med list via fax. Med list updated. PATRICIA Carson NP with updates. New orders received.

## 2023-09-30 NOTE — CONSULTS
Hyperlipidemia     Hypertension     Migraines     MRSA (methicillin resistant staph aureus) culture positive 2015    back    Neuropathy     PAT (obstructive sleep apnea)     had UPPP  \"have not used machine in years\"    Prolonged emergence from general anesthesia     PVD (peripheral vascular disease) ([facility])     SOB (shortness of breath)     \"long distance\"    Tinnitus        Past Surgical History:   Procedure Laterality Date    APPENDECTOMY      BACK SURGERY      I and D lower back MRSA    BREAST REDUCTION SURGERY Left 1999    CENTRAL LINE  1/29/2022         COLONOSCOPY      HYSTERECTOMY (CERVIX STATUS UNKNOWN)      INSERTABLE CARDIAC MONITOR      IR TUNNELED CATHETER PLACEMENT GREATER THAN 5 YEARS  2/1/2022    IR TUNNELED CATHETER PLACEMENT GREATER THAN 5 YEARS 2/1/2022 [facility] SPECIAL PROCEDURES    MASTECTOMY Left     lymph nodes removed    MASTECTOMY Left 05/14/2018    axillary mastectomy    WV EXC B9 LESION MRGN XCP SK TG T/A/L 0.5 CM/< Right 5/14/2018    RIGHT AXILLARY MASTECTOMY performed by Malcolm Richmond, DO at [facility]         Prior to Admission medications    Medication Sig Start Date End Date Taking? Authorizing Provider   acetaminophen (TYLENOL) 500 MG tablet Take 2 tablets by mouth 3 times daily as needed for Pain   Yes Historical Provider, MD   Insulin Aspart (NOVOLOG SC) Inject into the skin 3 times daily (before meals) And at bedtime. Per sliding scale:  150-200=2  201-250=4  251-300=6  301-350=8  351-400=10  Cover and notify md if above 400, subQ before meals and at bedtime.    Yes Historical Provider, MD   venlafaxine (EFFEXOR) 37.5 MG tablet Take 1.5 tablets by mouth daily (with breakfast)   Yes Historical Provider, MD   atorvastatin (LIPITOR) 10 MG tablet Take 1 tablet by mouth nightly    Historical Provider, MD   busPIRone (BUSPAR) 10 MG tablet Take 1 tablet by mouth every 8 hours as needed (anxiety) Indications: Feeling Anxious    Historical Provider, MD   calcium 03/18/2022    INR 1.0 01/28/2022     PTH: No results found for: \"PTH\"  Phosphorus:    Lab Results   Component Value Date/Time    PHOS 3.4 08/09/2023 02:28 PM     Ionized Calcium: No results found for: \"IONCA\"  Magnesium:   Lab Results   Component Value Date/Time    MG 2.1 09/29/2023 01:57 PM     Albumin:   Lab Results   Component Value Date/Time    LABALBU 3.9 09/30/2023 05:55 AM         Radiology:  ONE XRAY VIEW OF THE CHEST     9/29/2023 11:40 am     COMPARISON:  07/10/2023 and 05/09/2023. HISTORY:  ORDERING SYSTEM PROVIDED HISTORY: syncope  TECHNOLOGIST PROVIDED HISTORY:  syncope  Reason for Exam: syncope     FINDINGS:  There are left basilar airspace opacities that appear predominantly linear. The lungs and costophrenic angles are otherwise clear. The patient is  rotated to the left. The cardiac silhouette, given AP technique and patient  rotation, is within normal limits. There is no vascular congestion or  interstitial prominence. IMPRESSION:  Left basilar airspace opacities that are predominantly linear. Atelectasis  is favored over pneumonia. Elsewhere, there are no acute findings. CT OF THE HEAD WITHOUT CONTRAST  9/29/2023 1:07 pm     TECHNIQUE:  CT of the head was performed without the administration of intravenous  contrast. Automated exposure control, iterative reconstruction, and/or weight  based adjustment of the mA/kV was utilized to reduce the radiation dose to as  low as reasonably achievable. COMPARISON:  08/09/2023. HISTORY:  ORDERING SYSTEM PROVIDED HISTORY: syncope  TECHNOLOGIST PROVIDED HISTORY:  syncope     Decision Support Exception - unselect if not a suspected or confirmed  emergency medical condition->Emergency Medical Condition (MA)  Reason for Exam: LOC     FINDINGS:  BRAIN/VENTRICLES: There is no acute intracranial hemorrhage, mass effect or  midline shift. No abnormal extra-axial fluid collection.   The gray-white  differentiation is maintained without evidence MWF  Will follow      Thank you for the consultation. Please do not hesitate to call with questions.     Electronically signed by Ambrosio Tang MD  on 9/30/2023 at 12:00 PM     [facility]

## 2023-09-30 NOTE — CARE COORDINATION
Case Management Assessment  Initial Evaluation    Date/Time of Evaluation: 9/30/2023 2:18 PM  Assessment Completed by: Jamari Sousa RN    If patient is discharged prior to next notation, then this note serves as note for discharge by case management. Patient Name: Judit Nowak                   YOB: 1945  Diagnosis: Syncope and collapse [R55]                   Date / Time: 9/29/2023  1:09 PM    Patient Admission Status: Inpatient   Readmission Risk (Low < 19, Mod (19-27), High > 27): Readmission Risk Score: 24.4    Current PCP: Linda Dickey MD  PCP verified by CM? Yes    Chart Reviewed: Yes      History Provided by: Patient  Patient Orientation: Alert and Oriented, Person, Place, Self    Patient Cognition: Alert    Hospitalization in the last 30 days (Readmission):  No    If yes, Readmission Assessment in  Navigator will be completed. Advance Directives:      Code Status: Full Code   Patient's Primary Decision Maker is: Legal Next of Kin      Discharge Planning:    Patient lives with: Alone Type of Home: Assisted living  Primary Care Giver: Self  Patient Support Systems include: Children   Current Financial resources: None  [facility]  Current services prior to admission: None            Current DME:              Type of Home Care services:  None    ADLS  Prior functional level: Assistance with the following:, Shopping, Housework, Cooking, Mobility  Current functional level: Assistance with the following:, Cooking, Housework, Shopping, Mobility    PT AM-PAC:   /24  OT AM-PAC:   /24    Family can provide assistance at DC: No  Would you like Case Management to discuss the discharge plan with any other family members/significant others, and if so, who?  Yes (children)  Plans to Return to Present Housing: Yes  Other Identified Issues/Barriers to RETURNING to current housing: medical condition  Potential Assistance needed at discharge: Home

## 2023-09-30 NOTE — PROGRESS NOTES
Transitions of Care Pharmacy Service   Medication Review    The patient's list of current home medications has been reviewed and updated. Source(s) of information: Med list from [employer]. Med list was updated accurately by night shift RN after she obtained list from [employer] and I did not need to make any changes to list or orders. Please feel free to call with any questions about this encounter. Thank you. Nestor Buckley [facility]  Transitions of Care Pharmacy Service  Phone:  [phone]  Fax: [phone]        Prior to Admission medications    Medication Sig Start Date End Date Taking? Authorizing Provider   acetaminophen (TYLENOL) 500 MG tablet Take 2 tablets by mouth 3 times daily   Yes Historical Provider, MD   Insulin Aspart (NOVOLOG SC) Inject into the skin 3 times daily (before meals) And at bedtime. Per sliding scale:  150-200=2  201-250=4  251-300=6  301-350=8  351-400=10  Cover and notify md if above 400, subQ before meals and at bedtime. Yes Historical Provider, MD   venlafaxine (EFFEXOR) 37.5 MG tablet Take 1.5 tablets by mouth daily (with breakfast)   Yes Historical Provider, MD   atorvastatin (LIPITOR) 10 MG tablet Take 1 tablet by mouth nightly    Historical Provider, MD   busPIRone (BUSPAR) 10 MG tablet Take 1 tablet by mouth every 8 hours as needed (anxiety) Indications: Feeling Anxious    Historical Provider, MD   calcium acetate (PHOSLO) 667 MG CAPS capsule Take 2 capsules by mouth 3 times daily (with meals)    Historical Provider, MD   B Complex-C-Folic Acid (DIALYVITE PO) Take 1 tablet by mouth daily    Historical Provider, MD   gabapentin (NEURONTIN) 100 MG capsule Take 1 capsule by mouth 3 times daily.     Historical Provider, MD   ipratropium 0.5 mg-albuterol 2.5 mg (DUONEB) 0.5-2.5 (3) MG/3ML SOLN nebulizer solution Inhale 3 mLs into the lungs every 6 hours as needed for Shortness of Breath For wheezing/SOB    Historical Provider, MD   Lidocaine 4 % GEL Apply

## 2023-09-30 NOTE — CONSULTS
[facility]   Consult Note                 Date:   9/30/2023  Date of admission:  9/29/2023  1:09 PM  MRN:   [id number]  YOB: 1945    Reason for Consult: Syncope    HISTORY OF PRESENT ILLNESS:    The patient is a 77 y.o.  female who is admitted to the hospital for for syncope while getting dialysis patient already had completed almost complete . When patient had a syncopal episode patient was brought here  At this time patient is awake alert patient do remember history of chest pain after patient become conscious. Denies any heart fluttering racing weakness numbness  Patient denies any prior history of syncope minor stroke major stroke  Patient do have a history of high blood pressure  Patient is diabetic  CKD on dialysis 3 times a week  Patient troponin is 113-110 with a history of chronically elevated tropes  Patient echocardiogram on 8/10/2023 was 50 to 55%  Past Medical History:   has a past medical history of Anxiety and depression, Arthritis, Cancer ([address]), Cerebral artery occlusion with cerebral infarction ([address]), Chronic kidney disease, stage 5 ([address]), Diabetes mellitus ([address]), GERD (gastroesophageal reflux disease), Hearing loss, Hemodialysis patient ([address]), History of blood transfusion, Hyperlipidemia, Hypertension, Migraines, MRSA (methicillin resistant staph aureus) culture positive, Neuropathy, PAT (obstructive sleep apnea), Prolonged emergence from general anesthesia, PVD (peripheral vascular disease) ([address]), SOB (shortness of breath), and Tinnitus. Past Surgical History:   has a past surgical history that includes Uvulopalatopharygoplasty; Mastectomy (Left); Hysterectomy; Appendectomy; back surgery; Mastectomy (Left, 05/14/2018); pr exc b9 lesion mrgn xcp sk tg t/a/l 0.5 cm/< (Right, 5/14/2018); Breast reduction surgery (Left, 1999); Colonoscopy;  Insertable Cardiac Monitor; central line (1/29/2022); and IR TUNNELED CVC PLACE WO SQ PORT/PUMP > 5 YEARS keep it to her baseline weight  We will continue to follow        Discussed with patient and nursing.     Kyara Garcia MD, MD [facility]        [phone]

## 2023-09-30 NOTE — PLAN OF CARE
Problem: Discharge Planning  Goal: Discharge to home or other facility with appropriate resources  Outcome: Progressing     Problem: Safety - Adult  Goal: Free from fall injury  9/30/2023 1047 by Reggie Buckley RN  Outcome: Progressing  9/30/2023 0618 by Terell Broderick RN  Outcome: Progressing  Flowsheets (Taken 9/30/2023 0618)  Free From Fall Injury:   Instruct family/caregiver on patient safety   Based on caregiver fall risk screen, instruct family/caregiver to ask for assistance with transferring infant if caregiver noted to have fall risk factors     Problem: ABCDS Injury Assessment  Goal: Absence of physical injury  Outcome: Progressing     Problem: Pain  Goal: Verbalizes/displays adequate comfort level or baseline comfort level  Outcome: Progressing     Problem: Chronic Conditions and Co-morbidities  Goal: Patient's chronic conditions and co-morbidity symptoms are monitored and maintained or improved  Outcome: Progressing

## 2023-10-01 LAB
ANION GAP SERPL CALCULATED.3IONS-SCNC: 15 MMOL/L (ref 9–17)
BUN SERPL-MCNC: 64 MG/DL (ref 8–23)
BUN/CREAT SERPL: 7 (ref 9–20)
CALCIUM SERPL-MCNC: 9.4 MG/DL (ref 8.6–10.4)
CHLORIDE SERPL-SCNC: 98 MMOL/L (ref 98–107)
CO2 SERPL-SCNC: 25 MMOL/L (ref 20–31)
CREAT SERPL-MCNC: 9.6 MG/DL (ref 0.5–0.9)
GFR SERPL CREATININE-BSD FRML MDRD: 4 ML/MIN/1.73M2
GLUCOSE BLD-MCNC: 114 MG/DL (ref 65–105)
GLUCOSE BLD-MCNC: 120 MG/DL (ref 65–105)
GLUCOSE BLD-MCNC: 159 MG/DL (ref 65–105)
GLUCOSE BLD-MCNC: 166 MG/DL (ref 65–105)
GLUCOSE SERPL-MCNC: 203 MG/DL (ref 70–99)
MAGNESIUM SERPL-MCNC: 2.3 MG/DL (ref 1.6–2.6)
PHOSPHATE SERPL-MCNC: 4.4 MG/DL (ref 2.6–4.5)
POTASSIUM SERPL-SCNC: 5.6 MMOL/L (ref 3.7–5.3)
SODIUM SERPL-SCNC: 138 MMOL/L (ref 135–144)

## 2023-10-01 PROCEDURE — 36415 COLL VENOUS BLD VENIPUNCTURE: CPT

## 2023-10-01 PROCEDURE — 6370000000 HC RX 637 (ALT 250 FOR IP): Performed by: FAMILY MEDICINE

## 2023-10-01 PROCEDURE — 2580000003 HC RX 258: Performed by: FAMILY MEDICINE

## 2023-10-01 PROCEDURE — 6360000002 HC RX W HCPCS: Performed by: FAMILY MEDICINE

## 2023-10-01 PROCEDURE — 2500000003 HC RX 250 WO HCPCS: Performed by: FAMILY MEDICINE

## 2023-10-01 PROCEDURE — 6370000000 HC RX 637 (ALT 250 FOR IP): Performed by: REGISTERED NURSE

## 2023-10-01 PROCEDURE — 80048 BASIC METABOLIC PNL TOTAL CA: CPT

## 2023-10-01 PROCEDURE — 1200000000 HC SEMI PRIVATE

## 2023-10-01 PROCEDURE — 6370000000 HC RX 637 (ALT 250 FOR IP): Performed by: INTERNAL MEDICINE

## 2023-10-01 PROCEDURE — 82947 ASSAY GLUCOSE BLOOD QUANT: CPT

## 2023-10-01 PROCEDURE — 84100 ASSAY OF PHOSPHORUS: CPT

## 2023-10-01 PROCEDURE — 99233 SBSQ HOSP IP/OBS HIGH 50: CPT | Performed by: INTERNAL MEDICINE

## 2023-10-01 PROCEDURE — 83735 ASSAY OF MAGNESIUM: CPT

## 2023-10-01 RX ADMIN — ATORVASTATIN CALCIUM 10 MG: 10 TABLET, FILM COATED ORAL at 21:28

## 2023-10-01 RX ADMIN — SODIUM CHLORIDE, PRESERVATIVE FREE 10 ML: 5 INJECTION INTRAVENOUS at 09:37

## 2023-10-01 RX ADMIN — GABAPENTIN 100 MG: 100 CAPSULE ORAL at 14:52

## 2023-10-01 RX ADMIN — GABAPENTIN 100 MG: 100 CAPSULE ORAL at 21:29

## 2023-10-01 RX ADMIN — CALCIUM ACETATE 1334 MG: 667 CAPSULE ORAL at 09:36

## 2023-10-01 RX ADMIN — HEPARIN SODIUM 5000 UNITS: 5000 INJECTION INTRAVENOUS; SUBCUTANEOUS at 06:23

## 2023-10-01 RX ADMIN — CARVEDILOL 12.5 MG: 12.5 TABLET, FILM COATED ORAL at 17:36

## 2023-10-01 RX ADMIN — GABAPENTIN 100 MG: 100 CAPSULE ORAL at 09:37

## 2023-10-01 RX ADMIN — VENLAFAXINE HYDROCHLORIDE 37.5 MG: 37.5 TABLET ORAL at 21:28

## 2023-10-01 RX ADMIN — HEPARIN SODIUM 5000 UNITS: 5000 INJECTION INTRAVENOUS; SUBCUTANEOUS at 14:52

## 2023-10-01 RX ADMIN — PANTOPRAZOLE SODIUM 20 MG: 20 TABLET, DELAYED RELEASE ORAL at 06:24

## 2023-10-01 RX ADMIN — CLOPIDOGREL BISULFATE 75 MG: 75 TABLET ORAL at 09:37

## 2023-10-01 RX ADMIN — CARVEDILOL 12.5 MG: 12.5 TABLET, FILM COATED ORAL at 09:36

## 2023-10-01 RX ADMIN — SODIUM ZIRCONIUM CYCLOSILICATE 10 G: 10 POWDER, FOR SUSPENSION ORAL at 12:40

## 2023-10-01 RX ADMIN — SODIUM CHLORIDE, PRESERVATIVE FREE 10 ML: 5 INJECTION INTRAVENOUS at 21:35

## 2023-10-01 RX ADMIN — CALCIUM ACETATE 1334 MG: 667 CAPSULE ORAL at 17:36

## 2023-10-01 RX ADMIN — VENLAFAXINE HYDROCHLORIDE 56.25 MG: 37.5 TABLET ORAL at 09:37

## 2023-10-01 RX ADMIN — CALCIUM ACETATE 1334 MG: 667 CAPSULE ORAL at 12:40

## 2023-10-01 RX ADMIN — LIDOCAINE 4%: 4 CREAM TOPICAL at 09:37

## 2023-10-01 RX ADMIN — HEPARIN SODIUM 5000 UNITS: 5000 INJECTION INTRAVENOUS; SUBCUTANEOUS at 21:29

## 2023-10-01 NOTE — PROGRESS NOTES
[facility].,    Adult Hospitalist      Name: Rabia Velez  MRN: [id number]     Acct: [de-identified]  Room: 2009/2009-02    Admit Date: 9/29/2023  1:09 PM  PCP: Ozzy Christian MD    Primary Problem  Principal Problem:    Syncope and collapse  Active Problems:    Hypertension secondary to other renal disorders    Chest pain  Resolved Problems:    * No resolved hospital problems. *        Assesment:     Syncope  End-stage renal disease with hemodialysis  Mixed hyperlipidemia  Anxiety disorder  Essential hypertension with episodes of hypotension  Diabetes mellitus type 2  Diabetic neuropathy  Gastroesophageal reflux disease without esophagitis  Major depressive disorder        Plan:     Admit to MedSurg  Monitor vitals closely  Keep SPO2 above 90%  I's and O's  IV  Pain control  Antiemetics as needed  Neurochecks every 4 hours  Orthostatic blood pressure  Pro Amatine  Hold gabapentin, carvedilol  Serial cardiac enzymes  Twelve-lead EKG  Consult cardiology  CBC, BMP  Incentive spirometry  Consult nephrology for hemodialysis  Cardiac stress test on 10/2  DVT and GI prophylaxis. Chief Complaint:     Chief Complaint   Patient presents with    Loss of Consciousness     2 hours 50 min into Dialysis. Lasted for about 1 min         History of Present Illness:        Patient seen and examined at bedside  Last 24-hour events reviewed with nursing  Patient states she feels better  Had occasional, self-limiting sharp chest pain over upper sternal region  Plan stress test in the morning  Nephrology needs electrolytes monitored prior to that  Denies any more chest pain, dyspnea or orthopnea  Denies nausea, vomiting or abdominal pain  Denies diarrhea or constipation    Cardiology recommended stress test   Discussed with RN    Initial HPI  Rabia Velez is a 77 y.o.  female who presents with Loss of Consciousness (2 hours 50 min into Dialysis.  Lasted for about 1 min)    Patient admitted to the emergency room anesthesia     PVD (peripheral vascular disease) (HCC)     SOB (shortness of breath)     \"long distance\"    Tinnitus         Past Surgical History:     Past Surgical History:   Procedure Laterality Date    APPENDECTOMY      BACK SURGERY      I and D lower back MRSA    BREAST REDUCTION SURGERY Left 1999    CENTRAL LINE  1/29/2022         COLONOSCOPY      HYSTERECTOMY (CERVIX STATUS UNKNOWN)      INSERTABLE CARDIAC MONITOR      IR TUNNELED CATHETER PLACEMENT GREATER THAN 5 YEARS  2/1/2022    IR TUNNELED CATHETER PLACEMENT GREATER THAN 5 YEARS 2/1/2022 [facility]    MASTECTOMY Left     lymph nodes removed    MASTECTOMY Left 05/14/2018    axillary mastectomy    NM EXC B9 LESION MRGN XCP SK TG T/A/L 0.5 CM/< Right 5/14/2018    RIGHT AXILLARY MASTECTOMY performed by Malcolm Chacon, DO at [facility]          Medications Prior to Admission:       Prior to Admission medications    Medication Sig Start Date End Date Taking? Authorizing Provider   acetaminophen (TYLENOL) 500 MG tablet Take 2 tablets by mouth 3 times daily   Yes Historical Provider, MD   Insulin Aspart (NOVOLOG SC) Inject into the skin 3 times daily (before meals) And at bedtime. Per sliding scale:  150-200=2  201-250=4  251-300=6  301-350=8  351-400=10  Cover and notify md if above 400, subQ before meals and at bedtime.    Yes Historical Provider, MD   venlafaxine (EFFEXOR) 37.5 MG tablet Take 1.5 tablets by mouth daily (with breakfast)   Yes Historical Provider, MD   atorvastatin (LIPITOR) 10 MG tablet Take 1 tablet by mouth nightly    Historical Provider, MD   busPIRone (BUSPAR) 10 MG tablet Take 1 tablet by mouth every 8 hours as needed (anxiety) Indications: Feeling Anxious    Historical Provider, MD   calcium acetate (PHOSLO) 667 MG CAPS capsule Take 2 capsules by mouth 3 times daily (with meals)    Historical Provider, MD [place] Complex-C-Folic Acid (DIALYVITE PO) Take 1 tablet by mouth daily    Historical Provider, 04/07/2022    INR 1.1 03/18/2022    INR 1.0 01/28/2022    PROTIME 13.6 04/07/2022    PROTIME 13.8 03/18/2022    PROTIME 10.5 01/28/2022       Lab Results   Component Value Date/Time    SPECIAL LAC 20ML 08/09/2023 08:19 PM     Lab Results   Component Value Date/Time    CULTURE NO GROWTH 5 DAYS 08/09/2023 08:19 PM       Lab Results   Component Value Date/Time    FIO2 UNKNOWN 01/28/2022 09:10 PM       Radiology:    XR CHEST PORTABLE    Result Date: 9/29/2023  Left basilar airspace opacities that are predominantly linear. Atelectasis is favored over pneumonia. Elsewhere, there are no acute findings. CT Head W/O Contrast    Result Date: 9/29/2023  1. No acute intracranial findings. 2. Volume loss, chronic microvascular ischemic changes and a lacunar infarct left caudate head, as before. All radiological studies reviewed                Code Status:  Full Code    Electronically signed by Jazmyn Tanner MD on 10/1/2023 at 4:19 PM     Copy sent to Dr. Saddie Felty, MD    This note was created with the assistance of a speech-recognition program.  Although the intention is to generate a document that actually reflects the content of the visit, no guarantees can be provided that every mistake has been identified and corrected by editing. Note was updated later by me after  physical examination and  completion of the assessment.

## 2023-10-01 NOTE — PLAN OF CARE
Patient resting comfortably. Respirations are even and unlabored. No signs of distress noted at this time. No other complaints. Plan of care ongoing.

## 2023-10-01 NOTE — PLAN OF CARE
Problem: Discharge Planning  Goal: Discharge to home or other facility with appropriate resources  10/1/2023 1341 by Elaine Voss RN  Outcome: Progressing     Problem: Safety - Adult  Goal: Free from fall injury  10/1/2023 1341 by Elaine Voss RN  Outcome: Progressing     Problem: ABCDS Injury Assessment  Goal: Absence of physical injury  10/1/2023 1341 by Elaine Voss RN  Outcome: Progressing     Problem: Pain  Goal: Verbalizes/displays adequate comfort level or baseline comfort level  10/1/2023 1341 by Elaine Voss RN  Outcome: Progressing     Problem: Chronic Conditions and Co-morbidities  Goal: Patient's chronic conditions and co-morbidity symptoms are monitored and maintained or improved  10/1/2023 1341 by Elaine Voss RN  Outcome: Progressing     Problem: Cardiovascular - Adult  Goal: Maintains optimal cardiac output and hemodynamic stability  10/1/2023 1341 by Elaine Voss RN  Outcome: Progressing     Problem: Gastrointestinal - Adult  Goal: Minimal or absence of nausea and vomiting  10/1/2023 1341 by Elaine Voss RN  Outcome: Progressing     Problem: Genitourinary - Adult  Goal: Absence of urinary retention  10/1/2023 1341 by Elaine Voss RN  Outcome: Progressing     Problem: Infection - Adult  Goal: Absence of infection at discharge  10/1/2023 1341 by Elaine Voss RN  Outcome: Progressing

## 2023-10-02 ENCOUNTER — APPOINTMENT (OUTPATIENT)
Dept: NUCLEAR MEDICINE | Age: 78
End: 2023-10-02
Attending: INTERNAL MEDICINE
Payer: MEDICARE

## 2023-10-02 ENCOUNTER — APPOINTMENT (OUTPATIENT)
Age: 78
End: 2023-10-02
Attending: INTERNAL MEDICINE
Payer: MEDICARE

## 2023-10-02 LAB
ANION GAP SERPL CALCULATED.3IONS-SCNC: 15 MMOL/L (ref 9–17)
BUN SERPL-MCNC: 77 MG/DL (ref 8–23)
BUN/CREAT SERPL: 7 (ref 9–20)
CALCIUM SERPL-MCNC: 8.9 MG/DL (ref 8.6–10.4)
CHLORIDE SERPL-SCNC: 98 MMOL/L (ref 98–107)
CO2 SERPL-SCNC: 26 MMOL/L (ref 20–31)
CREAT SERPL-MCNC: 10.8 MG/DL (ref 0.5–0.9)
ECHO BSA: 1.76 M2
GFR SERPL CREATININE-BSD FRML MDRD: 3 ML/MIN/1.73M2
GLUCOSE BLD-MCNC: 101 MG/DL (ref 65–105)
GLUCOSE BLD-MCNC: 111 MG/DL (ref 65–105)
GLUCOSE BLD-MCNC: 122 MG/DL (ref 65–105)
GLUCOSE BLD-MCNC: 216 MG/DL (ref 65–105)
GLUCOSE SERPL-MCNC: 115 MG/DL (ref 70–99)
MAGNESIUM SERPL-MCNC: 2.3 MG/DL (ref 1.6–2.6)
PHOSPHATE SERPL-MCNC: 3.9 MG/DL (ref 2.6–4.5)
POTASSIUM SERPL-SCNC: 4.6 MMOL/L (ref 3.7–5.3)
SODIUM SERPL-SCNC: 139 MMOL/L (ref 135–144)
STRESS BASELINE DIAS BP: 63 MMHG
STRESS BASELINE HR: 71 BPM
STRESS BASELINE SYS BP: 149 MMHG
STRESS ESTIMATED WORKLOAD: 1 METS
STRESS EXERCISE DUR MIN: 1 MIN
STRESS EXERCISE DUR SEC: 0 SEC
STRESS PEAK DIAS BP: 63 MMHG
STRESS PEAK SYS BP: 149 MMHG
STRESS PERCENT HR ACHIEVED: 60 %
STRESS POST PEAK HR: 85 BPM
STRESS RATE PRESSURE PRODUCT: NORMAL BPM*MMHG
STRESS TARGET HR: 141 BPM

## 2023-10-02 PROCEDURE — 80048 BASIC METABOLIC PNL TOTAL CA: CPT

## 2023-10-02 PROCEDURE — 6370000000 HC RX 637 (ALT 250 FOR IP): Performed by: FAMILY MEDICINE

## 2023-10-02 PROCEDURE — 1200000000 HC SEMI PRIVATE

## 2023-10-02 PROCEDURE — 97535 SELF CARE MNGMENT TRAINING: CPT

## 2023-10-02 PROCEDURE — 90935 HEMODIALYSIS ONE EVALUATION: CPT

## 2023-10-02 PROCEDURE — 97116 GAIT TRAINING THERAPY: CPT

## 2023-10-02 PROCEDURE — 6360000002 HC RX W HCPCS: Performed by: FAMILY MEDICINE

## 2023-10-02 PROCEDURE — 6370000000 HC RX 637 (ALT 250 FOR IP): Performed by: REGISTERED NURSE

## 2023-10-02 PROCEDURE — 3430000000 HC RX DIAGNOSTIC RADIOPHARMACEUTICAL: Performed by: INTERNAL MEDICINE

## 2023-10-02 PROCEDURE — 6360000002 HC RX W HCPCS: Performed by: INTERNAL MEDICINE

## 2023-10-02 PROCEDURE — 97530 THERAPEUTIC ACTIVITIES: CPT

## 2023-10-02 PROCEDURE — A9500 TC99M SESTAMIBI: HCPCS | Performed by: INTERNAL MEDICINE

## 2023-10-02 PROCEDURE — 2500000003 HC RX 250 WO HCPCS: Performed by: FAMILY MEDICINE

## 2023-10-02 PROCEDURE — 97163 PT EVAL HIGH COMPLEX 45 MIN: CPT

## 2023-10-02 PROCEDURE — 36415 COLL VENOUS BLD VENIPUNCTURE: CPT

## 2023-10-02 PROCEDURE — 97167 OT EVAL HIGH COMPLEX 60 MIN: CPT

## 2023-10-02 PROCEDURE — 2580000003 HC RX 258: Performed by: FAMILY MEDICINE

## 2023-10-02 PROCEDURE — 82947 ASSAY GLUCOSE BLOOD QUANT: CPT

## 2023-10-02 PROCEDURE — 84100 ASSAY OF PHOSPHORUS: CPT

## 2023-10-02 PROCEDURE — 2580000003 HC RX 258: Performed by: INTERNAL MEDICINE

## 2023-10-02 PROCEDURE — 83735 ASSAY OF MAGNESIUM: CPT

## 2023-10-02 RX ORDER — TETRAKIS(2-METHOXYISOBUTYLISOCYANIDE)COPPER(I) TETRAFLUOROBORATE 1 MG/ML
21.1 INJECTION, POWDER, LYOPHILIZED, FOR SOLUTION INTRAVENOUS
Status: COMPLETED | OUTPATIENT
Start: 2023-10-02 | End: 2023-10-02

## 2023-10-02 RX ORDER — SODIUM CHLORIDE 0.9 % (FLUSH) 0.9 %
5-40 SYRINGE (ML) INJECTION PRN
Status: ACTIVE | OUTPATIENT
Start: 2023-10-02 | End: 2023-10-02

## 2023-10-02 RX ORDER — AMINOPHYLLINE 25 MG/ML
50 INJECTION, SOLUTION INTRAVENOUS PRN
Status: ACTIVE | OUTPATIENT
Start: 2023-10-02 | End: 2023-10-02

## 2023-10-02 RX ORDER — NITROGLYCERIN 0.4 MG/1
0.4 TABLET SUBLINGUAL EVERY 5 MIN PRN
Status: ACTIVE | OUTPATIENT
Start: 2023-10-02 | End: 2023-10-02

## 2023-10-02 RX ORDER — SODIUM CHLORIDE 9 MG/ML
500 INJECTION, SOLUTION INTRAVENOUS CONTINUOUS PRN
Status: ACTIVE | OUTPATIENT
Start: 2023-10-02 | End: 2023-10-02

## 2023-10-02 RX ORDER — TETRAKIS(2-METHOXYISOBUTYLISOCYANIDE)COPPER(I) TETRAFLUOROBORATE 1 MG/ML
38.7 INJECTION, POWDER, LYOPHILIZED, FOR SOLUTION INTRAVENOUS
Status: COMPLETED | OUTPATIENT
Start: 2023-10-02 | End: 2023-10-02

## 2023-10-02 RX ORDER — REGADENOSON 0.08 MG/ML
0.4 INJECTION, SOLUTION INTRAVENOUS
Status: COMPLETED | OUTPATIENT
Start: 2023-10-02 | End: 2023-10-02

## 2023-10-02 RX ORDER — METOPROLOL TARTRATE 5 MG/5ML
5 INJECTION INTRAVENOUS EVERY 5 MIN PRN
Status: ACTIVE | OUTPATIENT
Start: 2023-10-02 | End: 2023-10-02

## 2023-10-02 RX ORDER — ATROPINE SULFATE 0.1 MG/ML
0.5 INJECTION INTRAVENOUS EVERY 5 MIN PRN
Status: ACTIVE | OUTPATIENT
Start: 2023-10-02 | End: 2023-10-02

## 2023-10-02 RX ADMIN — CALCIUM ACETATE 1334 MG: 667 CAPSULE ORAL at 17:33

## 2023-10-02 RX ADMIN — CARVEDILOL 12.5 MG: 12.5 TABLET, FILM COATED ORAL at 17:33

## 2023-10-02 RX ADMIN — GABAPENTIN 100 MG: 100 CAPSULE ORAL at 09:31

## 2023-10-02 RX ADMIN — CALCIUM ACETATE 1334 MG: 667 CAPSULE ORAL at 12:24

## 2023-10-02 RX ADMIN — HEPARIN SODIUM 5000 UNITS: 5000 INJECTION INTRAVENOUS; SUBCUTANEOUS at 06:27

## 2023-10-02 RX ADMIN — CARVEDILOL 12.5 MG: 12.5 TABLET, FILM COATED ORAL at 09:30

## 2023-10-02 RX ADMIN — GABAPENTIN 100 MG: 100 CAPSULE ORAL at 13:05

## 2023-10-02 RX ADMIN — HEPARIN SODIUM 5000 UNITS: 5000 INJECTION INTRAVENOUS; SUBCUTANEOUS at 13:04

## 2023-10-02 RX ADMIN — SODIUM CHLORIDE, PRESERVATIVE FREE 10 ML: 5 INJECTION INTRAVENOUS at 08:12

## 2023-10-02 RX ADMIN — VENLAFAXINE HYDROCHLORIDE 37.5 MG: 37.5 TABLET ORAL at 21:38

## 2023-10-02 RX ADMIN — ACETAMINOPHEN 650 MG: 325 TABLET ORAL at 09:30

## 2023-10-02 RX ADMIN — SODIUM CHLORIDE, PRESERVATIVE FREE 10 ML: 5 INJECTION INTRAVENOUS at 13:07

## 2023-10-02 RX ADMIN — HEPARIN SODIUM 5000 UNITS: 5000 INJECTION INTRAVENOUS; SUBCUTANEOUS at 21:40

## 2023-10-02 RX ADMIN — Medication 21.1 MILLICURIE: at 08:13

## 2023-10-02 RX ADMIN — GABAPENTIN 100 MG: 100 CAPSULE ORAL at 21:39

## 2023-10-02 RX ADMIN — CLOPIDOGREL BISULFATE 75 MG: 75 TABLET ORAL at 09:31

## 2023-10-02 RX ADMIN — ATORVASTATIN CALCIUM 10 MG: 10 TABLET, FILM COATED ORAL at 21:40

## 2023-10-02 RX ADMIN — INSULIN LISPRO 4 UNITS: 100 INJECTION, SOLUTION INTRAVENOUS; SUBCUTANEOUS at 13:06

## 2023-10-02 RX ADMIN — VENLAFAXINE HYDROCHLORIDE 56.25 MG: 37.5 TABLET ORAL at 09:31

## 2023-10-02 RX ADMIN — CALCIUM ACETATE 1334 MG: 667 CAPSULE ORAL at 09:30

## 2023-10-02 RX ADMIN — Medication 38.7 MILLICURIE: at 13:25

## 2023-10-02 RX ADMIN — REGADENOSON 0.4 MG: 0.08 INJECTION, SOLUTION INTRAVENOUS at 08:12

## 2023-10-02 ASSESSMENT — PAIN SCALES - GENERAL
PAINLEVEL_OUTOF10: 0
PAINLEVEL_OUTOF10: 0

## 2023-10-02 ASSESSMENT — PAIN DESCRIPTION - DESCRIPTORS: DESCRIPTORS: ACHING

## 2023-10-02 ASSESSMENT — PAIN DESCRIPTION - LOCATION: LOCATION: HEAD

## 2023-10-02 NOTE — CARE COORDINATION
Social Work- Pt was admitted from [facility]. COntacted [facility] . Pt is long term care at [facility]. Contacted  son. The plan will be for pt to return to [facility]. COnfirmed dialysis times with [facility].

## 2023-10-02 NOTE — PROGRESS NOTES
Occupational Therapy  Facility/Department: [facility] MED SURG  Occupational Therapy Initial Assessment    Name: Kenzie Garcia  :   MRN: [id number]  Date of Service: 10/2/2023    Discharge Recommendations:  Patient would benefit from continued therapy after discharge Pt currently functioning below baseline. Recommend daily inpatient skilled therapy at time of discharge to maximize long term outcomes and prevent re-admission. Please refer to AM-PAC score for current level of function. RN reports patient is medically stable for therapy treatment this date. Chart reviewed prior to treatment and patient is agreeable for therapy. All lines intact and patient positioned comfortably at end of treatment. All patient needs addressed prior to ending therapy session. Patient Diagnosis(es): The primary encounter diagnosis was Syncope and collapse. A diagnosis of Chest pain, unspecified type was also pertinent to this visit. Past Medical History:  has a past medical history of Anxiety and depression, Arthritis, Cancer ([address]), Cerebral artery occlusion with cerebral infarction ([address]), Chronic kidney disease, stage 5 ([address]), Diabetes mellitus ([address]), GERD (gastroesophageal reflux disease), Hearing loss, Hemodialysis patient ([address]), History of blood transfusion, Hyperlipidemia, Hypertension, Migraines, MRSA (methicillin resistant staph aureus) culture positive, Neuropathy, PAT (obstructive sleep apnea), Prolonged emergence from general anesthesia, PVD (peripheral vascular disease) ([address]), SOB (shortness of breath), and Tinnitus. Past Surgical History:  has a past surgical history that includes Uvulopalatopharygoplasty; Mastectomy (Left); Hysterectomy; Appendectomy; back surgery; Mastectomy (Left, 2018); pr exc b9 lesion mrgn xcp sk tg t/a/l 0.5 cm/< (Right, 2018); Breast reduction surgery (Left, ); Colonoscopy;  Insertable Cardiac Monitor; central line (2022); and IR TUNNELED CVC PLACE WO SQ bed)  Active : No  Patient's  Info: medical transport to HD, family also drives  Leisure & Hobbies: Bingo  Additional Comments: Patient questionable historian. Objective   Pulse: 79  Heart Rate Source: Telemetry  BP: (!) 109/53  BP Location: Right upper arm  BP Method: Automatic  Patient Position: Standing  MAP (Calculated): 72  Respirations: 16  SpO2: 100 %  O2 Device: None (Room air)          Observation/Palpation  Posture: Poor (flexed posture, requires verbal cues to stand upright.)  Observation: Decreased sensation in feet and hands; pt lying in bed. Some confusion noted throughout session with inconsistencies regarding prior level of fxn, \"joking\" when needing more time to answer questions  Safety Devices  Type of Devices: All fall risk precautions in place;Call light within reach; Chair alarm in place;Gait belt;Patient at risk for falls; Left in chair;Nurse notified  Balance  Sitting: High guard (SBA sitting EOB prior to standing)  Standing: With support (min A with RW for support)  Gait  Overall Level of Assistance: Minimum assistance (pt completed mob in room bed<>bathroom with RW. Pt needing min-mod cues for safety with device, helen with turns, backing up to toilet, and approaching sink.)  Interventions: Safety awareness training;Verbal cues  Assistive Device: Gait belt;Walker, rolling  Toilet Transfers  Toilet - Technique: Ambulating  Equipment Used: Standard toilet  Toilet Transfer: Minimal assistance  AROM: Within functional limits  Strength: Within functional limits  Coordination: Within functional limits  Tone: Normal  Sensation: Impaired (numbness B feet)  ADL  Feeding: Setup; Independent  Grooming: Minimal assistance  Grooming Skilled Clinical Factors: to stand at sink for oral care and hand washing. Pt fatiguing easily following toileting.  Pt with one posterior LOB with min A to correct and pt needing to grab sink to \"catch\" self  UE Bathing: Minimal assistance  LE Bathing: Minimal 29.7

## 2023-10-02 NOTE — PROGRESS NOTES
Nephrology Progress Note    Adrienne Desir MD    Patient Active Problem List   Diagnosis    AMS (altered mental status)    Diabetes ([address])    Admission for dialysis [facility])    Hypoglycemia    Encephalopathy    Metabolic encephalopathy    End stage renal failure on dialysis ([address])    COVID-19 virus infection    Hypertensive urgency    Sepsis ([address])    Acute respiratory failure with hypoxia (HCC)    Morbid obesity ([address])    Hypertension, essential    Hypokalemia    Metabolic acidosis    Elevated procalcitonin    Elevated C-reactive protein (CRP)    ESRD (end stage renal disease) (HCC)    Altered mental status    NSTEMI (non-ST elevated myocardial infarction) ([address])    Hypertension secondary to other renal disorders    Pure hypercholesterolemia    UTI due to extended-spectrum beta lactamase (ESBL) producing Escherichia coli    Syncope and collapse    Chest pain             CHIEF COMPLAINT     HD management     CURRENT MEDICATIONS      Current Facility-Administered Medications   Medication Dose Route Frequency Provider Last Rate Last Admin    sodium chloride flush 0.9 % injection 5-40 mL  5-40 mL IntraVENous PRN Serina Joseph MD   10 mL at 10/02/23 0812    0.9 % sodium chloride infusion  500 mL IntraVENous Continuous PRN Serina Joseph MD        atropine injection 0.5 mg  0.5 mg IntraVENous Q5 Min PRN Serina Joseph MD        nitroGLYCERIN (NITROSTAT) SL tablet 0.4 mg  0.4 mg SubLINGual Q5 Min PRMEGHAN Joseph MD        metoprolol (LOPRESSOR) injection 5 mg  5 mg IntraVENous Q5 Min PRMEGHAN Joseph MD        aminophylline injection 50 mg  50 mg IntraVENous PRMEGHAN Joseph MD        venlafaxine [facility]) tablet 56.25 mg  56.25 mg Oral Daily with breakfast LEYDI Ahn CNP   56.25 mg at 10/02/23 0931    insulin lispro (HUMALOG) injection vial 0-16 Units  0-16 Units SubCUTAneous TID  LEYDI Thomas CNP        insulin lispro (HUMALOG) injection vial 0-4 Units  0-4 Units SubCUTAneous Nightly ipratropium (ATROVENT HFA) 17 MCG/ACT inhaler 2 puff  2 puff Inhalation Q6H PRN Yan Brown MD        carvedilol (COREG) tablet 12.5 mg  12.5 mg Oral BID WC Shruthi Da SilvaLEYDI menchaca CNP   12.5 mg at 10/02/23 0930    atorvastatin (LIPITOR) tablet 10 mg  10 mg Oral Daily DaisyzaidaLEYDI Garcia CNP   10 mg at 10/01/23 2128       REVIEW OF SYSTEMS     Constitutional: No fever/chills  Cardiac: No chest pain,no dyspnea,no orthopnea. Chest: No wheezing, cough  Abdomen: No pain, no constipation , no diarrhea  : oligoanuric        SUBJECTIVE     No events overnight. No complaints.  Doing well    Vitals:    10/02/23 1144   BP: (!) 109/53   Pulse: 79   Resp:    Temp:    SpO2:      Wt Readings from Last 2 Encounters:   09/29/23 154 lb (69.9 kg)   08/15/23 164 lb 0.4 oz (74.4 kg)     In: 150 [P.O.:150]  Out: -   In: 150   Out: -      PHYSICAL EXAM      GENERAL APPEARANCE: in no acute distress  SKIN: warm and dry, no rash or erythema  PULMONARY:   diminished   CADRDIOVASCULAR: distant heart tones  ABDOMEN: soft nontender, bowel sounds are present, no organomegaly,  no ascitic wave  EXTREMITIES: no cyanosis or edema    LABS   CBC:   Lab Results   Component Value Date/Time    WBC 7.5 09/30/2023 05:55 AM    RBC 3.16 09/30/2023 05:55 AM    HGB 10.3 09/30/2023 05:55 AM    HCT 33.1 09/30/2023 05:55 AM    .7 09/30/2023 05:55 AM    MCH 32.6 09/30/2023 05:55 AM    MCHC 31.1 09/30/2023 05:55 AM    RDW 16.8 09/30/2023 05:55 AM     09/30/2023 05:55 AM    MPV 11.1 09/30/2023 05:55 AM      BMP:   Lab Results   Component Value Date/Time     10/02/2023 03:19 AM    K 4.6 10/02/2023 03:19 AM    CL 98 10/02/2023 03:19 AM    CO2 26 10/02/2023 03:19 AM    BUN 77 10/02/2023 03:19 AM    CREATININE 10.8 10/02/2023 03:19 AM    GLUCOSE 115 10/02/2023 03:19 AM    GLUCOSE 169 06/05/2023 09:56 AM    CALCIUM 8.9 10/02/2023 03:19 AM      BNP:No results found for: \"BNP\"  PHOSPHORUS:    Lab Results   Component Value Date/Time    PHOS 3.9

## 2023-10-02 NOTE — PROGRESS NOTES
Physical Therapy  Facility/Department: [facility] MED SURG  Physical Therapy Initial Assessment    Name: Olivier aWddell  : [id number]  MRN: [id number]  Date of Service: 10/2/2023    Discharge Recommendations:  Patient would benefit from continued therapy after discharge. Pt currently functioning below baseline. Recommend daily inpatient skilled therapy at time of discharge to maximize long term outcomes and prevent re-admission. Please refer to AM-PAC score for current level of function. HPI (per chart): Patient admitted to the emergency room where she was brought in via EMS. Patient was undergoing hemodialysis when 2 hours and 15 minutes into dialysis of 3-hour 30-minute dialysis when she suddenly lost consciousness. This lasted approximately 1 minute. Hemodialysis staff were monitoring the patient and noted patient slouch. They shook her and pt remembers her name being called. She says once she woke up and a few minutes after that she has been her normal self. Pain around but did not remember the event. According to staff patient had complained of chest pain when she woke up. According to the patient she has had similar episodes in the past when she would feel fatigued during or after hemodialysis. Patient Diagnosis(es): The primary encounter diagnosis was Syncope and collapse. A diagnosis of Chest pain, unspecified type was also pertinent to this visit.   Past Medical History:  has a past medical history of Anxiety and depression, Arthritis, Cancer ([address]), Cerebral artery occlusion with cerebral infarction ([address]), Chronic kidney disease, stage 5 ([address]), Diabetes mellitus ([address]), GERD (gastroesophageal reflux disease), Hearing loss, Hemodialysis patient ([address]), History of blood transfusion, Hyperlipidemia, Hypertension, Migraines, MRSA (methicillin resistant staph aureus) culture positive, Neuropathy, PAT (obstructive sleep apnea), Prolonged emergence from general anesthesia, PVD (peripheral vascular recovery. Gait Deviations: Slow Alisia; Increased CATE; Decreased step length;Decreased step height;Shuffles;Decreased head and trunk rotation  Distance: 30 feet, 20 feet     Balance  Sitting - Static: Good  Sitting - Dynamic: Good  Standing - Static: Fair (RW)  Standing - Dynamic: Fair;- (RW)           OutComes Score  Balance Score: 2 (10/02/23 1239)  Gait Score: 2 (10/02/23 1239)        Tinetti Total Score: 4 (10/02/23 1239)                                   AM-PAC Score  AM-PAC Inpatient Mobility Raw Score : 19 (10/02/23 1238)  AM-PAC Inpatient T-Scale Score : 45.44 (10/02/23 1238)  Mobility Inpatient CMS 0-100% Score: 41.77 (10/02/23 1238)  Mobility Inpatient CMS G-Code Modifier : CK (10/02/23 1238)          Tinneti Score  Balance Score: 2 (10/02/23 1239)  Gait Score: 2 (10/02/23 1239)  Tinetti Total Score: 4 (10/02/23 1239)  Tinetti Disability Index: 80-99% (10/02/23 1239)    Goals  Short Term Goals  Time Frame for Short Term Goals: 12 visits  Short Term Goal 1: Patient will be indep with bed mobility. Short Term Goal 2: Patient will be indep with transfers. Short Term Goal 3: Patient will amb 100 feet with RW and CGA. Short Term Goal 4: Patient will demo fair + standing balance with RW. Short Term Goal 5: Patient will tolerate 30 minutes of ther-ex and ther-act. Patient Goals   Patient Goals : None stated       Education  Patient Education  Education Given To: Patient  Education Provided: Role of Therapy;Plan of Care;Transfer Training; Fall Prevention Strategies;Orientation  Education Provided Comments: Patient educated to safety with transfers, fall prevention.   Education Method: Verbal  Barriers to Learning: None  Education Outcome: Continued education needed      Therapy Time   Individual Concurrent Group Co-treatment   Time In 7648         Time Out 1147         Minutes 42         Timed Code Treatment Minutes: [address], PT

## 2023-10-02 NOTE — DISCHARGE INSTR - COC
Continuity of Care Form    Patient Name: Louisa Ramirez   :    MRN:  [id number]    Admit date:  2023  Discharge date:  10/5/2023    Code Status Order: Full Code   Advance Directives:     Admitting Physician:  Maryam Polanco MD  PCP: Queenie Tabor MD    Discharging Nurse: [facility] Unit/Room#:   Discharging Unit Phone Number: [phone]    Emergency Contact:   Extended Emergency Contact Information  Primary Emergency Contact: Duana Schlatter [facility] [address] [address] Phone: [phone]  Relation: Child  Secondary Emergency Contact: jose blackman  Home Phone: [phone]  Relation: Child   needed?  No    Past Surgical History:  Past Surgical History:   Procedure Laterality Date    APPENDECTOMY      BACK SURGERY      I and D lower back MRSA    BREAST REDUCTION SURGERY Left     CENTRAL LINE  2022         COLONOSCOPY      HYSTERECTOMY (CERVIX STATUS UNKNOWN)      INSERTABLE CARDIAC MONITOR      IR TUNNELED CATHETER PLACEMENT GREATER THAN 5 YEARS  2022    IR TUNNELED CATHETER PLACEMENT GREATER THAN 5 YEARS 2022 [facility]    MASTECTOMY Left     lymph nodes removed    MASTECTOMY Left 2018    axillary mastectomy    DC EXC B9 LESION MRGN XCP SK TG T/A/L 0.5 CM/< Right 2018    RIGHT AXILLARY MASTECTOMY performed by Stacey Etienne DO at [facility]         Immunization History:   Immunization History   Administered Date(s) Administered    COVID-19, MODERNA BLUE border, Primary or Immunocompromised, (age 12y+), IM, 100 mcg/0.5mL 2021, 2021    COVID-19, [place] Vaccine, Vaccine Unspecified 2021, 2021, 2021, 2021    Hep B, ENGERIX-B, (age 20y+), IM, 1mL 2021, 2021, 2021, 2021, 2022, 2022, 2022, 2022    Hepatitis B 2021, 2021, 2021, 2021    Hepatitis B vaccine 2022, 2022, requires [address] for less 30 days.      Update Admission H&P: No change in H&P    PHYSICIAN SIGNATURE:  Electronically signed by Magan Oneal MD on 10/4/23 at 11:40 AM EDT

## 2023-10-02 NOTE — PLAN OF CARE
Problem: Discharge Planning  Goal: Discharge to home or other facility with appropriate resources  10/2/2023 0209 by Anand Morocho RN  Outcome: Progressing  10/1/2023 1341 by Solis Tobar RN  Outcome: Progressing     Problem: Safety - Adult  Goal: Free from fall injury  10/2/2023 0209 by Anand Morocho RN  Outcome: Progressing  10/1/2023 1341 by Solis Tobar RN  Outcome: Progressing     Problem: ABCDS Injury Assessment  Goal: Absence of physical injury  10/2/2023 0209 by Anand Morocho RN  Outcome: Progressing  10/1/2023 1341 by Solis Tobar RN  Outcome: Progressing     Problem: Pain  Goal: Verbalizes/displays adequate comfort level or baseline comfort level  10/2/2023 0209 by Anand Morocho RN  Outcome: Progressing  10/1/2023 1341 by Solis Tobar RN  Outcome: Progressing     Problem: Chronic Conditions and Co-morbidities  Goal: Patient's chronic conditions and co-morbidity symptoms are monitored and maintained or improved  10/2/2023 0209 by Anand Morocho RN  Outcome: Progressing  10/1/2023 1341 by Solis Tobar RN  Outcome: Progressing     Problem: Cardiovascular - Adult  Goal: Maintains optimal cardiac output and hemodynamic stability  10/2/2023 0209 by Anand Morocho RN  Outcome: Progressing  10/1/2023 1341 by Solis Tobar RN  Outcome: Progressing     Problem: Gastrointestinal - Adult  Goal: Minimal or absence of nausea and vomiting  10/2/2023 0209 by Anand Morocho RN  Outcome: Progressing  10/1/2023 1341 by Solis Tobar RN  Outcome: Progressing     Problem: Genitourinary - Adult  Goal: Absence of urinary retention  10/2/2023 0209 by Anand Morocho RN  Outcome: Progressing  10/1/2023 1341 by Solis Tobar RN  Outcome: Progressing     Problem: Infection - Adult  Goal: Absence of infection at discharge  10/2/2023 0209 by Anand Morocho RN  Outcome: Progressing  10/1/2023 1341 by Solis Tboar RN  Outcome: Progressing

## 2023-10-02 NOTE — FLOWSHEET NOTE
Pt tolerated lexiscan without experiencing side effect, recovered on cart and taken to [facility] for further testing in nad

## 2023-10-02 NOTE — PROGRESS NOTES
Treatment time: 10 minutes    Net UF: 0 mL    Pre weight: 69.9 kg  Post weight: 69.9 kg  EDW: 67 kg    Access used: AVF LUE  Access function: clotted    Medications or blood products given: none    Regular outpatient schedule: MWF [facility]    Summary of response to treatment: Patient did not tolerate treatment, venous access clotted 10 minutes into treatment, no bleeding at venous sites, clots noted in venous sheath, Dr. Gabbie Hansen notified and new orders received for fistulagram, treatment terminated for today, report given to TY MUÑOZ RN. Copy of dialysis treatment record placed in chart, to be scanned into EMR.

## 2023-10-03 ENCOUNTER — APPOINTMENT (OUTPATIENT)
Dept: INTERVENTIONAL RADIOLOGY/VASCULAR | Age: 78
End: 2023-10-03
Payer: MEDICARE

## 2023-10-03 ENCOUNTER — APPOINTMENT (OUTPATIENT)
Dept: MRI IMAGING | Age: 78
End: 2023-10-03
Payer: MEDICARE

## 2023-10-03 LAB
AMMONIA PLAS-SCNC: 31 UMOL/L (ref 11–51)
FOLATE SERPL-MCNC: 19.8 NG/ML (ref 4.8–24.2)
GLUCOSE BLD-MCNC: 108 MG/DL (ref 65–105)
GLUCOSE BLD-MCNC: 112 MG/DL (ref 65–105)
GLUCOSE BLD-MCNC: 97 MG/DL (ref 65–105)
INR PPP: 1
PROTHROMBIN TIME: 13.3 SEC (ref 11.5–14.2)
VIT B12 SERPL-MCNC: 457 PG/ML (ref 232–1245)

## 2023-10-03 PROCEDURE — 82947 ASSAY GLUCOSE BLOOD QUANT: CPT

## 2023-10-03 PROCEDURE — 82140 ASSAY OF AMMONIA: CPT

## 2023-10-03 PROCEDURE — 99222 1ST HOSP IP/OBS MODERATE 55: CPT | Performed by: PSYCHIATRY & NEUROLOGY

## 2023-10-03 PROCEDURE — 5A1D70Z PERFORMANCE OF URINARY FILTRATION, INTERMITTENT, LESS THAN 6 HOURS PER DAY: ICD-10-PCS | Performed by: INTERNAL MEDICINE

## 2023-10-03 PROCEDURE — 82607 VITAMIN B-12: CPT

## 2023-10-03 PROCEDURE — 90935 HEMODIALYSIS ONE EVALUATION: CPT

## 2023-10-03 PROCEDURE — 85610 PROTHROMBIN TIME: CPT

## 2023-10-03 PROCEDURE — 6370000000 HC RX 637 (ALT 250 FOR IP): Performed by: REGISTERED NURSE

## 2023-10-03 PROCEDURE — 2709999900 IR FISTULAGRAM

## 2023-10-03 PROCEDURE — 82746 ASSAY OF FOLIC ACID SERUM: CPT

## 2023-10-03 PROCEDURE — 36415 COLL VENOUS BLD VENIPUNCTURE: CPT

## 2023-10-03 PROCEDURE — 6360000002 HC RX W HCPCS: Performed by: FAMILY MEDICINE

## 2023-10-03 PROCEDURE — 1200000000 HC SEMI PRIVATE

## 2023-10-03 PROCEDURE — 99233 SBSQ HOSP IP/OBS HIGH 50: CPT | Performed by: INTERNAL MEDICINE

## 2023-10-03 PROCEDURE — 6370000000 HC RX 637 (ALT 250 FOR IP): Performed by: INTERNAL MEDICINE

## 2023-10-03 PROCEDURE — 6360000004 HC RX CONTRAST MEDICATION: Performed by: RADIOLOGY

## 2023-10-03 PROCEDURE — 70551 MRI BRAIN STEM W/O DYE: CPT

## 2023-10-03 PROCEDURE — 6370000000 HC RX 637 (ALT 250 FOR IP): Performed by: FAMILY MEDICINE

## 2023-10-03 PROCEDURE — B31H1ZZ FLUOROSCOPY OF RIGHT UPPER EXTREMITY ARTERIES USING LOW OSMOLAR CONTRAST: ICD-10-PCS | Performed by: INTERNAL MEDICINE

## 2023-10-03 PROCEDURE — 36901 INTRO CATH DIALYSIS CIRCUIT: CPT

## 2023-10-03 PROCEDURE — 2500000003 HC RX 250 WO HCPCS: Performed by: FAMILY MEDICINE

## 2023-10-03 PROCEDURE — 2580000003 HC RX 258: Performed by: FAMILY MEDICINE

## 2023-10-03 RX ORDER — ASPIRIN 81 MG/1
81 TABLET ORAL DAILY
Status: DISCONTINUED | OUTPATIENT
Start: 2023-10-03 | End: 2023-10-05 | Stop reason: HOSPADM

## 2023-10-03 RX ORDER — ISOSORBIDE MONONITRATE 30 MG/1
30 TABLET, EXTENDED RELEASE ORAL DAILY
Status: DISCONTINUED | OUTPATIENT
Start: 2023-10-03 | End: 2023-10-05 | Stop reason: HOSPADM

## 2023-10-03 RX ORDER — IODIXANOL 320 MG/ML
INJECTION, SOLUTION INTRAVASCULAR PRN
Status: COMPLETED | OUTPATIENT
Start: 2023-10-03 | End: 2023-10-03

## 2023-10-03 RX ORDER — ATORVASTATIN CALCIUM 40 MG/1
40 TABLET, FILM COATED ORAL DAILY
Status: DISCONTINUED | OUTPATIENT
Start: 2023-10-03 | End: 2023-10-05 | Stop reason: HOSPADM

## 2023-10-03 RX ADMIN — HEPARIN SODIUM 5000 UNITS: 5000 INJECTION INTRAVENOUS; SUBCUTANEOUS at 20:29

## 2023-10-03 RX ADMIN — GABAPENTIN 100 MG: 100 CAPSULE ORAL at 10:47

## 2023-10-03 RX ADMIN — GABAPENTIN 100 MG: 100 CAPSULE ORAL at 17:16

## 2023-10-03 RX ADMIN — ISOSORBIDE MONONITRATE 30 MG: 30 TABLET, EXTENDED RELEASE ORAL at 17:16

## 2023-10-03 RX ADMIN — SODIUM CHLORIDE, PRESERVATIVE FREE 10 ML: 5 INJECTION INTRAVENOUS at 20:31

## 2023-10-03 RX ADMIN — GABAPENTIN 100 MG: 100 CAPSULE ORAL at 20:30

## 2023-10-03 RX ADMIN — ATORVASTATIN CALCIUM 40 MG: 40 TABLET, FILM COATED ORAL at 20:30

## 2023-10-03 RX ADMIN — IODIXANOL 100 ML: 320 INJECTION, SOLUTION INTRAVASCULAR at 09:03

## 2023-10-03 RX ADMIN — CLOPIDOGREL BISULFATE 75 MG: 75 TABLET ORAL at 10:47

## 2023-10-03 RX ADMIN — CALCIUM ACETATE 1334 MG: 667 CAPSULE ORAL at 10:47

## 2023-10-03 RX ADMIN — VENLAFAXINE HYDROCHLORIDE 37.5 MG: 37.5 TABLET ORAL at 20:30

## 2023-10-03 RX ADMIN — SODIUM CHLORIDE, PRESERVATIVE FREE 10 ML: 5 INJECTION INTRAVENOUS at 17:45

## 2023-10-03 RX ADMIN — CALCIUM ACETATE 1334 MG: 667 CAPSULE ORAL at 17:16

## 2023-10-03 RX ADMIN — VENLAFAXINE HYDROCHLORIDE 56.25 MG: 37.5 TABLET ORAL at 10:47

## 2023-10-03 RX ADMIN — CARVEDILOL 12.5 MG: 12.5 TABLET, FILM COATED ORAL at 17:16

## 2023-10-03 RX ADMIN — HEPARIN SODIUM 5000 UNITS: 5000 INJECTION INTRAVENOUS; SUBCUTANEOUS at 17:16

## 2023-10-03 RX ADMIN — CARVEDILOL 12.5 MG: 12.5 TABLET, FILM COATED ORAL at 10:47

## 2023-10-03 ASSESSMENT — PAIN SCALES - GENERAL
PAINLEVEL_OUTOF10: 0
PAINLEVEL_OUTOF10: 0

## 2023-10-03 NOTE — PROGRESS NOTES
[facility] Hospitalist        10/3/2023   3:41 PM    Name:  Edwin Perez  MRN:    [id number]     [address]:     [de-identified]   Room:  2009/2009-02  IP Day: 4     Admit Date: 9/29/2023  1:09 PM  PCP: Tunde Mccoy MD    C/C:   Chief Complaint   Patient presents with    Loss of Consciousness     2 hours 50 min into Dialysis. Lasted for about 1 min       Assessment:      Syncope  End-stage renal disease with hemodialysis  Mixed hyperlipidemia  Anxiety disorder  Essential hypertension with episodes of hypotension  Diabetes mellitus type 2  Diabetic neuropathy  Gastroesophageal reflux disease without esophagitis  Major depressive disorder        Plan:      Admit to MedSurg  Monitor vitals closely  Keep SPO2 above 90%  I's and O's  IV  Pain control  Antiemetics as needed  Neurochecks every 4 hours  Orthostatic blood pressure  Pro Amatine  Hold gabapentin, carvedilol  Serial cardiac enzymes  Twelve-lead EKG  Consult cardiology  CBC, BMP  Incentive spirometry  Consult nephrology for hemodialysis  Cardiac stress test on 10/2  Patient tells me that he is having jerking body movements for past 1 months he is not sure what it is, I have consulted neurology with neurology assistance they have ordered MRI and EEG  DVT and GI prophylaxis.   Continue medication as below      Scheduled Meds:   aspirin  81 mg Oral Daily    isosorbide mononitrate  30 mg Oral Daily    atorvastatin  40 mg Oral Daily    venlafaxine  56.25 mg Oral Daily with breakfast    insulin lispro  0-16 Units SubCUTAneous TID WC    insulin lispro  0-4 Units SubCUTAneous Nightly    venlafaxine  37.5 mg Oral Nightly    sodium chloride flush  5-40 mL IntraVENous 2 times per day    heparin (porcine)  5,000 Units SubCUTAneous 3 times per day    calcium acetate  2 capsule Oral TID WC    clopidogrel  75 mg Oral Daily    gabapentin  100 mg Oral TID    midodrine  10 mg Oral Once per day on Mon Wed Fri    pantoprazole  20 mg Oral QAM AC    carvedilol MD Kevin    0.9 % sodium chloride infusion, , IntraVENous, PRN, Yan Brown MD    heparin (porcine) injection 5,000 Units, 5,000 Units, SubCUTAneous, 3 times per day, Denise Pickett MD, 5,000 Units at 10/02/23 2140    acetaminophen (TYLENOL) tablet 650 mg, 650 mg, Oral, Q4H PRN, Yan Brown MD, 650 mg at 10/02/23 0930    ondansetron (ZOFRAN-ODT) disintegrating tablet 4 mg, 4 mg, Oral, Q8H PRN **OR** ondansetron (ZOFRAN) injection 4 mg, 4 mg, IntraVENous, Q6H PRN, Yan Brown MD    calcium acetate (PHOSLO) capsule 1,334 mg, 2 capsule, Oral, TID WC, Yan Brown MD, 1,334 mg at 10/03/23 1047    clopidogrel (PLAVIX) tablet 75 mg, 75 mg, Oral, Daily, Yan Brown MD, 75 mg at 10/03/23 1047    gabapentin (NEURONTIN) capsule 100 mg, 100 mg, Oral, TID, Yan Brown MD, 100 mg at 10/03/23 1047    midodrine (PROAMATINE) tablet 10 mg, 10 mg, Oral, Once per day on Mon Wed Fri, Denise Pickett MD    pantoprazole (PROTONIX) tablet 20 mg, 20 mg, Oral, QAM AC, Yan Brown MD, 20 mg at 10/01/23 0624    albuterol sulfate HFA (PROVENTIL;VENTOLIN;PROAIR) 108 (90 Base) MCG/ACT inhaler 2 puff, 2 puff, Inhalation, Q6H PRN, Yan Brown MD    ipratropium (ATROVENT HFA) 17 MCG/ACT inhaler 2 puff, 2 puff, Inhalation, Q6H PRN, Yan Brown MD    carvedilol (COREG) tablet 12.5 mg, 12.5 mg, Oral, BID WC, Shruthi Osman, APRN - CNP, 12.5 mg at 10/03/23 1047      I/O (24Hr):     Intake/Output Summary (Last 24 hours) at 10/3/2023 1541  Last data filed at 10/3/2023 1417  Gross per 24 hour   Intake 300 ml   Output 1500 ml   Net -1200 ml         Data:           Labs:    Hematology:  Recent Labs     10/03/23  0434   INR 1.0       Chemistry:  Recent Labs     10/01/23  1104 10/02/23  0319    139   K 5.6* 4.6   CL 98 98   CO2 25 26   GLUCOSE 203* 115*   BUN 64* 77*   CREATININE 9.6* 10.8*   MG 2.3 2.3   ANIONGAP 15 15   LABGLOM 4* 3*   CALCIUM 9.4 8.9   PHOS 4.4 3.9       Recent Labs     10/01/23  2110 10/02/23  8231

## 2023-10-03 NOTE — PLAN OF CARE
Problem: Safety - Adult  Goal: Free from fall injury  10/3/2023 1812 by Jt White RN  Outcome: Progressing     Problem: ABCDS Injury Assessment  Goal: Absence of physical injury  10/3/2023 1812 by Jt White RN  Outcome: Progressing     Problem: Pain  Goal: Verbalizes/displays adequate comfort level or baseline comfort level  10/3/2023 1812 by Jt White RN  Outcome: Progressing     Problem: Chronic Conditions and Co-morbidities  Goal: Patient's chronic conditions and co-morbidity symptoms are monitored and maintained or improved  10/3/2023 1812 by Jt White RN  Outcome: Progressing     Problem: Cardiovascular - Adult  Goal: Maintains optimal cardiac output and hemodynamic stability  10/3/2023 1812 by Jt White RN  Outcome: Progressing     Problem: Gastrointestinal - Adult  Goal: Minimal or absence of nausea and vomiting  10/3/2023 1812 by Jt White RN  Outcome: Progressing     Problem: Genitourinary - Adult  Goal: Absence of urinary retention  10/3/2023 1812 by Jt White RN  Outcome: Progressing     Problem: Infection - Adult  Goal: Absence of infection at discharge  10/3/2023 1812 by Jt White RN  Outcome: Progressing

## 2023-10-03 NOTE — BRIEF OP NOTE
Brief Postoperative Note    Felice Beaulieu  YOB: 1945  [id number]    Pre-operative Diagnosis: LUE fistula malfunction; pulling clots out during dialysis    Post-operative Diagnosis: Same    Procedure: Fistulagram    Anesthesia: Local    Surgeons/Assistants: Suraj    Estimated Blood Loss: less than 50     Complications: None    Specimens: Was Not Obtained    Findings: No significant stenosis involving the LUE brachiocephalic fistula. Mild-moderate subcutaneous edema c/w infiltration at the outflow vein just above the elbow at the site if cannulation. If cannulation or fistula malfunctions during next dialysis, the patient may require catheter placement for the edema/swelling to resolve.     Electronically signed by Saira Wall MD on 10/3/2023 at 11:18 AM

## 2023-10-03 NOTE — PROGRESS NOTES
HEMODIALYSIS PRE-TREATMENT NOTE    Patient Identifiers prior to treatment: name  mrn    Isolation Required: yes                      Isolation Type: contact       (please document if patient is being managed as a PUI/COVID-19 patient)        Hepatitis status:                           Date Drawn                             Result  Hepatitis B Surface Antigen 23     neg                     Hepatitis B Surface Antibody 08/10/23 17.78 pos        Hepatitis B Core Antibody 08/10/23 neg          How was Hepatitis Status verified: epic     Was a copy of the labs you documented provided to facility for the patient's chart: yes    Hemodialysis orders verified: yes    Access Within normal limits ( I.e. s/s of infection,. ..bruised but able to stick above and below away from firm area in middle with 16 gauge needles , running well     Pre-Assessment completed: yes    Pre-dialysis report received from: Eldridge Crigler                      Time: 1100

## 2023-10-03 NOTE — CONSULTS
[facility] Neurology   IN-PATIENT SERVICE      NEUROLOGY CONSULT  NOTE            Date:   10/3/2023  Patient name:  Ines Islas  Date of admission:  9/29/2023  YOB: 1945      Chief Complaint:     Chief Complaint   Patient presents with    Loss of Consciousness     2 hours 50 min into Dialysis. Lasted for about 1 min       Reason for Consult:      LOC    History of Present Illness: The patient is a 77 y.o. female who presents with Loss of Consciousness (2 hours 50 min into Dialysis. Lasted for about 1 min)  . The patient was seen and examined and the chart was reviewed. This is a 77year old female with ESRD on dialysis who presented to the ED for near syncope during dialysis. She was immediately arousable and no seizure activity was seen. On regaining consciousness she began to complain of left cardial chest pain. Her blood pressure at the time was actually spiked to the 190s. Administered sublingual nitro with termination of her chest pain and improvement in her blood pressure to around 150s. She is being followed by Cardiology. [address] She was orthostatic positive.      Past Medical History:     Past Medical History:   Diagnosis Date    Anxiety and depression     Arthritis     Rheumatoid     Cancer ([address])     left breast cancer    Cerebral artery occlusion with cerebral infarction [facility]) jan 2020 & March 2020    poor balance since strokes    Chronic kidney disease, stage 5 (HCC)     hemodialysis Mon-Wed-Fri    Diabetes mellitus ([address])     GERD (gastroesophageal reflux disease)     Hearing loss     bilateral. Does not have hearing aides    Hemodialysis patient ([address])     M-W-F started in 2020    History of blood transfusion     50 plus yrs ago with pregnancy    Hyperlipidemia     Hypertension     Migraines     MRSA (methicillin resistant staph aureus) culture positive 2015    back    Neuropathy     PAT (obstructive sleep apnea)     had UPPP  \"have not used machine in years\"

## 2023-10-03 NOTE — PROGRESS NOTES
Occupational Therapy  DATE: 10/3/2023    NAME: Shree Goel  MRN: [id number]   : 1945    Patient not seen this date for Occupational Therapy due to:      [x] Cancel by RN or physician due to:    [] Hemodialysis    [] Critical Lab Value Level     [] Blood transfusion in progress    [] Acute or unstable cardiovascular status   _MAP < 55 or more than >115  _HR < 40 or > 130    [] Acute or unstable pulmonary status   -FiO2 > 60%   _RR < 5 or >40    _O2 sats < 85%    [] Strict Bedrest    [x] Off Unit for surgery or procedure (RN Scott Byers reports that pt is out of room for procedure at this time. OT to follow when able). [] Off Unit for testing       [] Pending imaging to R/O fracture    [] Refusal by Patient      [] Intubated    [] Other      [] OT being discontinued at this time. Patient independent. No further needs. [] OT being discontinued at this time as the patient has been transferred to hospice care. No further needs.       Cora Hoffman, OTR/L

## 2023-10-03 NOTE — PROGRESS NOTES
Physical Therapy  DATE: 10/3/2023    NAME: Fabrice Segura  MRN: [id number]   : 1945    Patient not seen this date for Physical Therapy due to:      [] Cancel by RN or physician due to:    [x] Hemodialysis    [] Critical Lab Value Level     [] Blood transfusion in progress    [] Acute or unstable cardiovascular status   _MAP < 55 or more than >115  _HR < 40 or > 130    [] Acute or unstable pulmonary status   -FiO2 > 60%   _RR < 5 or >40    _O2 sats < 85%    [] Strict Bedrest    [] Off Unit for surgery or procedure    [] Off Unit for testing       [] Pending imaging to R/O fracture    [] Refusal by Patient      [] Other      [] PT being discontinued at this time. Patient independent. No further needs. [] PT being discontinued at this time as the patient has been transferred to hospice care. No further needs.       KAREN DELAROSA, PTA

## 2023-10-03 NOTE — PROGRESS NOTES
HEMODIALYSIS POST TREATMENT NOTE    Treatment time ordered: 3.5h    Actual treatment time: 3h refused longer, states \"I have to run tomorrow too\"    UltraFiltration Goal: 2l  UltraFiltration Removed: 1.2l, goal dec , had an episode of nausea with bp drop to 110/59, uf off and 200 ns given with improvement, resumed tx with dec goal      Pre Treatment weight: 78.2kg  Post Treatment weight: 76.1kg bed down and all blankets off except 1  Estimated Dry Weight: 67kg? Record from [facility]    Access used:     Central Venous Catheter:          Tunneled or Non-tunneled: na           Site: na          Access Flow: na      Internal Access:       AV Fistula or AV Graft: avf         Site: left arm, 16g this tx due to bruising       Access Flow: good, tx reset up once due to clotted dialyzer       Sign and symptoms of infection: no       If YES: na    Medications or blood products given: no    Chronic outpatient schedule: [facility]    Chronic outpatient unit: [facility]    Summary of response to treatment: diogo well other than nausea episode     Explain if orders NOT met, was physician notified:yes      ACES flowsheet faxed to patient unit/ placed in patient chart: yes    Post assessment completed: yes    Report given to: Tyler Addison rn      * Intra-treatment documented Safety Checks include the followin) Access and face visible at all times. 2) All connections and blood lines are secure with no kinks. 3) NVL alarm engaged. 4) Hemosafe device applied (if applicable). 5) No collapse of Arterial or Venous blood chambers. 6) All blood lines / pump segments in the air detectors.

## 2023-10-04 LAB
ANION GAP SERPL CALCULATED.3IONS-SCNC: 17 MMOL/L (ref 9–17)
BUN SERPL-MCNC: 47 MG/DL (ref 8–23)
BUN/CREAT SERPL: 6 (ref 9–20)
CALCIUM SERPL-MCNC: 9 MG/DL (ref 8.6–10.4)
CHLORIDE SERPL-SCNC: 98 MMOL/L (ref 98–107)
CO2 SERPL-SCNC: 22 MMOL/L (ref 20–31)
CREAT SERPL-MCNC: 7.5 MG/DL (ref 0.5–0.9)
GFR SERPL CREATININE-BSD FRML MDRD: 5 ML/MIN/1.73M2
GLUCOSE BLD-MCNC: 112 MG/DL (ref 65–105)
GLUCOSE BLD-MCNC: 124 MG/DL (ref 65–105)
GLUCOSE BLD-MCNC: 183 MG/DL (ref 65–105)
GLUCOSE BLD-MCNC: 90 MG/DL (ref 65–105)
GLUCOSE BLD-MCNC: 90 MG/DL (ref 65–105)
GLUCOSE SERPL-MCNC: 121 MG/DL (ref 70–99)
POTASSIUM SERPL-SCNC: 4.7 MMOL/L (ref 3.7–5.3)
SODIUM SERPL-SCNC: 137 MMOL/L (ref 135–144)

## 2023-10-04 PROCEDURE — 6360000002 HC RX W HCPCS: Performed by: FAMILY MEDICINE

## 2023-10-04 PROCEDURE — 95816 EEG AWAKE AND DROWSY: CPT | Performed by: PSYCHIATRY & NEUROLOGY

## 2023-10-04 PROCEDURE — 1200000000 HC SEMI PRIVATE

## 2023-10-04 PROCEDURE — 6370000000 HC RX 637 (ALT 250 FOR IP): Performed by: REGISTERED NURSE

## 2023-10-04 PROCEDURE — 99232 SBSQ HOSP IP/OBS MODERATE 35: CPT | Performed by: PSYCHIATRY & NEUROLOGY

## 2023-10-04 PROCEDURE — 80048 BASIC METABOLIC PNL TOTAL CA: CPT

## 2023-10-04 PROCEDURE — 2580000003 HC RX 258: Performed by: FAMILY MEDICINE

## 2023-10-04 PROCEDURE — 6370000000 HC RX 637 (ALT 250 FOR IP): Performed by: FAMILY MEDICINE

## 2023-10-04 PROCEDURE — 97535 SELF CARE MNGMENT TRAINING: CPT

## 2023-10-04 PROCEDURE — 36415 COLL VENOUS BLD VENIPUNCTURE: CPT

## 2023-10-04 PROCEDURE — 6370000000 HC RX 637 (ALT 250 FOR IP): Performed by: INTERNAL MEDICINE

## 2023-10-04 PROCEDURE — 97110 THERAPEUTIC EXERCISES: CPT

## 2023-10-04 PROCEDURE — 82947 ASSAY GLUCOSE BLOOD QUANT: CPT

## 2023-10-04 PROCEDURE — 2500000003 HC RX 250 WO HCPCS: Performed by: FAMILY MEDICINE

## 2023-10-04 PROCEDURE — 95816 EEG AWAKE AND DROWSY: CPT

## 2023-10-04 RX ORDER — GABAPENTIN 100 MG/1
100 CAPSULE ORAL 2 TIMES DAILY PRN
Qty: 5 CAPSULE | Refills: 0 | Status: SHIPPED | OUTPATIENT
Start: 2023-10-04 | End: 2023-10-09

## 2023-10-04 RX ORDER — GABAPENTIN 100 MG/1
100 CAPSULE ORAL 2 TIMES DAILY PRN
Status: DISCONTINUED | OUTPATIENT
Start: 2023-10-04 | End: 2023-10-05 | Stop reason: HOSPADM

## 2023-10-04 RX ADMIN — HEPARIN SODIUM 5000 UNITS: 5000 INJECTION INTRAVENOUS; SUBCUTANEOUS at 14:30

## 2023-10-04 RX ADMIN — SODIUM CHLORIDE, PRESERVATIVE FREE 10 ML: 5 INJECTION INTRAVENOUS at 21:36

## 2023-10-04 RX ADMIN — CALCIUM ACETATE 1334 MG: 667 CAPSULE ORAL at 17:11

## 2023-10-04 RX ADMIN — PANTOPRAZOLE SODIUM 20 MG: 20 TABLET, DELAYED RELEASE ORAL at 05:26

## 2023-10-04 RX ADMIN — ISOSORBIDE MONONITRATE 30 MG: 30 TABLET, EXTENDED RELEASE ORAL at 09:10

## 2023-10-04 RX ADMIN — HEPARIN SODIUM 5000 UNITS: 5000 INJECTION INTRAVENOUS; SUBCUTANEOUS at 21:33

## 2023-10-04 RX ADMIN — VENLAFAXINE HYDROCHLORIDE 37.5 MG: 37.5 TABLET ORAL at 21:34

## 2023-10-04 RX ADMIN — VENLAFAXINE HYDROCHLORIDE 56.25 MG: 37.5 TABLET ORAL at 09:10

## 2023-10-04 RX ADMIN — HEPARIN SODIUM 5000 UNITS: 5000 INJECTION INTRAVENOUS; SUBCUTANEOUS at 05:25

## 2023-10-04 RX ADMIN — ASPIRIN 81 MG: 81 TABLET, COATED ORAL at 09:10

## 2023-10-04 RX ADMIN — CALCIUM ACETATE 1334 MG: 667 CAPSULE ORAL at 09:10

## 2023-10-04 RX ADMIN — CARVEDILOL 12.5 MG: 12.5 TABLET, FILM COATED ORAL at 09:11

## 2023-10-04 RX ADMIN — CARVEDILOL 12.5 MG: 12.5 TABLET, FILM COATED ORAL at 17:10

## 2023-10-04 RX ADMIN — ATORVASTATIN CALCIUM 40 MG: 40 TABLET, FILM COATED ORAL at 21:34

## 2023-10-04 RX ADMIN — GABAPENTIN 100 MG: 100 CAPSULE ORAL at 09:10

## 2023-10-04 RX ADMIN — CALCIUM ACETATE 1334 MG: 667 CAPSULE ORAL at 12:03

## 2023-10-04 RX ADMIN — CLOPIDOGREL BISULFATE 75 MG: 75 TABLET ORAL at 09:11

## 2023-10-04 NOTE — PROGRESS NOTES
[facility] Hospitalist        10/4/2023   10:55 AM    Name:  Ines Islas  MRN:    [id number]     [address]:     [de-identified]   Room:  2009/2009-02  IP Day: 5     Admit Date: 9/29/2023  1:09 PM  PCP: Abdulaziz Justin MD    C/C:   Chief Complaint   Patient presents with    Loss of Consciousness     2 hours 50 min into Dialysis.  Lasted for about 1 min       Assessment:      Syncope  Orthostatic hypotension  End-stage renal disease  History of CAPD associated peritonitis status post removal of PD catheter/Klebsiella  Anemia of chronic disease  Hypertension-currently hypotensive  Diabetes type 2  History of CVA  Breast cancer history of left radical mastectomy        Plan:        Patient is on MedSurg floor  O2 maintain oxygen saturation greater than 92%    Monitor blood pressure  Continue midodrine  Cardiology following    Hemodialysis as per nephrology    MRI brain reviewed  Neurology following, plan for EEG at 6 PM  Thyroid profile, vitamin B12, folate, ammonia      DVT and GI prophylaxis  Continue to monitor/telemetry/CBC with differential daily/BMP daily  Continue medications as below  Discharge planning    Scheduled Meds:   aspirin  81 mg Oral Daily    isosorbide mononitrate  30 mg Oral Daily    atorvastatin  40 mg Oral Daily    venlafaxine  56.25 mg Oral Daily with breakfast    insulin lispro  0-16 Units SubCUTAneous TID WC    insulin lispro  0-4 Units SubCUTAneous Nightly    venlafaxine  37.5 mg Oral Nightly    sodium chloride flush  5-40 mL IntraVENous 2 times per day    heparin (porcine)  5,000 Units SubCUTAneous 3 times per day    calcium acetate  2 capsule Oral TID WC    clopidogrel  75 mg Oral Daily    gabapentin  100 mg Oral TID    midodrine  10 mg Oral Once per day on Mon Wed Fri    pantoprazole  20 mg Oral QAM AC    carvedilol  12.5 mg Oral BID WC     Continuous Infusions:   dextrose      sodium chloride       PRN Meds:  glucose, 4 tablet, PRN  dextrose bolus, 125 mL, PRN mg, IntraVENous, Q6H PRN, Yan Brown MD    calcium acetate (PHOSLO) capsule 1,334 mg, 2 capsule, Oral, TID WC, Yan Brown MD, 1,334 mg at 10/04/23 0910    clopidogrel (PLAVIX) tablet 75 mg, 75 mg, Oral, Daily, Yan Brown MD, 75 mg at 10/04/23 0911    gabapentin (NEURONTIN) capsule 100 mg, 100 mg, Oral, TID, Yan Brown MD, 100 mg at 10/04/23 0910    midodrine (PROAMATINE) tablet 10 mg, 10 mg, Oral, Once per day on Mon Wed Fri, Capo Eldridge MD    pantoprazole (PROTONIX) tablet 20 mg, 20 mg, Oral, QAM AC, Capo Eldridge MD, 20 mg at 10/04/23 0526    albuterol sulfate HFA (PROVENTIL;VENTOLIN;PROAIR) 108 (90 Base) MCG/ACT inhaler 2 puff, 2 puff, Inhalation, Q6H PRN, Yan Brown MD    ipratropium (ATROVENT HFA) 17 MCG/ACT inhaler 2 puff, 2 puff, Inhalation, Q6H PRN, Yan Brown MD    carvedilol (COREG) tablet 12.5 mg, 12.5 mg, Oral, BID Jacqui MARIE Acqua, APRN - CNP, 12.5 mg at 10/04/23 0911      I/O (24Hr):     Intake/Output Summary (Last 24 hours) at 10/4/2023 1056  Last data filed at 10/3/2023 1417  Gross per 24 hour   Intake 300 ml   Output 1500 ml   Net -1200 ml       Data:           Labs:    Hematology:  Recent Labs     10/03/23  0434   INR 1.0     Chemistry:  Recent Labs     10/01/23  1104 10/02/23  0319 10/04/23  0849    139 137   K 5.6* 4.6 4.7   CL 98 98 98   CO2 25 26 22   GLUCOSE 203* 115* 121*   BUN 64* 77* 47*   CREATININE 9.6* 10.8* 7.5*   MG 2.3 2.3  --    ANIONGAP 15 15 17   LABGLOM 4* 3* 5*   CALCIUM 9.4 8.9 9.0   PHOS 4.4 3.9  --      Recent Labs     10/02/23  1945 10/03/23  0643 10/03/23  1332 10/03/23  1642 10/03/23  2134 10/04/23  0214 10/04/23  0624   AMMONIA  --   --  31  --   --   --   --    POCGLU 122* 97  --  112* 108* 90 90       Lab Results   Component Value Date/Time    SPECIAL LAC 20ML 08/09/2023 08:19 PM     Lab Results   Component Value Date/Time    CULTURE NO GROWTH 5 DAYS 08/09/2023 08:19 PM       Lab Results   Component Value Date/Time    FIO2 UNKNOWN 01/28/2022 09:10 PM       Radiology:    MRI BRAIN WO CONTRAST    Result Date: 10/3/2023  1. No acute intracranial abnormality. No acute infarct. 2. Mild global parenchymal volume loss with mild-to-moderate chronic microvascular ischemic changes. IR FISTULAGRAM    Result Date: 10/3/2023  No significant stenosis involving the left brachiocephalic fistula. Mild-moderate subcutaneous infiltration along the outflow tract above the elbow. This is most likely the site of infiltration from recent attempt in cannulation. Recommend accessing the fistula at marked sites where the fistula is closer to the skin without significant subcutaneous edema and easier to cannulate. If fistula is difficult to access and malfunctions, the patient may require dialysis catheter to allow edema/subcutaneous infiltration to resolve. Nuclear stress test with myocardial perfusion    Result Date: 10/2/2023  [Small reversible defect representing stress-induced ischemia involving the inferolateral wall.] Normal LVEF Risk stratification: [Low]     XR CHEST PORTABLE    Result Date: 9/29/2023  Left basilar airspace opacities that are predominantly linear. Atelectasis is favored over pneumonia. Elsewhere, there are no acute findings. CT Head W/O Contrast    Result Date: 9/29/2023  1. No acute intracranial findings. 2. Volume loss, chronic microvascular ischemic changes and a lacunar infarct left caudate head, as before. All radiological studies reviewed  Code Status:  Full Code        Electronically signed by Rama Breen MD on 10/4/2023 at 10:56 AM    This note was created with the assistance of a speech-recognition program.  Although the intention is to generate a document that actually reflects the content of the visit, no guarantees can be provided that every mistake has been identified and corrected by editing. Note was updated later by me after  physical examination and  completion of the assessment.

## 2023-10-04 NOTE — PROGRESS NOTES
Nephrology HD Progress Note    Bailee Ritchie MD    Patient Active Problem List   Diagnosis    AMS (altered mental status)    Diabetes ([address])    Admission for dialysis [facility])    Hypoglycemia    Encephalopathy    Metabolic encephalopathy    End stage renal failure on dialysis ([address])    COVID-19 virus infection    Hypertensive urgency    Sepsis ([address])    Acute respiratory failure with hypoxia (HCC)    Morbid obesity ([address])    Hypertension, essential    Hypokalemia    Metabolic acidosis    Elevated procalcitonin    Elevated C-reactive protein (CRP)    ESRD (end stage renal disease) (HCC)    Altered mental status    NSTEMI (non-ST elevated myocardial infarction) ([address])    Hypertension secondary to other renal disorders    Pure hypercholesterolemia    UTI due to extended-spectrum beta lactamase (ESBL) producing Escherichia coli    Syncope and collapse    Chest pain             CHIEF COMPLAINT     HD mangement    CURRENT MEDICATIONS      Current Facility-Administered Medications   Medication Dose Route Frequency Provider Last Rate Last Admin    gabapentin (NEURONTIN) capsule 100 mg  100 mg Oral BID PRN Shruthi Davila MD        aspirin EC tablet 81 mg  81 mg Oral Daily RedDennis pat, DO   81 mg at 10/04/23 0910    isosorbide mononitrate (IMDUR) extended release tablet 30 mg  30 mg Oral Daily RedDennis pat, DO   30 mg at 10/04/23 0910    atorvastatin (LIPITOR) tablet 40 mg  40 mg Oral Daily RedDennis, DO   40 mg at 10/03/23 2030    venlafaxine (EFFEXOR) tablet 56.25 mg  56.25 mg Oral Daily with breakfast Shruthi Osman APRN - CNP   56.25 mg at 10/04/23 0910    insulin lispro (HUMALOG) injection vial 0-16 Units  0-16 Units SubCUTAneous TID WC Shruthi Osman APRN - CNP   4 Units at 10/02/23 1306    insulin lispro (HUMALOG) injection vial 0-4 Units  0-4 Units SubCUTAneous Nightly Shruthi Osman APRN - CNP        glucose chewable tablet 16 g  4 tablet Oral PRN Shruthi Osman APRN - CNP        dextrose bolus

## 2023-10-04 NOTE — PLAN OF CARE
Problem: Discharge Planning  Goal: Discharge to home or other facility with appropriate resources  Outcome: Progressing  Flowsheets (Taken 10/2/2023 2000 by Jayne Pino RN)  Discharge to home or other facility with appropriate resources:   Identify barriers to discharge with patient and caregiver   Arrange for needed discharge resources and transportation as appropriate   Identify discharge learning needs (meds, wound care, etc)   Refer to discharge planning if patient needs post-hospital services based on physician order or complex needs related to functional status, cognitive ability or social support system     Problem: Safety - Adult  Goal: Free from fall injury  10/3/2023 2323 by Deb Babb RN  Outcome: Progressing  Flowsheets (Taken 9/30/2023 0618 by Josiah Bucio RN)  Free From Fall Injury:   Instruct family/caregiver on patient safety   Based on caregiver fall risk screen, instruct family/caregiver to ask for assistance with transferring infant if caregiver noted to have fall risk factors  10/3/2023 1812 by Helder Blanco RN  Outcome: Progressing     Problem: ABCDS Injury Assessment  Goal: Absence of physical injury  10/3/2023 2323 by Deb Babb RN  Outcome: Progressing  Flowsheets (Taken 10/3/2023 2323)  Absence of Physical Injury: Implement safety measures based on patient assessment  10/3/2023 1812 by Helder Blanco RN  Outcome: Progressing     Problem: Pain  Goal: Verbalizes/displays adequate comfort level or baseline comfort level  10/3/2023 2323 by Deb Babb RN  Outcome: Progressing  Flowsheets (Taken 10/2/2023 1943 by Jayne Pino RN)  Verbalizes/displays adequate comfort level or baseline comfort level:   Encourage patient to monitor pain and request assistance   Assess pain using appropriate pain scale   Administer analgesics based on type and severity of pain and evaluate response   Implement non-pharmacological measures as appropriate and evaluate response

## 2023-10-04 NOTE — PROGRESS NOTES
Occupational Therapy  Facility/Department: [facility]  Rehabilitation Occupational Therapy Daily Treatment Note    Date: 10/4/23  Patient Name: Bandar Mantilla       Room:   MRN: [id number]  Account: [de-identified]   : 1945  (74 y.o.) Gender: female      PATRICIA Cameron July reports patient is medically stable for therapy treatment this date. Chart reviewed prior to treatment and patient is agreeable for therapy. All lines intact and patient positioned comfortably at end of treatment. All patient needs addressed prior to ending therapy session. Pt currently functioning below baseline. Recommend daily inpatient skilled therapy at time of discharge to maximize long term outcomes and prevent re-admission. Please refer to AM-PAC score for current level of function. Past Medical History:  has a past medical history of Anxiety and depression, Arthritis, Cancer ([address]), Cerebral artery occlusion with cerebral infarction ([address]), Chronic kidney disease, stage 5 ([address]), Diabetes mellitus ([address]), GERD (gastroesophageal reflux disease), Hearing loss, Hemodialysis patient ([address]), History of blood transfusion, Hyperlipidemia, Hypertension, Migraines, MRSA (methicillin resistant staph aureus) culture positive, Neuropathy, PAT (obstructive sleep apnea), Prolonged emergence from general anesthesia, PVD (peripheral vascular disease) ([address]), SOB (shortness of breath), and Tinnitus. Past Surgical History:   has a past surgical history that includes Uvulopalatopharygoplasty; Mastectomy (Left); Hysterectomy; Appendectomy; back surgery; Mastectomy (Left, 2018); pr exc b9 lesion mrgn xcp sk tg t/a/l 0.5 cm/< (Right, 2018); Breast reduction surgery (Left, ); Colonoscopy; Insertable Cardiac Monitor; central line (2022); and IR TUNNELED CVC PLACE WO SQ PORT/PUMP > 5 YEARS (2022).     Restrictions  Restrictions/Precautions: General Precautions, Fall Risk, Seizure  Other position/activity restrictions: telemetry, LUE fistula    Subjective  Subjective: Pt sleeping in bed upon arrival agreeable to OT. Restrictions/Precautions: General Precautions; Fall Risk;Seizure             Objective     Cognition  Overall Cognitive Status: Exceptions  Arousal/Alertness: Appropriate responses to stimuli  Following Commands: Follows one step commands with increased time; Follows one step commands with repetition  Attention Span: Attends with cues to redirect  Memory: Decreased recall of biographical Information  Safety Judgement: Decreased awareness of need for assistance;Decreased awareness of need for safety  Problem Solving: Decreased awareness of errors;Assistance required to identify errors made;Assistance required to generate solutions;Assistance required to implement solutions;Assistance required to correct errors made  Insights: Decreased awareness of deficits  Initiation: Requires cues for some  Sequencing: Requires cues for some  Orientation  Overall Orientation Status: Impaired  Orientation Level: Oriented to time;Oriented to person         ADL             Functional Mobility  Device: Rolling walker  Assistance Level: Minimal assistance  Skilled Clinical Factors: Attempted mobility in room taking 5-6 steps away from EOB and pt stopped and reported dizziness needing chair pulled up behind her. BP taken reading low and was assisted back into bed. Mod VC's for slow/controlled movement, upright posture, pursed lip breathing, RW safety and overall safety. Bed Mobility  Overall Assistance Level: Contact Guard Assist  Additional Factors: Head of bed flat; With handrails; Increased time to complete  Sit to Supine  Assistance Level: Contact guard assist  Supine to Sit  Assistance Level: Contact guard assist  Scooting  Assistance Level: Contact guard assist  Skilled Clinical Factors: Scooting fully to EOB. Transfers  Surface: From bed; To chair with arms; To bed  Additional Factors: Increased time to complete;Hand Valtrex Counseling: I discussed with the patient the risks of valacyclovir including but not limited to kidney damage, nausea, vomiting and severe allergy.  The patient understands that if the infection seems to be worsening or is not improving, they are to call.

## 2023-10-04 NOTE — PROGRESS NOTES
Writer spoke with Dr. Natalia Casiano regarding patient, will allow patient's arm to rest today and will plan for dialysis in am.

## 2023-10-05 VITALS
DIASTOLIC BLOOD PRESSURE: 98 MMHG | WEIGHT: 165.34 LBS | HEIGHT: 63 IN | RESPIRATION RATE: 18 BRPM | TEMPERATURE: 97.8 F | BODY MASS INDEX: 29.3 KG/M2 | HEART RATE: 69 BPM | SYSTOLIC BLOOD PRESSURE: 172 MMHG | OXYGEN SATURATION: 99 %

## 2023-10-05 LAB
ANION GAP SERPL CALCULATED.3IONS-SCNC: 12 MMOL/L (ref 9–17)
BASOPHILS # BLD: 0.04 K/UL (ref 0–0.2)
BASOPHILS NFR BLD: 1 % (ref 0–2)
BUN SERPL-MCNC: 60 MG/DL (ref 8–23)
BUN/CREAT SERPL: 7 (ref 9–20)
CALCIUM SERPL-MCNC: 8.6 MG/DL (ref 8.6–10.4)
CHLORIDE SERPL-SCNC: 102 MMOL/L (ref 98–107)
CO2 SERPL-SCNC: 24 MMOL/L (ref 20–31)
CREAT SERPL-MCNC: 9 MG/DL (ref 0.5–0.9)
EOSINOPHIL # BLD: 0.5 K/UL (ref 0–0.44)
EOSINOPHILS RELATIVE PERCENT: 6 % (ref 1–4)
ERYTHROCYTE [DISTWIDTH] IN BLOOD BY AUTOMATED COUNT: 17.1 % (ref 11.8–14.4)
GFR SERPL CREATININE-BSD FRML MDRD: 4 ML/MIN/1.73M2
GLUCOSE BLD-MCNC: 129 MG/DL (ref 65–105)
GLUCOSE BLD-MCNC: 174 MG/DL (ref 65–105)
GLUCOSE BLD-MCNC: 97 MG/DL (ref 65–105)
GLUCOSE SERPL-MCNC: 86 MG/DL (ref 70–99)
HCT VFR BLD AUTO: 29.2 % (ref 36.3–47.1)
HGB BLD-MCNC: 9.2 G/DL (ref 11.9–15.1)
IMM GRANULOCYTES # BLD AUTO: 0.06 K/UL (ref 0–0.3)
IMM GRANULOCYTES NFR BLD: 1 %
LYMPHOCYTES NFR BLD: 2.61 K/UL (ref 1.1–3.7)
LYMPHOCYTES RELATIVE PERCENT: 31 % (ref 24–43)
MCH RBC QN AUTO: 33.1 PG (ref 25.2–33.5)
MCHC RBC AUTO-ENTMCNC: 31.5 G/DL (ref 28.4–34.8)
MCV RBC AUTO: 105 FL (ref 82.6–102.9)
MONOCYTES NFR BLD: 1.05 K/UL (ref 0.1–1.2)
MONOCYTES NFR BLD: 13 % (ref 3–12)
NEUTROPHILS NFR BLD: 48 % (ref 36–65)
NEUTS SEG NFR BLD: 4.15 K/UL (ref 1.5–8.1)
NRBC BLD-RTO: 0 PER 100 WBC
PLATELET # BLD AUTO: 167 K/UL (ref 138–453)
PMV BLD AUTO: 11.4 FL (ref 8.1–13.5)
POTASSIUM SERPL-SCNC: 5 MMOL/L (ref 3.7–5.3)
RBC # BLD AUTO: 2.78 M/UL (ref 3.95–5.11)
RBC # BLD: ABNORMAL 10*6/UL
RBC # BLD: ABNORMAL 10*6/UL
SODIUM SERPL-SCNC: 138 MMOL/L (ref 135–144)
WBC OTHER # BLD: 8.4 K/UL (ref 3.5–11.3)

## 2023-10-05 PROCEDURE — 6370000000 HC RX 637 (ALT 250 FOR IP): Performed by: REGISTERED NURSE

## 2023-10-05 PROCEDURE — 6370000000 HC RX 637 (ALT 250 FOR IP): Performed by: FAMILY MEDICINE

## 2023-10-05 PROCEDURE — 6370000000 HC RX 637 (ALT 250 FOR IP): Performed by: INTERNAL MEDICINE

## 2023-10-05 PROCEDURE — 2580000003 HC RX 258: Performed by: FAMILY MEDICINE

## 2023-10-05 PROCEDURE — 36415 COLL VENOUS BLD VENIPUNCTURE: CPT

## 2023-10-05 PROCEDURE — 85025 COMPLETE CBC W/AUTO DIFF WBC: CPT

## 2023-10-05 PROCEDURE — 82947 ASSAY GLUCOSE BLOOD QUANT: CPT

## 2023-10-05 PROCEDURE — 6360000002 HC RX W HCPCS: Performed by: FAMILY MEDICINE

## 2023-10-05 PROCEDURE — 80048 BASIC METABOLIC PNL TOTAL CA: CPT

## 2023-10-05 PROCEDURE — 2500000003 HC RX 250 WO HCPCS: Performed by: FAMILY MEDICINE

## 2023-10-05 PROCEDURE — 90935 HEMODIALYSIS ONE EVALUATION: CPT

## 2023-10-05 PROCEDURE — 97535 SELF CARE MNGMENT TRAINING: CPT

## 2023-10-05 RX ORDER — ISOSORBIDE MONONITRATE 30 MG/1
30 TABLET, EXTENDED RELEASE ORAL DAILY
Qty: 30 TABLET | Refills: 3 | Status: SHIPPED | OUTPATIENT
Start: 2023-10-06

## 2023-10-05 RX ORDER — MIDODRINE HYDROCHLORIDE 10 MG/1
10 TABLET ORAL
Status: COMPLETED | OUTPATIENT
Start: 2023-10-05 | End: 2023-10-05

## 2023-10-05 RX ORDER — ASPIRIN 81 MG/1
81 TABLET ORAL DAILY
Qty: 30 TABLET | Refills: 3 | Status: SHIPPED | OUTPATIENT
Start: 2023-10-06

## 2023-10-05 RX ORDER — ATORVASTATIN CALCIUM 40 MG/1
40 TABLET, FILM COATED ORAL DAILY
Qty: 30 TABLET | Refills: 3 | Status: SHIPPED | OUTPATIENT
Start: 2023-10-05

## 2023-10-05 RX ADMIN — CARVEDILOL 12.5 MG: 12.5 TABLET, FILM COATED ORAL at 17:02

## 2023-10-05 RX ADMIN — SODIUM CHLORIDE, PRESERVATIVE FREE 10 ML: 5 INJECTION INTRAVENOUS at 09:13

## 2023-10-05 RX ADMIN — CARVEDILOL 12.5 MG: 12.5 TABLET, FILM COATED ORAL at 09:11

## 2023-10-05 RX ADMIN — ISOSORBIDE MONONITRATE 30 MG: 30 TABLET, EXTENDED RELEASE ORAL at 09:11

## 2023-10-05 RX ADMIN — PANTOPRAZOLE SODIUM 20 MG: 20 TABLET, DELAYED RELEASE ORAL at 06:19

## 2023-10-05 RX ADMIN — VENLAFAXINE HYDROCHLORIDE 56.25 MG: 37.5 TABLET ORAL at 09:10

## 2023-10-05 RX ADMIN — ASPIRIN 81 MG: 81 TABLET, COATED ORAL at 09:11

## 2023-10-05 RX ADMIN — HEPARIN SODIUM 5000 UNITS: 5000 INJECTION INTRAVENOUS; SUBCUTANEOUS at 06:19

## 2023-10-05 RX ADMIN — MIDODRINE HYDROCHLORIDE 10 MG: 10 TABLET ORAL at 14:44

## 2023-10-05 RX ADMIN — CLOPIDOGREL BISULFATE 75 MG: 75 TABLET ORAL at 09:11

## 2023-10-05 RX ADMIN — CALCIUM ACETATE 1334 MG: 667 CAPSULE ORAL at 17:02

## 2023-10-05 RX ADMIN — CALCIUM ACETATE 1334 MG: 667 CAPSULE ORAL at 09:11

## 2023-10-05 ASSESSMENT — PAIN DESCRIPTION - DESCRIPTORS: DESCRIPTORS: ACHING

## 2023-10-05 ASSESSMENT — PAIN SCALES - GENERAL: PAINLEVEL_OUTOF10: 0

## 2023-10-05 NOTE — PROGRESS NOTES
210min of dialysis  1600L removed, unable to remove 2L per MD order, pt. Received Midodrine, pt. became light headed, UF goal decreased. 200cc NS given. Post treatment pt. In stable condition, asymptomatic. Pt. Scheduled to go home.

## 2023-10-05 NOTE — PROGRESS NOTES
Physical Therapy  DATE: 10/5/2023    NAME: Yajaira Rodriguez  MRN: [id number]   : 1945    Patient not seen this date for Physical Therapy due to:      [] Cancel by RN or physician due to:    [x] Hemodialysis    [] Critical Lab Value Level     [] Blood transfusion in progress    [] Acute or unstable cardiovascular status   _MAP < 55 or more than >115  _HR < 40 or > 130    [] Acute or unstable pulmonary status   -FiO2 > 60%   _RR < 5 or >40    _O2 sats < 85%    [] Strict Bedrest    [] Off Unit for surgery or procedure    [] Off Unit for testing       [] Pending imaging to R/O fracture    [] Refusal by Patient      [] Other      [] PT being discontinued at this time. Patient independent. No further needs. [] PT being discontinued at this time as the patient has been transferred to hospice care. No further needs.       KAREN DELAROSA, PTA Cephalexin Pregnancy And Lactation Text: This medication is Pregnancy Category B and considered safe during pregnancy.  It is also excreted in breast milk but can be used safely for shorter doses.

## 2023-10-05 NOTE — CARE COORDINATION
Social Work-Pt will dc to [facility] today. [facility] will transport at [facility]. Orders faxed. Nurse to call to [phone]. Pt/son are agreeable with dc plans. Notified Mir[facility] Acacia Guiles and faxed [address].  Kaylynn [place]

## 2023-10-05 NOTE — PROGRESS NOTES
Attempted to call report to nurse X2. First attempt call got disconnected and second call was no answer.

## 2023-10-05 NOTE — PROCEDURES
[address] [place] [address] Dr                [address], [address] [facility]                          ELECTROENCEPHALOGRAM REPORT    PATIENT NAME: Charanjit Schmidt                 :        1945  MED REC NO:   [id number]                             ROOM:         ACCOUNT NO:   [de-identified]                           ADMIT DATE: 2023  PROVIDER:     Gudelia Pryor    DATE OF EEG:  10/04/2023    HISTORY:  This is a 76-year-old female with syncope during dialysis. MEDICATIONS:  Include venlafaxine, BuSpar, midodrine, atorvastatin,  gabapentin, carvedilol, and clopidogrel. DESCRIPTION OF PROCEDURE:  Electrodes were applied using paste-in  positions dictated by the International 10-20 system of placement. Reviewing montages included both referential and bipolar derivations. In addition to EEG data, EKG and eye movements were recorded. This is a  routine recording. This test was performed on 10/04/2023. DESCRIPTION OF ACTIVITIES:  At the onset of the study, the patient is  awake and during wakefulness there are occasional runs of 5-6 Hz 20-40  microvolt theta activity. No clear anterior-posterior gradient in  amplitude and frequencies noted. High-frequency low-amplitude beta  activity superimposed noted in frontal head regions. Photic stimulation  did not induce posterior driving responses. Hyperventilation is not  performed. Electrocardiogram montage revealed normal sinus rhythm. No  epileptiform discharges and no electrographic seizures noted. ELECTRODIAGNOSTIC INTERPRETATION:  This EEG performed predominantly  during wakefulness and brief period of drowsiness demonstrated diffuse  slowing in mixed delta and theta frequencies suggestive of moderately  severe encephalopathy. No epileptiform discharges and no electrographic  seizures noted. EKG montage revealed normal sinus rhythm.         Edi Peralta    D: 10/05/2023 16:35:48

## 2023-10-05 NOTE — DISCHARGE SUMMARY
[address] [place] [address].,    Adult Hospitalist      Patient ID: Sasha Guadarrama  MRN: [id number]     Acct:  [de-identified]       Patient's PCP: Anabella Luke MD    Admit Date: 9/29/2023     Discharge Date: 10/5/2023      Admitting Physician: Babak Arreola MD    Discharge Physician: Shanda Sandoval MD     CONSULTANTS: Patient Care Team:  Anabella Luke MD as PCP - General    PROCEDURES PERFORMED:       Active Discharge Diagnoses:  Syncope  End-stage renal disease with hemodialysis  Mixed hyperlipidemia  Essential hypertension with episodes of hypotension  Diabetes mellitus type 2  Diabetic neuropathy  Gastroesophageal reflux disease without esophagitis   Depression with anxiety      Primary Problem  Syncope and collapse    Hospital Course:     Patient admitted to the emergency room where she was brought in via EMS. Patient  lost consciousness in the middle of dialysis. This lasted approximately 1 minute. Hemodialysis staff were monitoring the patient and noted patient slouch. They shook her and pt remembers her name being called. She says once she woke up and a few minutes after that she has been her normal self. According to staff patient had complained of chest pain when she woke up. According to the patient she has had similar episodes in the past when she would feel fatigued during or after hemodialysis. Patient denies any orthopnea, headache or vision change. Denies neck pain or back pain. Denies vomiting, diarrhea or constipation. Denies dysuria . Patient brought to the ER and admitted for further management. Upon presentation troponin was elevated. She has had history of chronically elevated troponin. Cardiology was consulted. Orthostatics were positive. Patient was continued on midodrine.     Patient has had a Lexiscan stress test.  Stress test was abnormal but still low risk due to small territory of reversible ischemia inferior lateral.  Cardiology did not atraumatic  Eyes - pupils equal and reactive, extraocular eye movements intact, conjunctiva clear  Ears - hearing appears to be intact  Nose - no drainage noted  Mouth - mucous membranes moist  Neck - supple, no carotid bruits, thyroid not palpable  Chest - clear to auscultation, normal effort  Heart - normal rate, regular rhythm, no murmur  Abdomen - soft, nontender, nondistended, bowel sounds present all four quadrants, no masses, hepatomegaly or splenomegaly  Neurological - normal speech, no focal findings or movement disorder noted, cranial nerves II through XII grossly intact  Extremities - peripheral pulses palpable, no pedal edema or calf pain with palpation  Skin - no gross lesions, rashes, or induration noted      Consults:  IP CONSULT TO INTERNAL MEDICINE  IP CONSULT TO CARDIOLOGY  IP CONSULT TO NEPHROLOGY  IP CONSULT TO NEUROLOGY    Disposition: SNF    Discharged Condition: Stable    Follow Up: Marisol Rodríguez DO  [address]  [facility] [address]    Schedule an appointment as soon as possible for a visit in 2 week(s)  call office to schedule a follow up appointent in 2-4 weeks- [facility]    Beck Adkins MD  [address] [place] [address]    Go to  appointments as scheduled    [address] [id number]  Go to  Monday, Wednesday, Friday as scheduled    [facility], [address] [place], [place] [address]  [phone]    Schedule an appointment as soon as possible for a visit in 2 day(s)  call office too schedule a follow up appointment      Lab Frequency Next Occurrence   MDI treatment EVERY 6 HOURS PRN    Initiate RT Inhaler-Nebulizer Bronchodilator Protocol DAILY (RT)    POCT glucose 4 TIMES DAILY BEFORE MEALS & AT BEDTIME    POCT glucose - If patient is NPO, TPN, or continuous tube feed and on HumaLOG NOW THEN EVERY 4 HOURS    HYPOGLYCEMIA TREATMENT: blood glucose LESS THAN 70 mg/dL and

## 2023-10-05 NOTE — PROGRESS NOTES
Pt discharged to [facility] [place] in stable condition with belongings  Discharge instructions given  Pt denies having any further questions at this time  Personal items given to patient at discharge  Patient/family state they have everything they were admitted with.

## 2023-10-05 NOTE — PROGRESS NOTES
Occupational Therapy  Facility/Department: [facility]  Daily Treatment Note  NAME: Jack Lin  :   MRN: [id number]    Date of Service: 10/5/2023    PATRICIA Kathleen reports patient is medically stable for therapy treatment this date. Chart reviewed prior to treatment and patient is agreeable for therapy. All lines intact and patient positioned comfortably at end of treatment. All patient needs addressed prior to ending therapy session. Pt currently functioning below baseline. Recommend daily inpatient skilled therapy at time of discharge to maximize long term outcomes and prevent re-admission. Please refer to AM-PAC score for current level of function. Discharge Recommendations:  Patient would benefit from continued therapy after discharge  OT Equipment Recommendations  Equipment Needed:  (CTA)      Patient Diagnosis(es): The primary encounter diagnosis was Syncope and collapse. A diagnosis of Chest pain, unspecified type was also pertinent to this visit. Assessment    Assessment: Pt had Fair tolerance for activity this session and is maintaining progress towards STGs. Pt most limited by generalized weakness, fatigue, decreased balance, and decreased safety awareness. Pt w/ no c/o dizziness throughout session. Pt performed functional transfers and mobility tasks in room w/ CGA and RW. Facilitated toileting & dressing tasks w/ pt while in room bathroom, w/ pt requiring Sunshine for UB ADLs and ModA for LB ADLs. Continued skilled OT services are indicated at this time to maximize this pt's safety and IND with self care tasks and to facilitate safe return to PLOF as able. Activity Tolerance: Patient limited by endurance; Patient limited by fatigue  Discharge Recommendations: Patient would benefit from continued therapy after discharge  Equipment Needed:  (CTA)      Plan   Occupational Therapy Plan  Times Per Week: 4-5x/week  Current Treatment Recommendations: Strengthening;Balance prevention techs, EC/WS techs, equip needs, and B UE HEP  Patient Goals   Patient goals : not stated       Therapy Time   Individual Concurrent Group Co-treatment   Time In [address]         Time Out 0906         Minutes [address], [place]

## 2023-10-05 NOTE — PROGRESS NOTES
Physician Progress Note      Cece Palafox  [facility] #:                  [id number]  :                       1945  ADMIT DATE:       2023 1:09 PM  [address] [facility] DATE:  [facility]  PROVIDER #:        Juan Birmingham MD          QUERY TEXT:    Patient admitted with Syncope LENA x 1 min. noted to have Orthostatic blood   pressure positive:  lay 135/62, sit 121/61, and stand 102/56. lay 154/70, sit   138/64, and standing 111/58. If possible, please document in progress notes   and discharge summary after study the etiology of the syncope: The medical record reflects the following:  Risk Factors: high risk for underlying coronary artery disease and when   patient become conscious patient complaining of tightness and pressure,   Dialysis patient. Clinical Indicators: Orthostatic positive lay 135/62, sit 121/61, and stand   102/56. lay 154/70, sit 138/64, and standing 111/58. Treatment: Cardio consult, stress test, Patient can use compression stocking   20 to 30 mmHg    Thank you. Bette Minaya RN, Clinical Documentation Integrity, [employer]   [employer], [facility]), CRCR  Options provided:  -- Syncope due to orthostatic hypotension secondary to possible   dehydration-taking too much fluid by dialysis  -- Syncope due to other hypotension  -- Syncope due to dysrhythmias due to ischemia, vasovagal  -- Other - I will add my own diagnosis  -- Disagree - Not applicable / Not valid  -- Disagree - Clinically unable to determine / Unknown  -- Refer to Clinical Documentation Reviewer    PROVIDER RESPONSE TEXT:    The syncope is due to orthostatic hypotension secondary to possible   dehydration-taking too much fluid by dialysis.     Query created by: Galindo Ambrosio on 10/3/2023 10:26 AM      Electronically signed by:  Juan Birmingham MD 10/5/2023 11:25 AM

## 2023-10-05 NOTE — ACP (ADVANCE CARE PLANNING)
Advance Care Planning     Advance Care Planning Activator (Inpatient)  Conversation Note      Date of ACP Conversation: 10/5/2023     Conversation Conducted with: Patient with [address]: Named in Advance Directive or Healthcare Power of  (name) Delaney Roth. ACP Activator: Yesica Farley RN    Health Care Decision Maker:     Current Designated Health Care Decision Maker:     Primary Decision Maker: Edwin Rivera - [phone]  Click here to complete Healthcare Decision Makers including section of the Healthcare Decision Maker Relationship (ie \"Primary\")  Today we documented Decision Maker(s). The patient will provide ACP documents. Patient encouraged to provide documents at PCP or any [facility]  Appointment and we can make a copy and scan in. Care Preferences    Ventilation: \"If you were in your present state of health and suddenly became very ill and were unable to breathe on your own, what would your preference be about the use of a ventilator (breathing machine) if it were available to you? \"      Would the patient desire the use of ventilator (breathing machine)?: yes    \"If your health worsens and it becomes clear that your chance of recovery is unlikely, what would your preference be about the use of a ventilator (breathing machine) if it were available to you? \"     Would the patient desire the use of ventilator (breathing machine)?: Yes      Resuscitation  \"CPR works best to restart the heart when there is a sudden event, like a heart attack, in someone who is otherwise healthy. Unfortunately, CPR does not typically restart the heart for people who have serious health conditions or who are very sick. \"    \"In the event your heart stopped as a result of an underlying serious health condition, would you want attempts to be made to restart your heart (answer \"yes\" for attempt to resuscitate) or would you prefer a natural death

## 2023-12-26 ENCOUNTER — HOSPITAL ENCOUNTER (OUTPATIENT)
Age: 78
Setting detail: SPECIMEN
Discharge: HOME | End: 2023-12-26
Payer: MEDICARE

## 2023-12-26 PROCEDURE — 87086 URINE CULTURE/COLONY COUNT: CPT

## 2023-12-26 PROCEDURE — 81001 URINALYSIS AUTO W/SCOPE: CPT

## 2023-12-26 PROCEDURE — 87186 SC STD MICRODIL/AGAR DIL: CPT

## 2023-12-26 PROCEDURE — 87088 URINE BACTERIA CULTURE: CPT

## 2023-12-27 LAB
BACTERIA URNS QL MICRO: ABNORMAL
BILIRUB UR QL STRIP: NEGATIVE
CASTS #/AREA URNS LPF: ABNORMAL /LPF (ref 0–8)
CLARITY UR: ABNORMAL
COLOR UR: YELLOW
EPI CELLS #/AREA URNS HPF: ABNORMAL /HPF (ref 0–5)
GLUCOSE UR STRIP-MCNC: NEGATIVE MG/DL
HGB UR QL STRIP.AUTO: ABNORMAL
KETONES UR STRIP-MCNC: NEGATIVE MG/DL
LEUKOCYTE ESTERASE UR QL STRIP: ABNORMAL
NITRITE UR QL STRIP: NEGATIVE
PH UR STRIP: >=9 [PH] (ref 5–8)
PROT UR STRIP-MCNC: ABNORMAL MG/DL
RBC #/AREA URNS HPF: ABNORMAL /HPF (ref 0–4)
SP GR UR STRIP: 1.01 (ref 1–1.03)
UROBILINOGEN UR STRIP-ACNC: NORMAL EU/DL (ref 0–1)
WBC #/AREA URNS HPF: ABNORMAL /HPF (ref 0–5)

## 2023-12-28 LAB
MICROORGANISM SPEC CULT: ABNORMAL
SERVICE CMNT-IMP: ABNORMAL
SPECIMEN DESCRIPTION: ABNORMAL

## 2024-01-12 ENCOUNTER — APPOINTMENT (OUTPATIENT)
Dept: CT IMAGING | Age: 79
End: 2024-01-12
Payer: MEDICARE

## 2024-01-12 ENCOUNTER — HOSPITAL ENCOUNTER (INPATIENT)
Age: 79
LOS: 1 days | Discharge: NURSING, REHAB OR LONG-TERM CARE | End: 2024-01-13
Attending: STUDENT IN AN ORGANIZED HEALTH CARE EDUCATION/TRAINING PROGRAM | Admitting: HOSPITALIST
Payer: MEDICARE

## 2024-01-12 DIAGNOSIS — G51.39 FACIAL SPASM: Primary | ICD-10-CM

## 2024-01-12 DIAGNOSIS — N18.6 ESRD (END STAGE RENAL DISEASE) (HCC): ICD-10-CM

## 2024-01-12 DIAGNOSIS — E87.5 HYPERKALEMIA: ICD-10-CM

## 2024-01-12 PROBLEM — R25.2 SPASM: Status: ACTIVE | Noted: 2024-01-12

## 2024-01-12 LAB
ALBUMIN SERPL-MCNC: 4.3 G/DL (ref 3.5–5.2)
ALP SERPL-CCNC: 137 U/L (ref 35–104)
ALT SERPL-CCNC: 20 U/L (ref 5–33)
ANION GAP SERPL CALCULATED.3IONS-SCNC: 19 MMOL/L (ref 9–17)
AST SERPL-CCNC: 27 U/L
B PARAP IS1001 DNA NPH QL NAA+NON-PROBE: NOT DETECTED
B PERT DNA SPEC QL NAA+PROBE: NOT DETECTED
BASOPHILS # BLD: 0.05 K/UL (ref 0–0.2)
BASOPHILS NFR BLD: 1 % (ref 0–2)
BILIRUB SERPL-MCNC: 0.3 MG/DL (ref 0.3–1.2)
BUN SERPL-MCNC: 67 MG/DL (ref 8–23)
BUN/CREAT SERPL: 7 (ref 9–20)
C PNEUM DNA NPH QL NAA+NON-PROBE: NOT DETECTED
CA-I BLD-SCNC: 1.15 MMOL/L (ref 1.13–1.33)
CALCIUM SERPL-MCNC: 8.3 MG/DL (ref 8.6–10.4)
CHLORIDE SERPL-SCNC: 101 MMOL/L (ref 98–107)
CK SERPL-CCNC: 286 U/L (ref 26–192)
CO2 SERPL-SCNC: 18 MMOL/L (ref 20–31)
CREAT SERPL-MCNC: 10.3 MG/DL (ref 0.5–0.9)
EKG ATRIAL RATE: 68 BPM
EKG P AXIS: 49 DEGREES
EKG P-R INTERVAL: 248 MS
EKG Q-T INTERVAL: 444 MS
EKG QRS DURATION: 84 MS
EKG QTC CALCULATION (BAZETT): 472 MS
EKG R AXIS: -34 DEGREES
EKG T AXIS: -6 DEGREES
EKG VENTRICULAR RATE: 68 BPM
EOSINOPHIL # BLD: 0.33 K/UL (ref 0–0.44)
EOSINOPHILS RELATIVE PERCENT: 4 % (ref 1–4)
ERYTHROCYTE [DISTWIDTH] IN BLOOD BY AUTOMATED COUNT: 15.9 % (ref 11.8–14.4)
EST. AVERAGE GLUCOSE BLD GHB EST-MCNC: 140 MG/DL
FLUAV RNA NPH QL NAA+NON-PROBE: NOT DETECTED
FLUBV RNA NPH QL NAA+NON-PROBE: NOT DETECTED
GFR SERPL CREATININE-BSD FRML MDRD: 3 ML/MIN/1.73M2
GLUCOSE BLD-MCNC: 101 MG/DL (ref 65–105)
GLUCOSE BLD-MCNC: 143 MG/DL (ref 65–105)
GLUCOSE BLD-MCNC: 83 MG/DL (ref 65–105)
GLUCOSE SERPL-MCNC: 103 MG/DL (ref 70–99)
HADV DNA NPH QL NAA+NON-PROBE: NOT DETECTED
HBA1C MFR BLD: 6.5 % (ref 4–6)
HCOV 229E RNA NPH QL NAA+NON-PROBE: NOT DETECTED
HCOV HKU1 RNA NPH QL NAA+NON-PROBE: NOT DETECTED
HCOV NL63 RNA NPH QL NAA+NON-PROBE: NOT DETECTED
HCOV OC43 RNA NPH QL NAA+NON-PROBE: NOT DETECTED
HCT VFR BLD AUTO: 36.5 % (ref 36.3–47.1)
HGB BLD-MCNC: 11.4 G/DL (ref 11.9–15.1)
HMPV RNA NPH QL NAA+NON-PROBE: NOT DETECTED
HPIV1 RNA NPH QL NAA+NON-PROBE: NOT DETECTED
HPIV2 RNA NPH QL NAA+NON-PROBE: NOT DETECTED
HPIV3 RNA NPH QL NAA+NON-PROBE: NOT DETECTED
HPIV4 RNA NPH QL NAA+NON-PROBE: NOT DETECTED
IMM GRANULOCYTES # BLD AUTO: 0.03 K/UL (ref 0–0.3)
IMM GRANULOCYTES NFR BLD: 0 %
LYMPHOCYTES NFR BLD: 2.27 K/UL (ref 1.1–3.7)
LYMPHOCYTES RELATIVE PERCENT: 25 % (ref 24–43)
M PNEUMO DNA NPH QL NAA+NON-PROBE: NOT DETECTED
MAGNESIUM SERPL-MCNC: 2.1 MG/DL (ref 1.6–2.6)
MAGNESIUM SERPL-MCNC: 2.8 MG/DL (ref 1.6–2.6)
MCH RBC QN AUTO: 32.1 PG (ref 25.2–33.5)
MCHC RBC AUTO-ENTMCNC: 31.2 G/DL (ref 28.4–34.8)
MCV RBC AUTO: 102.8 FL (ref 82.6–102.9)
MONOCYTES NFR BLD: 0.62 K/UL (ref 0.1–1.2)
MONOCYTES NFR BLD: 7 % (ref 3–12)
MYOGLOBIN SERPL-MCNC: 78 NG/ML (ref 25–58)
NEUTROPHILS NFR BLD: 63 % (ref 36–65)
NEUTS SEG NFR BLD: 5.9 K/UL (ref 1.5–8.1)
NRBC BLD-RTO: 0 PER 100 WBC
PHOSPHATE SERPL-MCNC: 5.7 MG/DL (ref 2.6–4.5)
PLATELET # BLD AUTO: 230 K/UL (ref 138–453)
PMV BLD AUTO: 11.6 FL (ref 8.1–13.5)
POTASSIUM SERPL-SCNC: 4.2 MMOL/L (ref 3.7–5.3)
POTASSIUM SERPL-SCNC: 5.7 MMOL/L (ref 3.7–5.3)
PROT SERPL-MCNC: 7.8 G/DL (ref 6.4–8.3)
RBC # BLD AUTO: 3.55 M/UL (ref 3.95–5.11)
RBC # BLD: ABNORMAL 10*6/UL
RSV RNA NPH QL NAA+NON-PROBE: NOT DETECTED
RV+EV RNA NPH QL NAA+NON-PROBE: NOT DETECTED
SARS-COV-2 RNA NPH QL NAA+NON-PROBE: NOT DETECTED
SODIUM SERPL-SCNC: 138 MMOL/L (ref 135–144)
SPECIMEN DESCRIPTION: NORMAL
WBC OTHER # BLD: 9.2 K/UL (ref 3.5–11.3)

## 2024-01-12 PROCEDURE — 84100 ASSAY OF PHOSPHORUS: CPT

## 2024-01-12 PROCEDURE — 99285 EMERGENCY DEPT VISIT HI MDM: CPT

## 2024-01-12 PROCEDURE — 83735 ASSAY OF MAGNESIUM: CPT

## 2024-01-12 PROCEDURE — 99222 1ST HOSP IP/OBS MODERATE 55: CPT | Performed by: PSYCHIATRY & NEUROLOGY

## 2024-01-12 PROCEDURE — 6370000000 HC RX 637 (ALT 250 FOR IP): Performed by: HOSPITALIST

## 2024-01-12 PROCEDURE — 85025 COMPLETE CBC W/AUTO DIFF WBC: CPT

## 2024-01-12 PROCEDURE — 83874 ASSAY OF MYOGLOBIN: CPT

## 2024-01-12 PROCEDURE — 84132 ASSAY OF SERUM POTASSIUM: CPT

## 2024-01-12 PROCEDURE — 2580000003 HC RX 258: Performed by: NURSE PRACTITIONER

## 2024-01-12 PROCEDURE — 6370000000 HC RX 637 (ALT 250 FOR IP): Performed by: NURSE PRACTITIONER

## 2024-01-12 PROCEDURE — 95816 EEG AWAKE AND DROWSY: CPT

## 2024-01-12 PROCEDURE — 80053 COMPREHEN METABOLIC PANEL: CPT

## 2024-01-12 PROCEDURE — 93005 ELECTROCARDIOGRAM TRACING: CPT | Performed by: HOSPITALIST

## 2024-01-12 PROCEDURE — 6370000000 HC RX 637 (ALT 250 FOR IP): Performed by: INTERNAL MEDICINE

## 2024-01-12 PROCEDURE — 1200000000 HC SEMI PRIVATE

## 2024-01-12 PROCEDURE — 90935 HEMODIALYSIS ONE EVALUATION: CPT

## 2024-01-12 PROCEDURE — 82947 ASSAY GLUCOSE BLOOD QUANT: CPT

## 2024-01-12 PROCEDURE — 36415 COLL VENOUS BLD VENIPUNCTURE: CPT

## 2024-01-12 PROCEDURE — 93010 ELECTROCARDIOGRAM REPORT: CPT | Performed by: INTERNAL MEDICINE

## 2024-01-12 PROCEDURE — 82330 ASSAY OF CALCIUM: CPT

## 2024-01-12 PROCEDURE — 83036 HEMOGLOBIN GLYCOSYLATED A1C: CPT

## 2024-01-12 PROCEDURE — 6360000002 HC RX W HCPCS: Performed by: NURSE PRACTITIONER

## 2024-01-12 PROCEDURE — 0202U NFCT DS 22 TRGT SARS-COV-2: CPT

## 2024-01-12 PROCEDURE — 82550 ASSAY OF CK (CPK): CPT

## 2024-01-12 PROCEDURE — 5A1D70Z PERFORMANCE OF URINARY FILTRATION, INTERMITTENT, LESS THAN 6 HOURS PER DAY: ICD-10-PCS | Performed by: INTERNAL MEDICINE

## 2024-01-12 RX ORDER — ONDANSETRON 2 MG/ML
4 INJECTION INTRAMUSCULAR; INTRAVENOUS EVERY 6 HOURS PRN
Status: DISCONTINUED | OUTPATIENT
Start: 2024-01-12 | End: 2024-01-13 | Stop reason: HOSPADM

## 2024-01-12 RX ORDER — ISOSORBIDE MONONITRATE 30 MG/1
30 TABLET, EXTENDED RELEASE ORAL DAILY
Status: DISCONTINUED | OUTPATIENT
Start: 2024-01-12 | End: 2024-01-13 | Stop reason: HOSPADM

## 2024-01-12 RX ORDER — FAMOTIDINE 20 MG/1
10 TABLET, FILM COATED ORAL DAILY
Status: DISCONTINUED | OUTPATIENT
Start: 2024-01-12 | End: 2024-01-13 | Stop reason: HOSPADM

## 2024-01-12 RX ORDER — HEPARIN SODIUM 5000 [USP'U]/ML
5000 INJECTION, SOLUTION INTRAVENOUS; SUBCUTANEOUS EVERY 8 HOURS SCHEDULED
Status: DISCONTINUED | OUTPATIENT
Start: 2024-01-12 | End: 2024-01-13 | Stop reason: HOSPADM

## 2024-01-12 RX ORDER — MIDODRINE HYDROCHLORIDE 10 MG/1
10 TABLET ORAL
Status: DISCONTINUED | OUTPATIENT
Start: 2024-01-12 | End: 2024-01-13 | Stop reason: HOSPADM

## 2024-01-12 RX ORDER — ACETAMINOPHEN 650 MG/1
650 SUPPOSITORY RECTAL EVERY 6 HOURS PRN
Status: DISCONTINUED | OUTPATIENT
Start: 2024-01-12 | End: 2024-01-13 | Stop reason: HOSPADM

## 2024-01-12 RX ORDER — BUSPIRONE HYDROCHLORIDE 10 MG/1
10 TABLET ORAL EVERY 8 HOURS PRN
Status: DISCONTINUED | OUTPATIENT
Start: 2024-01-12 | End: 2024-01-13 | Stop reason: HOSPADM

## 2024-01-12 RX ORDER — CARVEDILOL 12.5 MG/1
12.5 TABLET ORAL 2 TIMES DAILY WITH MEALS
Status: DISCONTINUED | OUTPATIENT
Start: 2024-01-12 | End: 2024-01-12

## 2024-01-12 RX ORDER — SODIUM CHLORIDE 0.9 % (FLUSH) 0.9 %
10 SYRINGE (ML) INJECTION EVERY 12 HOURS SCHEDULED
Status: DISCONTINUED | OUTPATIENT
Start: 2024-01-12 | End: 2024-01-13 | Stop reason: HOSPADM

## 2024-01-12 RX ORDER — INSULIN LISPRO 100 [IU]/ML
0-4 INJECTION, SOLUTION INTRAVENOUS; SUBCUTANEOUS NIGHTLY
Status: DISCONTINUED | OUTPATIENT
Start: 2024-01-12 | End: 2024-01-13 | Stop reason: HOSPADM

## 2024-01-12 RX ORDER — MIDODRINE HYDROCHLORIDE 10 MG/1
10 TABLET ORAL 3 TIMES DAILY PRN
Status: DISCONTINUED | OUTPATIENT
Start: 2024-01-12 | End: 2024-01-13 | Stop reason: HOSPADM

## 2024-01-12 RX ORDER — ASPIRIN 81 MG/1
81 TABLET ORAL DAILY
Status: DISCONTINUED | OUTPATIENT
Start: 2024-01-12 | End: 2024-01-13 | Stop reason: HOSPADM

## 2024-01-12 RX ORDER — DEXTROSE MONOHYDRATE 100 MG/ML
INJECTION, SOLUTION INTRAVENOUS CONTINUOUS PRN
Status: DISCONTINUED | OUTPATIENT
Start: 2024-01-12 | End: 2024-01-13 | Stop reason: HOSPADM

## 2024-01-12 RX ORDER — ISOSORBIDE MONONITRATE 30 MG/1
30 TABLET, EXTENDED RELEASE ORAL DAILY
Status: DISCONTINUED | OUTPATIENT
Start: 2024-01-12 | End: 2024-01-12

## 2024-01-12 RX ORDER — VENLAFAXINE 37.5 MG/1
56.25 TABLET ORAL
Status: DISCONTINUED | OUTPATIENT
Start: 2024-01-13 | End: 2024-01-13 | Stop reason: HOSPADM

## 2024-01-12 RX ORDER — CARVEDILOL 12.5 MG/1
12.5 TABLET ORAL 2 TIMES DAILY WITH MEALS
Status: DISCONTINUED | OUTPATIENT
Start: 2024-01-12 | End: 2024-01-13 | Stop reason: HOSPADM

## 2024-01-12 RX ORDER — SODIUM CHLORIDE 0.9 % (FLUSH) 0.9 %
10 SYRINGE (ML) INJECTION PRN
Status: DISCONTINUED | OUTPATIENT
Start: 2024-01-12 | End: 2024-01-13 | Stop reason: HOSPADM

## 2024-01-12 RX ORDER — INSULIN LISPRO 100 [IU]/ML
0-8 INJECTION, SOLUTION INTRAVENOUS; SUBCUTANEOUS
Status: DISCONTINUED | OUTPATIENT
Start: 2024-01-12 | End: 2024-01-13 | Stop reason: HOSPADM

## 2024-01-12 RX ORDER — ACETAMINOPHEN 325 MG/1
650 TABLET ORAL EVERY 6 HOURS PRN
Status: DISCONTINUED | OUTPATIENT
Start: 2024-01-12 | End: 2024-01-13 | Stop reason: HOSPADM

## 2024-01-12 RX ORDER — VENLAFAXINE 37.5 MG/1
37.5 TABLET ORAL NIGHTLY
Status: DISCONTINUED | OUTPATIENT
Start: 2024-01-12 | End: 2024-01-13 | Stop reason: HOSPADM

## 2024-01-12 RX ORDER — ONDANSETRON 4 MG/1
4 TABLET, FILM COATED ORAL ONCE
Status: COMPLETED | OUTPATIENT
Start: 2024-01-12 | End: 2024-01-12

## 2024-01-12 RX ORDER — CLOPIDOGREL BISULFATE 75 MG/1
75 TABLET ORAL DAILY
Status: DISCONTINUED | OUTPATIENT
Start: 2024-01-12 | End: 2024-01-13 | Stop reason: HOSPADM

## 2024-01-12 RX ORDER — ALBUMIN (HUMAN) 12.5 G/50ML
25 SOLUTION INTRAVENOUS EVERY 30 MIN PRN
Status: DISCONTINUED | OUTPATIENT
Start: 2024-01-12 | End: 2024-01-13 | Stop reason: HOSPADM

## 2024-01-12 RX ORDER — ONDANSETRON 4 MG/1
4 TABLET, ORALLY DISINTEGRATING ORAL EVERY 8 HOURS PRN
Status: DISCONTINUED | OUTPATIENT
Start: 2024-01-12 | End: 2024-01-13 | Stop reason: HOSPADM

## 2024-01-12 RX ORDER — SODIUM CHLORIDE 9 MG/ML
INJECTION, SOLUTION INTRAVENOUS PRN
Status: DISCONTINUED | OUTPATIENT
Start: 2024-01-12 | End: 2024-01-13 | Stop reason: HOSPADM

## 2024-01-12 RX ADMIN — HEPARIN SODIUM 5000 UNITS: 5000 INJECTION INTRAVENOUS; SUBCUTANEOUS at 14:35

## 2024-01-12 RX ADMIN — ONDANSETRON 4 MG: 2 INJECTION INTRAMUSCULAR; INTRAVENOUS at 14:31

## 2024-01-12 RX ADMIN — VENLAFAXINE HYDROCHLORIDE 37.5 MG: 37.5 TABLET ORAL at 20:51

## 2024-01-12 RX ADMIN — HEPARIN SODIUM 5000 UNITS: 5000 INJECTION INTRAVENOUS; SUBCUTANEOUS at 20:50

## 2024-01-12 RX ADMIN — CARVEDILOL 12.5 MG: 12.5 TABLET, FILM COATED ORAL at 13:30

## 2024-01-12 RX ADMIN — SODIUM CHLORIDE, PRESERVATIVE FREE 10 ML: 5 INJECTION INTRAVENOUS at 20:59

## 2024-01-12 RX ADMIN — FAMOTIDINE 10 MG: 20 TABLET ORAL at 13:31

## 2024-01-12 RX ADMIN — MIDODRINE HYDROCHLORIDE 10 MG: 10 TABLET ORAL at 20:51

## 2024-01-12 RX ADMIN — SODIUM CHLORIDE, PRESERVATIVE FREE 10 ML: 5 INJECTION INTRAVENOUS at 13:32

## 2024-01-12 RX ADMIN — ASPIRIN 81 MG: 81 TABLET, COATED ORAL at 16:45

## 2024-01-12 RX ADMIN — ISOSORBIDE MONONITRATE 30 MG: 30 TABLET, EXTENDED RELEASE ORAL at 14:30

## 2024-01-12 RX ADMIN — CLOPIDOGREL BISULFATE 75 MG: 75 TABLET ORAL at 16:45

## 2024-01-12 RX ADMIN — ONDANSETRON HYDROCHLORIDE 4 MG: 4 TABLET, FILM COATED ORAL at 18:34

## 2024-01-12 ASSESSMENT — PAIN - FUNCTIONAL ASSESSMENT: PAIN_FUNCTIONAL_ASSESSMENT: 0-10

## 2024-01-12 NOTE — CARE COORDINATION
Case Management Assessment  Initial Evaluation    Date/Time of Evaluation: 1/12/2024 4:13 PM  Assessment Completed by: Karime Oseguera RN    If patient is discharged prior to next notation, then this note serves as note for discharge by case management.    Patient Name: Sheri Leigh                   YOB: 1945  Diagnosis: Spasm [R25.2]  Facial spasm [G51.39]                   Date / Time: 1/12/2024  5:49 AM    Patient Admission Status: Inpatient   Readmission Risk (Low < 19, Mod (19-27), High > 27): Readmission Risk Score: 21    Current PCP: Jose Conner MD  PCP verified by CM? Yes    Chart Reviewed: Yes      History Provided by: Medical Record, Patient  Patient Orientation: Alert and Oriented (chart review)    Patient Cognition: Alert    Hospitalization in the last 30 days (Readmission):  No    If yes, Readmission Assessment in CM Navigator will be completed.    Advance Directives:      Code Status: Full Code   Patient's Primary Decision Maker is: Legal Next of Kin    Primary Decision Maker: [facility] - [phone]    Discharge Planning:    Patient lives with: Other (Comment) (ECF) Type of Home: Skilled Nursing Facility  Primary Care Giver:    Patient Support Systems include: / (From [facility])   Current Financial resources: Medicare  Current community resources: ECF/Home Care  Current services prior to admission: Skilled Nursing Facility            Current DME:              Type of Home Care services:  None    ADLS  Prior functional level:    Current functional level:      PT AM-PAC:   /24  OT AM-PAC:   /24    Family can provide assistance at DC: No  Would you like Case Management to discuss the discharge plan with any other family members/significant others, and if so, who?    Plans to Return to Present Housing: Yes  Other Identified Issues/Barriers to RETURNING to current housing: none  Potential Assistance needed at discharge: N/A

## 2024-01-12 NOTE — CONSULTS
Consult Note    Reason for Consult: ESRD/fluid likely management    Requesting Physician:  Luke Mendoza MD    HISTORY OF PRESENT ILLNESS:    The patient is a multigravid 78 y.o. black female/long-term dialysis patient mine presents with a missed HD treatment/last hemodialysis was on Monday.  The patient apparently was noted to have facial twitching and was referred to the ED.  She missed Wednesday though feeling poor.  She is currently dialyzed at [facility] and has had issues with missed HD due to transportation or feeling \"ill\". Unfortunately the patient often decompensates on subsequent HD leading to concern of \" dysequilibrium.        Review Of Systems:   Denies any fever chills or rigor. Facial \"twitching\" continues. She has been depressed with some reduction in appetite.  Currently is in a facility and is mostly chair or bedbound.  She does stand.  Denies any current headache photophobia focal logical deficit.  As noted has no active chest pain or palpitations.  Denies any worsening shortness of breath and is on room air.  Has no pain distention.  Minimal urine output.     Past Medical History:   Diagnosis Date    Anxiety and depression     Arthritis     Rheumatoid     Cancer (HCC)     left breast cancer    Cerebral artery occlusion with cerebral infarction (HCC) jan 2020 & March 2020    poor balance since strokes    Chronic kidney disease, stage 5 (HCC)     hemodialysis Mon-Wed-Fri    Diabetes mellitus (HCC)     GERD (gastroesophageal reflux disease)     Hearing loss     bilateral. Does not have hearing aides    Hemodialysis patient ([facility])     M-W-F started in 2020    History of blood transfusion     50 plus yrs ago with pregnancy    Hyperlipidemia     Hypertension     Migraines     MRSA (methicillin resistant staph aureus) culture positive 2015    back    Neuropathy     PAT (obstructive sleep apnea)     had UPPP  \"have not used machine in years\"    Prolonged emergence from general anesthesia

## 2024-01-12 NOTE — ED NOTES
Patient arrives per EMS from [facility] for evaluation of possible electrolyte imbalance. Patient was at dialysis center and had some facial twitching that was noted. Patient states that she has not been feeling well for a few days and was even at [facility] Wednesday for evaluation. Patient is a MWF dialysis patient, missed Wednesday because she was in the hospital. Last dialysis was done Monday. Patient arrives AO x4 in no acute distress. Denies chest pain, no difficulty breathing. Only c/o neuropathy pain to bilateral feel. Intermittent facial twitching noted to face and bilateral arms.

## 2024-01-12 NOTE — CONSULTS
NEUROLOGY INPATIENT CONSULT NOTE    1/12/2024         Sheri Leigh is a  78 y.o. female admitted on 1/12/2024 with  Spasm [R25.2]  Facial spasm [G51.39]    Reason for consult: facial twitching  History is obtained mostly from the patient and the medical record and from the caregivers. Chart is reviewed and patient is examined.   Briefly, this is a  78 y.o. female with hx of HTN, HLD, CKD was admitted on 1/12/2024 with c/o facial twitching occurring intermittently lasting for minutes.  Patient stated that she started having facial twitching 2 days ago and it occurs either on right side or left side of the face associated with twitching/jerking activity of extremities.  Never lost consciousness.  Denied symptoms to suggest hypoglycemia during facial twitching episodes.  Denied tongue bite and bladder incontinence.  Patient is presently not having any facial twitches at the time of this encounter.  Caregivers also did not witness her having any facial twitches.  Patient was evaluated by neurology team on 10/2/2023 for syncopal episode.  Patient had felt near syncopal attack during dialysis.  Patient stated that she has had chest pain at the time and she required sublingual nitro with termination of chest pain and improvement in her blood pressure.  Patient had EEG in October during that facial twitching episode and it was unremarkable.  She has not had any headaches or speech or swallowing difficulties with these facial twitches.  Patient denied history of seizures, stroke in the past.  Presently patient denies facial pain, numbness or weakness.      No current facility-administered medications on file prior to encounter.     Current Outpatient Medications on File Prior to Encounter   Medication Sig Dispense Refill    atorvastatin (LIPITOR) 40 MG tablet Take 1 tablet by mouth daily 30 tablet 3    aspirin 81 MG EC tablet Take 1 tablet by mouth daily 30 tablet 3    isosorbide mononitrate (IMDUR) 30 MG extended  intact to confrontation  III,IV,VI -  EOMs full, no afferent defect, no                      ABHIJEET, no ptosis  V     -     Normal facial sensation  VII    -     Normal facial symmetry  VIII   -     Intact hearing  IX,X -     Symmetrical palate  XI    -     Symmetrical shoulder shrug  XII   -     Midline tongue, no atrophy    MOTOR FUNCTION:  Normal bulk, normal tone, normal power;  no involuntary movements, no tremor   SENSORY FUNCTION:  Normal touch, normal pin, normal vibration, normal proprioception   CEREBELLAR FUNCTION:  Intact fine motor control over upper limbs   REFLEX FUNCTION:  Symmetric, no perverted reflex, no Babinski sign   STATION and GAIT  Not tested         Data:    Lab Results:     Lab Results   Component Value Date    CHOL 145 04/17/2023    LDLCHOLESTEROL 81 04/17/2023    HDL 46 04/17/2023    TRIG 91 04/17/2023    ALT 20 01/12/2024    AST 27 01/12/2024    TSH 1.21 05/12/2021    INR 1.0 10/03/2023    LABA1C 6.5 (H) 01/12/2024    [id number] 457 10/03/2023     Hematology:  Recent Labs     01/12/24  0600   WBC 9.2   HGB 11.4*   HCT 36.5        Chemistry:  Recent Labs     01/12/24  0600      K 5.7*      CO2 18*   GLUCOSE 103*   BUN 67*   CREATININE 10.3*   MG 2.8*   CALCIUM 8.3*     Recent Labs     01/12/24  0600   PROT 7.8   LABALBU 4.3   LABA1C 6.5*   AST 27   ALT 20       No results found for: \"PHENYTOIN\", \"PHENOBARB\", \"VALPROATE\", \"CBMZ\"        Diagnostic data reviewed:  EEG 10/5/2023: This EEG performed predominantly during wakefulness and brief period of drowsiness demonstrated diffuse slowing in mixed delta and theta frequencies suggestive of moderately severe encephalopathy.  No epileptiform discharges and no electrographic seizures noted.  EKG montage revealed normal sinus rhythm.    MRI Brain (10/3/2023): No acute intracranial abnormality.  No acute infarct.  Mild global parenchymal volume loss with mild to moderate chronic microvascular ischemic changes.         Impression

## 2024-01-12 NOTE — H&P
History & Physical  [facility]    Adult Hospitalist      Name: Sheri Leigh  MRN: [id number]     Acct: [id number]  Room: 2012/2012-02    Admit Date: 1/12/2024  5:49 AM  PCP: Jose Conner MD    Primary Problem  Principal Problem:    Spasm  Resolved Problems:    * No resolved hospital problems. *        Assesment/ plan:     Patient admitted to [facility] telemetry      Facial twitching  CT brain without contrast  CK and myoglobin  Check electrolytes and replace as necessary  Neurology consult          Hyperkalemia  On presentation potassium was 5.7  Will recheck potassium  Nephrology consult    End-stage renal disease on hemodialysis  Nephrology consult for continuation of hemodialysis        Anemia of chronic disease  Monitor hemoglobin        Mixed hyperlipidemia  Continue statin as below    Diabetes type 2  Monitor blood glucose  SSI  Monitor blood glucose        Hypertensive heart disease  Continue Coreg and Imdur        Depression with anxiety  Continue BuSpar and Effexor                Continue to monitor/telemetry/CBC with differential daily/BMP daily  DVT and GI prophylaxis.  Continue medications as below      Scheduled Meds:   sodium chloride flush  10 mL IntraVENous 2 times per day    heparin (porcine)  5,000 Units SubCUTAneous 3 times per day    famotidine  10 mg Oral Daily    insulin lispro  0-8 Units SubCUTAneous TID WC    insulin lispro  0-4 Units SubCUTAneous Nightly    aspirin  81 mg Oral Daily    carvedilol  12.5 mg Oral BID WC    clopidogrel  75 mg Oral Daily    isosorbide mononitrate  30 mg Oral Daily    midodrine  10 mg Oral Once per day on Mon Wed Fri    venlafaxine  37.5 mg Oral Nightly    [START ON 1/13/2024] venlafaxine  56.25 mg Oral Daily with breakfast     Continuous Infusions:   dextrose      sodium chloride       PRN Meds:  glucose, 4 tablet, PRN  dextrose bolus, 125 mL, PRN   Or  dextrose bolus, 250 mL, PRN  glucagon (rDNA), 1 mg, PRN  dextrose, , Continuous PRN  sodium  ProviderFilomena MD   busPIRone (BUSPAR) 10 MG tablet Take 1 tablet by mouth every 8 hours as needed (anxiety) Indications: Feeling Anxious    Filomena Yanes MD   calcium acetate (PHOSLO) 667 MG CAPS capsule Take 2 capsules by mouth 3 times daily (with meals)    Provider, Historical, MD   B Complex-C-Folic Acid (DIALYVITE PO) Take 1 tablet by mouth daily    Filomena Yanes MD   ipratropium 0.5 mg-albuterol 2.5 mg (DUONEB) 0.5-2.5 (3) MG/3ML SOLN nebulizer solution Inhale 3 mLs into the lungs every 6 hours as needed for Shortness of Breath For wheezing/SOB    Filomena Yanes MD   Lidocaine 4 % GEL Apply topically 2 times daily Apply to both feet/legs topically for neuropathy    Filomena Yanes MD   midodrine (PROAMATINE) 10 MG tablet Take 1 tablet by mouth three times a week Only for Dialysis: send with patient to dialysis Mon, Wed, Fri For Hypotension    Filomena Yanes MD   omeprazole (PRILOSEC) 20 MG delayed release capsule Take 1 capsule by mouth daily    Filomena Yanes MD   promethazine (PHENERGAN) 25 MG tablet Take 1 tablet by mouth every 8 hours as needed for Nausea    Filomena Yanes MD   venlafaxine (EFFEXOR) 37.5 MG tablet Take 1 tablet by mouth nightly    Filomena Yanes MD   carvedilol (COREG) 12.5 MG tablet Take 1 tablet by mouth 2 times daily (with meals)    Filomena Yanes MD   clopidogrel (PLAVIX) 75 MG tablet Take 1 tablet by mouth daily    ProviderFilomena MD        Allergies:       Patient has no known allergies.    Social History:     Tobacco:    reports that she quit smoking about 34 years ago. She has never used smokeless tobacco.  Alcohol:      reports current alcohol use.  Drug Use:  reports no history of drug use.    Family History:     History reviewed. No pertinent family history.      Physical Exam:     Vitals:  /61   Pulse 76   Temp 97.3 °F (36.3 °C) (Axillary)   Resp 18   Ht 1.611 m (5' 3.43\")   Wt 74.1 kg (163

## 2024-01-12 NOTE — PROGRESS NOTES
received consult to speak with patient but when  arrived at room, patient was at dialysis.  left prayer card with name and will attempt visit later today. RN notified.     01/12/24 3800   Encounter Summary   Service Provided For: Patient not available   Referral/Consult From: Nurse   Support System Unknown   Last Encounter  01/12/24   Complexity of Encounter Low   Begin Time 1250   End Time  1300   Total Time Calculated 10 min   Spiritual/Emotional needs   Type Spiritual Support   Assessment/Intervention/Outcome   Assessment Impaired resilience;Unable to assess   Intervention Prayer (assurance of)/[place];Sustaining Presence/Ministry of presence   Outcome Connection/Belonging

## 2024-01-12 NOTE — ED NOTES
[facility] Per person on phone \"Her seat was this morning and we are currently full.\" Patient unable to get dialysis there today. RN updated Dr Ash about this.

## 2024-01-12 NOTE — PROGRESS NOTES
HEMODIALYSIS PRE-TREATMENT NOTE    Patient Identifiers prior to treatment: name, birthdate and band    Isolation Required: ESBL                      Isolation Type: contact       (please document if patient is being managed as a PUI/COVID-19 patient)        Hepatitis status:                           Date Drawn                             Result  Hepatitis B Surface Antigen                           Hepatitis B Surface Antibody 08/10/2023 pos     17.78   Hepatitis B Core Antibody            How was Hepatitis Status verified: labs     Was a copy of the labs you documented provided to facility for the patient's chart: yes    Hemodialysis orders verified: yes    Access Within normal limits ( I.e. s/s of infection,...): yes     Pre-Assessment completed: yes    Pre-dialysis report received from: Karolina Deleon                      Time: 0900

## 2024-01-12 NOTE — ED NOTES
RN called to speak to [facility]. Updated on patients arrival from dialysis unit and need for dialysis here since patient missed chair time.

## 2024-01-12 NOTE — PLAN OF CARE
Problem: Discharge Planning  Goal: Discharge to home or other facility with appropriate resources  1/12/2024 1707 by Tomeka Mendiola RN  Outcome: Progressing  1/12/2024 1706 by Tomeka Mendiola RN  Outcome: Progressing     Problem: Pain  Goal: Verbalizes/displays adequate comfort level or baseline comfort level  1/12/2024 1707 by Tomeka Mendiola RN  Outcome: Progressing  1/12/2024 1706 by Tomeka Mendiola RN  Outcome: Progressing     Problem: Skin/Tissue Integrity  Goal: Absence of new skin breakdown  Description: 1.  Monitor for areas of redness and/or skin breakdown  2.  Assess vascular access sites hourly  3.  Every 4-6 hours minimum:  Change oxygen saturation probe site  4.  Every 4-6 hours:  If on nasal continuous positive airway pressure, respiratory therapy assess nares and determine need for appliance change or resting period.  1/12/2024 1707 by Tomeka Mendiola RN  Outcome: Progressing  1/12/2024 1706 by Tomeka Mendiola RN  Outcome: Progressing     Problem: Safety - Adult  Goal: Free from fall injury  1/12/2024 1707 by Tomeka Mendiola RN  Outcome: Progressing  1/12/2024 1706 by Tomeka Mendiola RN  Outcome: Progressing     Problem: ABCDS Injury Assessment  Goal: Absence of physical injury  1/12/2024 1707 by Tomeka Mendiola RN  Outcome: Progressing  1/12/2024 1706 by Tomeka Mendiola RN  Outcome: Progressing     Problem: Metabolic/Fluid and Electrolytes - Adult  Goal: Electrolytes maintained within normal limits  Outcome: Progressing  Goal: Glucose maintained within prescribed range  Outcome: Progressing     Problem: Hematologic - Adult  Goal: Maintains hematologic stability  Outcome: Progressing

## 2024-01-12 NOTE — PROGRESS NOTES
Pharmacy Note - Renal dose adjustment made per P/T protocol    Original order:  Famotidine 20 mg daily    Estimated Creatinine Clearance: 4 mL/min (A) (based on SCr of 10.3 mg/dL (HH)).    Recent Labs     01/12/24  0600   BUN 67*   CREATININE 10.3*       Renally adjusted order:  Famotidine 10 mg daily    Please call pharmacy with any questions.    Thank you,  Peyton Tucker [facility]  1/12/2024 8:48 AM  pharmac

## 2024-01-12 NOTE — CARE COORDINATION
Social work:  Spoke to Megha/Matt CLAY, patient is LTC bed hold and does not need a new precert for return.  Writer confirmed with Da/son plan is return.

## 2024-01-12 NOTE — PROGRESS NOTES
HEMODIALYSIS POST TREATMENT NOTE    Treatment time ordered: 180    Actual treatment time: 180    UltraFiltration Goal:   UltraFiltration Removed:       Pre Treatment weight: 76.1  Post Treatment weight: 74.1  Estimated Dry Weight: na    Access used:     Central Venous Catheter:          Tunneled or Non-tunneled: na           Site: na          Access Flow: na      Internal Access:       AV Fistula or AV Graft: fistula         Site: left upper arm       Access Flow: good       Sign and symptoms of infection: none       If YES: na    Medications or blood products given: na    Chronic outpatient schedule: [facility]    Chronic outpatient unit: [facility]    Summary of response to treatment: the pt tolerated treatment well    Explain if orders NOT met, was physician notified:donte      ACES flowsheet faxed to patient unit/ placed in patient chart: yes    Post assessment completed: yes    Report given to Sariah Deleon      * Intra-treatment documented Safety Checks include the followin) Access and face visible at all times.     2) All connections and blood lines are secure with no kinks.     3) NVL alarm engaged.     4) Hemosafe device applied (if applicable).     5) No collapse of Arterial or Venous blood chambers.     6) All blood lines / pump segments in the air detectors.

## 2024-01-12 NOTE — PROGRESS NOTES
Physical Therapy  DATE: 2024    NAME: Sheri Leigh  MRN: [id number]   : 1945    Patient not seen this date for Physical Therapy due to:      [] Cancel by RN or physician due to:    [x] Hemodialysis    [] Critical Lab Value Level     [] Blood transfusion in progress    [] Acute or unstable cardiovascular status   _MAP < 55 or more than >115  _HR < 40 or > 130    [] Acute or unstable pulmonary status   -FiO2 > 60%   _RR < 5 or >40    _O2 sats < 85%    [] Strict Bedrest    [] Off Unit for surgery or procedure    [] Off Unit for testing       [] Pending imaging to R/O fracture    [] Refusal by Patient      [] Other      [] PT being discontinued at this time. Patient independent. No further needs.     [] PT being discontinued at this time as the patient has been transferred to hospice care. No further needs.      Ashely Colon, PT

## 2024-01-13 ENCOUNTER — APPOINTMENT (OUTPATIENT)
Dept: CT IMAGING | Age: 79
End: 2024-01-13
Payer: MEDICARE

## 2024-01-13 VITALS
BODY MASS INDEX: 28.55 KG/M2 | RESPIRATION RATE: 16 BRPM | WEIGHT: 161.16 LBS | HEIGHT: 63 IN | DIASTOLIC BLOOD PRESSURE: 59 MMHG | HEART RATE: 86 BPM | TEMPERATURE: 98.1 F | SYSTOLIC BLOOD PRESSURE: 111 MMHG | OXYGEN SATURATION: 97 %

## 2024-01-13 PROBLEM — G51.39 FACIAL SPASM: Status: ACTIVE | Noted: 2024-01-12

## 2024-01-13 LAB
ANION GAP SERPL CALCULATED.3IONS-SCNC: 17 MMOL/L (ref 9–17)
BASOPHILS # BLD: 0.04 K/UL (ref 0–0.2)
BASOPHILS # BLD: 0.07 K/UL (ref 0–0.2)
BASOPHILS NFR BLD: 1 % (ref 0–2)
BASOPHILS NFR BLD: 1 % (ref 0–2)
BUN SERPL-MCNC: 46 MG/DL (ref 8–23)
BUN/CREAT SERPL: 6 (ref 9–20)
CALCIUM SERPL-MCNC: 8.6 MG/DL (ref 8.6–10.4)
CHLORIDE SERPL-SCNC: 97 MMOL/L (ref 98–107)
CO2 SERPL-SCNC: 21 MMOL/L (ref 20–31)
CREAT SERPL-MCNC: 8.1 MG/DL (ref 0.5–0.9)
EOSINOPHIL # BLD: 0.3 K/UL (ref 0–0.44)
EOSINOPHIL # BLD: 0.38 K/UL (ref 0–0.44)
EOSINOPHILS RELATIVE PERCENT: 5 % (ref 1–4)
EOSINOPHILS RELATIVE PERCENT: 5 % (ref 1–4)
ERYTHROCYTE [DISTWIDTH] IN BLOOD BY AUTOMATED COUNT: 15.7 % (ref 11.8–14.4)
ERYTHROCYTE [DISTWIDTH] IN BLOOD BY AUTOMATED COUNT: 15.9 % (ref 11.8–14.4)
GFR SERPL CREATININE-BSD FRML MDRD: 5 ML/MIN/1.73M2
GLUCOSE BLD-MCNC: 127 MG/DL (ref 65–105)
GLUCOSE BLD-MCNC: 96 MG/DL (ref 65–105)
GLUCOSE BLD-MCNC: 98 MG/DL (ref 65–105)
GLUCOSE SERPL-MCNC: 91 MG/DL (ref 70–99)
HCT VFR BLD AUTO: 34.9 % (ref 36.3–47.1)
HCT VFR BLD AUTO: 42.2 % (ref 36.3–47.1)
HGB BLD-MCNC: 11 G/DL (ref 11.9–15.1)
HGB BLD-MCNC: 12.7 G/DL (ref 11.9–15.1)
IMM GRANULOCYTES # BLD AUTO: 0.03 K/UL (ref 0–0.3)
IMM GRANULOCYTES # BLD AUTO: 0.04 K/UL (ref 0–0.3)
IMM GRANULOCYTES NFR BLD: 0 %
IMM GRANULOCYTES NFR BLD: 1 %
LYMPHOCYTES NFR BLD: 2.28 K/UL (ref 1.1–3.7)
LYMPHOCYTES NFR BLD: 3.03 K/UL (ref 1.1–3.7)
LYMPHOCYTES RELATIVE PERCENT: 35 % (ref 24–43)
LYMPHOCYTES RELATIVE PERCENT: 38 % (ref 24–43)
MAGNESIUM SERPL-MCNC: 2.4 MG/DL (ref 1.6–2.6)
MCH RBC QN AUTO: 31.6 PG (ref 25.2–33.5)
MCH RBC QN AUTO: 32.1 PG (ref 25.2–33.5)
MCHC RBC AUTO-ENTMCNC: 30.1 G/DL (ref 28.4–34.8)
MCHC RBC AUTO-ENTMCNC: 31.5 G/DL (ref 28.4–34.8)
MCV RBC AUTO: 100.3 FL (ref 82.6–102.9)
MCV RBC AUTO: 106.6 FL (ref 82.6–102.9)
MONOCYTES NFR BLD: 0.66 K/UL (ref 0.1–1.2)
MONOCYTES NFR BLD: 0.84 K/UL (ref 0.1–1.2)
MONOCYTES NFR BLD: 10 % (ref 3–12)
MONOCYTES NFR BLD: 11 % (ref 3–12)
NEUTROPHILS NFR BLD: 45 % (ref 36–65)
NEUTROPHILS NFR BLD: 49 % (ref 36–65)
NEUTS SEG NFR BLD: 3.21 K/UL (ref 1.5–8.1)
NEUTS SEG NFR BLD: 3.61 K/UL (ref 1.5–8.1)
NRBC BLD-RTO: 0 PER 100 WBC
NRBC BLD-RTO: 0 PER 100 WBC
PLATELET # BLD AUTO: 193 K/UL (ref 138–453)
PLATELET # BLD AUTO: 218 K/UL (ref 138–453)
PMV BLD AUTO: 11.6 FL (ref 8.1–13.5)
PMV BLD AUTO: 11.7 FL (ref 8.1–13.5)
POTASSIUM SERPL-SCNC: 4.7 MMOL/L (ref 3.7–5.3)
RBC # BLD AUTO: 3.48 M/UL (ref 3.95–5.11)
RBC # BLD AUTO: 3.96 M/UL (ref 3.95–5.11)
RBC # BLD: ABNORMAL 10*6/UL
SODIUM SERPL-SCNC: 135 MMOL/L (ref 135–144)
WBC OTHER # BLD: 6.5 K/UL (ref 3.5–11.3)
WBC OTHER # BLD: 8 K/UL (ref 3.5–11.3)

## 2024-01-13 PROCEDURE — 82947 ASSAY GLUCOSE BLOOD QUANT: CPT

## 2024-01-13 PROCEDURE — 85025 COMPLETE CBC W/AUTO DIFF WBC: CPT

## 2024-01-13 PROCEDURE — 6360000002 HC RX W HCPCS: Performed by: NURSE PRACTITIONER

## 2024-01-13 PROCEDURE — 99232 SBSQ HOSP IP/OBS MODERATE 35: CPT | Performed by: PSYCHIATRY & NEUROLOGY

## 2024-01-13 PROCEDURE — 36415 COLL VENOUS BLD VENIPUNCTURE: CPT

## 2024-01-13 PROCEDURE — 83735 ASSAY OF MAGNESIUM: CPT

## 2024-01-13 PROCEDURE — 6370000000 HC RX 637 (ALT 250 FOR IP): Performed by: HOSPITALIST

## 2024-01-13 PROCEDURE — 70450 CT HEAD/BRAIN W/O DYE: CPT

## 2024-01-13 PROCEDURE — 80048 BASIC METABOLIC PNL TOTAL CA: CPT

## 2024-01-13 PROCEDURE — 90935 HEMODIALYSIS ONE EVALUATION: CPT

## 2024-01-13 PROCEDURE — 95816 EEG AWAKE AND DROWSY: CPT | Performed by: PSYCHIATRY & NEUROLOGY

## 2024-01-13 PROCEDURE — 6370000000 HC RX 637 (ALT 250 FOR IP): Performed by: NURSE PRACTITIONER

## 2024-01-13 RX ADMIN — HEPARIN SODIUM 5000 UNITS: 5000 INJECTION INTRAVENOUS; SUBCUTANEOUS at 05:56

## 2024-01-13 RX ADMIN — HEPARIN SODIUM 5000 UNITS: 5000 INJECTION INTRAVENOUS; SUBCUTANEOUS at 15:11

## 2024-01-13 RX ADMIN — ONDANSETRON 4 MG: 4 TABLET, ORALLY DISINTEGRATING ORAL at 16:18

## 2024-01-13 RX ADMIN — CARVEDILOL 12.5 MG: 12.5 TABLET, FILM COATED ORAL at 16:55

## 2024-01-13 NOTE — PROGRESS NOTES
Reason for Follow up: ESRD and related problems    HISTORY:      Patient received dialysis after missing outpatient sessions she had facial twitching at the time of presentation which has resolved.  Presently denies any chest pain or shortness of breath no twitching.    Review Of Systems:     All other ROS is negative.      Scheduled Meds:   sodium chloride flush  10 mL IntraVENous 2 times per day    heparin (porcine)  5,000 Units SubCUTAneous 3 times per day    famotidine  10 mg Oral Daily    insulin lispro  0-8 Units SubCUTAneous TID WC    insulin lispro  0-4 Units SubCUTAneous Nightly    aspirin  81 mg Oral Daily    carvedilol  12.5 mg Oral BID WC    clopidogrel  75 mg Oral Daily    isosorbide mononitrate  30 mg Oral Daily    midodrine  10 mg Oral Once per day on Mon Wed Fri    venlafaxine  37.5 mg Oral Nightly    venlafaxine  56.25 mg Oral Daily with breakfast   Continuous Infusions:   dextrose      sodium chloride       PRN Meds:glucose, dextrose bolus **OR** dextrose bolus, glucagon (rDNA), dextrose, sodium chloride flush, sodium chloride, ondansetron **OR** ondansetron, acetaminophen **OR** acetaminophen, midodrine, albumin human 25%, busPIRone    No Known Allergies    Physical Exam:  Blood pressure (!) 151/66, pulse 74, temperature 99.5 °F (37.5 °C), temperature source Oral, resp. rate 16, height 1.611 m (5' 3.43\"), weight 74.1 kg (163 lb 5.8 oz), SpO2 96 %.  In: 505 [I.V.:5]  Out: 2500   In: 505   Out: 2500     General:  Awake, alert, not in distress. Appears to be stated age.  HEENT: Atraumatic, normocephalic. Anicteric sclera. Pink and moist oral mucosa.   Chest: Bilateral air entry, clear to auscultation, no wheezing, rhonchi or rales.  Cardiovascular: RRR, S1S2, no murmur, rub or gallop. No lower extremity edema.    Abdomen: Soft, non tender to palpation.   Musculoskeletal: Left upper arm AV fistula positive bruit sticks covered with dressing     CNS: Oriented to person, place and time. Speech clear.

## 2024-01-13 NOTE — PLAN OF CARE
Patient denies pain. Patient up with walker and one assist to bathroom, patient is a HD patient, but makes urine.   Patient denies any facial twitching, none noted by writer  Problem: Discharge Planning  Goal: Discharge to home or other facility with appropriate resources  1/13/2024 0440 by Jewell James RN  Outcome: Progressing     Problem: Pain  Goal: Verbalizes/displays adequate comfort level or baseline comfort level  1/13/2024 0440 by Jewell James RN  Outcome: Progressing     Problem: Skin/Tissue Integrity  Goal: Absence of new skin breakdown  Description: 1.  Monitor for areas of redness and/or skin breakdown  2.  Assess vascular access sites hourly  3.  Every 4-6 hours minimum:  Change oxygen saturation probe site  4.  Every 4-6 hours:  If on nasal continuous positive airway pressure, respiratory therapy assess nares and determine need for appliance change or resting period.  1/13/2024 0440 by Jewell James RN  Outcome: Progressing     Problem: Safety - Adult  Goal: Free from fall injury  1/13/2024 0440 by Jewell James RN  Outcome: Progressing     Problem: ABCDS Injury Assessment  Goal: Absence of physical injury  1/13/2024 0440 by Jewell James RN  Outcome: Progressing     Problem: Metabolic/Fluid and Electrolytes - Adult  Goal: Electrolytes maintained within normal limits  1/13/2024 0440 by Jewell James RN  Outcome: Progressing     Problem: Metabolic/Fluid and Electrolytes - Adult  Goal: Glucose maintained within prescribed range  1/13/2024 0440 by Jewell James RN  Outcome: Progressing     Problem: Hematologic - Adult  Goal: Maintains hematologic stability  1/13/2024 0440 by Jewell James RN  Outcome: Progressing

## 2024-01-13 NOTE — DISCHARGE SUMMARY
Results   Component Value Date    INR 1.0 10/03/2023    INR 1.0 2022    INR 1.1 2022    PROTIME 13.3 10/03/2023    PROTIME 13.6 2022    PROTIME 13.8 2022     Lab Results   Component Value Date/Time    SPECIAL [id number] 2023 04:00 PM     Lab Results   Component Value Date/Time    CULTURE PROTEUS MIRABILIS >100,000 CFU/ML (A) 2023 04:00 PM       Lab Results   Component Value Date/Time    FIO2 UNKNOWN 2022 09:10 PM       Radiology:    CT HEAD WO CONTRAST    Result Date: 2024  No acute intracranial abnormality.         All radiological studies reviewed      Reviews of Symptoms:    A 10 point system is reviewed and  negative except described in hospital course    Physical Exam:    Vitals:  BP (!) 126/54   Pulse 83   Temp 98.4 °F (36.9 °C) (Oral)   Resp 16   Ht 1.611 m (5' 3.43\")   Wt 73.1 kg (161 lb 2.5 oz)   SpO2 100%   BMI 28.17 kg/m²   Temp (24hrs), Av.7 °F (37.1 °C), Min:98.4 °F (36.9 °C), Max:99.5 °F (37.5 °C)      General appearance - alert, well appearing, and in no acute distress  Mental status - oriented to person, place, and time with normal affect  Head - normocephalic and atraumatic  Eyes - pupils equal and reactive, extraocular eye movements intact, conjunctiva clear  Ears - hearing appears to be intact  Nose - no drainage noted  Mouth - mucous membranes moist  Neck - supple, no carotid bruits, thyroid not palpable  Chest - clear to auscultation, normal effort  Heart - normal rate, regular rhythm, no murmur  Abdomen - soft, nontender, nondistended, bowel sounds present all four quadrants, no masses, hepatomegaly or splenomegaly  Neurological - normal speech, no focal findings or movement disorder noted, cranial nerves II through XII grossly intact  Extremities - peripheral pulses palpable, no pedal edema or calf pain with palpation  Skin - no gross lesions, rashes, or induration noted      Consults:  IP CONSULT TO NEPHROLOGY  IP CONSULT TO SPIRITUAL  If patient is NPO, TPN, or continuous tube feed and on HumaLOG NOW THEN EVERY 4 HOURS    HYPOGLYCEMIA TREATMENT: blood glucose LESS THAN 70 mg/dL and patient ALERT and TOLERATING PO PRN    HYPOGLYCEMIA TREATMENT: blood glucose LESS THAN 70 mg/dL and patient NOT ALERT or NPO PRN    POCT Glucose PRN    Initiate Oxygen Therapy Protocol DAILY (RT)    Basic Metabolic Panel w/ Reflex to MG Daily (Lab)    CBC with Auto Differential Daily (Lab)    Hemodialysis inpatient EVERY MWF    Magnesium Daily (Lab)    Phosphorus TOMORROW AM          Diet: ADULT DIET; Regular; No Added Salt (3-4 gm); Low Potassium (Less than 3000 mg/day); Low Phosphorus (Less than 1000 mg); 1200 ml    Discharge Medications:      Medication List        CONTINUE taking these medications      aspirin 81 MG EC tablet  Take 1 tablet by mouth daily     atorvastatin 40 MG tablet  Commonly known as: LIPITOR  Take 1 tablet by mouth daily     busPIRone 10 MG tablet  Commonly known as: BUSPAR     calcium acetate 667 MG Caps capsule  Commonly known as: PHOSLO     carvedilol 12.5 MG tablet  Commonly known as: COREG     clopidogrel 75 MG tablet  Commonly known as: PLAVIX     DIALYVITE PO     gabapentin 100 MG capsule  Commonly known as: NEURONTIN  Take 1 capsule by mouth 2 times daily as needed (Paresthesia) for up to 5 days.     ipratropium 0.5 mg-albuterol 2.5 mg 0.5-2.5 (3) MG/3ML Soln nebulizer solution  Commonly known as: DUONEB     isosorbide mononitrate 30 MG extended release tablet  Commonly known as: IMDUR  Take 1 tablet by mouth daily     Lidocaine 4 % Gel     midodrine 10 MG tablet  Commonly known as: PROAMATINE     NOVOLOG SC     omeprazole 20 MG delayed release capsule  Commonly known as: PRILOSEC     promethazine 25 MG tablet  Commonly known as: PHENERGAN     * venlafaxine 37.5 MG tablet  Commonly known as: EFFEXOR     * venlafaxine 37.5 MG tablet  Commonly known as: EFFEXOR           * This list has 2 medication(s) that are the same as other

## 2024-01-13 NOTE — PROGRESS NOTES
Physical Therapy  DATE: 2024    NAME: Sheri Leigh  MRN: [id number]   : 1945    Patient not seen this date for Physical Therapy due to:      [] Cancel by RN or physician due to:    [x] Hemodialysis    [] Critical Lab Value Level     [] Blood transfusion in progress    [] Acute or unstable cardiovascular status   _MAP < 55 or more than >115  _HR < 40 or > 130    [] Acute or unstable pulmonary status   -FiO2 > 60%   _RR < 5 or >40    _O2 sats < 85%    [] Strict Bedrest    [] Off Unit for surgery or procedure    [] Off Unit for testing       [] Pending imaging to R/O fracture    [] Refusal by Patient      [] Other      [] PT being discontinued at this time. Patient independent. No further needs.     [] PT being discontinued at this time as the patient has been transferred to hospice care. No further needs.      Mark Haywood, PT

## 2024-01-13 NOTE — PROGRESS NOTES
Pt discharged to SNF in stable condition with belongings  Discharge instructions given  Pt denies having any further questions at this time  Personal items given to patient at discharge  Patient state they have everything they were admitted with.

## 2024-01-13 NOTE — PROGRESS NOTES
Occupational Therapy  DATE: 2024    NAME: Sheri Leigh  MRN: [id number]   : 1945    Patient not seen this date for Occupational Therapy due to:      [] Cancel by RN or physician due to:    [x] Hemodialysis    [] Critical Lab Value Level     [] Blood transfusion in progress    [] Acute or unstable cardiovascular status   _MAP < 55 or more than >115  _HR < 40 or > 130    [] Acute or unstable pulmonary status   -FiO2 > 60%   _RR < 5 or >40    _O2 sats < 85%    [] Strict Bedrest    [] Off Unit for surgery or procedure    [] Off Unit for testing       [] Pending imaging to R/O fracture    [] Refusal by Patient      [] Other      [] OT being discontinued at this time. Patient independent. No further needs.     [] OT being discontinued at this time as the patient has been transferred to hospice care. No further needs.      Nicole Billy, OT

## 2024-01-13 NOTE — PROGRESS NOTES
HEMODIALYSIS POST TREATMENT NOTE    Treatment time ordered: 3    Actual treatment time: 2    UltraFiltration Goal: 2000  UltraFiltration Removed:1056      Pre Treatment weight: 74.1  Post Treatment weight: 73.1  Estimated Dry Weight: na    Access used:     Central Venous Catheter:          Tunneled or Non-tunneled: na           Site: na          Access Flow: nan      Internal Access:       AV Fistula or AV Graft: avf         Site: mason       Access Flow: good       Sign and symptoms of infection: na       If YES: na    Medications or blood products given: na    Chronic outpatient schedule: nan    Chronic outpatient unit: na    Summary of response to treatment: pt tolerated tx well    Explain if orders NOT met, was physician notified:donte      ACES flowsheet faxed to patient unit/ placed in patient chart: yes    Post assessment completed: yes    Report given to:jesus hughes    * Intra-treatment documented Safety Checks include the followin) Access and face visible at all times.     2) All connections and blood lines are secure with no kinks.     3) NVL alarm engaged.     4) Hemosafe device applied (if applicable).     5) No collapse of Arterial or Venous blood chambers.     6) All blood lines / pump segments in the air detectors.

## 2024-01-13 NOTE — DISCHARGE INSTR - COC
Continuity of Care Form    Patient Name: Sheri Leigh   :  1945  MRN:  [id number]    Admit date:  2024  Discharge date:     Code Status Order: Full Code   Advance Directives:     Admitting Physician:  Luke Mendoza MD  PCP: Jose Conner MD    Discharging Nurse: Aubrie  Discharging Hospital Unit/Room#:   Discharging Unit Phone Number: [phone]    Emergency Contact:   Extended Emergency Contact Information  Primary Emergency Contact: Da Leigh Jr   [facility]  Home Phone: [phone]  Relation: Child  Secondary Emergency Contact: jose blackman  Home Phone: [phone]  Relation: Child   needed? No    Past Surgical History:  Past Surgical History:   Procedure Laterality Date    APPENDECTOMY      BACK SURGERY      I and D lower back MRSA    BREAST REDUCTION SURGERY Left     CENTRAL LINE  2022         COLONOSCOPY      HYSTERECTOMY (CERVIX STATUS UNKNOWN)      INSERTABLE CARDIAC MONITOR      IR TUNNELED CATHETER PLACEMENT GREATER THAN 5 YEARS  2022    IR TUNNELED CATHETER PLACEMENT GREATER THAN 5 YEARS 2022 [facility] SPECIAL PROCEDURES    MASTECTOMY Left     lymph nodes removed    MASTECTOMY Left 2018    axillary mastectomy    VT EXC B9 LESION MRGN XCP SK TG T/A/L 0.5 CM/< Right 2018    RIGHT AXILLARY MASTECTOMY performed by Terrence Springer DO at [facility] OR    UVULOPALATOPHARYGOPLASTY         Immunization History:   Immunization History   Administered Date(s) Administered    COVID-19, MODERNA BLUE border, Primary or Immunocompromised, (age 12y+), IM, 100 mcg/0.5mL 2021, 2021    COVID-19, [place] Vaccine, Vaccine Unspecified 2021, 2021, 2021, 2021, 2021, 2021    Hep B, ENGERIX-B, (age 20y+), IM, 1mL 2021, 2021, 2021, 2021, 2022, 2022, 2022, 2022    Hepatitis B 2021, 2021, 2021, 2021    Hepatitis B vaccine

## 2024-01-13 NOTE — CARE COORDINATION
Social work: called all weekend on call and other numbers for [facility]. No answer at the [facility] building also. Faxing to ef[employer] to see if someone will see it.  Better wait to send pt till morning ad no one is even answering for report.   Phone for report [phone]  Fax: [phone]  Pt is a return bed hold and does not need a new auth to return.   Pt had dialysis today and states to RN she has a MWF appointment at [facility] phone [phone] no answer today will leave message and fax AVS/blaine  [facility] dialysis fax [phone].      Ok with Megha rep for [facility] to come back tonight before 9 pm or tomorrow around 10 am if transport can be found.     Daughter to transport. Megha at [facility] was notified and is ok with that plan.   Faxed blaine to both [facility] and [facility] at [facility] dialysis with dialysis treatment notes and nephrology note.   RN will send copy of blaine and MAR WITH daughter.       Janel garcia

## 2024-01-13 NOTE — PROCEDURES
EEG REPORT       Patient: Sheri Leigh Age: 78 y.o.  MRN: [id number]      Referring Provider: No ref. provider found    History: This routine 30 minute scalp EEG was recorded with video- monitoring for a 78 y.o.. female  who presented with encephalopathy. This EEG was performed to evaluate for focal and epileptiform abnormalities.     Sheri Leigh   Current Facility-Administered Medications   Medication Dose Route Frequency Provider Last Rate Last Admin    glucose chewable tablet 16 g  4 tablet Oral PRN Rober, Atria, APRN - CNP        dextrose bolus 10% 125 mL  125 mL IntraVENous PRN Rober, Atria, APRN - CNP        Or    dextrose bolus 10% 250 mL  250 mL IntraVENous PRN Roebr, Atria, APRN - CNP        glucagon injection 1 mg  1 mg SubCUTAneous PRN Rober, Atria, APRN - CNP        dextrose 10 % infusion   IntraVENous Continuous PRN Rober, Atria, APRN - CNP        sodium chloride flush 0.9 % injection 10 mL  10 mL IntraVENous 2 times per day Rober, Atria, APRN - CNP   10 mL at 01/12/24 2059    sodium chloride flush 0.9 % injection 10 mL  10 mL IntraVENous PRN Rober, Atria, APRN - CNP        0.9 % sodium chloride infusion   IntraVENous PRN Rober, Atria, APRN - CNP        heparin (porcine) injection 5,000 Units  5,000 Units SubCUTAneous 3 times per day Rober Atria, APRN - CNP   5,000 Units at 01/13/24 1511    ondansetron (ZOFRAN-ODT) disintegrating tablet 4 mg  4 mg Oral Q8H PRN Rober, Atria, APRN - CNP        Or    ondansetron (ZOFRAN) injection 4 mg  4 mg IntraVENous Q6H PRN Rober, Atria, APRN - CNP   4 mg at 01/12/24 1431    acetaminophen (TYLENOL) tablet 650 mg  650 mg Oral Q6H PRN Rober, Atria, APRN - CNP        Or    acetaminophen (TYLENOL) suppository 650 mg  650 mg Rectal Q6H PRN Rober, Atria, APRN - CNP        famotidine (PEPCID) tablet 10 mg  10 mg Oral Daily Rober Atria, APRN - CNP   10 mg at 01/12/24 1331    insulin lispro (HUMALOG) injection vial 0-8 Units  0-8 Units SubCUTAneous TID  Rober  2-30 Hz was performed and there was a biposterior, symmetric, driving response.    Hyperventilation - was not preformed.     No abnormalities were activated by photic stimulation     The EKG channel demonstrated a normal sinus rhythm.    Interpretation  This EEG was abnormal in wakefulness and sleep.  There was intermittent right and left hemisphere theta slowing.    Clinical correlation  This EEG was abnormal.  There are intermittent right and left hemisphere theta slowing is nonspecific.  No focal or epileptiform abnormalities were seen.    Russ Barrera MD

## 2024-01-13 NOTE — PROGRESS NOTES
NEUROLOGY INPATIENT PROGRESS NOTE    1/13/2024         Sheri Leigh is a  78 y.o. female admitted on 1/12/2024 with  Spasm [R25.2]  Facial spasm [G51.39]    Reason for consult: facial twitching  History is obtained mostly from the patient and the medical record and from the caregivers. Chart is reviewed and patient is examined.   Briefly, this is a  78 y.o. female with hx of HTN, HLD, CKD was admitted on 1/12/2024 with c/o facial twitching occurring intermittently lasting for minutes.  Patient stated that she started having facial twitching 2 days ago and it occurs either on right side or left side of the face associated with twitching/jerking activity of extremities.  Never lost consciousness.  Denied symptoms to suggest hypoglycemia during facial twitching episodes.  Denied tongue bite and bladder incontinence.  Patient is presently not having any facial twitches at the time of this encounter.  Caregivers also did not witness her having any facial twitches.  Patient was evaluated by neurology team on 10/2/2023 for syncopal episode.  Patient had felt near syncopal attack during dialysis.  Patient stated that she has had chest pain at the time and she required sublingual nitro with termination of chest pain and improvement in her blood pressure.  Patient had EEG in October during that facial twitching episode and it was unremarkable.  She has not had any headaches or speech or swallowing difficulties with these facial twitches.  Patient denied history of seizures, stroke in the past.  Presently patient denies facial pain, numbness or weakness.    1/13/2024: Chart reviewed and discussed with caregivers.  CT head done yesterday and it was unrevealing.  EEG not done so far; patient does not want to wait for it and wants to get it as outpatient.  Patient is requesting to be discharged.  Patient also stated that she has minimal features and has not had any impaired consciousness with those.  Wants to be followed  in no distress   HEENT  NC/ AT   NECK  Supple and no bruits heard   Cardiovascular  S1, S2 heard; radial pulse intact   MENTAL STATUS:  Alert, oriented, intact memory, no confusion, normal speech, normal language, no hallucination or delusion   CRANIAL NERVES: II     -       Pupils reactive b/l visual acuity: 20/30 OU; Fundus exam: intact venous pulsations; Visual fields intact to confrontation  III,IV,VI -  EOMs full, no afferent defect, no                      ABHIJEET, no ptosis  V     -     Normal facial sensation  VII    -     Normal facial symmetry  VIII   -     Intact hearing  IX,X -     Symmetrical palate  XI    -     Symmetrical shoulder shrug  XII   -     Midline tongue, no atrophy    MOTOR FUNCTION:  Normal bulk, normal tone, normal power;  no involuntary movements, no tremor   SENSORY FUNCTION:  Normal touch, normal pin, normal vibration, normal proprioception   CEREBELLAR FUNCTION:  Intact fine motor control over upper limbs   REFLEX FUNCTION:  Symmetric, no perverted reflex, no Babinski sign   STATION and GAIT  Not tested         Data:    Lab Results:     Lab Results   Component Value Date    CHOL 145 04/17/2023    LDLCHOLESTEROL 81 04/17/2023    HDL 46 04/17/2023    TRIG 91 04/17/2023    ALT 20 01/12/2024    AST 27 01/12/2024    TSH 1.21 05/12/2021    INR 1.0 10/03/2023    LABA1C 6.5 (H) 01/12/2024    [id number] 457 10/03/2023     Hematology:  Recent Labs     01/12/24  0600 01/13/24  0608   WBC 9.2 8.0   HGB 11.4* 11.0*   HCT 36.5 34.9*    218     Chemistry:  Recent Labs     01/12/24  0600 01/12/24  1450 01/13/24  0608     --  135   K 5.7* 4.2 4.7     --  97*   CO2 18*  --  21   GLUCOSE 103*  --  91   BUN 67*  --  46*   CREATININE 10.3*  --  8.1*   MG 2.8* 2.1 2.4   CALCIUM 8.3*  --  8.6     Recent Labs     01/12/24  0600 01/12/24  1450   PROT 7.8  --    LABALBU 4.3  --    LABA1C 6.5*  --    AST 27  --    ALT 20  --    CKTOTAL  --  286*       No results found for: \"PHENYTOIN\",

## 2024-01-13 NOTE — PLAN OF CARE
Problem: Discharge Planning  Goal: Discharge to home or other facility with appropriate resources  1/13/2024 1707 by Aubrie Le RN  Outcome: Progressing  Flowsheets (Taken 1/13/2024 0815)  Discharge to home or other facility with appropriate resources:   Identify barriers to discharge with patient and caregiver   Arrange for needed discharge resources and transportation as appropriate   Identify discharge learning needs (meds, wound care, etc)   Refer to discharge planning if patient needs post-hospital services based on physician order or complex needs related to functional status, cognitive ability or social support system       Problem: Pain  Goal: Verbalizes/displays adequate comfort level or baseline comfort level  1/13/2024 1707 by Aubrie Le RN  Outcome: Progressing       Problem: Skin/Tissue Integrity  Goal: Absence of new skin breakdown  Description: 1.  Monitor for areas of redness and/or skin breakdown  2.  Assess vascular access sites hourly  3.  Every 4-6 hours minimum:  Change oxygen saturation probe site  4.  Every 4-6 hours:  If on nasal continuous positive airway pressure, respiratory therapy assess nares and determine need for appliance change or resting period.  1/13/2024 1707 by Aubrie Le RN  Outcome: Progressing       Problem: Safety - Adult  Goal: Free from fall injury  1/13/2024 1707 by Aubrie Le RN  Outcome: Progressing       Problem: ABCDS Injury Assessment  Goal: Absence of physical injury  1/13/2024 1707 by Aubrie Le RN  Outcome: Progressing    Problem: Metabolic/Fluid and Electrolytes - Adult  Goal: Electrolytes maintained within normal limits  1/13/2024 1707 by Aubrie Le RN  Outcome: Progressing    Goal: Glucose maintained within prescribed range  1/13/2024 1707 by Aubrie Le RN  Outcome: Progressing       Problem: Hematologic - Adult  Goal: Maintains hematologic stability  1/13/2024 1707 by Aubrie Le RN  Outcome:

## 2024-06-04 ENCOUNTER — HOSPITAL ENCOUNTER (OUTPATIENT)
Age: 79
Setting detail: SPECIMEN
Discharge: HOME | End: 2024-06-04
Payer: MEDICARE

## 2024-06-04 LAB
BACTERIA URNS QL MICRO: ABNORMAL
BILIRUB UR QL STRIP: NEGATIVE
CLARITY UR: ABNORMAL
COLOR UR: YELLOW
EPI CELLS #/AREA URNS HPF: ABNORMAL /HPF (ref 0–5)
GLUCOSE UR STRIP-MCNC: NEGATIVE MG/DL
HGB UR QL STRIP.AUTO: ABNORMAL
KETONES UR STRIP-MCNC: NEGATIVE MG/DL
LEUKOCYTE ESTERASE UR QL STRIP: ABNORMAL
NITRITE UR QL STRIP: NEGATIVE
PH UR STRIP: 8.5 [PH] (ref 5–8)
PROT UR STRIP-MCNC: ABNORMAL MG/DL
RBC #/AREA URNS HPF: ABNORMAL /HPF (ref 0–2)
SP GR UR STRIP: 1.02 (ref 1–1.03)
UROBILINOGEN UR STRIP-ACNC: NORMAL EU/DL (ref 0–1)
WBC #/AREA URNS HPF: ABNORMAL /HPF (ref 0–5)

## 2024-06-04 PROCEDURE — 81001 URINALYSIS AUTO W/SCOPE: CPT

## 2024-06-04 PROCEDURE — 87086 URINE CULTURE/COLONY COUNT: CPT

## 2024-06-05 LAB
MICROORGANISM SPEC CULT: NORMAL
SPECIMEN DESCRIPTION: NORMAL

## 2024-08-21 ENCOUNTER — HOSPITAL ENCOUNTER (EMERGENCY)
Age: 79
Discharge: HOME | End: 2024-08-21
Attending: EMERGENCY MEDICINE
Payer: MEDICARE

## 2024-08-21 ENCOUNTER — APPOINTMENT (OUTPATIENT)
Dept: GENERAL RADIOLOGY | Age: 79
End: 2024-08-21
Payer: MEDICARE

## 2024-08-21 VITALS
DIASTOLIC BLOOD PRESSURE: 72 MMHG | SYSTOLIC BLOOD PRESSURE: 176 MMHG | WEIGHT: 161 LBS | BODY MASS INDEX: 28.53 KG/M2 | RESPIRATION RATE: 13 BRPM | HEART RATE: 66 BPM | TEMPERATURE: 98.1 F | HEIGHT: 63 IN | OXYGEN SATURATION: 100 %

## 2024-08-21 DIAGNOSIS — R07.89 ATYPICAL CHEST PAIN: Primary | ICD-10-CM

## 2024-08-21 LAB
ANION GAP SERPL CALCULATED.3IONS-SCNC: 15 MMOL/L (ref 9–17)
BASOPHILS # BLD: 0.05 K/UL (ref 0–0.2)
BASOPHILS NFR BLD: 1 % (ref 0–2)
BUN SERPL-MCNC: 46 MG/DL (ref 8–23)
BUN/CREAT SERPL: 7 (ref 9–20)
CALCIUM SERPL-MCNC: 8.6 MG/DL (ref 8.6–10.4)
CHLORIDE SERPL-SCNC: 100 MMOL/L (ref 98–107)
CO2 SERPL-SCNC: 26 MMOL/L (ref 20–31)
CREAT SERPL-MCNC: 7 MG/DL (ref 0.5–0.9)
EOSINOPHIL # BLD: 0.49 K/UL (ref 0–0.44)
EOSINOPHILS RELATIVE PERCENT: 5 % (ref 1–4)
ERYTHROCYTE [DISTWIDTH] IN BLOOD BY AUTOMATED COUNT: 16.1 % (ref 11.8–14.4)
GFR, ESTIMATED: 6 ML/MIN/1.73M2
GLUCOSE SERPL-MCNC: 159 MG/DL (ref 70–99)
HCT VFR BLD AUTO: 29.7 % (ref 36.3–47.1)
HGB BLD-MCNC: 9.4 G/DL (ref 11.9–15.1)
IMM GRANULOCYTES # BLD AUTO: 0.06 K/UL (ref 0–0.3)
IMM GRANULOCYTES NFR BLD: 1 %
LYMPHOCYTES NFR BLD: 2.81 K/UL (ref 1.1–3.7)
LYMPHOCYTES RELATIVE PERCENT: 29 % (ref 24–43)
MCH RBC QN AUTO: 33.1 PG (ref 25.2–33.5)
MCHC RBC AUTO-ENTMCNC: 31.6 G/DL (ref 28.4–34.8)
MCV RBC AUTO: 104.6 FL (ref 82.6–102.9)
MONOCYTES NFR BLD: 1.16 K/UL (ref 0.1–1.2)
MONOCYTES NFR BLD: 12 % (ref 3–12)
NEUTROPHILS NFR BLD: 52 % (ref 36–65)
NEUTS SEG NFR BLD: 5.11 K/UL (ref 1.5–8.1)
NRBC BLD-RTO: 0 PER 100 WBC
PLATELET # BLD AUTO: 209 K/UL (ref 138–453)
PMV BLD AUTO: 11.9 FL (ref 8.1–13.5)
POTASSIUM SERPL-SCNC: 4.5 MMOL/L (ref 3.7–5.3)
RBC # BLD AUTO: 2.84 M/UL (ref 3.95–5.11)
RBC # BLD: ABNORMAL 10*6/UL
RBC # BLD: ABNORMAL 10*6/UL
SODIUM SERPL-SCNC: 141 MMOL/L (ref 135–144)
TROPONIN I SERPL HS-MCNC: 92 NG/L (ref 0–14)
TROPONIN I SERPL HS-MCNC: 92 NG/L (ref 0–14)
WBC OTHER # BLD: 9.7 K/UL (ref 3.5–11.3)

## 2024-08-21 PROCEDURE — 99285 EMERGENCY DEPT VISIT HI MDM: CPT

## 2024-08-21 PROCEDURE — 93005 ELECTROCARDIOGRAM TRACING: CPT | Performed by: EMERGENCY MEDICINE

## 2024-08-21 PROCEDURE — 85025 COMPLETE CBC W/AUTO DIFF WBC: CPT

## 2024-08-21 PROCEDURE — 84484 ASSAY OF TROPONIN QUANT: CPT

## 2024-08-21 PROCEDURE — 71045 X-RAY EXAM CHEST 1 VIEW: CPT

## 2024-08-21 PROCEDURE — 80048 BASIC METABOLIC PNL TOTAL CA: CPT

## 2024-08-21 NOTE — DISCHARGE INSTRUCTIONS
Return to dialysis at your next scheduled appointment.  If you have any recurrence of chest pain and recommend return to the emergency room.  It is important to follow-up with your doctor after an episode of chest pain.

## 2024-08-22 LAB
EKG ATRIAL RATE: 70 BPM
EKG P AXIS: 88 DEGREES
EKG P-R INTERVAL: 260 MS
EKG Q-T INTERVAL: 426 MS
EKG QRS DURATION: 88 MS
EKG QTC CALCULATION (BAZETT): 460 MS
EKG R AXIS: -30 DEGREES
EKG T AXIS: 101 DEGREES
EKG VENTRICULAR RATE: 70 BPM

## 2024-11-19 ENCOUNTER — HOSPITAL ENCOUNTER (OUTPATIENT)
Age: 79
Setting detail: SPECIMEN
Discharge: HOME | End: 2024-11-19

## 2024-11-20 ENCOUNTER — HOSPITAL ENCOUNTER (OUTPATIENT)
Age: 79
Setting detail: SPECIMEN
Discharge: HOME | End: 2024-11-20
Payer: MEDICARE

## 2024-11-20 PROCEDURE — 81001 URINALYSIS AUTO W/SCOPE: CPT

## 2024-11-20 PROCEDURE — 87077 CULTURE AEROBIC IDENTIFY: CPT

## 2024-11-20 PROCEDURE — 87186 SC STD MICRODIL/AGAR DIL: CPT

## 2024-11-20 PROCEDURE — 87086 URINE CULTURE/COLONY COUNT: CPT

## 2024-11-21 ENCOUNTER — HOSPITAL ENCOUNTER (OUTPATIENT)
Age: 79
Setting detail: SPECIMEN
Discharge: HOME | End: 2024-11-21

## 2024-11-21 LAB
BACTERIA URNS QL MICRO: ABNORMAL
BILIRUB UR QL STRIP: NEGATIVE
CASTS #/AREA URNS LPF: ABNORMAL /LPF (ref 0–8)
CLARITY UR: ABNORMAL
COLOR UR: YELLOW
EPI CELLS #/AREA URNS HPF: ABNORMAL /HPF (ref 0–5)
ERYTHROCYTE [DISTWIDTH] IN BLOOD BY AUTOMATED COUNT: 16.8 % (ref 11.8–14.4)
GLUCOSE UR STRIP-MCNC: NEGATIVE MG/DL
HCT VFR BLD AUTO: 35.3 % (ref 36.3–47.1)
HGB BLD-MCNC: 11.1 G/DL (ref 11.9–15.1)
HGB UR QL STRIP.AUTO: ABNORMAL
KETONES UR STRIP-MCNC: NEGATIVE MG/DL
LEUKOCYTE ESTERASE UR QL STRIP: ABNORMAL
MCH RBC QN AUTO: 34.4 PG (ref 25.2–33.5)
MCHC RBC AUTO-ENTMCNC: 31.4 G/DL (ref 28.4–34.8)
MCV RBC AUTO: 109.3 FL (ref 82.6–102.9)
NITRITE UR QL STRIP: NEGATIVE
NRBC BLD-RTO: 0.3 PER 100 WBC
PH UR STRIP: >=9 [PH] (ref 5–8)
PLATELET # BLD AUTO: 189 K/UL (ref 138–453)
PMV BLD AUTO: 12.4 FL (ref 8.1–13.5)
PROT UR STRIP-MCNC: ABNORMAL MG/DL
RBC # BLD AUTO: 3.23 M/UL (ref 3.95–5.11)
RBC #/AREA URNS HPF: ABNORMAL /HPF (ref 0–4)
SP GR UR STRIP: 1.01 (ref 1–1.03)
UROBILINOGEN UR STRIP-ACNC: NORMAL EU/DL (ref 0–1)
WBC #/AREA URNS HPF: ABNORMAL /HPF (ref 0–5)
WBC OTHER # BLD: 7.5 K/UL (ref 3.5–11.3)

## 2024-11-21 PROCEDURE — 85027 COMPLETE CBC AUTOMATED: CPT

## 2024-11-21 PROCEDURE — 36415 COLL VENOUS BLD VENIPUNCTURE: CPT

## 2024-11-21 PROCEDURE — P9603 ONE-WAY ALLOW PRORATED MILES: HCPCS

## 2024-11-22 LAB
MICROORGANISM SPEC CULT: ABNORMAL
SERVICE CMNT-IMP: ABNORMAL
SPECIMEN DESCRIPTION: ABNORMAL

## 2024-12-27 NOTE — FLOWSHEET NOTE
December 27, 2024     Patient: Darby Alexander   YOB: 1954   Date of Visit: 12/27/2024       To Whom it May Concern:    Darby Alexander was seen in my clinic on 12/27/2024 at 9:20 am.    Please excuse Darby for her absence from work on the date listed above to be able to make her appointment.    Patient can return to work with no restrictions starting Jan 2, 2025 with no restrictions.  If return to work time needs to be adjusted/modified, patient is advised to schedule for another appointment for re-evaluation.    Sincerely,         Evens Becerra,     Medical information is confidential and cannot be disclosed without the written consent of the patient or her representative.     Updated pt's family, all questions answered.     Tanesha GOMEZ

## 2025-01-12 ENCOUNTER — HOSPITAL ENCOUNTER (OUTPATIENT)
Age: 80
Setting detail: SPECIMEN
Discharge: HOME | End: 2025-01-12

## 2025-01-12 LAB
BACTERIA URNS QL MICRO: ABNORMAL
BILIRUB UR QL STRIP: NEGATIVE
CLARITY UR: ABNORMAL
COLOR UR: ABNORMAL
CRYSTALS URNS MICRO: ABNORMAL /HPF
CRYSTALS URNS MICRO: ABNORMAL /HPF
EPI CELLS #/AREA URNS HPF: ABNORMAL /HPF (ref 0–5)
GLUCOSE UR STRIP-MCNC: NEGATIVE MG/DL
HGB UR QL STRIP.AUTO: NEGATIVE
KETONES UR STRIP-MCNC: NEGATIVE MG/DL
LEUKOCYTE ESTERASE UR QL STRIP: ABNORMAL
NITRITE UR QL STRIP: NEGATIVE
PH UR STRIP: >9 [PH] (ref 5–8)
PROT UR STRIP-MCNC: ABNORMAL MG/DL
RBC #/AREA URNS HPF: ABNORMAL /HPF (ref 0–2)
SP GR UR STRIP: 1.02 (ref 1–1.03)
UROBILINOGEN UR STRIP-ACNC: NORMAL EU/DL (ref 0–1)
WBC #/AREA URNS HPF: ABNORMAL /HPF (ref 0–5)

## 2025-01-12 PROCEDURE — 81001 URINALYSIS AUTO W/SCOPE: CPT

## 2025-01-12 PROCEDURE — 87086 URINE CULTURE/COLONY COUNT: CPT

## 2025-01-13 LAB
MICROORGANISM SPEC CULT: NORMAL
SERVICE CMNT-IMP: NORMAL
SPECIMEN DESCRIPTION: NORMAL

## 2025-03-05 ENCOUNTER — HOSPITAL ENCOUNTER (EMERGENCY)
Age: 80
Discharge: HOME | End: 2025-03-05

## 2025-03-05 ENCOUNTER — APPOINTMENT (OUTPATIENT)
Dept: CT IMAGING | Age: 80
End: 2025-03-05

## 2025-03-05 VITALS
RESPIRATION RATE: 13 BRPM | TEMPERATURE: 97.9 F | DIASTOLIC BLOOD PRESSURE: 124 MMHG | HEIGHT: 63 IN | BODY MASS INDEX: 28.53 KG/M2 | OXYGEN SATURATION: 100 % | HEART RATE: 71 BPM | WEIGHT: 161 LBS | SYSTOLIC BLOOD PRESSURE: 150 MMHG

## 2025-03-05 DIAGNOSIS — E04.1 THYROID NODULE: ICD-10-CM

## 2025-03-05 DIAGNOSIS — S09.90XA CLOSED HEAD INJURY, INITIAL ENCOUNTER: Primary | ICD-10-CM
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
## 2025-03-05 PROCEDURE — 99284 EMERGENCY DEPT VISIT MOD MDM: CPT

## 2025-03-05 PROCEDURE — 70450 CT HEAD/BRAIN W/O DYE: CPT

## 2025-03-05 PROCEDURE — 72125 CT NECK SPINE W/O DYE: CPT

## 2025-03-05 ASSESSMENT — ENCOUNTER SYMPTOMS
NAUSEA: 0
VOMITING: 0
SHORTNESS OF BREATH: 0
ABDOMINAL PAIN: 0

## 2025-03-05 ASSESSMENT — PAIN - FUNCTIONAL ASSESSMENT: PAIN_FUNCTIONAL_ASSESSMENT: 0-10

## 2025-03-05 ASSESSMENT — PAIN SCALES - GENERAL: PAINLEVEL_OUTOF10: 7

## 2025-03-05 NOTE — ED NOTES
Pt to ed via ems for fall that happed around 430 this AM at facility. Pt comes to ed from dialysis. Ems states pt received full dialysis treatment. Per ems pt had episode of hypotension and nausea and was given zofran. Pt not complaining of nausea at this time. Pt c/o neck and head pain following fall. Pt has small bump on back of head. Pt states she fell in shower this morning. Per ems dialysis staff states pt is more lethargic than normal. Pt a/o x4 at this time. Ems had c-collar in place but took it off. Pt rates pain 7/10. Pt placed on full cardiac monitor. Bed locked and in lowest position. Call light in reach.

## 2025-03-05 NOTE — ED PROVIDER NOTES
[facility] EMERGENCY DEPARTMENT  Emergency Department Encounter  Emergency Medicine Attending     Pt Name:Sheri Leigh  MRN: [id number]  Birthdate 1945  Date of evaluation: 3/5/25  PCP:  Jose Conner MD  Note Started: 11:54 AM EST      CHIEF COMPLAINT       Chief Complaint   Patient presents with    Fall    Head Injury    Neck Pain       HISTORY OF PRESENT ILLNESS  (Location/Symptom, Timing/Onset, Context/Setting, Quality, Duration, Modifying Factors, Severity.)      79-year-old female presenting for evaluation of fall.  Patient was in her bathroom showering and she bent over to pick something up and fell backwards and struck her head.  Afterwards she went to dialysis and had a full session and her facility decided to send her in for evaluation.  Patient endorses pain at the area of her scalp hematoma otherwise denies any pain.  No chest pain.  Denies any leg pain or arm pain.  No fevers or chills.            PAST MEDICAL / SURGICAL / SOCIAL / FAMILY HISTORY      has a past medical history of Anxiety and depression, Arthritis, Cancer (HCC), Cerebral artery occlusion with cerebral infarction (HCC), Chronic kidney disease, stage 5 (HCC), Diabetes mellitus (HCC), GERD (gastroesophageal reflux disease), Hearing loss, Hemodialysis patient, History of blood transfusion, Hyperlipidemia, Hypertension, Migraines, MRSA (methicillin resistant staph aureus) culture positive, Neuropathy, PAT (obstructive sleep apnea), Prolonged emergence from general anesthesia, PVD (peripheral vascular disease), SOB (shortness of breath), and Tinnitus.     has a past surgical history that includes Uvulopalatopharygoplasty; Mastectomy (Left); Hysterectomy; Appendectomy; back surgery; Mastectomy (Left, 05/14/2018); pr exc b9 lesion mrgn xcp sk tg t/a/l 0.5 cm/< (Right, 5/14/2018); Breast reduction surgery (Left, 1999); Colonoscopy; Insertable Cardiac Monitor; central line (1/29/2022); and IR TUNNELED CVC PLACE WO SQ PORT/PUMP >

## 2025-03-05 NOTE — ED NOTES
Writer spoke with Cari at [facility]. Cari updated on plan of care. All questions answered at this time.

## 2025-03-07 LAB
EKG ATRIAL RATE: 69 BPM
EKG P-R INTERVAL: 272 MS
EKG Q-T INTERVAL: 432 MS
EKG QRS DURATION: 88 MS
EKG QTC CALCULATION (BAZETT): 462 MS
EKG R AXIS: -40 DEGREES
EKG T AXIS: 75 DEGREES
EKG VENTRICULAR RATE: 69 BPM

## 2025-04-23 NOTE — CONSULTS
Received Rx fax from [facility] for Second Prescription Refill for Vitamin D, Ergocalciferol, 1.25 mg (50,000 units) capsule.  Spoke with Pharmacist Candelaria, she confirmed Pt has refills on file and there's one prescription ready for .   Renal Consult Note    Patient :  Silvina Peacock; 67 y.o. MRN# [id number]  Location:  2007/2007-01  Attending:  Jersey Mtz DO  Admit Date:  1/28/2022   Hospital Day: 1    Reason for Consult:     Asked by Dr Jersey Mtz DO to see for JANE/Elevated Creatinine. History Obtained From:     patient, non-family caregiver - Nurse, electronic medical record    HD Access:     previous permacat    HD Unit:      [facility]     Nephrologist:     Dr Jennie Ayoub Weight:     Unknown    Admission Weight:     77.1kg    History of Present Illness:     Silvina Peacock; 67 y.o. female with past medical history as mentioned below presented to the hospital with the chief complaint of feeling ill for the last 1 to 2 days. She missed her last 2 dialysis sessions because her port was not aspirating per the patient. In the ER she was hypertensive with blood pressures in the 826R systolic, had a fever of 409Q, and tested positive for covid. The patient did receive Vanco and cefepime in the ER due to concern for line infection however there is no redness tenderness or drainage at catheter exit site. Blood and urine cultures pending. Patient was also found to have COVID-19 infection positive. She did report some minimal nonproductive cough. The patient has been on dialysis for about 1 year. Her regular HD days are Monday, Wednesday, Friday at [facility] hemodialysis facility using right IJ under Dr Terry Sheets. She is unsure of her dry weight and when I called the facility to verify her dry weight, no one answered. Nephrology is consulted due to ESRD on HD. Past History/Allergies? Social History:     Past Medical History:   Diagnosis Date    Anxiety and depression     Arthritis     Rheumatoid     Cancer ([facility]     left breast cancer    Cerebral artery occlusion with cerebral infarction [facility]) jan 2020 & March 2020    poor balance since strokes    Chronic kidney disease, stage 5 (HCC)     hemodialysis Mon-    Diabetes mellitus ([facility]     GERD (gastroesophageal reflux disease)     Hearing loss     bilateral. Does not have hearing aides    Hemodialysis patient ([facility] [address] [facility]     M-W-F started in     History of blood transfusion     50 plus yrs ago with pregnancy    Hyperlipidemia     Hypertension     Migraines     MRSA (methicillin resistant staph aureus) culture positive     back    Neuropathy     PAT (obstructive sleep apnea)     had UPPP  \"have not used machine in years\"    Prolonged emergence from general anesthesia     [facility]     SOB (shortness of breath)     \"long distance\"    Tinnitus      Past Surgical History:   Procedure Laterality Date    APPENDECTOMY      BACK SURGERY      I and D lower back MRSA    BREAST REDUCTION SURGERY Left     COLONOSCOPY      HYSTERECTOMY      INSERTABLE CARDIAC MONITOR      MASTECTOMY Left     lymph nodes removed    MASTECTOMY Left 2018    axillary mastectomy    NJ EXC SKIN Tj Sellar <5MM TRUNK,ARM,LEG Right 2018    RIGHT AXILLARY MASTECTOMY performed by Leah Marie DO at [facility]         No Known Allergies    Social History     Socioeconomic History    Marital status:      Spouse name: Not on file    Number of children: Not on file    Years of education: Not on file    Highest education level: Not on file   Occupational History    Not on file   Tobacco Use    Smoking status: Former Smoker     Quit date:      Years since quittin.0    Smokeless tobacco: Never Used   Vaping Use    Vaping Use: Never used   Substance and Sexual Activity    Alcohol use: Yes     Comment: occasionally  \"couple a month\"    Drug use: No    Sexual activity: Not on file   Other Topics Concern    Not on file   Social History Narrative    Not on file     Social Determinants of Health     Financial Resource Strain:     Difficulty of Paying Living Expenses: Not on file Food Insecurity:     Worried About Running Out of Food in the Last Year: Not on file    Caleb of Food in the Last Year: Not on file   Transportation Needs:     Lack of Transportation (Medical): Not on file    Lack of Transportation (Non-Medical): Not on file   Physical Activity:     Days of Exercise per Week: Not on file    Minutes of Exercise per Session: Not on file   Stress:     Feeling of Stress : Not on file   Social Connections:     Frequency of Communication with Friends and Family: Not on file    Frequency of Social Gatherings with Friends and Family: Not on file    Attends Catholic Services: Not on file    Active Member of [address] [place] [address] [employer] or Organizations: Not on file    Attends Club or Organization Meetings: Not on file    Marital Status: Not on file   Intimate Partner Violence:     Fear of Current or Ex-Partner: Not on file    Emotionally Abused: Not on file    Physically Abused: Not on file    Sexually Abused: Not on file   Housing Stability:     Unable to Pay for Housing in the Last Year: Not on file    Number of [place] in the Last Year: Not on file    Unstable Housing in the Last Year: Not on file       Family History:      None reported.     Outpatient Medications:     Medications Prior to Admission: calcitRIOL (ROCALTROL) 0.25 MCG capsule, Take 0.25 mcg by mouth daily  sevelamer (RENVELA) 800 MG tablet, Take 1 tablet by mouth 3 times daily (with meals)  clopidogrel (PLAVIX) 75 MG tablet, Take 75 mg by mouth daily  enalapril (VASOTEC) 5 MG tablet, Take 5 mg by mouth daily  metoprolol (LOPRESSOR) 100 MG tablet, Take 100 mg by mouth daily  NIFEdipine (PROCARDIA XL) 60 MG extended release tablet, Take 60 mg by mouth daily  pantoprazole (PROTONIX) 40 MG tablet, Take 40 mg by mouth daily  cloNIDine (CATAPRES) 0.1 MG/24HR PTWK, Place 1 patch onto the skin once a week Indications: Sundays Sundays  hydrALAZINE (APRESOLINE) 100 MG tablet, Take 100 mg by mouth 3 times daily  rosuvastatin (CRESTOR) 5 MG tablet, Take 5 mg by mouth daily  citalopram (CELEXA) 20 MG tablet, Take 40 mg by mouth daily Takes 2 tabs (=40mg) daily  ondansetron (ZOFRAN) 4 MG tablet, Take 1 tablet by mouth every 6 hours as needed for Nausea or Vomiting    Current Medications:     Scheduled Meds:    sodium chloride flush  5-40 mL IntraVENous 2 times per day    heparin (porcine)  5,000 Units SubCUTAneous 3 times per day    enalapril  5 mg Oral Daily    NIFEdipine  60 mg Oral Daily     Continuous Infusions:    sodium chloride       PRN Meds:  sodium chloride flush, sodium chloride, ondansetron **OR** ondansetron, acetaminophen **OR** acetaminophen, hydrALAZINE    Review of Systems:     Constitutional: +fever, +chills, no lethargy, +weakness. HEENT:  No headache, otalgia, itchy eyes, nasal discharge or sore throat. Cardiac:  No chest pain, dyspnea, orthopnea or PND. Chest:   Positive cough, no phlegm or wheezing. Abdomen:  No abdominal pain, nausea or vomiting. Neuro:  No focal weakness, abnormal movements orseizure like activity. Skin:   No rashes, no itching. :   No hematuria, no pyuria, no dysuria, no flank pain. Extremities:  No swelling or joint pains. ROS was otherwise negative except as mentioned in the [address] Input/Output:     I/O last 3 completed shifts:  In: -   Out: 150 [Urine:150]      Vital Signs:   Temperature:  Temp: 99 °F (37.2 °C)  TMax:   Temp (24hrs), Av.2 °F (37.9 °C), Min:99 °F (37.2 °C), Max:103 °F (39.4 °C)    Respirations:  Resp: 22  Pulse:   Pulse: 70  BP:    BP: (!) 153/69  BP Range: Systolic (73AMQ), RGP:986 , Min:152 , JFQ:763       Diastolic (79SWN), IAF:92, Min:69, Max:102      Physical Examination:     General:  AAO x 3, speaking in full sentences, no accessory muscle use. HEENT: Atraumatic, normocephalic. Neck:   No JVD, no thyromegaly, no lymphadenopathy. Chest:   Crackles noted in the bases, no  wheezes.     Right IJ, dressing clean, dry, intact  Cardiac:  S1 S2 RR, no murmurs, gallops or rubs, JVP not raised. Abdomen: Soft, non-tender, no masses or organomegaly, BS audible. :   Salter in place, urine yellow/clear  Neuro:  AAO x 3, No FND. SKIN:  No rashes, good skin turgor. Extremities:  Trace bilateral lower extremity edema, no calf tenderness. Labs:       Recent Labs     01/28/22 2110 01/29/22  0652   WBC 9.4 9.9   RBC 3.95 3.87*   HGB 11.5* 11.3*   HCT 37.5 35.4*   MCV 94.9 91.5   MCH 29.1 29.2   MCHC 30.7 31.9   RDW 13.3 13.6   PLT See Reflexed IPF Result 177   MPV NOT REPORTED 12.2      BMP:   Recent Labs     01/28/22 2106 01/28/22 2110 01/29/22  0652   NA  --  140 136   K  --  3.6* 4.1   CL  --  105 105   CO2  --  16* 13*   BUN  --  55* 56*   CREATININE 6.33* 5.14* 5.13*   GLUCOSE  --  105* 178*   CALCIUM  --  8.8 8.5*      Albumin:    Recent Labs     01/28/22 2110 01/29/22  0652   LABALBU 4.2 3.5     Urinalysis/Chemistries:      Lab Results   Component Value Date    NITRU NEGATIVE 01/28/2022    COLORU Yellow 01/28/2022    PHUR 6.5 01/28/2022    WBCUA 2 TO 5 01/28/2022    RBCUA 0 TO 2 01/28/2022    MUCUS 1+ 01/28/2022    TRICHOMONAS NOT REPORTED 01/28/2022    YEAST NOT REPORTED 01/28/2022    BACTERIA FEW 01/28/2022    SPECGRAV 1.016 01/28/2022    LEUKOCYTESUR TRACE 01/28/2022    UROBILINOGEN Normal 01/28/2022    BILIRUBINUR NEGATIVE 01/28/2022    GLUCOSEU NEGATIVE 01/28/2022    KETUA NEGATIVE 01/28/2022    AMORPHOUS NOT REPORTED 01/28/2022       Radiology:     XR CHEST PORTABLE     Result Date: 1/28/2022  EXAMINATION: ONE XRAY VIEW OF THE CHEST 1/28/2022 9:42 pm COMPARISON: 11/08/2021 HISTORY: ORDERING SYSTEM PROVIDED HISTORY: missed dialysis TECHNOLOGIST PROVIDED HISTORY: missed dialysis FINDINGS: Cardiomediastinal silhouette is unchanged in size. MediPort and central venous catheter are unchanged in position. Suspected small left pleural effusion. No pneumothorax. There is interstitial prominence with faint bibasilar pulmonary opacity.   No acute osseous abnormality.      Prominent interstitium and bilateral pulmonary opacities, concerning for edema or pneumonia. Assessment:     1. ESRD on Hemodialysis. His regular HD days are Monday, Wednesday, Friday at [facility] hemodialysis facility using right IJ under Dr Desmond Pires. Her dry weight is unknown Admitted with 77.1kg  2. Anemia of chronic disease  3. Secondary hyperparathyroidism  4. Hypertension  5. Covid 19 infection. 6.  Metabolic acidosis. 7.  Hypokalemia-improved. Plan:   1. HD today and then per Monday, Wednesday, Friday schedule. 2.  If the catheter is not accessible, will try Cathflo and if it does not help then will require tunneled catheter exchange versus Andrea catheter placement for dialysis today. 3. Strict Input and Output, Daily weigh and document in the chart. 4. Low Potassium, Low phosphorus and low salt diet. Fluids to be restricted to 1500ml/day. 5. IV Aranesp/Epogen for anemia of chronic disease with HD weekly. 6. IV Zemplar per protocol for secondary hyperparathyroidism with HD thrice a week. 7.  BMP in AM.  8.  Will follow. Nutrition   Please ensure that patient is on a renal diet/TF. Thank you for the consultation. Please do not hesitate to contact us for any further questions/concerns. We will continue to follow along with you. Electronically signed by LEYDI Shafer CNP on 1/29/2022 at 8:35 AM    Attending Physician Statement  I have discussed the care of this patient, including pertinent history and exam findings, with the Resident/CNP. I have reviewed and edited the key elements of all parts of the encounter with the Resident/CNP. I agree with the assessment, plan and orders as documented by the Resident/CNP. Mark Pollock MD   Nephrology [address] [place] [address] Drive    This note is created with the assistance of a speech-recognition program. While intending to generate a document that actually reflects the content of the visit, no guarantees can be provided that every mistake has been identified and corrected by editing.

## (undated) DEVICE — GLOVE SURG SZ 7 L12IN FNGR THK87MIL WHT LTX FREE

## (undated) DEVICE — SUTURE ETHLN SZ 3-0 L18IN NONABSORBABLE BLK PS-2 L19MM 3/8 1669H

## (undated) DEVICE — SYRINGE, LUER LOCK, 10ML: Brand: MEDLINE

## (undated) DEVICE — GLOVE SURG SZ 65 L12IN FNGR THK87MIL WHT LTX FREE

## (undated) DEVICE — GARMENT,MEDLINE,DVT,INT,CALF,MED, GEN2: Brand: MEDLINE

## (undated) DEVICE — SURGICAL PROCEDURE KIT BASIN MAJ SET UP

## (undated) DEVICE — INTENDED FOR TISSUE SEPARATION, AND OTHER PROCEDURES THAT REQUIRE A SHARP SURGICAL BLADE TO PUNCTURE OR CUT.: Brand: BARD-PARKER ® CARBON RIB-BACK BLADES

## (undated) DEVICE — GLOVE SURG SZ 75 L12IN FNGR THK87MIL DK GRN LTX FREE ISOLEX

## (undated) DEVICE — GOWN,AURORA,NONRNF,XL,30/CS: Brand: MEDLINE

## (undated) DEVICE — DRAPE,REIN 53X77,STERILE: Brand: MEDLINE

## (undated) DEVICE — 3M™ WARMING BLANKET, LOWER BODY, 10 PER CASE, 42568: Brand: BAIR HUGGER™

## (undated) DEVICE — TUBING, SUCTION, 1/4" X 12', STRAIGHT: Brand: MEDLINE

## (undated) DEVICE — TOWEL,OR,DSP,ST,BLUE,STD,4/PK,20PK/CS: Brand: MEDLINE

## (undated) DEVICE — SKIN AFFIX SURG ADHESIVE 72/CS 0.55ML: Brand: MEDLINE

## (undated) DEVICE — DISCONTINUED USE 405792 GLOVE SURG SENSICARE ALOE LT LF PF ST GRN SZ 7

## (undated) DEVICE — PAD,ABDOMINAL,5"X9",ST,LF,25/BX: Brand: MEDLINE INDUSTRIES, INC.

## (undated) DEVICE — Z DISCONTINUED BY MEDLINE USE 2271199 TRAY PREP WET 4% CHG SCRB SOL

## (undated) DEVICE — ADHESIVE SKIN CLSR 0.7ML TOP DERMBND ADV

## (undated) DEVICE — YANKAUER,FLEXIBLE HANDLE,REGLR CAPACITY: Brand: MEDLINE INDUSTRIES, INC.

## (undated) DEVICE — GLOVE SURG SZ 75 L12IN FNGR THK87MIL WHT LTX FREE

## (undated) DEVICE — GOWN,SIRUS,NON REINFRCD,LARGE,SET IN SL: Brand: MEDLINE